# Patient Record
Sex: FEMALE | Race: WHITE | NOT HISPANIC OR LATINO | Employment: OTHER | URBAN - METROPOLITAN AREA
[De-identification: names, ages, dates, MRNs, and addresses within clinical notes are randomized per-mention and may not be internally consistent; named-entity substitution may affect disease eponyms.]

---

## 2017-02-21 RX ORDER — PRASUGREL 10 MG/1
10 TABLET, FILM COATED ORAL DAILY
Qty: 30 TABLET | Refills: 4 | Status: SHIPPED | OUTPATIENT
Start: 2017-02-21 | End: 2018-05-11 | Stop reason: HOSPADM

## 2017-05-08 ENCOUNTER — OFFICE VISIT (OUTPATIENT)
Dept: CARDIOLOGY | Facility: CLINIC | Age: 57
End: 2017-05-08

## 2017-05-08 VITALS
HEART RATE: 61 BPM | WEIGHT: 185 LBS | BODY MASS INDEX: 34.04 KG/M2 | HEIGHT: 62 IN | SYSTOLIC BLOOD PRESSURE: 157 MMHG | DIASTOLIC BLOOD PRESSURE: 82 MMHG

## 2017-05-08 DIAGNOSIS — I10 ESSENTIAL HYPERTENSION: Primary | ICD-10-CM

## 2017-05-08 DIAGNOSIS — I25.10 CORONARY ARTERY DISEASE INVOLVING NATIVE CORONARY ARTERY OF NATIVE HEART WITHOUT ANGINA PECTORIS: ICD-10-CM

## 2017-05-08 PROCEDURE — 99213 OFFICE O/P EST LOW 20 MIN: CPT | Performed by: INTERNAL MEDICINE

## 2017-05-08 RX ORDER — BUDESONIDE AND FORMOTEROL FUMARATE DIHYDRATE 160; 4.5 UG/1; UG/1
2 AEROSOL RESPIRATORY (INHALATION) 2 TIMES DAILY PRN
Refills: 0 | COMMUNITY
Start: 2017-05-06 | End: 2022-02-02

## 2017-05-08 RX ORDER — BISOPROLOL FUMARATE 5 MG/1
2.5 TABLET, FILM COATED ORAL DAILY
Qty: 45 TABLET | Refills: 3 | Status: SHIPPED | OUTPATIENT
Start: 2017-05-08 | End: 2017-07-28 | Stop reason: SDUPTHER

## 2017-05-08 RX ORDER — ALBUTEROL SULFATE 90 UG/1
1 AEROSOL, METERED RESPIRATORY (INHALATION) EVERY 4 HOURS PRN
Refills: 1 | COMMUNITY
Start: 2017-05-06 | End: 2023-02-17

## 2017-07-28 RX ORDER — BISOPROLOL FUMARATE 5 MG/1
2.5 TABLET, FILM COATED ORAL DAILY
Qty: 45 TABLET | Refills: 0 | Status: SHIPPED | OUTPATIENT
Start: 2017-07-28 | End: 2018-01-18 | Stop reason: SDUPTHER

## 2017-11-10 ENCOUNTER — TELEPHONE (OUTPATIENT)
Dept: CARDIOLOGY | Facility: CLINIC | Age: 57
End: 2017-11-10

## 2018-01-18 RX ORDER — BISOPROLOL FUMARATE 5 MG/1
2.5 TABLET, FILM COATED ORAL DAILY
Qty: 45 TABLET | Refills: 0 | Status: ON HOLD | OUTPATIENT
Start: 2018-01-18 | End: 2018-05-11

## 2018-01-18 RX ORDER — NITROGLYCERIN 0.4 MG/1
0.4 TABLET SUBLINGUAL
Qty: 25 TABLET | Refills: 3 | Status: ON HOLD | OUTPATIENT
Start: 2018-01-18 | End: 2018-05-11

## 2018-05-10 ENCOUNTER — HOSPITAL ENCOUNTER (OUTPATIENT)
Facility: HOSPITAL | Age: 58
Setting detail: OBSERVATION
Discharge: HOME | End: 2018-05-11
Attending: EMERGENCY MEDICINE | Admitting: INTERNAL MEDICINE

## 2018-05-10 ENCOUNTER — APPOINTMENT (OUTPATIENT)
Dept: GENERAL RADIOLOGY | Facility: HOSPITAL | Age: 58
End: 2018-05-10

## 2018-05-10 DIAGNOSIS — R07.9 CHEST PAIN, UNSPECIFIED TYPE: Primary | ICD-10-CM

## 2018-05-10 LAB
ALBUMIN SERPL-MCNC: 4.2 G/DL (ref 3.5–5.2)
ALBUMIN/GLOB SERPL: 1.4 G/DL
ALP SERPL-CCNC: 95 U/L (ref 39–117)
ALT SERPL W P-5'-P-CCNC: 33 U/L (ref 1–33)
ANION GAP SERPL CALCULATED.3IONS-SCNC: 12.9 MMOL/L
AST SERPL-CCNC: 23 U/L (ref 1–32)
BASOPHILS # BLD AUTO: 0.05 10*3/MM3 (ref 0–0.2)
BASOPHILS NFR BLD AUTO: 0.5 % (ref 0–1.5)
BILIRUB SERPL-MCNC: 0.3 MG/DL (ref 0.1–1.2)
BUN BLD-MCNC: 11 MG/DL (ref 6–20)
BUN/CREAT SERPL: 13.6 (ref 7–25)
CALCIUM SPEC-SCNC: 9.5 MG/DL (ref 8.6–10.5)
CHLORIDE SERPL-SCNC: 101 MMOL/L (ref 98–107)
CO2 SERPL-SCNC: 26.1 MMOL/L (ref 22–29)
CREAT BLD-MCNC: 0.81 MG/DL (ref 0.57–1)
DEPRECATED RDW RBC AUTO: 45.7 FL (ref 37–54)
EOSINOPHIL # BLD AUTO: 0.25 10*3/MM3 (ref 0–0.7)
EOSINOPHIL NFR BLD AUTO: 2.6 % (ref 0.3–6.2)
ERYTHROCYTE [DISTWIDTH] IN BLOOD BY AUTOMATED COUNT: 14.5 % (ref 11.7–13)
GFR SERPL CREATININE-BSD FRML MDRD: 72 ML/MIN/1.73
GLOBULIN UR ELPH-MCNC: 2.9 GM/DL
GLUCOSE BLD-MCNC: 243 MG/DL (ref 65–99)
GLUCOSE BLDC GLUCOMTR-MCNC: 210 MG/DL (ref 70–130)
GLUCOSE BLDC GLUCOMTR-MCNC: 273 MG/DL (ref 70–130)
HCT VFR BLD AUTO: 40.2 % (ref 35.6–45.5)
HGB BLD-MCNC: 12.6 G/DL (ref 11.9–15.5)
HOLD SPECIMEN: NORMAL
HOLD SPECIMEN: NORMAL
IMM GRANULOCYTES # BLD: 0.02 10*3/MM3 (ref 0–0.03)
IMM GRANULOCYTES NFR BLD: 0.2 % (ref 0–0.5)
LYMPHOCYTES # BLD AUTO: 2.18 10*3/MM3 (ref 0.9–4.8)
LYMPHOCYTES NFR BLD AUTO: 22.9 % (ref 19.6–45.3)
MAGNESIUM SERPL-MCNC: 2.2 MG/DL (ref 1.6–2.6)
MCH RBC QN AUTO: 27 PG (ref 26.9–32)
MCHC RBC AUTO-ENTMCNC: 31.3 G/DL (ref 32.4–36.3)
MCV RBC AUTO: 86.3 FL (ref 80.5–98.2)
MONOCYTES # BLD AUTO: 1.09 10*3/MM3 (ref 0.2–1.2)
MONOCYTES NFR BLD AUTO: 11.4 % (ref 5–12)
NEUTROPHILS # BLD AUTO: 5.97 10*3/MM3 (ref 1.9–8.1)
NEUTROPHILS NFR BLD AUTO: 62.6 % (ref 42.7–76)
PLATELET # BLD AUTO: 321 10*3/MM3 (ref 140–500)
PMV BLD AUTO: 10.4 FL (ref 6–12)
POTASSIUM BLD-SCNC: 4.3 MMOL/L (ref 3.5–5.2)
PROT SERPL-MCNC: 7.1 G/DL (ref 6–8.5)
RBC # BLD AUTO: 4.66 10*6/MM3 (ref 3.9–5.2)
SODIUM BLD-SCNC: 140 MMOL/L (ref 136–145)
TROPONIN T SERPL-MCNC: <0.01 NG/ML (ref 0–0.03)
WBC NRBC COR # BLD: 9.54 10*3/MM3 (ref 4.5–10.7)
WHOLE BLOOD HOLD SPECIMEN: NORMAL
WHOLE BLOOD HOLD SPECIMEN: NORMAL

## 2018-05-10 PROCEDURE — 80053 COMPREHEN METABOLIC PANEL: CPT | Performed by: EMERGENCY MEDICINE

## 2018-05-10 PROCEDURE — 71046 X-RAY EXAM CHEST 2 VIEWS: CPT

## 2018-05-10 PROCEDURE — 93005 ELECTROCARDIOGRAM TRACING: CPT | Performed by: EMERGENCY MEDICINE

## 2018-05-10 PROCEDURE — 94799 UNLISTED PULMONARY SVC/PX: CPT

## 2018-05-10 PROCEDURE — G0378 HOSPITAL OBSERVATION PER HR: HCPCS

## 2018-05-10 PROCEDURE — 93010 ELECTROCARDIOGRAM REPORT: CPT | Performed by: INTERNAL MEDICINE

## 2018-05-10 PROCEDURE — 99219 PR INITIAL OBSERVATION CARE/DAY 50 MINUTES: CPT | Performed by: INTERNAL MEDICINE

## 2018-05-10 PROCEDURE — 25010000002 ENOXAPARIN PER 10 MG: Performed by: INTERNAL MEDICINE

## 2018-05-10 PROCEDURE — 93005 ELECTROCARDIOGRAM TRACING: CPT | Performed by: INTERNAL MEDICINE

## 2018-05-10 PROCEDURE — 63710000001 INSULIN ASPART PER 5 UNITS: Performed by: HOSPITALIST

## 2018-05-10 PROCEDURE — 25010000002 HYDRALAZINE PER 20 MG: Performed by: INTERNAL MEDICINE

## 2018-05-10 PROCEDURE — 94640 AIRWAY INHALATION TREATMENT: CPT

## 2018-05-10 PROCEDURE — 93005 ELECTROCARDIOGRAM TRACING: CPT

## 2018-05-10 PROCEDURE — 96375 TX/PRO/DX INJ NEW DRUG ADDON: CPT

## 2018-05-10 PROCEDURE — 84484 ASSAY OF TROPONIN QUANT: CPT | Performed by: EMERGENCY MEDICINE

## 2018-05-10 PROCEDURE — 85025 COMPLETE CBC W/AUTO DIFF WBC: CPT

## 2018-05-10 PROCEDURE — 82962 GLUCOSE BLOOD TEST: CPT

## 2018-05-10 PROCEDURE — 25010000002 MORPHINE PER 10 MG: Performed by: NURSE PRACTITIONER

## 2018-05-10 PROCEDURE — 96376 TX/PRO/DX INJ SAME DRUG ADON: CPT

## 2018-05-10 PROCEDURE — 99284 EMERGENCY DEPT VISIT MOD MDM: CPT

## 2018-05-10 PROCEDURE — 96372 THER/PROPH/DIAG INJ SC/IM: CPT

## 2018-05-10 PROCEDURE — 25010000002 PROMETHAZINE PER 50 MG: Performed by: HOSPITALIST

## 2018-05-10 PROCEDURE — 25010000002 ONDANSETRON PER 1 MG: Performed by: NURSE PRACTITIONER

## 2018-05-10 PROCEDURE — 83735 ASSAY OF MAGNESIUM: CPT | Performed by: NURSE PRACTITIONER

## 2018-05-10 RX ORDER — ONDANSETRON 2 MG/ML
4 INJECTION INTRAMUSCULAR; INTRAVENOUS ONCE
Status: COMPLETED | OUTPATIENT
Start: 2018-05-10 | End: 2018-05-10

## 2018-05-10 RX ORDER — PREGABALIN 75 MG/1
75 CAPSULE ORAL NIGHTLY
Status: DISCONTINUED | OUTPATIENT
Start: 2018-05-10 | End: 2018-05-11 | Stop reason: HOSPADM

## 2018-05-10 RX ORDER — CHLORAL HYDRATE 500 MG
1000 CAPSULE ORAL
Status: ON HOLD | COMMUNITY
End: 2018-11-17

## 2018-05-10 RX ORDER — ALBUTEROL SULFATE 2.5 MG/3ML
2.5 SOLUTION RESPIRATORY (INHALATION)
Status: DISCONTINUED | OUTPATIENT
Start: 2018-05-10 | End: 2018-05-11 | Stop reason: HOSPADM

## 2018-05-10 RX ORDER — PIOGLITAZONEHYDROCHLORIDE 30 MG/1
30 TABLET ORAL DAILY
Status: DISCONTINUED | OUTPATIENT
Start: 2018-05-10 | End: 2018-05-11

## 2018-05-10 RX ORDER — DEXTROSE MONOHYDRATE 25 G/50ML
25 INJECTION, SOLUTION INTRAVENOUS
Status: DISCONTINUED | OUTPATIENT
Start: 2018-05-10 | End: 2018-05-11 | Stop reason: HOSPADM

## 2018-05-10 RX ORDER — ATORVASTATIN CALCIUM 10 MG/1
10 TABLET, FILM COATED ORAL DAILY
Status: DISCONTINUED | OUTPATIENT
Start: 2018-05-10 | End: 2018-05-11

## 2018-05-10 RX ORDER — NICOTINE POLACRILEX 4 MG
15 LOZENGE BUCCAL
Status: DISCONTINUED | OUTPATIENT
Start: 2018-05-10 | End: 2018-05-11 | Stop reason: HOSPADM

## 2018-05-10 RX ORDER — ASPIRIN 81 MG/1
81 TABLET ORAL DAILY
Status: DISCONTINUED | OUTPATIENT
Start: 2018-05-11 | End: 2018-05-11 | Stop reason: HOSPADM

## 2018-05-10 RX ORDER — FLUOXETINE HYDROCHLORIDE 20 MG/1
20 CAPSULE ORAL DAILY
Status: DISCONTINUED | OUTPATIENT
Start: 2018-05-10 | End: 2018-05-11 | Stop reason: HOSPADM

## 2018-05-10 RX ORDER — HYDRALAZINE HYDROCHLORIDE 20 MG/ML
10 INJECTION INTRAMUSCULAR; INTRAVENOUS EVERY 4 HOURS PRN
Status: DISCONTINUED | OUTPATIENT
Start: 2018-05-10 | End: 2018-05-11 | Stop reason: HOSPADM

## 2018-05-10 RX ORDER — SODIUM CHLORIDE 0.9 % (FLUSH) 0.9 %
10 SYRINGE (ML) INJECTION AS NEEDED
Status: DISCONTINUED | OUTPATIENT
Start: 2018-05-10 | End: 2018-05-11 | Stop reason: HOSPADM

## 2018-05-10 RX ORDER — SODIUM CHLORIDE 0.9 % (FLUSH) 0.9 %
1-10 SYRINGE (ML) INJECTION AS NEEDED
Status: DISCONTINUED | OUTPATIENT
Start: 2018-05-10 | End: 2018-05-11 | Stop reason: HOSPADM

## 2018-05-10 RX ORDER — PROMETHAZINE HYDROCHLORIDE 25 MG/ML
12.5 INJECTION, SOLUTION INTRAMUSCULAR; INTRAVENOUS EVERY 4 HOURS PRN
Status: DISCONTINUED | OUTPATIENT
Start: 2018-05-10 | End: 2018-05-11 | Stop reason: HOSPADM

## 2018-05-10 RX ORDER — BISOPROLOL FUMARATE 5 MG/1
2.5 TABLET, FILM COATED ORAL DAILY
Status: DISCONTINUED | OUTPATIENT
Start: 2018-05-10 | End: 2018-05-11 | Stop reason: HOSPADM

## 2018-05-10 RX ADMIN — PROMETHAZINE HYDROCHLORIDE 12.5 MG: 25 INJECTION INTRAMUSCULAR; INTRAVENOUS at 22:06

## 2018-05-10 RX ADMIN — FLUOXETINE HYDROCHLORIDE 20 MG: 20 CAPSULE ORAL at 18:44

## 2018-05-10 RX ADMIN — BISOPROLOL FUMARATE 2.5 MG: 5 TABLET ORAL at 18:44

## 2018-05-10 RX ADMIN — INSULIN ASPART 4 UNITS: 100 INJECTION, SOLUTION INTRAVENOUS; SUBCUTANEOUS at 21:38

## 2018-05-10 RX ADMIN — ALBUTEROL SULFATE 2.5 MG: 2.5 SOLUTION RESPIRATORY (INHALATION) at 20:45

## 2018-05-10 RX ADMIN — ONDANSETRON 4 MG: 2 INJECTION INTRAMUSCULAR; INTRAVENOUS at 16:42

## 2018-05-10 RX ADMIN — ONDANSETRON 4 MG: 2 INJECTION INTRAMUSCULAR; INTRAVENOUS at 15:01

## 2018-05-10 RX ADMIN — ATORVASTATIN CALCIUM 10 MG: 10 TABLET, FILM COATED ORAL at 18:44

## 2018-05-10 RX ADMIN — METFORMIN HYDROCHLORIDE 1000 MG: 1000 TABLET ORAL at 18:44

## 2018-05-10 RX ADMIN — MORPHINE SULFATE 4 MG: 4 INJECTION, SOLUTION INTRAMUSCULAR; INTRAVENOUS at 15:02

## 2018-05-10 RX ADMIN — ALBUTEROL SULFATE 2.5 MG: 2.5 SOLUTION RESPIRATORY (INHALATION) at 23:19

## 2018-05-10 RX ADMIN — PREGABALIN 75 MG: 75 CAPSULE ORAL at 21:49

## 2018-05-10 RX ADMIN — Medication 10 MG: at 22:05

## 2018-05-10 RX ADMIN — PIOGLITAZONE 30 MG: 30 TABLET ORAL at 18:44

## 2018-05-10 RX ADMIN — ENOXAPARIN SODIUM 40 MG: 40 INJECTION SUBCUTANEOUS at 18:44

## 2018-05-10 NOTE — ED PROVIDER NOTES
"  EMERGENCY DEPARTMENT ENCOUNTER    CHIEF COMPLAINT  Chief Complaint: Chest pain   History given by:pt  History limited by:N/A  Room Number: 33/33  PMD: Ezio Patterson MD      HPI:  Pt is a 57 y.o. female who presents with intermittent episodes of mid-sternal chest pain [described as pressure] that has been ongoing for approximately 1 week. Pt states she last experienced an episode of CP earlier this morning and reported radiation of pain into the LUE [today's episode was the first time pt experienced radiation of pain into the LUE]. Patient also complains of nausea, near syncope, dizziness, and mild SOA.  Patient denies diaphoresis, vomiting, or any other pertinent symptoms. Pt denies any recent stress tests being performed. Pt did however see her PCP yesterday, but failed to mention the ongoing CP. Pt takes ASA daily and reports that EMS administered 3 nitroglycerin pills en route to the ER. Pt states she did experience relief after medication was administered. Pt denies being on any other anticoagulating medications at this time. On evaluation, pt is still experiencing CP and rates the current pain as 3/10 [the pain is not currently radiating into the LUE and is staying localized at mid-sternal region].  Past Medical History of previous abnormal stress test, CAD, diabetes, angina, asthma, obesity, previous cardiac cath, and Coronary stent placement.     Duration: 1 week  Timing:intermittent episodes   Location:mid-sternal  Radiation:experienced one episode of CP with radiation into the LUE  Quality:\"pain described as a pressure\"  Intensity/Severity:current pain is 3/10  Progression:unchanged   Associated Symptoms:nausea, dizziness, near syncope, mild SOA  Aggravating Factors:None reported   Alleviating Factors:None reported   Previous Episodes:Pt has an extensive cardiac hx.  Treatment before arrival:Pt takes ASA daily and reports EMS administering 3 nitroglycerin pills en route to the ER. Pt states she " did experience relief after medication was administered.     PAST MEDICAL HISTORY  Active Ambulatory Problems     Diagnosis Date Noted   • Chest pain 01/28/2016   • Shortness of breath 01/28/2016   • Syncope and collapse 01/28/2016   • Weakness 01/28/2016   • Coronary artery disease 01/28/2016   • Soft tissue lesion of shoulder region 04/22/2015   • Disorder of rotator cuff 06/10/2015   • Shoulder pain 04/01/2015   • Rotator cuff tear arthropathy 06/10/2015   • Unstable angina pectoris 10/01/2015   • Injury of kidney 07/01/2016   • Type 2 diabetes mellitus 07/01/2016   • Hypertension 07/01/2016   • Hypotension 07/01/2016   • Hypovolemia 07/04/2016     Resolved Ambulatory Problems     Diagnosis Date Noted   • No Resolved Ambulatory Problems     Past Medical History:   Diagnosis Date   • Abnormal stress test    • CAD (coronary artery disease)    • Crescendo angina    • Diabetes mellitus    • H/O angioplasty    • Hiatal hernia    • Hyperlipidemia    • Hypertension    • Obesity    • Recurrent urinary tract infection        PAST SURGICAL HISTORY  Past Surgical History:   Procedure Laterality Date   • CARDIAC CATHETERIZATION      OUTCOME: SUCCESSFUL   • CORONARY STENT PLACEMENT     • HYSTERECTOMY     • TONSILLECTOMY AND ADENOIDECTOMY         FAMILY HISTORY  History reviewed. No pertinent family history.    SOCIAL HISTORY  Social History     Social History   • Marital status:      Spouse name: N/A   • Number of children: N/A   • Years of education: N/A     Occupational History   • Not on file.     Social History Main Topics   • Smoking status: Never Smoker   • Smokeless tobacco: Not on file   • Alcohol use Yes      Comment: SOCIAL   • Drug use: Unknown   • Sexual activity: Not on file     Other Topics Concern   • Not on file     Social History Narrative   • No narrative on file         ALLERGIES  Penicillins    REVIEW OF SYSTEMS  Review of Systems   Constitutional: Negative for chills, diaphoresis and fever.    HENT: Negative for sore throat.    Respiratory: Positive for shortness of breath (mild ).    Cardiovascular: Positive for chest pain.   Gastrointestinal: Positive for nausea. Negative for vomiting.   Genitourinary: Negative for dysuria.   Musculoskeletal: Negative for back pain.   Skin: Negative for rash.   Neurological: Positive for dizziness and syncope (near syncope ).   Psychiatric/Behavioral: The patient is not nervous/anxious.        PHYSICAL EXAM  ED Triage Vitals   Temp Heart Rate Resp BP SpO2   05/10/18 1340 05/10/18 1325 05/10/18 1338 05/10/18 1325 05/10/18 1331   97.9 °F (36.6 °C) 79 18 179/80 97 %       Physical Exam   Constitutional: She is oriented to person, place, and time and well-developed, well-nourished, and in no distress.   HENT:   Head: Normocephalic and atraumatic.   Mouth/Throat: Mucous membranes are normal.   Eyes: No scleral icterus.   Neck: Normal range of motion.   Cardiovascular: Normal rate, regular rhythm and normal heart sounds.    Pulmonary/Chest: Effort normal and breath sounds normal. She exhibits no tenderness.   Lungs clear to auscultation bilaterally    Abdominal: Soft. Bowel sounds are normal. There is no tenderness.   Musculoskeletal: Normal range of motion.   Neurological: She is alert and oriented to person, place, and time.   Skin: Skin is warm and dry.   Psychiatric: Mood and affect normal.   Nursing note and vitals reviewed.      LAB RESULTS  Recent Results (from the past 24 hour(s))   Comprehensive Metabolic Panel    Collection Time: 05/10/18  2:09 PM   Result Value Ref Range    Glucose 243 (H) 65 - 99 mg/dL    BUN 11 6 - 20 mg/dL    Creatinine 0.81 0.57 - 1.00 mg/dL    Sodium 140 136 - 145 mmol/L    Potassium 4.3 3.5 - 5.2 mmol/L    Chloride 101 98 - 107 mmol/L    CO2 26.1 22.0 - 29.0 mmol/L    Calcium 9.5 8.6 - 10.5 mg/dL    Total Protein 7.1 6.0 - 8.5 g/dL    Albumin 4.20 3.50 - 5.20 g/dL    ALT (SGPT) 33 1 - 33 U/L    AST (SGOT) 23 1 - 32 U/L    Alkaline  Phosphatase 95 39 - 117 U/L    Total Bilirubin 0.3 0.1 - 1.2 mg/dL    eGFR Non African Amer 72 >60 mL/min/1.73    Globulin 2.9 gm/dL    A/G Ratio 1.4 g/dL    BUN/Creatinine Ratio 13.6 7.0 - 25.0    Anion Gap 12.9 mmol/L   Troponin    Collection Time: 05/10/18  2:09 PM   Result Value Ref Range    Troponin T <0.010 0.000 - 0.030 ng/mL   Light Blue Top    Collection Time: 05/10/18  2:09 PM   Result Value Ref Range    Extra Tube hold for add-on    Green Top (Gel)    Collection Time: 05/10/18  2:09 PM   Result Value Ref Range    Extra Tube Hold for add-ons.    Lavender Top    Collection Time: 05/10/18  2:09 PM   Result Value Ref Range    Extra Tube hold for add-on    Gold Top - SST    Collection Time: 05/10/18  2:09 PM   Result Value Ref Range    Extra Tube Hold for add-ons.    CBC Auto Differential    Collection Time: 05/10/18  2:09 PM   Result Value Ref Range    WBC 9.54 4.50 - 10.70 10*3/mm3    RBC 4.66 3.90 - 5.20 10*6/mm3    Hemoglobin 12.6 11.9 - 15.5 g/dL    Hematocrit 40.2 35.6 - 45.5 %    MCV 86.3 80.5 - 98.2 fL    MCH 27.0 26.9 - 32.0 pg    MCHC 31.3 (L) 32.4 - 36.3 g/dL    RDW 14.5 (H) 11.7 - 13.0 %    RDW-SD 45.7 37.0 - 54.0 fl    MPV 10.4 6.0 - 12.0 fL    Platelets 321 140 - 500 10*3/mm3    Neutrophil % 62.6 42.7 - 76.0 %    Lymphocyte % 22.9 19.6 - 45.3 %    Monocyte % 11.4 5.0 - 12.0 %    Eosinophil % 2.6 0.3 - 6.2 %    Basophil % 0.5 0.0 - 1.5 %    Immature Grans % 0.2 0.0 - 0.5 %    Neutrophils, Absolute 5.97 1.90 - 8.10 10*3/mm3    Lymphocytes, Absolute 2.18 0.90 - 4.80 10*3/mm3    Monocytes, Absolute 1.09 0.20 - 1.20 10*3/mm3    Eosinophils, Absolute 0.25 0.00 - 0.70 10*3/mm3    Basophils, Absolute 0.05 0.00 - 0.20 10*3/mm3    Immature Grans, Absolute 0.02 0.00 - 0.03 10*3/mm3   Magnesium    Collection Time: 05/10/18  2:09 PM   Result Value Ref Range    Magnesium 2.2 1.6 - 2.6 mg/dL       I ordered the above labs and reviewed the results    RADIOLOGY  XR Chest 2 View   Final Result   No focal pulmonary  "consolidation. Follow-up as clinical   indications persist.       This report was finalized on 5/10/2018 2:38 PM by Dr. Sony Roth M.D.              I ordered the above noted radiological studies and reviewed the images on the PACS system.      EKG    ekg was interpreted by Dr. Brooks      PROGRESS AND CONSULTS    Reviewed pt's history and workup with Dr. Brooks.  At bedside evaluation, they agree with the plan of care.    1539  Placed consult to Cardiology.     1553  Discussed pt's case with Dr. Harper [cardiology] who agrees to admit.     1557  Rechecked pt who is resting comfortably and in no acute distress. Discussed lab/ radiology results with pt. Discussed consult with cardiology and plan to admit pt to Dr. Harper. Pt understands and agrees to admission. All questions addressed at this time.    ADMISSION    Discussed treatment plan and reason for admission with pt/family and admitting physician.  Pt/family voiced understanding of the plan for admission for further testing/treatment as needed.      DIAGNOSIS  Final diagnoses:   Chest pain, unspecified type     COURSE & MEDICAL DECISION MAKING  Pertinent Labs and Imaging studies that were ordered and reviewed are noted above.  Results were reviewed/discussed with the patient and they were also made aware of online assess.   Pt also made aware that some labs, such as cultures, will not be resulted during ER visit and follow up with PMD is necessary.     MEDICATIONS GIVEN IN ER  Medications   sodium chloride 0.9 % flush 10 mL (not administered)   morphine injection 4 mg (4 mg Intravenous Given 5/10/18 1502)   ondansetron (ZOFRAN) injection 4 mg (4 mg Intravenous Given 5/10/18 1501)       /80   Pulse 79   Temp 97.9 °F (36.6 °C) (Tympanic)   Resp 18   Ht 149.9 cm (59\")   Wt 82.6 kg (182 lb)   SpO2 97%   BMI 36.76 kg/m²       I personally reviewed the past medical history, past surgical history, social history, family history, " current medications and allergies as they appear in this chart.  The scribe's note accurately reflects the work and decisions made by me.     Documentation assistance provided by williams Guo for SEN Hadley on 5/10/2018 at 4:08 PM. Information recorded by the scribe was done at my direction and has been verified and validated by me.           Morales Guo  05/10/18 1608       BRETT Le  05/10/18 9418

## 2018-05-10 NOTE — H&P
Patient Care Team:  Ezio Patterson MD as PCP - General  Ezio Patterson MD as PCP - Family Medicine    Chief complaint CHEST PAIN    Subjective     Patient is a 57 y.o. female presents with RETROSTERNAL CHEST PAIN. Onset of symptoms was abrupt starting 1 day ago.  Symptoms are associated with SOB.  Symptoms are aggravated by NONE.   Symptoms improve with NONE. Severity MODERATE Context AT REST  Quality PRESSURE    Review of Systems   All systems were reviewed and negative except for:  Constitution:  positive for fatigue  Cardiovascular: positive for  chest pressure / pain, on exertion    History  Past Medical History:   Diagnosis Date   • Abnormal stress test    • CAD (coronary artery disease)    • Crescendo angina    • Diabetes mellitus    • H/O angioplasty    • Hiatal hernia    • Hyperlipidemia    • Hypertension    • Obesity    • Recurrent urinary tract infection      Past Surgical History:   Procedure Laterality Date   • CARDIAC CATHETERIZATION      OUTCOME: SUCCESSFUL   • CORONARY STENT PLACEMENT     • HYSTERECTOMY     • TONSILLECTOMY AND ADENOIDECTOMY       History reviewed. No pertinent family history.  Social History   Substance Use Topics   • Smoking status: Never Smoker   • Smokeless tobacco: Not on file   • Alcohol use Yes      Comment: SOCIAL     No prescriptions prior to admission.     Allergies:  Penicillins    Objective     Vital Signs  Temp:  [97.8 °F (36.6 °C)-97.9 °F (36.6 °C)] 97.8 °F (36.6 °C)  Heart Rate:  [61-79] 77  Resp:  [18-20] 20  BP: (167-179)/(70-80) 167/70    Physical Exam:      General Appearance:    Alert, cooperative, in no acute distress   Head:    Normocephalic, without obvious abnormality, atraumatic   Eyes:            Lids and lashes normal, conjunctivae and sclerae normal, no   icterus, no pallor, corneas clear, PERRLA   Ears:    Ears appear intact with no abnormalities noted   Throat:   No oral lesions, no thrush, oral mucosa moist   Neck:   No adenopathy,  supple, trachea midline, no thyromegaly, no     carotid bruit, no JVD   Back:     No kyphosis present, no scoliosis present, no skin lesions,       erythema or scars, no tenderness to percussion or                   palpation,   range of motion normal   Lungs:     Clear to auscultation,respirations regular, even and                   unlabored    Heart:    Regular rhythm and normal rate, normal S1 and S2, no            murmur, no gallop, no rub, no click   Breast Exam:    Deferred   Abdomen:     Normal bowel sounds, no masses, no organomegaly, soft        non-tender, non-distended, no guarding, no rebound                 tenderness   Genitalia:    Deferred   Extremities:   Moves all extremities well, no edema, no cyanosis, no              redness   Pulses:   Pulses palpable and equal bilaterally   Skin:   No bleeding, bruising or rash   Lymph nodes:   No palpable adenopathy   Neurologic:   Cranial nerves 2 - 12 grossly intact, sensation intact, DTR        present and equal bilaterally       Results Review:    I reviewed the patient's new clinical results.    Assessment/Plan     Active Problems:    Chest pain      Patient Active Problem List   Diagnosis   • Chest pain   • Shortness of breath   • Syncope and collapse   • Weakness   • Coronary artery disease   • Soft tissue lesion of shoulder region   • Disorder of rotator cuff   • Shoulder pain   • Rotator cuff tear arthropathy   • Unstable angina pectoris   • Injury of kidney   • Type 2 diabetes mellitus   • Hypertension   • Hypotension   • Hypovolemia         I discussed the patients findings and my recommendations with patient.     Roe Harper MD  05/10/18  4:57 PM    Time:

## 2018-05-10 NOTE — PLAN OF CARE
Problem: Patient Care Overview  Goal: Plan of Care Review  Outcome: Ongoing (interventions implemented as appropriate)   05/10/18 9463   Coping/Psychosocial   Plan of Care Reviewed With patient   Plan of Care Review   Progress no change   OTHER   Outcome Summary BP elevated. no chest pain at this time. admission complete. stress test and echo ordered. Will continue to monitor.        Problem: Pain, Acute (Adult)  Goal: Identify Related Risk Factors and Signs and Symptoms  Outcome: Ongoing (interventions implemented as appropriate)    Goal: Acceptable Pain Control/Comfort Level  Outcome: Ongoing (interventions implemented as appropriate)

## 2018-05-10 NOTE — ED PROVIDER NOTES
Pt presents to the ED c/o constant CP which radiates to her LUE for the past 2 hours.  Pt reports intermittent episodes of mid-sternal CP over the past week.      On exam, pt's heart is RRR, lungs are CTAB, and abd has mild epigastric tenderness with no guarding or rebound.     EKG           EKG time: 1329  Rhythm/Rate: NSR, 79  P waves and AL: normal  QRS, axis: normal   ST and T waves: normal     Interpreted Contemporaneously by me, independently viewed  No prior for comparison.    Plan to consult Dr. Fatima (cardiology).    MD ATTESTATION NOTE    The FERNANDO and I have discussed this patient's history, physical exam, and treatment plan.  I have reviewed the documentation and personally had a face to face interaction with the patient. I affirm the documentation and agree with the treatment and plan.  The attached note describes my personal findings.    Documentation assistance provided by williams Laureano for Dr. Brooks Information recorded by the scribe was done at my direction and has been verified and validated by me.          Denisse Laureano  05/10/18 1526       Nixon Brooks MD  05/10/18 7985

## 2018-05-11 ENCOUNTER — APPOINTMENT (OUTPATIENT)
Dept: NUCLEAR MEDICINE | Facility: HOSPITAL | Age: 58
End: 2018-05-11

## 2018-05-11 ENCOUNTER — APPOINTMENT (OUTPATIENT)
Dept: CARDIOLOGY | Facility: HOSPITAL | Age: 58
End: 2018-05-11
Attending: INTERNAL MEDICINE

## 2018-05-11 VITALS
OXYGEN SATURATION: 96 % | HEART RATE: 58 BPM | DIASTOLIC BLOOD PRESSURE: 54 MMHG | SYSTOLIC BLOOD PRESSURE: 128 MMHG | TEMPERATURE: 98.4 F | RESPIRATION RATE: 20 BRPM | BODY MASS INDEX: 36 KG/M2 | HEIGHT: 59 IN | WEIGHT: 178.6 LBS

## 2018-05-11 LAB
ANION GAP SERPL CALCULATED.3IONS-SCNC: 12.7 MMOL/L
BH CV ECHO MEAS - ACS: 1.7 CM
BH CV ECHO MEAS - AO MAX PG (FULL): 3 MMHG
BH CV ECHO MEAS - AO MAX PG: 7 MMHG
BH CV ECHO MEAS - AO MEAN PG (FULL): 1 MMHG
BH CV ECHO MEAS - AO MEAN PG: 3 MMHG
BH CV ECHO MEAS - AO ROOT AREA (BSA CORRECTED): 1.8
BH CV ECHO MEAS - AO ROOT AREA: 8 CM^2
BH CV ECHO MEAS - AO ROOT DIAM: 3.2 CM
BH CV ECHO MEAS - AO V2 MAX: 132 CM/SEC
BH CV ECHO MEAS - AO V2 MEAN: 82.2 CM/SEC
BH CV ECHO MEAS - AO V2 VTI: 27.5 CM
BH CV ECHO MEAS - AVA(I,A): 2.7 CM^2
BH CV ECHO MEAS - AVA(I,D): 2.7 CM^2
BH CV ECHO MEAS - AVA(V,A): 2.4 CM^2
BH CV ECHO MEAS - AVA(V,D): 2.4 CM^2
BH CV ECHO MEAS - BSA(HAYCOCK): 1.9 M^2
BH CV ECHO MEAS - BSA: 1.8 M^2
BH CV ECHO MEAS - BZI_BMI: 36 KILOGRAMS/M^2
BH CV ECHO MEAS - BZI_METRIC_HEIGHT: 149.9 CM
BH CV ECHO MEAS - BZI_METRIC_WEIGHT: 80.7 KG
BH CV ECHO MEAS - CONTRAST EF (2CH): 63.8 ML/M^2
BH CV ECHO MEAS - CONTRAST EF 4CH: 59.4 ML/M^2
BH CV ECHO MEAS - EDV(CUBED): 85.2 ML
BH CV ECHO MEAS - EDV(MOD-SP2): 47 ML
BH CV ECHO MEAS - EDV(MOD-SP4): 69 ML
BH CV ECHO MEAS - EDV(TEICH): 87.7 ML
BH CV ECHO MEAS - EF(CUBED): 79.4 %
BH CV ECHO MEAS - EF(MOD-BP): 60 %
BH CV ECHO MEAS - EF(MOD-SP2): 63.8 %
BH CV ECHO MEAS - EF(MOD-SP4): 59.4 %
BH CV ECHO MEAS - EF(TEICH): 71.9 %
BH CV ECHO MEAS - ESV(CUBED): 17.6 ML
BH CV ECHO MEAS - ESV(MOD-SP2): 17 ML
BH CV ECHO MEAS - ESV(MOD-SP4): 28 ML
BH CV ECHO MEAS - ESV(TEICH): 24.6 ML
BH CV ECHO MEAS - FS: 40.9 %
BH CV ECHO MEAS - IVS/LVPW: 1.1
BH CV ECHO MEAS - IVSD: 1.2 CM
BH CV ECHO MEAS - LAT PEAK E' VEL: 7.7 CM/SEC
BH CV ECHO MEAS - LV DIASTOLIC VOL/BSA (35-75): 39.3 ML/M^2
BH CV ECHO MEAS - LV MASS(C)D: 180 GRAMS
BH CV ECHO MEAS - LV MASS(C)DI: 102.5 GRAMS/M^2
BH CV ECHO MEAS - LV MAX PG: 4 MMHG
BH CV ECHO MEAS - LV MEAN PG: 2 MMHG
BH CV ECHO MEAS - LV SYSTOLIC VOL/BSA (12-30): 16 ML/M^2
BH CV ECHO MEAS - LV V1 MAX: 100 CM/SEC
BH CV ECHO MEAS - LV V1 MEAN: 68.7 CM/SEC
BH CV ECHO MEAS - LV V1 VTI: 23.7 CM
BH CV ECHO MEAS - LVIDD: 4.4 CM
BH CV ECHO MEAS - LVIDS: 2.6 CM
BH CV ECHO MEAS - LVLD AP2: 6 CM
BH CV ECHO MEAS - LVLD AP4: 6.9 CM
BH CV ECHO MEAS - LVLS AP2: 4.7 CM
BH CV ECHO MEAS - LVLS AP4: 5.8 CM
BH CV ECHO MEAS - LVOT AREA (M): 3.1 CM^2
BH CV ECHO MEAS - LVOT AREA: 3.1 CM^2
BH CV ECHO MEAS - LVOT DIAM: 2 CM
BH CV ECHO MEAS - LVPWD: 1.1 CM
BH CV ECHO MEAS - MED PEAK E' VEL: 6.7 CM/SEC
BH CV ECHO MEAS - MV A DUR: 0.19 SEC
BH CV ECHO MEAS - MV A MAX VEL: 85.9 CM/SEC
BH CV ECHO MEAS - MV DEC SLOPE: 492 CM/SEC^2
BH CV ECHO MEAS - MV DEC TIME: 0.17 SEC
BH CV ECHO MEAS - MV E MAX VEL: 70.3 CM/SEC
BH CV ECHO MEAS - MV E/A: 0.82
BH CV ECHO MEAS - MV MAX PG: 4.2 MMHG
BH CV ECHO MEAS - MV MEAN PG: 2 MMHG
BH CV ECHO MEAS - MV P1/2T MAX VEL: 105 CM/SEC
BH CV ECHO MEAS - MV P1/2T: 62.5 MSEC
BH CV ECHO MEAS - MV V2 MAX: 102 CM/SEC
BH CV ECHO MEAS - MV V2 MEAN: 56.2 CM/SEC
BH CV ECHO MEAS - MV V2 VTI: 32.4 CM
BH CV ECHO MEAS - MVA P1/2T LCG: 2.1 CM^2
BH CV ECHO MEAS - MVA(P1/2T): 3.5 CM^2
BH CV ECHO MEAS - MVA(VTI): 2.3 CM^2
BH CV ECHO MEAS - PA ACC TIME: 0.11 SEC
BH CV ECHO MEAS - PA MAX PG (FULL): 1.9 MMHG
BH CV ECHO MEAS - PA MAX PG: 4.5 MMHG
BH CV ECHO MEAS - PA PR(ACCEL): 30 MMHG
BH CV ECHO MEAS - PA V2 MAX: 106 CM/SEC
BH CV ECHO MEAS - PULM A REVS DUR: 0.15 SEC
BH CV ECHO MEAS - PULM A REVS VEL: 22.8 CM/SEC
BH CV ECHO MEAS - PULM DIAS VEL: 32 CM/SEC
BH CV ECHO MEAS - PULM S/D: 0.7
BH CV ECHO MEAS - PULM SYS VEL: 22.3 CM/SEC
BH CV ECHO MEAS - PVA(V,A): 2.6 CM^2
BH CV ECHO MEAS - PVA(V,D): 2.6 CM^2
BH CV ECHO MEAS - QP/QS: 1
BH CV ECHO MEAS - RAP SYSTOLE: 3 MMHG
BH CV ECHO MEAS - RV MAX PG: 2.5 MMHG
BH CV ECHO MEAS - RV MEAN PG: 2 MMHG
BH CV ECHO MEAS - RV V1 MAX: 79.8 CM/SEC
BH CV ECHO MEAS - RV V1 MEAN: 57.9 CM/SEC
BH CV ECHO MEAS - RV V1 VTI: 22.1 CM
BH CV ECHO MEAS - RVOT AREA: 3.5 CM^2
BH CV ECHO MEAS - RVOT DIAM: 2.1 CM
BH CV ECHO MEAS - RVSP: 15.3 MMHG
BH CV ECHO MEAS - SI(AO): 126 ML/M^2
BH CV ECHO MEAS - SI(CUBED): 38.5 ML/M^2
BH CV ECHO MEAS - SI(LVOT): 42.4 ML/M^2
BH CV ECHO MEAS - SI(MOD-SP2): 17.1 ML/M^2
BH CV ECHO MEAS - SI(MOD-SP4): 23.4 ML/M^2
BH CV ECHO MEAS - SI(TEICH): 35.9 ML/M^2
BH CV ECHO MEAS - SV(AO): 221.2 ML
BH CV ECHO MEAS - SV(CUBED): 67.6 ML
BH CV ECHO MEAS - SV(LVOT): 74.5 ML
BH CV ECHO MEAS - SV(MOD-SP2): 30 ML
BH CV ECHO MEAS - SV(MOD-SP4): 41 ML
BH CV ECHO MEAS - SV(RVOT): 76.5 ML
BH CV ECHO MEAS - SV(TEICH): 63.1 ML
BH CV ECHO MEAS - TAPSE (>1.6): 2 CM2
BH CV ECHO MEAS - TR MAX VEL: 175 CM/SEC
BH CV ECHO MEASUREMENTS AVERAGE E/E' RATIO: 9.76
BH CV STRESS BP STAGE 1: NORMAL
BH CV STRESS BP STAGE 2: NORMAL
BH CV STRESS DURATION MIN STAGE 1: 3
BH CV STRESS DURATION MIN STAGE 2: 0
BH CV STRESS DURATION SEC STAGE 1: 0
BH CV STRESS DURATION SEC STAGE 2: 49
BH CV STRESS GRADE STAGE 1: 10
BH CV STRESS GRADE STAGE 2: 12
BH CV STRESS HR STAGE 1: 91
BH CV STRESS HR STAGE 2: 90
BH CV STRESS METS STAGE 1: 5
BH CV STRESS METS STAGE 2: 7.5
BH CV STRESS PROTOCOL 1: NORMAL
BH CV STRESS PROTOCOL 2 COMMENTS STAGE 1: NORMAL
BH CV STRESS PROTOCOL 2 DOSE REGADENOSON STAGE 1: 0.4
BH CV STRESS PROTOCOL 2 DURATION MIN STAGE 1: 0
BH CV STRESS PROTOCOL 2 DURATION SEC STAGE 1: 10
BH CV STRESS PROTOCOL 2 STAGE 1: 1
BH CV STRESS PROTOCOL 2: NORMAL
BH CV STRESS RECOVERY BP: NORMAL MMHG
BH CV STRESS RECOVERY HR: 65 BPM
BH CV STRESS SPEED STAGE 1: 1.7
BH CV STRESS SPEED STAGE 2: 2.5
BH CV STRESS STAGE 1: 1
BH CV STRESS STAGE 2: 2
BH CV XLRA - RV BASE: 3 CM
BH CV XLRA - RV LENGTH: 6.7 CM
BH CV XLRA - RV MID: 2.2 CM
BH CV XLRA - TDI S': 11.6 CM/SEC
BUN BLD-MCNC: 10 MG/DL (ref 6–20)
BUN/CREAT SERPL: 12.5 (ref 7–25)
CALCIUM SPEC-SCNC: 9.6 MG/DL (ref 8.6–10.5)
CHLORIDE SERPL-SCNC: 99 MMOL/L (ref 98–107)
CHOLEST SERPL-MCNC: 175 MG/DL (ref 0–200)
CO2 SERPL-SCNC: 26.3 MMOL/L (ref 22–29)
CREAT BLD-MCNC: 0.8 MG/DL (ref 0.57–1)
GFR SERPL CREATININE-BSD FRML MDRD: 73 ML/MIN/1.73
GLUCOSE BLD-MCNC: 207 MG/DL (ref 65–99)
GLUCOSE BLDC GLUCOMTR-MCNC: 154 MG/DL (ref 70–130)
GLUCOSE BLDC GLUCOMTR-MCNC: 199 MG/DL (ref 70–130)
GLUCOSE BLDC GLUCOMTR-MCNC: 303 MG/DL (ref 70–130)
HDLC SERPL-MCNC: 32 MG/DL (ref 40–60)
LDLC SERPL CALC-MCNC: 103 MG/DL (ref 0–100)
LDLC/HDLC SERPL: 3.23 {RATIO}
LEFT ATRIUM VOLUME INDEX: 25 ML/M2
LV EF NUC BP: 60 %
MAXIMAL PREDICTED HEART RATE: 161 BPM
PERCENT MAX PREDICTED HR: 48.45 %
POTASSIUM BLD-SCNC: 4.3 MMOL/L (ref 3.5–5.2)
SODIUM BLD-SCNC: 138 MMOL/L (ref 136–145)
STRESS BASELINE BP: NORMAL MMHG
STRESS BASELINE HR: 64 BPM
STRESS PERCENT HR: 57 %
STRESS POST EXERCISE DUR MIN: 3 MIN
STRESS POST EXERCISE DUR SEC: 49 SEC
STRESS POST PEAK BP: NORMAL MMHG
STRESS POST PEAK HR: 78 BPM
STRESS TARGET HR: 137 BPM
TRIGL SERPL-MCNC: 198 MG/DL (ref 0–150)
TROPONIN T SERPL-MCNC: <0.01 NG/ML (ref 0–0.03)
TROPONIN T SERPL-MCNC: <0.01 NG/ML (ref 0–0.03)
VLDLC SERPL-MCNC: 39.6 MG/DL (ref 5–40)

## 2018-05-11 PROCEDURE — A9500 TC99M SESTAMIBI: HCPCS | Performed by: INTERNAL MEDICINE

## 2018-05-11 PROCEDURE — 93306 TTE W/DOPPLER COMPLETE: CPT

## 2018-05-11 PROCEDURE — 78452 HT MUSCLE IMAGE SPECT MULT: CPT | Performed by: INTERNAL MEDICINE

## 2018-05-11 PROCEDURE — 93018 CV STRESS TEST I&R ONLY: CPT | Performed by: INTERNAL MEDICINE

## 2018-05-11 PROCEDURE — 0 TECHNETIUM SESTAMIBI: Performed by: INTERNAL MEDICINE

## 2018-05-11 PROCEDURE — G0378 HOSPITAL OBSERVATION PER HR: HCPCS

## 2018-05-11 PROCEDURE — 25010000002 REGADENOSON 0.4 MG/5ML SOLUTION: Performed by: INTERNAL MEDICINE

## 2018-05-11 PROCEDURE — 82962 GLUCOSE BLOOD TEST: CPT

## 2018-05-11 PROCEDURE — 80061 LIPID PANEL: CPT | Performed by: NURSE PRACTITIONER

## 2018-05-11 PROCEDURE — 99217 PR OBSERVATION CARE DISCHARGE MANAGEMENT: CPT | Performed by: INTERNAL MEDICINE

## 2018-05-11 PROCEDURE — 80048 BASIC METABOLIC PNL TOTAL CA: CPT | Performed by: INTERNAL MEDICINE

## 2018-05-11 PROCEDURE — 94799 UNLISTED PULMONARY SVC/PX: CPT

## 2018-05-11 PROCEDURE — 84484 ASSAY OF TROPONIN QUANT: CPT | Performed by: NURSE PRACTITIONER

## 2018-05-11 PROCEDURE — 0399T ADULT TRANSTHORACIC ECHO COMPLETE W/ CONT IF NECESSARY PER PROTOCOL: CPT | Performed by: INTERNAL MEDICINE

## 2018-05-11 PROCEDURE — 93306 TTE W/DOPPLER COMPLETE: CPT | Performed by: INTERNAL MEDICINE

## 2018-05-11 PROCEDURE — 84484 ASSAY OF TROPONIN QUANT: CPT | Performed by: INTERNAL MEDICINE

## 2018-05-11 PROCEDURE — 93017 CV STRESS TEST TRACING ONLY: CPT

## 2018-05-11 PROCEDURE — 63710000001 INSULIN ASPART PER 5 UNITS: Performed by: HOSPITALIST

## 2018-05-11 PROCEDURE — 0399T HC MYOCARDL STRAIN IMAG QUAN ASSMT PER SESS: CPT

## 2018-05-11 PROCEDURE — 78452 HT MUSCLE IMAGE SPECT MULT: CPT

## 2018-05-11 RX ORDER — ATORVASTATIN CALCIUM 20 MG/1
40 TABLET, FILM COATED ORAL DAILY
Status: DISCONTINUED | OUTPATIENT
Start: 2018-05-12 | End: 2018-05-11 | Stop reason: HOSPADM

## 2018-05-11 RX ORDER — SODIUM CHLORIDE 0.9 % (FLUSH) 0.9 %
1-10 SYRINGE (ML) INJECTION AS NEEDED
Status: DISCONTINUED | OUTPATIENT
Start: 2018-05-11 | End: 2018-05-11 | Stop reason: HOSPADM

## 2018-05-11 RX ORDER — ACETAMINOPHEN 325 MG/1
650 TABLET ORAL EVERY 4 HOURS PRN
Status: DISCONTINUED | OUTPATIENT
Start: 2018-05-11 | End: 2018-05-11 | Stop reason: HOSPADM

## 2018-05-11 RX ORDER — BISOPROLOL FUMARATE 5 MG/1
2.5 TABLET, FILM COATED ORAL DAILY
Qty: 45 TABLET | Refills: 0 | Status: SHIPPED | OUTPATIENT
Start: 2018-05-11 | End: 2019-02-27 | Stop reason: SDUPTHER

## 2018-05-11 RX ORDER — NITROGLYCERIN 0.4 MG/1
0.4 TABLET SUBLINGUAL
Status: DISCONTINUED | OUTPATIENT
Start: 2018-05-11 | End: 2018-05-11 | Stop reason: HOSPADM

## 2018-05-11 RX ORDER — NITROGLYCERIN 0.4 MG/1
0.4 TABLET SUBLINGUAL
Qty: 25 TABLET | Refills: 0 | Status: SHIPPED | OUTPATIENT
Start: 2018-05-11 | End: 2018-09-05 | Stop reason: SDUPTHER

## 2018-05-11 RX ORDER — PIOGLITAZONEHYDROCHLORIDE 45 MG/1
45 TABLET ORAL DAILY
Qty: 30 TABLET | Refills: 0 | Status: SHIPPED | OUTPATIENT
Start: 2018-05-12 | End: 2018-05-21 | Stop reason: ALTCHOICE

## 2018-05-11 RX ORDER — PRAVASTATIN SODIUM 20 MG
20 TABLET ORAL NIGHTLY
Qty: 30 TABLET | Refills: 1 | Status: SHIPPED | OUTPATIENT
Start: 2018-05-11 | End: 2018-06-07 | Stop reason: ALTCHOICE

## 2018-05-11 RX ORDER — PANTOPRAZOLE SODIUM 40 MG/1
40 TABLET, DELAYED RELEASE ORAL
Status: DISCONTINUED | OUTPATIENT
Start: 2018-05-11 | End: 2018-05-11 | Stop reason: HOSPADM

## 2018-05-11 RX ADMIN — TECHNETIUM TC 99M SESTAMIBI 1 DOSE: 1 INJECTION INTRAVENOUS at 09:15

## 2018-05-11 RX ADMIN — ACETAMINOPHEN 650 MG: 325 TABLET, FILM COATED ORAL at 12:19

## 2018-05-11 RX ADMIN — FLUOXETINE HYDROCHLORIDE 20 MG: 20 CAPSULE ORAL at 12:13

## 2018-05-11 RX ADMIN — ALBUTEROL SULFATE 2.5 MG: 2.5 SOLUTION RESPIRATORY (INHALATION) at 06:22

## 2018-05-11 RX ADMIN — BISOPROLOL FUMARATE 2.5 MG: 5 TABLET ORAL at 12:12

## 2018-05-11 RX ADMIN — REGADENOSON 0.4 MG: 0.08 INJECTION, SOLUTION INTRAVENOUS at 09:15

## 2018-05-11 RX ADMIN — TECHNETIUM TC 99M SESTAMIBI 1 DOSE: 1 INJECTION INTRAVENOUS at 06:45

## 2018-05-11 RX ADMIN — ATORVASTATIN CALCIUM 10 MG: 10 TABLET, FILM COATED ORAL at 12:12

## 2018-05-11 RX ADMIN — INSULIN ASPART 5 UNITS: 100 INJECTION, SOLUTION INTRAVENOUS; SUBCUTANEOUS at 12:16

## 2018-05-11 RX ADMIN — ALBUTEROL SULFATE 2.5 MG: 2.5 SOLUTION RESPIRATORY (INHALATION) at 11:07

## 2018-05-11 RX ADMIN — PIOGLITAZONE 30 MG: 30 TABLET ORAL at 12:12

## 2018-05-11 RX ADMIN — ALBUTEROL SULFATE 2.5 MG: 2.5 SOLUTION RESPIRATORY (INHALATION) at 15:05

## 2018-05-11 RX ADMIN — ALBUTEROL SULFATE 2.5 MG: 2.5 SOLUTION RESPIRATORY (INHALATION) at 03:33

## 2018-05-11 RX ADMIN — METFORMIN HYDROCHLORIDE 1000 MG: 1000 TABLET ORAL at 12:12

## 2018-05-11 RX ADMIN — ASPIRIN 81 MG: 81 TABLET ORAL at 12:12

## 2018-05-11 RX ADMIN — PANTOPRAZOLE SODIUM 40 MG: 40 TABLET, DELAYED RELEASE ORAL at 12:16

## 2018-05-11 NOTE — NURSING NOTE
"Diabetes Education  Assessment/Teaching    Patient Name:  Lily Unger  YOB: 1960  MRN: [id number]  Admit Date:  5/10/2018      Assessment Date:  5/11/2018  Flowsheet Row Most Recent Value   General Information    Referral From: MD yanna Ricardo   Height 149.9 cm (59\")   Height Method Stated   Weight 81 kg (178 lb 9.6 oz)   Diabetes History   What type of diabetes do you have? Type 2   Do you test your blood sugar at home? yes   Frequency of checks bid per pt   Do you have any diabetes complications? neuropathy   Education Preferences   Barriers to Learning -- [cultural. e.g pt describes not wanting to take so many meds]   Assessment Topics   Reducing Risk - Assessment Needs education   Healthy Coping - Assessment Competent [pt has supportive son at bedside. ]       Flowsheet Row Most Recent Value   DM Education Needs   Meter Has own   Medication Oral [metformin, Actos. ]   Reducing Risks Blood pressure, Foot care, Neuropathy [advise pt to f/u with podiatry as she has neuropathy ]   Healthy Coping Appropriate   Motivation Engaged   Teaching Method Explanation, Discussion, Handouts   Patient Response Verbalized understanding, Needs reinforcement            Electronically signed by:  Modesta Cee, RN, BSN, CDE   05/11/18 4:31 PM  "

## 2018-05-11 NOTE — PLAN OF CARE
Problem: Patient Care Overview  Goal: Plan of Care Review  Outcome: Outcome(s) achieved Date Met: 05/11/18 05/11/18 1543   Coping/Psychosocial   Plan of Care Reviewed With patient   Plan of Care Review   Progress improving   OTHER   Outcome Summary VSS; echo complete; stress test negative, see d/c orders; diabetes RN to come talk to patient prior to d/c     Goal: Individualization and Mutuality  Outcome: Outcome(s) achieved Date Met: 05/11/18    Goal: Discharge Needs Assessment  Outcome: Outcome(s) achieved Date Met: 05/11/18    Goal: Interprofessional Rounds/Family Conf  Outcome: Outcome(s) achieved Date Met: 05/11/18      Problem: Pain, Acute (Adult)  Goal: Identify Related Risk Factors and Signs and Symptoms  Outcome: Outcome(s) achieved Date Met: 05/11/18    Goal: Acceptable Pain Control/Comfort Level  Outcome: Outcome(s) achieved Date Met: 05/11/18

## 2018-05-11 NOTE — DISCHARGE INSTRUCTIONS
Return to ER per EMS for any recurrent symptoms including, but not limited to: chest pain/pressure/tightness, weakness, shortness of breath, dizziness, palpitations, near syncope or syncope at which time cardiac catheterization would be recommended

## 2018-05-11 NOTE — PLAN OF CARE
Problem: Patient Care Overview  Goal: Plan of Care Review  Outcome: Ongoing (interventions implemented as appropriate)   05/11/18 0659   Coping/Psychosocial   Plan of Care Reviewed With patient   Plan of Care Review   Progress improving   OTHER   Outcome Summary VSS. Echo and stress test planned for today. Pt has had nothing by mouth all night. BP elevated at beginning of shift. PRN Hydralazine added for SBP >170 and DBP>90. BP improved. Pt c/o nausea. Phenergan added. Continue to monitor.      Goal: Individualization and Mutuality  Outcome: Ongoing (interventions implemented as appropriate)    Goal: Discharge Needs Assessment  Outcome: Ongoing (interventions implemented as appropriate)      Problem: Pain, Acute (Adult)  Goal: Identify Related Risk Factors and Signs and Symptoms  Outcome: Ongoing (interventions implemented as appropriate)    Goal: Acceptable Pain Control/Comfort Level  Outcome: Ongoing (interventions implemented as appropriate)

## 2018-05-11 NOTE — CONSULTS
Internal medicine consult    Referring physician  Dr. LOPEZ    Reason for consult  Follow medical problems    Chief complaint  Chest pain    History of present illness 57-year-old white female with history of diabetes mellitus hypertension hyperlipidemia coronary artery disease morbidly obese who was been noncompliant with medication who has not taken any medication for last 2 months admitted through emergency room to cardiology service with intermittent chest pain for last 1 week which radiated to left upper extremity associated with some shortness of breath nausea dizzy mild shortness of breath but no vomiting diaphoresis validated in ER and admitted to cardiology service to rule out acute coronary syndrome, asked to follow the patient for medical problems.  Patient blood sugar is out of control as she has not taken any medication for last 2 months.  Patient denies fever chills cough but she does have polyuria polydipsia.    PAST MEDICAL HISTORY    • Abnormal stress test     • CAD (coronary artery disease)     • Crescendo angina     • Diabetes mellitus     • H/O angioplasty     • Hiatal hernia     • Hyperlipidemia     • Hypertension     • Obesity     • Recurrent urinary tract infection        PAST SURGICAL HISTORY  Surgical History         Past Surgical History:   Procedure Laterality Date   • CARDIAC CATHETERIZATION         OUTCOME: SUCCESSFUL   • CORONARY STENT PLACEMENT       • HYSTERECTOMY       • TONSILLECTOMY AND ADENOIDECTOMY             FAMILY HISTORY  History reviewed. No pertinent family history.     SOCIAL HISTORY  Social History   Social History            Social History   • Marital status:        Spouse name: N/A   • Number of children: N/A   • Years of education: N/A          Occupational History   • Not on file.               Social History Main Topics     • Smoking status: Never Smoker    • Smokeless tobacco: Not on file    • Alcohol use Yes          Comment: SOCIAL     • Drug use: Unknown   •  "Sexual activity: Not on file            Other Topics Concern   • Not on file          Social History Narrative   • No narrative on file         ALLERGIES  Penicillins    Current medications reviewed     REVIEW OF SYSTEMS  Review of Systems   Constitutional: Negative for chills, diaphoresis and fever.   HENT: Negative for sore throat.    Respiratory: Positive for shortness of breath (mild ).    Cardiovascular: Positive for chest pain.   Gastrointestinal: Positive for nausea. Negative for vomiting.   Genitourinary: Negative for dysuria.   Musculoskeletal: Negative for back pain.   Skin: Negative for rash.   Neurological: Positive for dizziness and syncope (near syncope ).   Psychiatric/Behavioral: The patient is not nervous/anxious.       PHYSICAL EXAM  Blood pressure 128/54, pulse 60, temperature 98.4 °F (36.9 °C), temperature source Oral, resp. rate 20, height 149.9 cm (59\"), weight 81 kg (178 lb 9.6 oz), SpO2 94 %, unknown if currently breastfeeding.    Constitutional: She is oriented to person, place, and time and well-developed, well-nourished, and in no distress.   Head: Normocephalic and atraumatic.   Mouth/Throat: Mucous membranes are normal.   Eyes: No scleral icterus.   Neck: Normal range of motion.   Cardiovascular: Normal rate, regular rhythm and normal heart sounds.    Pulmonary/Chest: Effort normal and breath sounds normal. She exhibits no tenderness.   Lungs clear to auscultation bilaterally    Abdominal: Soft. Bowel sounds are normal. There is no tenderness.   Musculoskeletal: Normal range of motion.   Neurological: She is alert and oriented to person, place, and time.   Skin: Skin is warm and dry.   Psychiatric: Mood and affect normal.     LAB RESULTS  Lab Results (last 24 hours)     Procedure Component Value Units Date/Time    Troponin [[id number]]  (Normal) Collected:  05/11/18 1154    Specimen:  Blood Updated:  05/11/18 1248     Troponin T <0.010 ng/mL     Narrative:       Troponin T Reference " Ranges:  Less than 0.03 ng/mL:    Negative for AMI  0.03 to 0.09 ng/mL:      Indeterminant for AMI  Greater than 0.09 ng/mL: Positive for AMI    POC Glucose Once [[id number]]  (Abnormal) Collected:  05/11/18 1053    Specimen:  Blood Updated:  05/11/18 1057     Glucose 303 (H) mg/dL     Narrative:       Meter: [id number] : [id number] Je REZA    Lipid Panel [[id number]]  (Abnormal) Collected:  05/11/18 0520    Specimen:  Blood Updated:  05/11/18 0958     Total Cholesterol 175 mg/dL      Triglycerides 198 (H) mg/dL      HDL Cholesterol 32 (L) mg/dL      LDL Cholesterol  103 (H) mg/dL      VLDL Cholesterol 39.6 mg/dL      LDL/HDL Ratio 3.23    Narrative:       Cholesterol Reference Ranges  (U.S. Department of Health and Human Services ATP III Classifications)    Desirable          <200 mg/dL  Borderline High    200-239 mg/dL  High Risk          >240 mg/dL      Triglyceride Reference Ranges  (U.S. Department of Health and Human Services ATP III Classifications)    Normal           <150 mg/dL  Borderline High  150-199 mg/dL  High             200-499 mg/dL  Very High        >500 mg/dL    HDL Reference Ranges  (U.S. Department of Health and Human Services ATP III Classifcations)    Low     <40 mg/dl (major risk factor for CHD)  High    >60 mg/dl ('negative' risk factor for CHD)        LDL Reference Ranges  (U.S. Department of Health and Human Services ATP III Classifcations)    Optimal          <100 mg/dL  Near Optimal     100-129 mg/dL  Borderline High  130-159 mg/dL  High             160-189 mg/dL  Very High        >189 mg/dL    Basic Metabolic Panel [[id number]]  (Abnormal) Collected:  05/11/18 0520    Specimen:  Blood Updated:  05/11/18 0704     Glucose 207 (H) mg/dL      BUN 10 mg/dL      Creatinine 0.80 mg/dL      Sodium 138 mmol/L      Potassium 4.3 mmol/L      Chloride 99 mmol/L      CO2 26.3 mmol/L      Calcium 9.6 mg/dL      eGFR Non African Amer 73 mL/min/1.73      BUN/Creatinine Ratio 12.5     Anion Gap  12.7 mmol/L     Narrative:       GFR Normal >60  Chronic Kidney Disease <60  Kidney Failure <15    Troponin [[id number]]  (Normal) Collected:  05/11/18 0520    Specimen:  Blood Updated:  05/11/18 0659     Troponin T <0.010 ng/mL     Narrative:       Troponin T Reference Ranges:  Less than 0.03 ng/mL:    Negative for AMI  0.03 to 0.09 ng/mL:      Indeterminant for AMI  Greater than 0.09 ng/mL: Positive for AMI    POC Glucose Once [[id number]]  (Abnormal) Collected:  05/11/18 0621    Specimen:  Blood Updated:  05/11/18 0622     Glucose 199 (H) mg/dL     Narrative:       Meter: [id number] : [id number] Bishop Calhoun CNA    POC Glucose Once [[id number]]  (Abnormal) Collected:  05/10/18 2018    Specimen:  Blood Updated:  05/10/18 2020     Glucose 273 (H) mg/dL     Narrative:       Meter: [id number] : [id number] Bishop Calhoun CNA    POC Glucose Once [[id number]]  (Abnormal) Collected:  05/10/18 1705    Specimen:  Blood Updated:  05/10/18 1706     Glucose 210 (H) mg/dL     Narrative:       Meter: [id number] : [id number] Graham REZA    [place] Draw [[id number]] Collected:  05/10/18 1409    Specimen:  Blood Updated:  05/10/18 1515    Narrative:       The following orders were created for panel order [place] Draw.  Procedure                               Abnormality         Status                     ---------                               -----------         ------                     Light Blue Top[[id number]]                                   Final result               Green Top (Gel)[[id number]]                                  Final result               Lavender Top[[id number]]                                     Final result               Gold Top - SST[[id number]]                                   Final result                 Please view results for these tests on the individual orders.    Light Blue Top [[id number]] Collected:  05/10/18 1409    Specimen:  Blood Updated:  05/10/18 1515     Extra Tube hold for  add-on     Comment: Auto resulted       Green Top (Gel) [[id number]] Collected:  05/10/18 1409    Specimen:  Blood Updated:  05/10/18 1515     Extra Tube Hold for add-ons.     Comment: Auto resulted.       Lavender Top [[id number]] Collected:  05/10/18 1409    Specimen:  Blood Updated:  05/10/18 1515     Extra Tube hold for add-on     Comment: Auto resulted       Gold Top - SST [[id number]] Collected:  05/10/18 1409    Specimen:  Blood Updated:  05/10/18 1515     Extra Tube Hold for add-ons.     Comment: Auto resulted.       Magnesium [[id number]]  (Normal) Collected:  05/10/18 1409    Specimen:  Blood Updated:  05/10/18 1507     Magnesium 2.2 mg/dL         Imaging Results (last 24 hours)     ** No results found for the last 24 hours. **        ECG 12 Lead      RR Interval= 909 ms  NV Interval= 144 ms  QRSD Interval= 92 ms  QT Interval= 440 ms  QTc Interval= 461 ms  Heart Rate= 66 ms  P Axis= 32 deg  QRS Axis= 20 deg  T Wave Axis= 82 deg  I: 40 Axis= 20 deg  T: 40 Axis= 19 deg  ST Axis= 84 deg  SINUS RHYTHM  CONSIDER LEFT VENTRICULAR HYPERTROPHY  NO SIGNIFICANT CHANGE FROM PREVIOUS ECG                Current Facility-Administered Medications:   •  acetaminophen (TYLENOL) tablet 650 mg, 650 mg, Oral, Q4H PRN, Arely Lomax, APRN, 650 mg at 05/11/18 1219  •  albuterol (PROVENTIL) nebulizer solution 0.083% 2.5 mg/3mL, 2.5 mg, Nebulization, Q4H - RT, Roe Harper MD, 2.5 mg at 05/11/18 1107  •  aspirin EC tablet 81 mg, 81 mg, Oral, Daily, Roe Harper MD, 81 mg at 05/11/18 1212  •  [START ON 5/12/2018] atorvastatin (LIPITOR) tablet 40 mg, 40 mg, Oral, Daily, Anthony Ricardo MD  •  bisoprolol (ZEBeta) tablet 2.5 mg, 2.5 mg, Oral, Daily, Roe Harper MD, 2.5 mg at 05/11/18 1212  •  dextrose (D50W) solution 25 g, 25 g, Intravenous, Q15 Min PRN, Anthony Ricardo MD  •  dextrose (GLUTOSE) oral gel 15 g, 15 g, Oral, Q15 Min PRN, Anthony Ricardo MD  •  enoxaparin (LOVENOX) syringe 40 mg, 40 mg, Subcutaneous, Q24H,  Roe Harper MD, 40 mg at 05/10/18 1844  •  FLUoxetine (PROzac) capsule 20 mg, 20 mg, Oral, Daily, Roe Harper MD, 20 mg at 05/11/18 1213  •  glucagon (human recombinant) (GLUCAGEN DIAGNOSTIC) injection 1 mg, 1 mg, Subcutaneous, PRN, Anthony Ricardo MD  •  hydrALAZINE (APRESOLINE) injection 10 mg, 10 mg, Intravenous, Q4H PRN, Roe Harper MD, 10 mg at 05/10/18 2205  •  insulin aspart (novoLOG) injection 0-7 Units, 0-7 Units, Subcutaneous, 4x Daily With Meals & Nightly, Anthony Ricardo MD, 5 Units at 05/11/18 1216  •  metFORMIN (GLUCOPHAGE) tablet 1,000 mg, 1,000 mg, Oral, BID With Meals, Roe Harper MD, 1,000 mg at 05/11/18 1212  •  nitroglycerin (NITROSTAT) SL tablet 0.4 mg, 0.4 mg, Sublingual, Q5 Min PRN, Arely Lomax, APRN  •  pantoprazole (PROTONIX) EC tablet 40 mg, 40 mg, Oral, Q AM, Arely Lomax APRN, 40 mg at 05/11/18 1216  •  [START ON 5/12/2018] pioglitazone (ACTOS) tablet 45 mg, 45 mg, Oral, Daily, Anthony Ricardo MD  •  pregabalin (LYRICA) capsule 75 mg, 75 mg, Oral, Nightly, Roe Harper MD, 75 mg at 05/10/18 2149  •  promethazine (PHENERGAN) injection 12.5 mg, 12.5 mg, Intravenous, Q4H PRN, Anthony Ricardo MD, 12.5 mg at 05/10/18 2206  •  sodium chloride 0.9 % flush 1-10 mL, 1-10 mL, Intravenous, PRN, Roe Harper MD  •  sodium chloride 0.9 % flush 1-10 mL, 1-10 mL, Intravenous, PRN, Arely Lomax, APRN  •  sodium chloride 0.9 % flush 10 mL, 10 mL, Intravenous, PRN, Nixon Brooks MD     ASSESSMENT  Chest pain and multiple coronary risk factors rule out acute coronary syndrome  Non-insulin-dependent diabetes mellitus mellitus poorly controlled secondary to noncompliance of medication  Hypertension  Hyperlipidemia  Coronary artery disease  Noncompliance of medication  Gastroesophageal reflux disease  Depression    PLAN  Agree with current care  Resume her home dose of Glucophage and Actos and continue Accu-Chek with sliding scale insulin  Check hemoglobin  A1c TSH lipid profile  Check 2-D echo and a stress Cardiolite per cardiology  Diabetic education  Stress ulcer DVT prophylaxis  Will follow with Dr. LOPEZ further recommendation according to hospital course    NEDA HELTON MD

## 2018-05-11 NOTE — PROGRESS NOTES
"[facility]  Progress Note                                                                                    Patient Identification:  Lily Unger:   57 y.o.  female  1960  [id number]            Chief Complaint   Patient presents with   • Chest Pain       Date of Admission:5-    Admitting Physician:  Dr Harper    Reason for Admission:Chest pain, unspecified type [R07.9], Chief Complaint   Patient presents with   • Chest Pain       History of Present Illness:     This patient is a 57-year-old white female followed for CAD, crescendo angina and benign essential hypertension.  She had a stent placed to the proximal LAD creating stent nursing home of the proximal diagonal in . Cardiac cath in 2016 (see below) showed widely patent proximal LAD stent, jailed 80% diagonal, 80% mid LAD and 70% lesions in the PDA and PLV of RCA.  EF at that time was normal. The patient temporarily relocated to [place] shortly thereafter.  She will return to [place] approximately 2 months ago and since that time has been out of all medications.    She went to see her PCP yesterday morning for new prescriptions.  She states her \"blood sugar has been out of control\"and that she had been experiencing some nausea, dizziness.  She did not mention episodes of chest pain.  She presented to the emergency room yesterday afternoon reporting one-week history of recurrent episodes of substernal chest pain radiating into her left shoulder.  She describes the pain as \"like a knife\", unrelated to activity that was relieved wth sublingual nitroglycerin per EMS.  She denies any recurrence and states this discomfort is different from that that she experienced when her stent was placed.    Admission EKG (see below) sinus rhythm with poor RWP, NSSTTW normalities.  Troponin negative ×2      Cardiac Risk Factors:  Diabetes, hypertension, dyslipidemia     Past Medical History:  Past Medical History:   Diagnosis Date   • Abnormal " stress test    • CAD (coronary artery disease)    • Crescendo angina    • Diabetes mellitus    • H/O angioplasty    • Hiatal hernia    • Hyperlipidemia    • Hypertension    • Obesity    • Recurrent urinary tract infection     at least 3 stable    Past Surgical History:  Past Surgical History:   Procedure Laterality Date   • CARDIAC CATHETERIZATION      OUTCOME: SUCCESSFUL   • CORONARY STENT PLACEMENT     • HYSTERECTOMY     • TONSILLECTOMY AND ADENOIDECTOMY          Social History:   Social History   Substance Use Topics   • Smoking status: Never Smoker   • Smokeless tobacco: Not on file   • Alcohol use Yes      Comment: SOCIAL        Family History:  History reviewed. No pertinent family history.       Allergies:  Allergies   Allergen Reactions   • Penicillins        Home Meds:  No current facility-administered medications on file prior to encounter.      Current Outpatient Prescriptions on File Prior to Encounter   Medication Sig Dispense Refill   • aspirin 81 MG EC tablet Take 81 mg by mouth daily.     • SYMBICORT 160-4.5 MCG/ACT inhaler Take 2 puffs by mouth.  0   • bisoprolol (ZEBETA) 5 MG tablet Take 0.5 tablets by mouth Daily. 45 tablet 0   • FLUoxetine (PROzac) 20 MG capsule Take 20 mg by mouth daily.     • metFORMIN (GLUCOPHAGE) 1000 MG tablet Take 1,000 mg by mouth 2 (two) times a day with meals.  0   • nitroglycerin (NITROSTAT) 0.4 MG SL tablet Take 1 tablet by mouth Every 5 (Five) Minutes As Needed for Chest Pain. PLACE 1 TABLET UNDER THE TONGUE EVERY 5 MINUTES FOR UP TO 3 DOSES 25 tablet 3   • pioglitazone (ACTOS) 30 MG tablet Take 30 mg by mouth daily.     • prasugrel (EFFIENT) 10 MG tablet Take 1 tablet by mouth Daily. 30 tablet 4   • pravastatin (PRAVACHOL) 20 MG tablet Take 1 tablet by mouth nightly.     • pregabalin (LYRICA) 75 MG capsule Take 1 capsule by mouth nightly.     • VENTOLIN  (90 BASE) MCG/ACT inhaler   1         Scheduled Meds:    albuterol 2.5 mg Q4H - RT   aspirin 81 mg Daily    atorvastatin 10 mg Daily   bisoprolol 2.5 mg Daily   enoxaparin 40 mg Q24H   FLUoxetine 20 mg Daily   insulin aspart 0-7 Units 4x Daily With Meals & Nightly   metFORMIN 1,000 mg BID With Meals   pioglitazone 30 mg Daily   pregabalin 75 mg Nightly           INTAKE AND OUTPUT:    Intake/Output Summary (Last 24 hours) at 05/11/18 0834  Last data filed at 05/10/18 1800   Gross per 24 hour   Intake              240 ml   Output                0 ml   Net              240 ml           Review of Systems:  Constitutional: No fever, chills   Eyes: No eye pain, vision changes   ENT/oropharynx: No difficulty swallowing, no epistaxis, no changes in hearing   Cardiovascular: No palpitations, paroxysmal nocturnal dyspnea, orthopnea, diaphoresis, syncopal episode   Respiratory: No dyspnea on exertion, cough, wheezing or hemoptysis   Gastrointestinal: +nausea, No abdominal pain,  vomiting, diarrhea, bloody stools   Genitourinary: No hematuria, dysuria   Neurological: No headache, one-sided weakness    Musculoskeletal: No cramps, joint pain   Integument: No rash, edema       Constitutional:  Temp:  [97.4 °F (36.3 °C)-97.9 °F (36.6 °C)] 97.4 °F (36.3 °C)  Heart Rate:  [50-79] 50  Resp:  [16-20] 16  BP: ()/(52-84) 105/56    Physical Exam:  General:  Well-developed, well-nourished white female resting quietly Appears in no acute distress  Eyes: PERTL,  HEENT:  No JVD. Thyroid not visibly enlarged. No mucosal pallor or cyanosis  Respiratory: Respirations regular and unlabored at rest. BBS with good air entry in all fields. No crackles, rubs or wheezes auscultated  Cardiovascular: S1S2 Regular rate and rhythm. No murmur, rubs or gallop. No carotid bruits. PT pulses . No- lower extremity pitting edema  Gastrointestinal: Abdomen soft, non tender. Bowel sounds present. No hepatosplenomegaly.  Musculoskeletal: BRICENO x4. No abnormal movements  Extremities: No digital clubbing or cyanosis  Skin: Color pink. Skin warm and dry to touch. No  rash  Neuro: AAO x3 CN II-XII grossly intact                           Cardiographics    Admission ECG:       Telemetry:          ECHO:  pending    Cardiac cath  3-2016   IMPRESSION OF HEART CATHETERIZATION:    1. Normal left main coronary artery.  2.  left anterior descending artery and its branches shows proximal LAD  stent widely patent, diagonal branch jailed 80%, mid LAD 80%  3. Normal left circumflex artery and its branches nondominant  4.  right coronary artery dominant with PDA 70% posterior lateral branch  70%  5. Normal left ventricular systolic function.  LVEDP 10 mmHg.  Ejection  fraction 50 %.    RECOMMENDATION:  Considering moderate coronary artery disease patient will  be treated medically if patient continues to have the pain angioplasty of  the diagonal branch and mid LAD would be considered, patient had a stress  test positive in that area.      Lab Review:    Results from last 7 days  Lab Units 05/11/18  0520 05/10/18  1409   TROPONIN T ng/mL <0.010 <0.010       Results from last 7 days  Lab Units 05/10/18  1409   MAGNESIUM mg/dL 2.2       Results from last 7 days  Lab Units 05/11/18  0520   SODIUM mmol/L 138   POTASSIUM mmol/L 4.3   BUN mg/dL 10   CREATININE mg/dL 0.80   CALCIUM mg/dL 9.6     Results from last 7 days  Lab Units 05/10/18  1409   WBC 10*3/mm3 9.54   HEMOGLOBIN g/dL 12.6   HEMATOCRIT % 40.2   PLATELETS 10*3/mm3 321         CXR FINDINGS: Heart, mediastinum and pulmonary vasculature are unremarkable. No focal pulmonary consolidation, pleural effusion, or pneumothorax. No  acute osseous process.          Assessment / Plan:  - chest pain - no MI  - CAD - h/o LAD PCI  - HTN  - DM  - Dyslipidemia    Recommendations:  Review stress test and 2-D echo once studies completed.  Internal medicine will evaluate the patient for ongoing medical problems including diabetes and dyslipidemia      Lab/tests ordered for a.m.: Lipid panel    Arely Lomax, BRETT  5/11/2018  8:34 AM    EMR  "Dragon/Transcription:   \"Dictated utilizing Dragon dictation\".     "

## 2018-05-11 NOTE — H&P
"[facility]  Progress Note                                                                                    Patient Identification:  Lily Unger:   57 y.o.  female  1960  [id number]            Chief Complaint   Patient presents with   • Chest Pain       Date of Admission:5-    Admitting Physician:  Dr Harper    Reason for Admission:Chest pain, unspecified type [R07.9], Chief Complaint   Patient presents with   • Chest Pain       History of Present Illness:     This patient is a 57-year-old white female followed for CAD, crescendo angina and benign essential hypertension.  She had a stent placed to the proximal LAD creating stent residential of the proximal diagonal in . Cardiac cath in 2016 (see below) showed widely patent proximal LAD stent, jailed 80% diagonal, 80% mid LAD and 70% lesions in the PDA and PLV of RCA.  EF at that time was normal. The patient temporarily relocated to [place] shortly thereafter.  She will return to [place] approximately 2 months ago and since that time has been out of all medications.    She went to see her PCP yesterday morning for new prescriptions.  She states her \"blood sugar has been out of control\"and that she had been experiencing some nausea, dizziness.  She did not mention episodes of chest pain.  She presented to the emergency room yesterday afternoon reporting one-week history of recurrent episodes of substernal chest pain radiating into her left shoulder.  She describes the pain as \"like a knife\", unrelated to activity that was relieved wth sublingual nitroglycerin per EMS.  She denies any recurrence and states this discomfort is different from that that she experienced when her stent was placed.    Admission EKG (see below) sinus rhythm with poor RWP, NSSTTW normalities.  Troponin negative ×2      Cardiac Risk Factors:  Diabetes, hypertension, dyslipidemia     Past Medical History:  Past Medical History:   Diagnosis Date   • Abnormal " stress test    • CAD (coronary artery disease)    • Crescendo angina    • Diabetes mellitus    • H/O angioplasty    • Hiatal hernia    • Hyperlipidemia    • Hypertension    • Obesity    • Recurrent urinary tract infection     at least 3 stable    Past Surgical History:  Past Surgical History:   Procedure Laterality Date   • CARDIAC CATHETERIZATION      OUTCOME: SUCCESSFUL   • CORONARY STENT PLACEMENT     • HYSTERECTOMY     • TONSILLECTOMY AND ADENOIDECTOMY          Social History:   Social History   Substance Use Topics   • Smoking status: Never Smoker   • Smokeless tobacco: Not on file   • Alcohol use Yes      Comment: SOCIAL        Family History:  History reviewed. No pertinent family history.       Allergies:  Allergies   Allergen Reactions   • Penicillins        Home Meds:  No current facility-administered medications on file prior to encounter.      Current Outpatient Prescriptions on File Prior to Encounter   Medication Sig Dispense Refill   • aspirin 81 MG EC tablet Take 81 mg by mouth daily.     • SYMBICORT 160-4.5 MCG/ACT inhaler Take 2 puffs by mouth.  0   • bisoprolol (ZEBETA) 5 MG tablet Take 0.5 tablets by mouth Daily. 45 tablet 0   • FLUoxetine (PROzac) 20 MG capsule Take 20 mg by mouth daily.     • metFORMIN (GLUCOPHAGE) 1000 MG tablet Take 1,000 mg by mouth 2 (two) times a day with meals.  0   • nitroglycerin (NITROSTAT) 0.4 MG SL tablet Take 1 tablet by mouth Every 5 (Five) Minutes As Needed for Chest Pain. PLACE 1 TABLET UNDER THE TONGUE EVERY 5 MINUTES FOR UP TO 3 DOSES 25 tablet 3   • pioglitazone (ACTOS) 30 MG tablet Take 30 mg by mouth daily.     • prasugrel (EFFIENT) 10 MG tablet Take 1 tablet by mouth Daily. 30 tablet 4   • pravastatin (PRAVACHOL) 20 MG tablet Take 1 tablet by mouth nightly.     • pregabalin (LYRICA) 75 MG capsule Take 1 capsule by mouth nightly.     • VENTOLIN  (90 BASE) MCG/ACT inhaler   1         Scheduled Meds:    albuterol 2.5 mg Q4H - RT   aspirin 81 mg Daily      atorvastatin 10 mg Daily   bisoprolol 2.5 mg Daily   enoxaparin 40 mg Q24H   FLUoxetine 20 mg Daily   insulin aspart 0-7 Units 4x Daily With Meals & Nightly   metFORMIN 1,000 mg BID With Meals   pioglitazone 30 mg Daily   pregabalin 75 mg Nightly           INTAKE AND OUTPUT:    Intake/Output Summary (Last 24 hours) at 05/11/18 0834  Last data filed at 05/10/18 1800   Gross per 24 hour   Intake              240 ml   Output                0 ml   Net              240 ml           Review of Systems:  Constitutional: No fever, chills   Eyes: No eye pain, vision changes   ENT/oropharynx: No difficulty swallowing, no epistaxis, no changes in hearing   Cardiovascular: No palpitations, paroxysmal nocturnal dyspnea, orthopnea, diaphoresis, syncopal episode   Respiratory: No dyspnea on exertion, cough, wheezing or hemoptysis   Gastrointestinal: +nausea, No abdominal pain,  vomiting, diarrhea, bloody stools   Genitourinary: No hematuria, dysuria   Neurological: No headache, one-sided weakness    Musculoskeletal: No cramps, joint pain   Integument: No rash, edema       Constitutional:  Temp:  [97.4 °F (36.3 °C)-97.9 °F (36.6 °C)] 97.4 °F (36.3 °C)  Heart Rate:  [50-79] 50  Resp:  [16-20] 16  BP: ()/(52-84) 105/56    Physical Exam:  General:  Well-developed, well-nourished white female resting quietly Appears in no acute distress  Eyes: PERTL,  HEENT:  No JVD. Thyroid not visibly enlarged. No mucosal pallor or cyanosis  Respiratory: Respirations regular and unlabored at rest. BBS with good air entry in all fields. No crackles, rubs or wheezes auscultated  Cardiovascular: S1S2 Regular rate and rhythm. No murmur, rubs or gallop. No carotid bruits. PT pulses . No- lower extremity pitting edema  Gastrointestinal: Abdomen soft, non tender. Bowel sounds present. No hepatosplenomegaly.  Musculoskeletal: BRICENO x4. No abnormal movements  Extremities: No digital clubbing or cyanosis  Skin: Color pink. Skin warm and dry to touch. No  rash  Neuro: AAO x3 CN II-XII grossly intact                           Cardiographics    Admission ECG:       Telemetry:          ECHO:  pending    Cardiac cath  3-2016   IMPRESSION OF HEART CATHETERIZATION:    1. Normal left main coronary artery.  2.  left anterior descending artery and its branches shows proximal LAD  stent widely patent, diagonal branch jailed 80%, mid LAD 80%  3. Normal left circumflex artery and its branches nondominant  4.  right coronary artery dominant with PDA 70% posterior lateral branch  70%  5. Normal left ventricular systolic function.  LVEDP 10 mmHg.  Ejection  fraction 50 %.    RECOMMENDATION:  Considering moderate coronary artery disease patient will  be treated medically if patient continues to have the pain angioplasty of  the diagonal branch and mid LAD would be considered, patient had a stress  test positive in that area.      Lab Review:    Results from last 7 days  Lab Units 05/11/18  0520 05/10/18  1409   TROPONIN T ng/mL <0.010 <0.010       Results from last 7 days  Lab Units 05/10/18  1409   MAGNESIUM mg/dL 2.2       Results from last 7 days  Lab Units 05/11/18  0520   SODIUM mmol/L 138   POTASSIUM mmol/L 4.3   BUN mg/dL 10   CREATININE mg/dL 0.80   CALCIUM mg/dL 9.6     Results from last 7 days  Lab Units 05/10/18  1409   WBC 10*3/mm3 9.54   HEMOGLOBIN g/dL 12.6   HEMATOCRIT % 40.2   PLATELETS 10*3/mm3 321         CXR FINDINGS: Heart, mediastinum and pulmonary vasculature are unremarkable. No focal pulmonary consolidation, pleural effusion, or pneumothorax. No  acute osseous process.          Assessment / Plan:  - chest pain - no MI  - CAD - h/o LAD PCI  - HTN  - DM  - Dyslipidemia    Recommendations:  Review stress test and 2-D echo once studies completed.  Internal medicine will evaluate the patient for ongoing medical problems including diabetes and dyslipidemia      Lab/tests ordered for a.m.: Lipid panel    Arely Lomax, BRETT  5/11/2018  8:34 AM    EMR  "Dragon/Transcription:   \"Dictated utilizing Dragon dictation\".     "

## 2018-05-11 NOTE — DISCHARGE SUMMARY
"[facility]  Physician Discharge Summary    Patient Identification:  Name: Lily Unger  Age: 57 y.o.  Sex: female  :  1960  MRN: [id number]    Admit date: 5/10/2018    Discharge date and time:  2018    Admitting Physician: Roe Harper MD     Discharge Physician: Dr Harper    Discharge Diagnoses:   - chest pain - no MI, s/p (-) stress test  - CAD - h/o LAD PCI  - HTN  - DM  - Dyslipidemia    Discharged Condition: stable    Hospital Course:   This patient was admitted for further evaluation after presenting to the reporting uncontrolled blood sugars, some intermittent nausea and dizziness as well as a one-week history of recurrent episodes of substernal chest pain radiating into her left shoulder.  She describes the pain as \"like a knife\", unrelated to activity that was relieved wth sublingual nitroglycerin per EMS.  She denied any recurrence and states this discomfort is different from that that she experienced when her stent was placed.      Of note, the patient had been out of all of her medications for approximately 2 months since relocating to [place] from [place].    She was placed on aspirin, topical nitrate, DVT and PPI prophylaxis and previously prescribed beta blocker and statin serial cardiac enzymes and EKGs were negative for acute cardiac ischemic event.  A 2-D echo was done.  Stress test showed normal myocardial perfusion with no evidence of ischemia.    She was seen in consultation by internal medicine and met with diabetic educator.    At time of discharge, patient's awake, alert resting quietly without complaint.  Cardiac test results to date including stress test with it's sensitivity and specificity of approximately 85% was reviewed with the patient.  She was advised that no further ischemic workup would be planned at this time.  Rationale for use of sublingual nitroglycerin was reviewed and prescription renewed.  She was advised to return to ER " medical evaluation per EMS for any recurrent symptoms including, but not limited to: chest pain/pressure/tightness, weakness, shortness of breath, dizziness, palpitations, near syncope or syncope at which time cardiac catheterization would be recommended.  She has been advised is scheduled to see us in office on May 21 and advised to follow-up with her PCP in one week.  All questions were answered.  She voiced understanding and agreement with treatment plan    Consults:   IP CONSULT TO CARDIOLOGY  IP CONSULT TO INTERNAL MEDICINE  IP CONSULT TO DIABETES EDUCATOR    Discharge Exam:  General:  Well-developed, well-nourished white female resting quietly Appears in no acute distress  Eyes: PERTL,  HEENT:  No JVD. Thyroid not visibly enlarged. No mucosal pallor or cyanosis  Respiratory: Respirations regular and unlabored at rest. BBS with good air entry in all fields. No crackles, rubs or wheezes auscultated  Cardiovascular: S1S2 Regular rate and rhythm. No murmur, rubs or gallop. No carotid bruits. PT pulses . No- lower extremity pitting edema  Gastrointestinal: Abdomen soft, non tender. Bowel sounds present. No hepatosplenomegaly.  Musculoskeletal: BRICENO x4. No abnormal movements  Extremities: No digital clubbing or cyanosis  Skin: Color pink. Skin warm and dry to touch. No rash  Neuro: AAO x3 CN II-XII grossly intact          LABS:    Results from last 7 days  Lab Units 05/11/18  1154 05/11/18  0520 05/10/18  1409   TROPONIN T ng/mL <0.010 <0.010 <0.010       Results from last 7 days  Lab Units 05/10/18  1409   MAGNESIUM mg/dL 2.2       Results from last 7 days  Lab Units 05/11/18  0520   SODIUM mmol/L 138   POTASSIUM mmol/L 4.3   BUN mg/dL 10   CREATININE mg/dL 0.80   CALCIUM mg/dL 9.6     Results from last 7 days  Lab Units 05/10/18  1409   WBC 10*3/mm3 9.54   HEMOGLOBIN g/dL 12.6   HEMATOCRIT % 40.2   PLATELETS 10*3/mm3 321       Results from last 7 days  Lab Units 05/11/18  0520   CHOLESTEROL mg/dL 175   TRIGLYCERIDES mg/dL 198*   HDL  "CHOL mg/dL 32*   LDL CHOL mg/dL 103*     Disposition:home      Discharge Medications:    Lily Unger KAMRAN   Home Medication Instructions GAMA:[id number]    Printed on:05/11/18 4493   Medication Information                      aspirin 81 MG EC tablet  Take 81 mg by mouth daily.             bisoprolol (ZEBETA) 5 MG tablet  Take 0.5 tablets by mouth Daily.             FLUoxetine (PROzac) 20 MG capsule  Take 20 mg by mouth daily.             metFORMIN (GLUCOPHAGE) 1000 MG tablet  Take 1,000 mg by mouth 2 (two) times a day with meals.             nitroglycerin (NITROSTAT) 0.4 MG SL tablet  Take 1 tablet by mouth Every 5 (Five) Minutes As Needed for Chest Pain. PLACE 1 TABLET UNDER THE TONGUE EVERY 5 MINUTES FOR UP TO 3 DOSES             Omega-3 Fatty Acids (FISH OIL) 1000 MG capsule capsule  Take 1,000 mg by mouth Daily With Breakfast.             pioglitazone (ACTOS) 30 MG tablet  Take 30 mg by mouth daily.             pravastatin (PRAVACHOL) 20 MG tablet  Take 1 tablet by mouth Every Night.             pregabalin (LYRICA) 75 MG capsule  Take 1 capsule by mouth nightly.             SYMBICORT 160-4.5 MCG/ACT inhaler  Take 2 puffs by mouth.             VENTOLIN  (90 BASE) MCG/ACT inhaler                     Discharge Home Instructions:  1. Return to ER per EMS for any recurrent symptoms including, but not limited to: chest pain/pressure/tightness, weakness, shortness of breath, dizziness, palpitations, near syncope or syncope at which time cardiac catheterization would be recommended    2. Follow up with PCP in one week for ongoing medical care including management of dyslipidemia and diabetes.  3. Follow up with Dr Harper as scheduled at the [facility] office on May 21 at 11:30 AM    Signed:  BRETT Delgadillo  5/11/2018  3:25 PM      EMR Dragon/Transcription:   \"Dictated utilizing Dragon dictation\".     "

## 2018-05-21 ENCOUNTER — OFFICE VISIT (OUTPATIENT)
Dept: CARDIOLOGY | Facility: CLINIC | Age: 58
End: 2018-05-21

## 2018-05-21 VITALS
HEIGHT: 60 IN | WEIGHT: 185 LBS | DIASTOLIC BLOOD PRESSURE: 78 MMHG | SYSTOLIC BLOOD PRESSURE: 159 MMHG | BODY MASS INDEX: 36.32 KG/M2 | HEART RATE: 50 BPM

## 2018-05-21 DIAGNOSIS — R06.02 SHORTNESS OF BREATH: ICD-10-CM

## 2018-05-21 DIAGNOSIS — I10 ESSENTIAL HYPERTENSION: ICD-10-CM

## 2018-05-21 DIAGNOSIS — R53.1 WEAKNESS: ICD-10-CM

## 2018-05-21 DIAGNOSIS — I25.10 CORONARY ARTERY DISEASE INVOLVING NATIVE CORONARY ARTERY OF NATIVE HEART WITHOUT ANGINA PECTORIS: Primary | ICD-10-CM

## 2018-05-21 DIAGNOSIS — R55 SYNCOPE AND COLLAPSE: ICD-10-CM

## 2018-05-21 DIAGNOSIS — R09.89 BRUIT: ICD-10-CM

## 2018-05-21 PROCEDURE — 99213 OFFICE O/P EST LOW 20 MIN: CPT | Performed by: INTERNAL MEDICINE

## 2018-05-21 RX ORDER — GABAPENTIN 600 MG/1
800 TABLET ORAL 3 TIMES DAILY
Refills: 0 | COMMUNITY
Start: 2018-05-15 | End: 2020-07-21

## 2018-05-21 RX ORDER — PANTOPRAZOLE SODIUM 40 MG/1
40 TABLET, DELAYED RELEASE ORAL DAILY
Refills: 0 | COMMUNITY
Start: 2018-05-14 | End: 2021-03-01

## 2018-05-21 RX ORDER — LOSARTAN POTASSIUM 25 MG/1
25 TABLET ORAL DAILY
Refills: 0 | COMMUNITY
Start: 2018-05-14 | End: 2018-11-19 | Stop reason: HOSPADM

## 2018-05-21 NOTE — PROGRESS NOTES
" Subjective:       Lily Unger is a 57 y.o. female who here for follow up    CC  Follow-up for the coronary artery disease  HPI  57-year-old female here for the follow-up for coronary artery disease, benign essential arterial hypertension, shortness of breath as well as history of the syncope denies any chest pains or tightness in chest     Problem List Items Addressed This Visit        Cardiovascular and Mediastinum    Syncope and collapse    Coronary artery disease - Primary    Relevant Orders    Duplex Carotid Ultrasound CAR    Hypertension    Relevant Medications    losartan (COZAAR) 25 MG tablet       Respiratory    Shortness of breath       Other    Weakness      Other Visit Diagnoses     Bruit            .    The following portions of the patient's history were reviewed and updated as appropriate: allergies, current medications, past family history, past medical history, past social history, past surgical history and problem list.    Past Medical History:   Diagnosis Date   • Abnormal stress test    • CAD (coronary artery disease)    • Crescendo angina    • Diabetes mellitus    • H/O angioplasty    • Hiatal hernia    • Hyperlipidemia    • Hypertension    • Obesity    • Recurrent urinary tract infection     reports that she has never smoked. She does not have any smokeless tobacco history on file. She reports that she drinks alcohol.  Family History   Problem Relation Age of Onset   • Heart attack Father    • Heart attack Brother        Review of Systems  Constitutional: No wt loss, fever, fatigue  Gastrointestinal: No nausea, abdominal pain  Behavioral/Psych: No insomnia or anxiety   Cardiovascular No chest pains or tightness in chest  Objective:       Physical Exam           Physical Exam  /78 (BP Location: Right arm, Patient Position: Sitting)   Pulse 50   Ht 151.1 cm (59.5\")   Wt 83.9 kg (185 lb)   BMI 36.74 kg/m²     General appearance: NAD, conversant   Eyes: anicteric sclerae, moist " conjunctivae; no lid-lag; PERRLA   HENT: Atraumatic; oropharynx clear with moist mucous membranes and no mucosal ulcerations;  normal hard and soft palate   Neck: Trachea midline; FROM, supple, no thyromegaly or lymphadenopathy   Lungs: CTA, with normal respiratory effort and no intercostal retractions   CV: S1-S2 regular, no murmurs, no rub, no gallop   Abdomen: Soft, non-tender; no masses or HSM   Extremities: No peripheral edema or extremity lymphadenopathy  Skin: Normal temperature, turgor and texture; no rash, ulcers or subcutaneous nodules   Psych: Appropriate affect, alert and oriented to person, place and time           Cardiographics  @Procedures    Echocardiogram:        Current Outpatient Prescriptions:   •  aspirin 81 MG EC tablet, Take 81 mg by mouth daily., Disp: , Rfl:   •  bisoprolol (ZEBETA) 5 MG tablet, Take 0.5 tablets by mouth Daily., Disp: 45 tablet, Rfl: 0  •  Cholecalciferol (VITAMIN D PO), Take  by mouth., Disp: , Rfl:   •  gabapentin (NEURONTIN) 600 MG tablet, Take 600 mg by mouth 3 (Three) Times a Day., Disp: , Rfl: 0  •  losartan (COZAAR) 25 MG tablet, Take 25 mg by mouth Daily., Disp: , Rfl: 0  •  metFORMIN (GLUCOPHAGE) 1000 MG tablet, Take 1 tablet by mouth 2 (Two) Times a Day With Meals for 30 days., Disp: 60 tablet, Rfl: 0  •  metFORMIN (GLUCOPHAGE) 500 MG tablet, Take 500 mg by mouth 2 (Two) Times a Day., Disp: , Rfl: 0  •  nitroglycerin (NITROSTAT) 0.4 MG SL tablet, Take 1 tablet by mouth Every 5 (Five) Minutes As Needed for Chest Pain. PLACE 1 TABLET UNDER THE TONGUE EVERY 5 MINUTES FOR UP TO 3 DOSES, Disp: 25 tablet, Rfl: 0  •  Omega-3 Fatty Acids (FISH OIL) 1000 MG capsule capsule, Take 1,000 mg by mouth Daily With Breakfast., Disp: , Rfl:   •  pantoprazole (PROTONIX) 40 MG EC tablet, Take 40 mg by mouth Daily., Disp: , Rfl: 0  •  pravastatin (PRAVACHOL) 20 MG tablet, Take 1 tablet by mouth Every Night., Disp: 30 tablet, Rfl: 1  •  SYMBICORT 160-4.5 MCG/ACT inhaler, Take 2 puffs  by mouth., Disp: , Rfl: 0  •  VENTOLIN  (90 BASE) MCG/ACT inhaler, , Disp: , Rfl: 1   Assessment:        Patient Active Problem List   Diagnosis   • Chest pain   • Shortness of breath   • Syncope and collapse   • Weakness   • Coronary artery disease   • Soft tissue lesion of shoulder region   • Disorder of rotator cuff   • Shoulder pain   • Rotator cuff tear arthropathy   • Unstable angina pectoris   • Injury of kidney   • Type 2 diabetes mellitus   • Hypertension   • Hypotension   • Hypovolemia     Total Cholesterol 0 - 200 mg/dL 175    Triglycerides 0 - 150 mg/dL 198     HDL Cholesterol 40 - 60 mg/dL 32     LDL Cholesterol  0 - 100 mg/dL 103     VLDL Cholesterol 5 - 40 mg/dL 39.6    LDL/HDL Ratio  3.23      Glucose 65 - 99 mg/dL 207     BUN 6 - 20 mg/dL 10    Creatinine 0.57 - 1.00 mg/dL 0.80    Sodium 136 - 145 mmol/L 138    Potassium 3.5 - 5.2 mmol/L 4.3    Chloride 98 - 107 mmol/L 99    CO2 22.0 - 29.0 mmol/L 26.3    Calcium 8.6 - 10.5 mg/dL 9.6    eGFR Non African Amer >60 mL/min/1.73 73    BUN/Creatinine Ratio 7.0 - 25.0 12.5    Anion Gap mmol/L 12.7      Interpretation Summary     · No ECG evidence of myocardial ischemia.Negative clinical evidence of myocardial ischemia.  · Left ventricular ejection fraction is normal (Calculated EF = 60%).  · Myocardial perfusion imaging indicates a normal myocardial perfusion study with no evidence of ischemia.  · Impressions are consistent with a low risk study.        Interpretation Summary     · NORMAL GLOBAL STRAIN -19.3  · Left atrial cavity size is mildly dilated.  · Mild mitral valve regurgitation is present  · Mild tricuspid valve regurgitation is present.  · Calculated EF = 60%.  · There is no evidence of pericardial effusion               Plan:            ICD-10-CM ICD-9-CM   1. Coronary artery disease involving native coronary artery of native heart without angina pectoris I25.10 414.01   2. Syncope and collapse R55 780.2   3. Essential hypertension I10  401.9   4. Shortness of breath R06.02 786.05   5. Weakness R53.1 780.79   6. Bruit R09.89 785.9     1. Coronary artery disease involving native coronary artery of native heart without angina pectoris  No angina pectoralis  - Duplex Carotid Ultrasound CAR    2. Syncope and collapse  No more syncope    3. Essential hypertension  Blood pressure under control    4. Shortness of breath  Multifactorial    5. Weakness  Multifactorial    6. Bruit  Asymptomatic       Specificity and sensitivity of the stress test/ cardiac workup has been explained. Pt has been explained if  Symptoms continue please go to ER, and further w/p will be required.    Also explained this does not rule out coronary artery disease or the future events, continue to emphasize on risk reductions for coronary artery disease    SEE US 2 WKS WITH US OF CAROTIDS  COUNSELING:    Lily Horner was given to patient for the following topics: diagnostic results, risk factor reductions, impressions, risks and benefits of treatment options and importance of treatment compliance .       SMOKING COUNSELING:    Counseling given: Not Answered      EMR Dragon/Transcription disclaimer:   Much of this encounter note is an electronic transcription/translation of spoken language to printed text. The electronic translation of spoken language may permit erroneous, or at times, nonsensical words or phrases to be inadvertently transcribed; Although I have reviewed the note for such errors, some may still exist.

## 2018-05-31 ENCOUNTER — HOSPITAL ENCOUNTER (OUTPATIENT)
Dept: CARDIOLOGY | Facility: HOSPITAL | Age: 58
Discharge: HOME | End: 2018-05-31
Attending: INTERNAL MEDICINE | Admitting: INTERNAL MEDICINE

## 2018-05-31 LAB
BH CV XLRA MEAS LEFT CCA RATIO VEL: 74.2 CM/SEC
BH CV XLRA MEAS LEFT DIST CCA EDV: 19.2 CM/SEC
BH CV XLRA MEAS LEFT DIST CCA PSV: 74.2 CM/SEC
BH CV XLRA MEAS LEFT DIST ICA EDV: -34 CM/SEC
BH CV XLRA MEAS LEFT DIST ICA PSV: -98.5 CM/SEC
BH CV XLRA MEAS LEFT ICA RATIO VEL: -130 CM/SEC
BH CV XLRA MEAS LEFT ICA/CCA RATIO: -1.8
BH CV XLRA MEAS LEFT MID ICA EDV: -37.5 CM/SEC
BH CV XLRA MEAS LEFT MID ICA PSV: -130 CM/SEC
BH CV XLRA MEAS LEFT PROX CCA EDV: 19.3 CM/SEC
BH CV XLRA MEAS LEFT PROX CCA PSV: 95.6 CM/SEC
BH CV XLRA MEAS LEFT PROX ECA EDV: -18.1 CM/SEC
BH CV XLRA MEAS LEFT PROX ECA PSV: -148 CM/SEC
BH CV XLRA MEAS LEFT PROX ICA EDV: -22.8 CM/SEC
BH CV XLRA MEAS LEFT PROX ICA PSV: -78.2 CM/SEC
BH CV XLRA MEAS LEFT PROX SCLA EDV: 11.8 CM/SEC
BH CV XLRA MEAS LEFT PROX SCLA PSV: 137 CM/SEC
BH CV XLRA MEAS LEFT VERTEBRAL A EDV: 20.4 CM/SEC
BH CV XLRA MEAS LEFT VERTEBRAL A PSV: 66.4 CM/SEC
BH CV XLRA MEAS RIGHT CCA RATIO VEL: 79.4 CM/SEC
BH CV XLRA MEAS RIGHT DIST CCA EDV: 17.7 CM/SEC
BH CV XLRA MEAS RIGHT DIST CCA PSV: 79.4 CM/SEC
BH CV XLRA MEAS RIGHT DIST ICA EDV: -15.3 CM/SEC
BH CV XLRA MEAS RIGHT DIST ICA PSV: -57 CM/SEC
BH CV XLRA MEAS RIGHT ICA RATIO VEL: -165 CM/SEC
BH CV XLRA MEAS RIGHT ICA/CCA RATIO: -2.1
BH CV XLRA MEAS RIGHT MID ICA EDV: -36.1 CM/SEC
BH CV XLRA MEAS RIGHT MID ICA PSV: -116 CM/SEC
BH CV XLRA MEAS RIGHT PROX CCA EDV: 14.7 CM/SEC
BH CV XLRA MEAS RIGHT PROX CCA PSV: 83.8 CM/SEC
BH CV XLRA MEAS RIGHT PROX ECA EDV: -14.7 CM/SEC
BH CV XLRA MEAS RIGHT PROX ECA PSV: -125 CM/SEC
BH CV XLRA MEAS RIGHT PROX ICA EDV: -53.4 CM/SEC
BH CV XLRA MEAS RIGHT PROX ICA PSV: -165 CM/SEC
BH CV XLRA MEAS RIGHT PROX SCLA PSV: 133 CM/SEC
BH CV XLRA MEAS RIGHT VERTEBRAL A EDV: 10.7 CM/SEC
BH CV XLRA MEAS RIGHT VERTEBRAL A PSV: 43.1 CM/SEC
LEFT ARM BP: NORMAL MMHG
RIGHT ARM BP: NORMAL MMHG

## 2018-05-31 PROCEDURE — 93880 EXTRACRANIAL BILAT STUDY: CPT

## 2018-06-07 ENCOUNTER — OFFICE VISIT (OUTPATIENT)
Dept: CARDIOLOGY | Facility: CLINIC | Age: 58
End: 2018-06-07

## 2018-06-07 VITALS
DIASTOLIC BLOOD PRESSURE: 74 MMHG | SYSTOLIC BLOOD PRESSURE: 123 MMHG | WEIGHT: 181 LBS | BODY MASS INDEX: 35.53 KG/M2 | HEIGHT: 60 IN | HEART RATE: 56 BPM

## 2018-06-07 DIAGNOSIS — I25.10 CORONARY ARTERY DISEASE INVOLVING NATIVE CORONARY ARTERY OF NATIVE HEART WITHOUT ANGINA PECTORIS: ICD-10-CM

## 2018-06-07 DIAGNOSIS — I10 ESSENTIAL HYPERTENSION: ICD-10-CM

## 2018-06-07 DIAGNOSIS — I77.9 BILATERAL CAROTID ARTERY DISEASE (HCC): ICD-10-CM

## 2018-06-07 DIAGNOSIS — I65.29 STENOSIS OF CAROTID ARTERY, UNSPECIFIED LATERALITY: Primary | ICD-10-CM

## 2018-06-07 PROCEDURE — 99213 OFFICE O/P EST LOW 20 MIN: CPT | Performed by: INTERNAL MEDICINE

## 2018-06-07 RX ORDER — ROSUVASTATIN CALCIUM 10 MG/1
TABLET, COATED ORAL
COMMUNITY
Start: 2018-06-06 | End: 2019-03-12 | Stop reason: ALTCHOICE

## 2018-06-07 NOTE — PROGRESS NOTES
" Subjective:       Lily Unger is a 57 y.o. female who here for follow up    CC  Follow up after carotid artery us  HPI  57-year-old female with a carotid artery disease essential hypertension underwent ultrasound of the carotids here for the follow-up with no chest pains or tightness in chest     Problem List Items Addressed This Visit        Cardiovascular and Mediastinum    Coronary artery disease    Hypertension    Bilateral carotid artery disease      Other Visit Diagnoses     Stenosis of carotid artery, unspecified laterality    -  Primary    Relevant Orders    Duplex Carotid Ultrasound CAR        .    The following portions of the patient's history were reviewed and updated as appropriate: allergies, current medications, past family history, past medical history, past social history, past surgical history and problem list.    Past Medical History:   Diagnosis Date   • Abnormal stress test    • CAD (coronary artery disease)    • Crescendo angina    • Diabetes mellitus    • H/O angioplasty    • Hiatal hernia    • Hyperlipidemia    • Hypertension    • Obesity    • Recurrent urinary tract infection     reports that she has never smoked. She does not have any smokeless tobacco history on file. She reports that she drinks alcohol.  Family History   Problem Relation Age of Onset   • Heart attack Father    • Heart attack Brother        Review of Systems  Constitutional: No wt loss, fever, fatigue  Gastrointestinal: No nausea, abdominal pain  Behavioral/Psych: No insomnia or anxiety   Cardiovascular No chest pains or tightness in chest  Objective:       Physical Exam           Physical Exam  /74 (BP Location: Left arm, Patient Position: Sitting)   Pulse 56   Ht 151.1 cm (59.5\")   Wt 82.1 kg (181 lb)   BMI 35.95 kg/m²     General appearance: NAD, conversant   Eyes: anicteric sclerae, moist conjunctivae; no lid-lag; PERRLA   HENT: Atraumatic; oropharynx clear with moist mucous membranes and no mucosal " ulcerations;  normal hard and soft palate   Neck: Trachea midline; FROM, supple, no thyromegaly or lymphadenopathy   Lungs: CTA, with normal respiratory effort and no intercostal retractions   CV: S1-S2 regular, no murmurs, no rub, no gallop   Abdomen: Soft, non-tender; no masses or HSM   Extremities: No peripheral edema or extremity lymphadenopathy  Skin: Normal temperature, turgor and texture; no rash, ulcers or subcutaneous nodules   Psych: Appropriate affect, alert and oriented to person, place and time           Cardiographics  @Procedures    Echocardiogram:        Current Outpatient Prescriptions:   •  aspirin 81 MG EC tablet, Take 81 mg by mouth daily., Disp: , Rfl:   •  bisoprolol (ZEBETA) 5 MG tablet, Take 0.5 tablets by mouth Daily., Disp: 45 tablet, Rfl: 0  •  Cholecalciferol (VITAMIN D PO), Take  by mouth., Disp: , Rfl:   •  gabapentin (NEURONTIN) 600 MG tablet, Take 600 mg by mouth 3 (Three) Times a Day., Disp: , Rfl: 0  •  losartan (COZAAR) 25 MG tablet, Take 25 mg by mouth Daily., Disp: , Rfl: 0  •  metFORMIN (GLUCOPHAGE) 1000 MG tablet, Take 1 tablet by mouth 2 (Two) Times a Day With Meals for 30 days., Disp: 60 tablet, Rfl: 0  •  metFORMIN (GLUCOPHAGE) 500 MG tablet, Take 500 mg by mouth 2 (Two) Times a Day., Disp: , Rfl: 0  •  nitroglycerin (NITROSTAT) 0.4 MG SL tablet, Take 1 tablet by mouth Every 5 (Five) Minutes As Needed for Chest Pain. PLACE 1 TABLET UNDER THE TONGUE EVERY 5 MINUTES FOR UP TO 3 DOSES, Disp: 25 tablet, Rfl: 0  •  Omega-3 Fatty Acids (FISH OIL) 1000 MG capsule capsule, Take 1,000 mg by mouth Daily With Breakfast., Disp: , Rfl:   •  pantoprazole (PROTONIX) 40 MG EC tablet, Take 40 mg by mouth Daily., Disp: , Rfl: 0  •  rosuvastatin (CRESTOR) 10 MG tablet, , Disp: , Rfl:   •  SYMBICORT 160-4.5 MCG/ACT inhaler, Take 2 puffs by mouth., Disp: , Rfl: 0  •  VENTOLIN  (90 BASE) MCG/ACT inhaler, , Disp: , Rfl: 1   Assessment:        Patient Active Problem List   Diagnosis   •  Chest pain   • Shortness of breath   • Syncope and collapse   • Weakness   • Coronary artery disease   • Soft tissue lesion of shoulder region   • Disorder of rotator cuff   • Shoulder pain   • Rotator cuff tear arthropathy   • Unstable angina pectoris   • Injury of kidney   • Type 2 diabetes mellitus   • Hypertension   • Hypotension   • Hypovolemia     Interpretation Summary     · Proximal right internal carotid artery moderate stenosis.  · Proximal left internal carotid artery moderate stenosis.  · Mid right internal carotid artery is normal.  · Distal right internal carotid artery is normal.  · All other right side carotid system vessels are normal.  · Mid left internal carotid artery is normal.  · Distal left internal carotid artery is normal.  · All other left side carotid system vessels are normal               Plan:            ICD-10-CM ICD-9-CM   1. Stenosis of carotid artery, unspecified laterality I65.29 433.10   2. Coronary artery disease involving native coronary artery of native heart without angina pectoris I25.10 414.01   3. Essential hypertension I10 401.9   4. Bilateral carotid artery disease I77.9 447.9     1. Stenosis of carotid artery, unspecified laterality  Moderate carotid artery stenosis  - Duplex Carotid Ultrasound CAR; Future    2. Coronary artery disease involving native coronary artery of native heart without angina pectoris  No angina pectoris    3. Essential hypertension  Blood pressure well-controlled    4. Bilateral carotid artery disease  Moderate stenosis will be treated conservatively       See in 1 yr with us carotids  COUNSELING:    Lily Horner was given to patient for the following topics: diagnostic results, risk factor reductions, impressions, risks and benefits of treatment options and importance of treatment compliance .       SMOKING COUNSELING:    Counseling given: Not Answered      EMR Dragon/Transcription disclaimer:   Much of this encounter note is an  electronic transcription/translation of spoken language to printed text. The electronic translation of spoken language may permit erroneous, or at times, nonsensical words or phrases to be inadvertently transcribed; Although I have reviewed the note for such errors, some may still exist.

## 2018-06-19 PROBLEM — I77.9 BILATERAL CAROTID ARTERY DISEASE (HCC): Status: ACTIVE | Noted: 2018-06-19

## 2018-09-07 RX ORDER — NITROGLYCERIN 0.4 MG/1
TABLET SUBLINGUAL
Qty: 25 TABLET | Refills: 0 | Status: SHIPPED | OUTPATIENT
Start: 2018-09-07 | End: 2020-07-24 | Stop reason: SDUPTHER

## 2018-11-17 ENCOUNTER — HOSPITAL ENCOUNTER (OUTPATIENT)
Facility: HOSPITAL | Age: 58
Setting detail: OBSERVATION
Discharge: HOME | End: 2018-11-19
Attending: INTERNAL MEDICINE | Admitting: INTERNAL MEDICINE

## 2018-11-17 PROBLEM — R42 DIZZINESS: Status: ACTIVE | Noted: 2018-11-17

## 2018-11-17 PROBLEM — B34.9 VIRAL ILLNESS: Status: ACTIVE | Noted: 2018-11-17

## 2018-11-17 PROBLEM — Z91.199 MEDICAL NON-COMPLIANCE: Status: ACTIVE | Noted: 2018-11-17

## 2018-11-17 LAB
B PERT DNA SPEC QL NAA+PROBE: NOT DETECTED
C PNEUM DNA NPH QL NAA+NON-PROBE: NOT DETECTED
FLUAV H1 2009 PAND RNA NPH QL NAA+PROBE: NOT DETECTED
FLUAV H1 HA GENE NPH QL NAA+PROBE: NOT DETECTED
FLUAV H3 RNA NPH QL NAA+PROBE: NOT DETECTED
FLUAV SUBTYP SPEC NAA+PROBE: NOT DETECTED
FLUBV RNA ISLT QL NAA+PROBE: NOT DETECTED
GLUCOSE BLDC GLUCOMTR-MCNC: 115 MG/DL (ref 70–130)
GLUCOSE BLDC GLUCOMTR-MCNC: 127 MG/DL (ref 70–130)
HADV DNA SPEC NAA+PROBE: NOT DETECTED
HBA1C MFR BLD: 7.6 % (ref 4.8–5.6)
HCOV 229E RNA SPEC QL NAA+PROBE: NOT DETECTED
HCOV HKU1 RNA SPEC QL NAA+PROBE: NOT DETECTED
HCOV NL63 RNA SPEC QL NAA+PROBE: NOT DETECTED
HCOV OC43 RNA SPEC QL NAA+PROBE: NOT DETECTED
HMPV RNA NPH QL NAA+NON-PROBE: NOT DETECTED
HPIV1 RNA SPEC QL NAA+PROBE: NOT DETECTED
HPIV2 RNA SPEC QL NAA+PROBE: NOT DETECTED
HPIV3 RNA NPH QL NAA+PROBE: NOT DETECTED
HPIV4 P GENE NPH QL NAA+PROBE: NOT DETECTED
M PNEUMO IGG SER IA-ACNC: NOT DETECTED
RHINOVIRUS RNA SPEC NAA+PROBE: NOT DETECTED
RSV RNA NPH QL NAA+NON-PROBE: NOT DETECTED
TROPONIN T SERPL-MCNC: <0.01 NG/ML (ref 0–0.03)

## 2018-11-17 PROCEDURE — G0378 HOSPITAL OBSERVATION PER HR: HCPCS

## 2018-11-17 PROCEDURE — 87486 CHLMYD PNEUM DNA AMP PROBE: CPT | Performed by: INTERNAL MEDICINE

## 2018-11-17 PROCEDURE — 87798 DETECT AGENT NOS DNA AMP: CPT | Performed by: INTERNAL MEDICINE

## 2018-11-17 PROCEDURE — 93005 ELECTROCARDIOGRAM TRACING: CPT | Performed by: INTERNAL MEDICINE

## 2018-11-17 PROCEDURE — 87581 M.PNEUMON DNA AMP PROBE: CPT | Performed by: INTERNAL MEDICINE

## 2018-11-17 PROCEDURE — 84484 ASSAY OF TROPONIN QUANT: CPT | Performed by: INTERNAL MEDICINE

## 2018-11-17 PROCEDURE — 83036 HEMOGLOBIN GLYCOSYLATED A1C: CPT | Performed by: INTERNAL MEDICINE

## 2018-11-17 PROCEDURE — 87633 RESP VIRUS 12-25 TARGETS: CPT | Performed by: INTERNAL MEDICINE

## 2018-11-17 PROCEDURE — 94640 AIRWAY INHALATION TREATMENT: CPT

## 2018-11-17 PROCEDURE — 93010 ELECTROCARDIOGRAM REPORT: CPT | Performed by: INTERNAL MEDICINE

## 2018-11-17 PROCEDURE — 82962 GLUCOSE BLOOD TEST: CPT

## 2018-11-17 PROCEDURE — G0379 DIRECT REFER HOSPITAL OBSERV: HCPCS

## 2018-11-17 RX ORDER — BISOPROLOL FUMARATE 5 MG/1
2.5 TABLET, FILM COATED ORAL DAILY
Status: DISCONTINUED | OUTPATIENT
Start: 2018-11-17 | End: 2018-11-19 | Stop reason: HOSPADM

## 2018-11-17 RX ORDER — ONDANSETRON 4 MG/1
4 TABLET, FILM COATED ORAL EVERY 6 HOURS PRN
Status: DISCONTINUED | OUTPATIENT
Start: 2018-11-17 | End: 2018-11-19 | Stop reason: HOSPADM

## 2018-11-17 RX ORDER — HYDROCODONE BITARTRATE AND ACETAMINOPHEN 5; 325 MG/1; MG/1
1 TABLET ORAL EVERY 4 HOURS PRN
Status: DISCONTINUED | OUTPATIENT
Start: 2018-11-17 | End: 2018-11-19 | Stop reason: HOSPADM

## 2018-11-17 RX ORDER — GABAPENTIN 300 MG/1
600 CAPSULE ORAL EVERY 8 HOURS SCHEDULED
Status: DISCONTINUED | OUTPATIENT
Start: 2018-11-17 | End: 2018-11-19 | Stop reason: HOSPADM

## 2018-11-17 RX ORDER — ROSUVASTATIN CALCIUM 10 MG/1
10 TABLET, COATED ORAL NIGHTLY
Status: DISCONTINUED | OUTPATIENT
Start: 2018-11-17 | End: 2018-11-19 | Stop reason: HOSPADM

## 2018-11-17 RX ORDER — NITROGLYCERIN 0.4 MG/1
0.4 TABLET SUBLINGUAL
Status: DISCONTINUED | OUTPATIENT
Start: 2018-11-17 | End: 2018-11-19 | Stop reason: HOSPADM

## 2018-11-17 RX ORDER — ONDANSETRON 2 MG/ML
4 INJECTION INTRAMUSCULAR; INTRAVENOUS EVERY 6 HOURS PRN
Status: DISCONTINUED | OUTPATIENT
Start: 2018-11-17 | End: 2018-11-19 | Stop reason: HOSPADM

## 2018-11-17 RX ORDER — BUDESONIDE AND FORMOTEROL FUMARATE DIHYDRATE 160; 4.5 UG/1; UG/1
2 AEROSOL RESPIRATORY (INHALATION)
Status: DISCONTINUED | OUTPATIENT
Start: 2018-11-17 | End: 2018-11-19 | Stop reason: HOSPADM

## 2018-11-17 RX ORDER — ONDANSETRON 4 MG/1
4 TABLET, ORALLY DISINTEGRATING ORAL EVERY 6 HOURS PRN
Status: DISCONTINUED | OUTPATIENT
Start: 2018-11-17 | End: 2018-11-19 | Stop reason: HOSPADM

## 2018-11-17 RX ORDER — DEXTROSE MONOHYDRATE 25 G/50ML
25 INJECTION, SOLUTION INTRAVENOUS
Status: DISCONTINUED | OUTPATIENT
Start: 2018-11-17 | End: 2018-11-19 | Stop reason: HOSPADM

## 2018-11-17 RX ORDER — SODIUM CHLORIDE 0.9 % (FLUSH) 0.9 %
3-10 SYRINGE (ML) INJECTION AS NEEDED
Status: DISCONTINUED | OUTPATIENT
Start: 2018-11-17 | End: 2018-11-19 | Stop reason: HOSPADM

## 2018-11-17 RX ORDER — NICOTINE POLACRILEX 4 MG
15 LOZENGE BUCCAL
Status: DISCONTINUED | OUTPATIENT
Start: 2018-11-17 | End: 2018-11-19 | Stop reason: HOSPADM

## 2018-11-17 RX ORDER — LOSARTAN POTASSIUM 25 MG/1
25 TABLET ORAL DAILY
Status: DISCONTINUED | OUTPATIENT
Start: 2018-11-18 | End: 2018-11-18

## 2018-11-17 RX ORDER — PANTOPRAZOLE SODIUM 40 MG/1
40 TABLET, DELAYED RELEASE ORAL
Status: DISCONTINUED | OUTPATIENT
Start: 2018-11-18 | End: 2018-11-19 | Stop reason: HOSPADM

## 2018-11-17 RX ORDER — SODIUM CHLORIDE 0.9 % (FLUSH) 0.9 %
3 SYRINGE (ML) INJECTION EVERY 12 HOURS SCHEDULED
Status: DISCONTINUED | OUTPATIENT
Start: 2018-11-17 | End: 2018-11-19 | Stop reason: HOSPADM

## 2018-11-17 RX ORDER — ALBUTEROL SULFATE 2.5 MG/3ML
2.5 SOLUTION RESPIRATORY (INHALATION) EVERY 6 HOURS PRN
Status: DISCONTINUED | OUTPATIENT
Start: 2018-11-17 | End: 2018-11-19 | Stop reason: HOSPADM

## 2018-11-17 RX ORDER — NATEGLINIDE 60 MG/1
60 TABLET ORAL
Status: ON HOLD | COMMUNITY
End: 2018-11-17

## 2018-11-17 RX ORDER — ACETAMINOPHEN 325 MG/1
650 TABLET ORAL EVERY 4 HOURS PRN
Status: DISCONTINUED | OUTPATIENT
Start: 2018-11-17 | End: 2018-11-19 | Stop reason: HOSPADM

## 2018-11-17 RX ADMIN — HYDROCODONE BITARTRATE AND ACETAMINOPHEN 1 TABLET: 5; 325 TABLET ORAL at 17:49

## 2018-11-17 RX ADMIN — HYDROCODONE BITARTRATE AND ACETAMINOPHEN 1 TABLET: 5; 325 TABLET ORAL at 23:32

## 2018-11-17 RX ADMIN — Medication 3 ML: at 20:00

## 2018-11-17 RX ADMIN — BUDESONIDE AND FORMOTEROL FUMARATE DIHYDRATE 2 PUFF: 160; 4.5 AEROSOL RESPIRATORY (INHALATION) at 19:55

## 2018-11-17 RX ADMIN — ROSUVASTATIN CALCIUM 10 MG: 10 TABLET, FILM COATED ORAL at 20:00

## 2018-11-17 RX ADMIN — GABAPENTIN 600 MG: 300 CAPSULE ORAL at 21:02

## 2018-11-17 NOTE — H&P
Patient Name:  Lily Unger  YOB: 1960  MRN:  [id number]  Admit Date:  11/17/2018  Patient Care Team:  Ezio Patterson MD as PCP - General  Ezio Patterson MD as PCP - Family Medicine      No chief complaint on file.  Chief complaint is headache and sore throat    Subjective   Ms. Unger is a 58 y.o. female  with a history of diabetes, hypertension, coronary artery disease who presented to [facility] emergency room today for multiple complaints.  She was directly admitted to our service for further care.  Her symptoms first started on Tuesday with a right sided stiff neck and progressed to a sore throat.  She later developed headache and progressed up to a point where she took 10 Tylenol last night and a 5 hour period.  She has had some nausea, vomiting and diarrhea as well.  She's also had some congestion, rhinorrhea, chest soreness and multiple sick contacts.  This concludes her sister-in-law for whom she was primary caregiver, family member with hand-foot and mouth.  And she works as a  at [employer].  She came to [facility] earlier today.  Influenza swab and strep throat swab were both negative.  She complained of dizziness.  Orthostatics were negative.  Her CBC was within normal limits except for a white blood cell count of 11.8.  Urinalysis was clear of UTI.  Her CT head was negative as well as a chest x-ray.  The patient's temperature was 100.7 in the emergency room.  She was given 1 L of fluids and sent to us to be directly admitted.  She also reports having blood in her bowel movement    Her sore throat has difficult to swallow.  She also has some soreness of her tongue as well.  History of Present Illness    Past Medical History:   Diagnosis Date   • Abnormal stress test    • CAD (coronary artery disease)    • Crescendo angina (CMS/HCC)    • Diabetes mellitus (CMS/HCC)    • H/O angioplasty    • Hiatal hernia    • Hyperlipidemia    • Hypertension    •  Obesity    • Recurrent urinary tract infection      Past Surgical History:   Procedure Laterality Date   • CARDIAC CATHETERIZATION      OUTCOME: SUCCESSFUL   • CORONARY STENT PLACEMENT     • HYSTERECTOMY     • TONSILLECTOMY AND ADENOIDECTOMY       Family History   Problem Relation Age of Onset   • Heart attack Father    • Heart attack Brother      Social History     Tobacco Use   • Smoking status: Never Smoker   Substance Use Topics   • Alcohol use: Yes     Comment: SOCIAL   • Drug use: Not on file     Medications Prior to Admission   Medication Sig Dispense Refill Last Dose   • aspirin 81 MG EC tablet Take 81 mg by mouth daily.   11/17/2018 at Unknown time   • bisoprolol (ZEBETA) 5 MG tablet Take 0.5 tablets by mouth Daily. 45 tablet 0 11/17/2018 at Unknown time   • Ertugliflozin L-PyroglutamicAc (STEGLATRO) 15 MG tablet Take 15 mg by mouth Every Morning.   11/17/2018 at Unknown time   • gabapentin (NEURONTIN) 600 MG tablet Take 600 mg by mouth 3 (Three) Times a Day.  0 11/17/2018 at Unknown time   • losartan (COZAAR) 25 MG tablet Take 25 mg by mouth Daily.  0 11/17/2018 at Unknown time   • metFORMIN (GLUCOPHAGE) 500 MG tablet Take 1,000 mg by mouth 2 (Two) Times a Day.  0 11/17/2018 at Unknown time   • pantoprazole (PROTONIX) 40 MG EC tablet Take 40 mg by mouth Daily.  0 11/17/2018 at Unknown time   • rosuvastatin (CRESTOR) 10 MG tablet    11/16/2018 at Unknown time   • SITagliptin (JANUVIA) 50 MG tablet Take 100 mg by mouth Daily.   11/17/2018 at Unknown time   • SYMBICORT 160-4.5 MCG/ACT inhaler Take 2 puffs by mouth.  0 11/16/2018 at Unknown time   • VENTOLIN  (90 BASE) MCG/ACT inhaler   1 11/16/2018 at Unknown time   • Cholecalciferol (VITAMIN D PO) Take  by mouth.   More than a month at Unknown time   • nitroglycerin (NITROSTAT) 0.4 MG SL tablet place 1 tablet under the tongue if needed every 5 minutes for chest pain for 3 doses IF NO RELIEF AFTER FIRST DOSE CALL PRESCRIBER . 25 tablet 0 More  than a month at Unknown time   • Omega-3 Fatty Acids (FISH OIL) 1000 MG capsule capsule Take 1,000 mg by mouth Daily With Breakfast.   Taking     Allergies:    Allergies   Allergen Reactions   • Penicillins Hives       Review of Systems   Constitutional: Positive for activity change, chills, fatigue and fever. Negative for appetite change and unexpected weight change.   HENT: Positive for sore throat and trouble swallowing. Negative for nosebleeds.    Eyes: Negative for pain and visual disturbance.   Respiratory: Positive for chest tightness. Negative for cough and shortness of breath.    Cardiovascular: Positive for chest pain. Negative for palpitations and leg swelling.   Gastrointestinal: Positive for blood in stool, diarrhea, nausea and vomiting. Negative for abdominal pain and constipation.   Endocrine:        Positive for diabetes.  Negative for thyroid disease   Genitourinary: Negative for dysuria and hematuria.   Musculoskeletal: Negative.  Negative for back pain, neck pain and neck stiffness.   Skin: Negative for rash and wound.   Neurological: Positive for dizziness and headaches. Negative for syncope, weakness and light-headedness.   Hematological: Negative for adenopathy. Does not bruise/bleed easily.   Psychiatric/Behavioral: Negative for agitation, behavioral problems and confusion.        Objective    Vital Signs  Heart Rate:  [85] 85  Resp:  [18] 18  BP: (101)/(66) 101/66  SpO2:  [92 %] 92 %  on   ;   Device (Oxygen Therapy): room air  Body mass index is 35 kg/m².    Physical Exam   Constitutional: She is oriented to person, place, and time. She appears well-developed and well-nourished. No distress.   No acute distress but appears uncomfortable   HENT:   Head: Atraumatic.   Mouth/Throat: No oropharyngeal exudate.   No mouth sores or signs of candidiasis   Neck: Normal range of motion. Neck supple. No JVD present. No neck rigidity. No thyromegaly present.   Full range of motion of the neck.    Cardiovascular: Normal rate and regular rhythm. Exam reveals no friction rub.   No murmur heard.  Pulmonary/Chest: Effort normal and breath sounds normal. No stridor. No respiratory distress.   Abdominal: Soft. Bowel sounds are normal. She exhibits no distension. There is no tenderness. There is no guarding.   Musculoskeletal: She exhibits no edema or tenderness.   Lymphadenopathy:     She has no cervical adenopathy.   Neurological: She is alert and oriented to person, place, and time.   Skin: Skin is warm and dry. She is not diaphoretic. No erythema.   No lesions on her hands or feet   Psychiatric: She has a normal mood and affect. Her behavior is normal.   Vitals reviewed.      Results Review:  I reviewed the patient's new clinical results.  I reviewed the patient's new imaging results and agree with the interpretation.  I reviewed the patient's other test results and agree with the interpretation  I personally viewed and interpreted the patient's EKG/Telemetry data  Discussed with ED provider at [facility]      Lab Results (last 24 hours)     ** No results found for the last 24 hours. **          Imaging Results (last 24 hours)     ** No results found for the last 24 hours. **          ECG 12 Lead    (Results Pending)     Assessment/Plan   Active Hospital Problems    Diagnosis Date Noted   • **Chest pain [R07.9] 01/28/2016   • Medical non-compliance [Z91.19] 11/17/2018   • Dizziness [R42] 11/17/2018   • Type 2 diabetes mellitus (CMS/[facility]) [E11.9] 07/01/2016   • Hypertension [I10] 07/01/2016      Resolved Hospital Problems   No resolved problems to display.       · I suspect she has a viral illness with the numerous sick contacts.  We'll check a respiratory viral panel.  Influenza antigen and strep antigen were both negative at the outside hospital.  Her CT head was negative.  · Given her dysphagia we may need to obtain barium swallow but will hold off until we get further information  · She complained of stiff  neck and headache but her stiff neck is now gone.  Very low suspicion for meningitis.  · Supportive care for her headache has CT head was negative  · Type 2 diabetes: Once we get her home medications will restart them.  Place her on sliding scale insulin X  · Reported hematochezia: Check hemoglobin and occult blood.  She may require a GI consultation given the bleeding and dysphagia but hold off for now  · I suspect chest pain is related to a viral illness but given her history of coronary artery disease I will check EKG and troponin now.  Also consult her cardiologist  · SCDs for DVT prophylaxis  · Patient's son is her healthcare surrogate and she wishes to be full code      I discussed the patients findings and my recommendations with patient and nursing staff.      Zachariah Hammond MD  [facility]  11/17/18  4:06 PM

## 2018-11-17 NOTE — PLAN OF CARE
Problem: Patient Care Overview  Goal: Plan of Care Review  Outcome: Ongoing (interventions implemented as appropriate)   11/17/18 1704   Coping/Psychosocial   Plan of Care Reviewed With patient   Plan of Care Review   Progress no change   OTHER   Outcome Summary Pt c/o sore throat/drainage. headache with 6/10. Meds ordered, plan for Cardiology consult for intermittent CP, Trop negative. VSS, will CTM      Goal: Individualization and Mutuality  Outcome: Ongoing (interventions implemented as appropriate)    Goal: Discharge Needs Assessment  Outcome: Ongoing (interventions implemented as appropriate)    Goal: Interprofessional Rounds/Family Conf  Outcome: Ongoing (interventions implemented as appropriate)      Problem: Infection, Risk/Actual (Adult)  Goal: Identify Related Risk Factors and Signs and Symptoms  Outcome: Outcome(s) achieved Date Met: 11/17/18    Goal: Infection Prevention/Resolution  Outcome: Ongoing (interventions implemented as appropriate)

## 2018-11-18 ENCOUNTER — APPOINTMENT (OUTPATIENT)
Dept: GENERAL RADIOLOGY | Facility: HOSPITAL | Age: 58
End: 2018-11-18

## 2018-11-18 LAB
ALBUMIN SERPL-MCNC: 3.3 G/DL (ref 3.5–5.2)
ALP SERPL-CCNC: 66 U/L (ref 39–117)
ALT SERPL W P-5'-P-CCNC: 25 U/L (ref 1–33)
ANION GAP SERPL CALCULATED.3IONS-SCNC: 11.2 MMOL/L
AST SERPL-CCNC: 23 U/L (ref 1–32)
BASOPHILS # BLD AUTO: 0.03 10*3/MM3 (ref 0–0.2)
BASOPHILS NFR BLD AUTO: 0.3 % (ref 0–1.5)
BILIRUB CONJ SERPL-MCNC: <0.2 MG/DL (ref 0–0.3)
BILIRUB INDIRECT SERPL-MCNC: ABNORMAL MG/DL
BILIRUB SERPL-MCNC: 0.2 MG/DL (ref 0.1–1.2)
BUN BLD-MCNC: 12 MG/DL (ref 8–23)
BUN/CREAT SERPL: 10.3 (ref 7–25)
CALCIUM SPEC-SCNC: 9.4 MG/DL (ref 8.6–10.5)
CHLORIDE SERPL-SCNC: 104 MMOL/L (ref 98–107)
CO2 SERPL-SCNC: 26.8 MMOL/L (ref 22–29)
CREAT BLD-MCNC: 1.17 MG/DL (ref 0.57–1)
DEPRECATED RDW RBC AUTO: 52.4 FL (ref 37–54)
EOSINOPHIL # BLD AUTO: 0.26 10*3/MM3 (ref 0–0.7)
EOSINOPHIL NFR BLD AUTO: 2.2 % (ref 0.3–6.2)
ERYTHROCYTE [DISTWIDTH] IN BLOOD BY AUTOMATED COUNT: 15.8 % (ref 11.7–13)
GFR SERPL CREATININE-BSD FRML MDRD: 47 ML/MIN/1.73
GLUCOSE BLD-MCNC: 126 MG/DL (ref 65–99)
GLUCOSE BLDC GLUCOMTR-MCNC: 115 MG/DL (ref 70–130)
GLUCOSE BLDC GLUCOMTR-MCNC: 138 MG/DL (ref 70–130)
GLUCOSE BLDC GLUCOMTR-MCNC: 145 MG/DL (ref 70–130)
GLUCOSE BLDC GLUCOMTR-MCNC: 151 MG/DL (ref 70–130)
HCT VFR BLD AUTO: 38.8 % (ref 35.6–45.5)
HGB BLD-MCNC: 11.4 G/DL (ref 11.9–15.5)
IMM GRANULOCYTES # BLD: 0.03 10*3/MM3 (ref 0–0.03)
IMM GRANULOCYTES NFR BLD: 0.3 % (ref 0–0.5)
LIPASE SERPL-CCNC: 24 U/L (ref 13–60)
LYMPHOCYTES # BLD AUTO: 1.78 10*3/MM3 (ref 0.9–4.8)
LYMPHOCYTES NFR BLD AUTO: 15 % (ref 19.6–45.3)
MCH RBC QN AUTO: 26.6 PG (ref 26.9–32)
MCHC RBC AUTO-ENTMCNC: 29.4 G/DL (ref 32.4–36.3)
MCV RBC AUTO: 90.7 FL (ref 80.5–98.2)
MONOCYTES # BLD AUTO: 1.52 10*3/MM3 (ref 0.2–1.2)
MONOCYTES NFR BLD AUTO: 12.8 % (ref 5–12)
NEUTROPHILS # BLD AUTO: 8.28 10*3/MM3 (ref 1.9–8.1)
NEUTROPHILS NFR BLD AUTO: 69.4 % (ref 42.7–76)
PLATELET # BLD AUTO: 229 10*3/MM3 (ref 140–500)
PMV BLD AUTO: 9.3 FL (ref 6–12)
POTASSIUM BLD-SCNC: 4.5 MMOL/L (ref 3.5–5.2)
PROT SERPL-MCNC: 7 G/DL (ref 6–8.5)
RBC # BLD AUTO: 4.28 10*6/MM3 (ref 3.9–5.2)
SODIUM BLD-SCNC: 142 MMOL/L (ref 136–145)
TROPONIN T SERPL-MCNC: <0.01 NG/ML (ref 0–0.03)
WBC NRBC COR # BLD: 11.9 10*3/MM3 (ref 4.5–10.7)

## 2018-11-18 PROCEDURE — 94799 UNLISTED PULMONARY SVC/PX: CPT

## 2018-11-18 PROCEDURE — 80076 HEPATIC FUNCTION PANEL: CPT | Performed by: INTERNAL MEDICINE

## 2018-11-18 PROCEDURE — G0378 HOSPITAL OBSERVATION PER HR: HCPCS

## 2018-11-18 PROCEDURE — 83690 ASSAY OF LIPASE: CPT | Performed by: INTERNAL MEDICINE

## 2018-11-18 PROCEDURE — 93010 ELECTROCARDIOGRAM REPORT: CPT | Performed by: INTERNAL MEDICINE

## 2018-11-18 PROCEDURE — 96376 TX/PRO/DX INJ SAME DRUG ADON: CPT

## 2018-11-18 PROCEDURE — 93005 ELECTROCARDIOGRAM TRACING: CPT | Performed by: INTERNAL MEDICINE

## 2018-11-18 PROCEDURE — 80048 BASIC METABOLIC PNL TOTAL CA: CPT | Performed by: INTERNAL MEDICINE

## 2018-11-18 PROCEDURE — 96374 THER/PROPH/DIAG INJ IV PUSH: CPT

## 2018-11-18 PROCEDURE — 25010000002 ONDANSETRON PER 1 MG: Performed by: INTERNAL MEDICINE

## 2018-11-18 PROCEDURE — 82962 GLUCOSE BLOOD TEST: CPT

## 2018-11-18 PROCEDURE — 99244 OFF/OP CNSLTJ NEW/EST MOD 40: CPT | Performed by: INTERNAL MEDICINE

## 2018-11-18 PROCEDURE — 85025 COMPLETE CBC W/AUTO DIFF WBC: CPT | Performed by: INTERNAL MEDICINE

## 2018-11-18 PROCEDURE — 63710000001 INSULIN LISPRO (HUMAN) PER 5 UNITS: Performed by: INTERNAL MEDICINE

## 2018-11-18 PROCEDURE — 71046 X-RAY EXAM CHEST 2 VIEWS: CPT

## 2018-11-18 PROCEDURE — 84484 ASSAY OF TROPONIN QUANT: CPT | Performed by: INTERNAL MEDICINE

## 2018-11-18 RX ORDER — MECLIZINE HYDROCHLORIDE 25 MG/1
25 TABLET ORAL 3 TIMES DAILY PRN
Status: DISCONTINUED | OUTPATIENT
Start: 2018-11-18 | End: 2018-11-19 | Stop reason: HOSPADM

## 2018-11-18 RX ADMIN — GABAPENTIN 600 MG: 300 CAPSULE ORAL at 05:29

## 2018-11-18 RX ADMIN — GABAPENTIN 600 MG: 300 CAPSULE ORAL at 20:25

## 2018-11-18 RX ADMIN — HYDROCODONE BITARTRATE AND ACETAMINOPHEN 1 TABLET: 5; 325 TABLET ORAL at 14:01

## 2018-11-18 RX ADMIN — ACETAMINOPHEN 650 MG: 325 TABLET, FILM COATED ORAL at 05:30

## 2018-11-18 RX ADMIN — ONDANSETRON 4 MG: 2 INJECTION INTRAMUSCULAR; INTRAVENOUS at 14:01

## 2018-11-18 RX ADMIN — PANTOPRAZOLE SODIUM 40 MG: 40 TABLET, DELAYED RELEASE ORAL at 05:29

## 2018-11-18 RX ADMIN — GABAPENTIN 600 MG: 300 CAPSULE ORAL at 17:19

## 2018-11-18 RX ADMIN — ACETAMINOPHEN 650 MG: 325 TABLET, FILM COATED ORAL at 20:28

## 2018-11-18 RX ADMIN — ONDANSETRON 4 MG: 2 INJECTION INTRAMUSCULAR; INTRAVENOUS at 20:28

## 2018-11-18 RX ADMIN — INSULIN LISPRO 2 UNITS: 100 INJECTION, SOLUTION INTRAVENOUS; SUBCUTANEOUS at 20:41

## 2018-11-18 RX ADMIN — Medication 3 ML: at 08:57

## 2018-11-18 RX ADMIN — BISOPROLOL FUMARATE 2.5 MG: 5 TABLET ORAL at 08:57

## 2018-11-18 RX ADMIN — BUDESONIDE AND FORMOTEROL FUMARATE DIHYDRATE 2 PUFF: 160; 4.5 AEROSOL RESPIRATORY (INHALATION) at 19:52

## 2018-11-18 RX ADMIN — LOSARTAN POTASSIUM 25 MG: 25 TABLET, FILM COATED ORAL at 08:57

## 2018-11-18 RX ADMIN — Medication 3 ML: at 20:25

## 2018-11-18 RX ADMIN — BUDESONIDE AND FORMOTEROL FUMARATE DIHYDRATE 2 PUFF: 160; 4.5 AEROSOL RESPIRATORY (INHALATION) at 07:59

## 2018-11-18 RX ADMIN — ACETAMINOPHEN 650 MG: 325 TABLET, FILM COATED ORAL at 00:19

## 2018-11-18 RX ADMIN — ROSUVASTATIN CALCIUM 10 MG: 10 TABLET, FILM COATED ORAL at 20:25

## 2018-11-18 NOTE — PLAN OF CARE
Problem: Patient Care Overview  Goal: Plan of Care Review  Outcome: Ongoing (interventions implemented as appropriate)   11/18/18 1802   Coping/Psychosocial   Plan of Care Reviewed With patient   Plan of Care Review   Progress improving   OTHER   Outcome Summary VSS. Cardiology DCed cozaar d/t bradycardia. Pt still c/o HA. Likely home tomorrow.     Goal: Individualization and Mutuality  Outcome: Ongoing (interventions implemented as appropriate)    Goal: Discharge Needs Assessment  Outcome: Ongoing (interventions implemented as appropriate)    Goal: Interprofessional Rounds/Family Conf  Outcome: Ongoing (interventions implemented as appropriate)      Problem: Infection, Risk/Actual (Adult)  Goal: Infection Prevention/Resolution  Outcome: Ongoing (interventions implemented as appropriate)      Problem: Pain, Acute (Adult)  Goal: Acceptable Pain Control/Comfort Level  Outcome: Ongoing (interventions implemented as appropriate)      Problem: Fall Risk (Adult)  Goal: Absence of Fall  Outcome: Ongoing (interventions implemented as appropriate)

## 2018-11-18 NOTE — CONSULTS
Patient Name: Lily Unger  :1960  58 y.o.    Date of Admission: 2018  Date of Consultation:  18  Encounter Provider: Magdalena Randall MD  Place of Service: [facility] CARDIOLOGY  Referring Provider: Zachariah Hammond MD  Patient Care Team:  Ezio Patterson MD as PCP - General  Ezio Patterson MD as PCP - Family Medicine      Chief complaint: sore throat, congestion, lightheadedness, chest pain    History of Present Illness:      This is a 58-year-old female who follows with Dr. Harper and has a history of coronary artery disease, diabetes mellitus, carotid artery stenosis, hypertension, hyperlipidemia, obesity.    In , she was admitted with chest pain and had a cardiac catheterization showing moderate CAD for which she was treated medically.  She then returned 2015 to [facility] with chest pain and had a crit catheterization showing 70-90% proximal LAD stenosis and was treated with drug-eluting stent to the proximal LAD.  She still had dyspnea and poor exercise tolerance.  She had repeat stress study there was no ischemia.  In 2016, she had a stress nuclear perfusion study showing a moderate sized defect in the anterolateral wall with ischemia done for chest pain and dyspnea.  She had cardiac catheterization which showed patent LAD stent, 80% jailed diagonal stenosis, 80% mid LAD stenosis.  She was treated medically.  She had an echocardiogram showing normal left ventricular systolic function, mild LVH, diastolic dysfunction done 2016.  She had 24-hour Holter which showed PVCs and PACs.  May 2018, she had syncope, dyspnea and weakness.  She had a stress nuclear perfusion study showing no evidence of ischemia.  Her echocardiogram then showed ejection fraction 60% with mild mitral and tricuspid insufficiency and mild left atrial dilation.  She had a carotid artery study which showed moderate bilateral internal  carotid artery stenosis.    In the past she had nausea with plavix and was switched to effient.     She presented to [facility] 1/17 with sore throat, dysphagia, congestion and dizziness.  She was negative for flu and strep.  Her chest x-ray showed no acute disease.  Urine was clear.  She had chest pain was transferred to [facility] for further evaluation.  Her troponin on admission was 0.010.    She had nausea, cough and diarrhea which are new.  She feels weak.  Her chest pain is reproducible by palpation at the costochrondal joints.  She's had no syncope.  Her cough is productive.     Cardiac Testing:  Carotid doppler studies 5/31/18  Interpretation Summary     · Proximal right internal carotid artery moderate stenosis.  · Proximal left internal carotid artery moderate stenosis.  · Mid right internal carotid artery is normal.  · Distal right internal carotid artery is normal.  · All other right side carotid system vessels are normal.  · Mid left internal carotid artery is normal.  · Distal left internal carotid artery is normal.  · All other left side carotid system vessels are normal.     Echocardiogram 5/11/18  Interpretation Summary     · NORMAL GLOBAL STRAIN -19.3  · Left atrial cavity size is mildly dilated.  · Mild mitral valve regurgitation is present  · Mild tricuspid valve regurgitation is present.  · Calculated EF = 60%.  · There is no evidence of pericardial effusion.     Myocardial Perfusion Stress Test 5/11/18  Interpretation Summary     · No ECG evidence of myocardial ischemia.Negative clinical evidence of myocardial ischemia.  · Left ventricular ejection fraction is normal (Calculated EF = 60%).  · Myocardial perfusion imaging indicates a normal myocardial perfusion study with no evidence of ischemia.  · Impressions are consistent with a low risk study.     24 hr Holter Monitor 7/2/16      Echocardiogram July 2016      Cardiac Catheterization March 2016      Myocardial Perfusion Stress Test  February 2016      Cardiac Catheterization October 2015            Past Medical History:   Diagnosis Date   • Abnormal stress test    • CAD (coronary artery disease)    • Crescendo angina (CMS/Formerly [facility])    • Diabetes mellitus (CMS/[facility])    • H/O angioplasty    • Hiatal hernia    • Hyperlipidemia    • Hypertension    • Obesity    • Recurrent urinary tract infection        Past Surgical History:   Procedure Laterality Date   • CARDIAC CATHETERIZATION      OUTCOME: SUCCESSFUL   • CORONARY STENT PLACEMENT     • HYSTERECTOMY     • TONSILLECTOMY AND ADENOIDECTOMY           Prior to Admission medications    Medication Sig Start Date End Date Taking? Authorizing Provider   aspirin 81 MG EC tablet Take 81 mg by mouth daily.   Yes Christina Fair MD   bisoprolol (ZEBETA) 5 MG tablet Take 0.5 tablets by mouth Daily. 5/11/18  Yes Arely Lomax APRN   Ertugliflozin L-PyroglutamicAc (STEGLATRO) 15 MG tablet Take 15 mg by mouth Every Morning.   Yes Christina Fair MD   gabapentin (NEURONTIN) 600 MG tablet Take 600 mg by mouth 3 (Three) Times a Day. 5/15/18  Yes Christina Fair MD   losartan (COZAAR) 25 MG tablet Take 25 mg by mouth Daily. 5/14/18  Yes Christina Fair MD   metFORMIN (GLUCOPHAGE) 500 MG tablet Take 1,000 mg by mouth 2 (Two) Times a Day. 5/14/18  Yes Christina Fair MD   pantoprazole (PROTONIX) 40 MG EC tablet Take 40 mg by mouth Daily. 5/14/18  Yes Christina Fair MD   rosuvastatin (CRESTOR) 10 MG tablet  6/6/18  Yes Christina Fair MD   SITagliptin (JANUVIA) 50 MG tablet Take 100 mg by mouth Daily.   Yes ProviderChristina MD   SYMBICORT 160-4.5 MCG/ACT inhaler Take 2 puffs by mouth. 5/6/17  Yes Christina Fair MD   VENTOLIN  (90 BASE) MCG/ACT inhaler  5/6/17  Yes ProviderChristina MD   nitroglycerin (NITROSTAT) 0.4 MG SL tablet place 1 tablet under the tongue if needed every 5 minutes for chest pain for 3 doses IF NO RELIEF AFTER FIRST DOSE CALL PRESCRIBER  . 9/7/18   Roe Harper MD       Allergies   Allergen Reactions   • Penicillins Hives       Social History     Socioeconomic History   • Marital status:      Spouse name: Not on file   • Number of children: Not on file   • Years of education: Not on file   • Highest education level: Not on file   Tobacco Use   • Smoking status: Never Smoker   Substance and Sexual Activity   • Alcohol use: Yes     Comment: SOCIAL       Family History   Problem Relation Age of Onset   • Heart attack Father    • Heart attack Brother        REVIEW OF SYSTEMS:   All systems reviewed.  Pertinent positives identified in HPI.  All other systems are negative.      Objective:     Vitals:    11/17/18 1955 11/17/18 2324 11/18/18 0500 11/18/18 0657   BP:  134/69  104/62   BP Location:  Right arm  Right arm   Patient Position:  Lying  Lying   Pulse: 71 79  60   Resp: 18 20  20   Temp:  99.3 °F (37.4 °C)  98.2 °F (36.8 °C)   TempSrc:  Oral  Oral   SpO2: 92% 95%  94%   Weight:   81.3 kg (179 lb 3.2 oz)    Height:         Body mass index is 35 kg/m².    General Appearance:    Alert, cooperative, in no acute distress   Head:    Normocephalic, without obvious abnormality, atraumatic   Eyes:            Lids and lashes normal, conjunctivae and sclerae normal, no   icterus, no pallor, corneas clear   Ears:    Ears appear intact with no abnormalities noted   Throat:   No oral lesions, no thrush, oral mucosa moist   Neck:   No adenopathy, supple, trachea midline, no thyromegaly, no   carotid bruit, no JVD   Back:     No kyphosis present, no scoliosis present, no skin lesions, erythema or scars, no tenderness, range of motion normal   Lungs:     Clear to auscultation,respirations regular, even and unlabored    Heart:    Regular rhythm and normal rate, normal S1 and S2, no murmur, no gallop, no rub, no click   Chest Wall:    No abnormalities observed   Abdomen:     Normal bowel sounds, no masses, no organomegaly, soft        -tender  epigastric, non-distended, no guarding, no rebound  tenderness   Extremities:   Moves all extremities well, no edema, no cyanosis, no redness   Pulses:   Pulses palpable and equal bilaterally. Normal radial, carotid, dorsalis pedis and posterior tibial pulses bilaterally.    Skin:  Psychiatric:   No bleeding, bruising or rash    Alert and oriented x 3, normal mood and affect   Lab Review:           Invalid input(s): LABALBU, PROT  Results from last 7 days   Lab Units  11/17/18   1551   TROPONIN T ng/mL  <0.010                             Telemetry:        EKG:        I personally viewed and interpreted the patient's EKG/Telemetry data.        Assessment and Plan:       1. Chest pain with h/o CAD. No ACS. Chest pain is reproducible with palpation.   2. Hypertension - BP now low -stop losartan.   3. Hyperlipidemia  4. Moderate bilateral carotid disease.   5. DM  6. Cough, dizziness, nausea, diarrhea and weakness - viral panel is normal. Check cxr, lipase and liver panel.      Will follow. abd tender - may need eval for this.  No stress needed.         Magdalena Randall MD  11/18/18  7:53 AM

## 2018-11-18 NOTE — PROGRESS NOTES
Name: Lily Unger ADMIT: 2018   : 1960  PCP: Ezio Patterson MD    MRN: [id number] LOS: 0 days   AGE/SEX: 58 y.o. female  ROOM: S519/1   Subjective     Continues with intermittent headache which is improved with Tylenol and Norco.  Sore throat has completely resolved.  Intermittent nausea but no vomiting or diarrhea, mild abdominal pain continues.  Has remained afebrile in the hospital.  Denies chest pain, palpitations, shortness of breath or cough.  She required oxygen overnight due to hypoxia, does not use her CPAP machine  Objective   Vital Signs  Temp:  [98.1 °F (36.7 °C)-99.3 °F (37.4 °C)] 98.2 °F (36.8 °C)  Heart Rate:  [53-85] 53  Resp:  [16-20] 16  BP: (101-134)/(59-69) 104/62  SpO2:  [90 %-97 %] 97 %  on  Flow (L/min):  [2] 2;   Device (Oxygen Therapy): nasal cannula  Body mass index is 35 kg/m².    Physical Exam   Constitutional: She is oriented to person, place, and time. No distress.   HENT:   Head: Normocephalic and atraumatic.   Mouth/Throat: No oropharyngeal exudate.   No oral ulcers or candidiasis   Neck: Normal range of motion. Neck supple.   Cardiovascular: Normal rate and regular rhythm. Exam reveals no friction rub.   No murmur heard.  Pulmonary/Chest: Effort normal and breath sounds normal. No stridor. No respiratory distress. She has no wheezes.   Abdominal: Soft. Bowel sounds are normal. She exhibits no distension. There is no tenderness.   Musculoskeletal: She exhibits no edema or tenderness.   Neurological: She is alert and oriented to person, place, and time.   Skin: She is not diaphoretic. No erythema. No pallor.   Psychiatric: She has a normal mood and affect. Her behavior is normal.       Results Review:       I reviewed the patient's new clinical results.  Results from last 7 days   Lab Units  18   0723   WBC 10*3/mm3  11.90*   HEMOGLOBIN g/dL  11.4*   PLATELETS 10*3/mm3  229     Results from last 7 days   Lab Units  18   0723   SODIUM mmol/L   142   POTASSIUM mmol/L  4.5   CHLORIDE mmol/L  104   CO2 mmol/L  26.8   BUN mg/dL  12   CREATININE mg/dL  1.17*   GLUCOSE mg/dL  126*   Estimated Creatinine Clearance: 48.3 mL/min (A) (by C-G formula based on SCr of 1.17 mg/dL (H)).  Results from last 7 days   Lab Units  11/18/18   0723   ALBUMIN g/dL  3.30*   BILIRUBIN mg/dL  0.2   ALK PHOS U/L  66   AST (SGOT) U/L  23   ALT (SGPT) U/L  25     Results from last 7 days   Lab Units  11/18/18   0723   CALCIUM mg/dL  9.4   ALBUMIN g/dL  3.30*           bisoprolol 2.5 mg Oral Daily   budesonide-formoterol 2 puff Inhalation BID - RT   gabapentin 600 mg Oral Q8H   insulin lispro 0-7 Units Subcutaneous 4x Daily With Meals & Nightly   pantoprazole 40 mg Oral Q AM   rosuvastatin 10 mg Oral Nightly   sodium chloride 3 mL Intravenous Q12H      Diet Regular; Consistent Carbohydrate      Assessment/Plan      Active Hospital Problems    Diagnosis Date Noted   • **Chest pain [R07.9] 01/28/2016   • Medical non-compliance [Z91.19] 11/17/2018   • Dizziness [R42] 11/17/2018   • Viral illness [B34.9] 11/17/2018   • Type 2 diabetes mellitus (CMS/[facility]) [E11.9] 07/01/2016   • Hypertension [I10] 07/01/2016      Resolved Hospital Problems   No resolved problems to display.       · I still suspect she has a viral illness with the numerous sick contacts even with a negative RVP.  She is already improving.  Her CT head was negative at outside hospital.  · Odynophagia and sore throat are gone, Rapid strep negative at OSH  · She complained of stiff neck and headache but her stiff neck is now gone.  Very low suspicion for meningitis.  · Supportive care for her headache as CT head was negative  · Type 2 diabetes: holding steglatro. Continue on sliding scale insulin    · Reported hematochezia: Check hemoglobin and occult blood but no BM yet.    · I suspect chest pain is related to a viral illness but given her history of coronary artery disease I have consulted her cardiologist  · MILAGROS: non compliant  with CPAP, could be contributing to her headache  · SCDs for DVT prophylaxis  · Patient's son is her healthcare surrogate and she wishes to be full code    · DISPO: Home tomorrow        Zachariah Hammond MD  [facility]  11/18/18  12:51 PM

## 2018-11-18 NOTE — PLAN OF CARE
Problem: Patient Care Overview  Goal: Plan of Care Review  Outcome: Ongoing (interventions implemented as appropriate)   11/18/18 0423   Coping/Psychosocial   Plan of Care Reviewed With patient   Plan of Care Review   Progress no change   OTHER   Outcome Summary VSS. Patient still c/o headache. PRN Lortab and Tylenol given for headache with some effect. Cardiology to see today. Still awaiting patient to have BM to send sample. Patient states she has a history of internal and external hemorrhoids. Will continue to monitor.        Problem: Infection, Risk/Actual (Adult)  Goal: Infection Prevention/Resolution  Outcome: Ongoing (interventions implemented as appropriate)      Problem: Pain, Acute (Adult)  Goal: Identify Related Risk Factors and Signs and Symptoms  Outcome: Outcome(s) achieved Date Met: 11/18/18    Goal: Acceptable Pain Control/Comfort Level  Outcome: Ongoing (interventions implemented as appropriate)      Problem: Fall Risk (Adult)  Goal: Identify Related Risk Factors and Signs and Symptoms  Outcome: Outcome(s) achieved Date Met: 11/18/18    Goal: Absence of Fall  Outcome: Ongoing (interventions implemented as appropriate)

## 2018-11-19 ENCOUNTER — TRANSCRIBE ORDERS (OUTPATIENT)
Dept: CARDIOLOGY | Facility: CLINIC | Age: 58
End: 2018-11-19

## 2018-11-19 VITALS
BODY MASS INDEX: 35.58 KG/M2 | DIASTOLIC BLOOD PRESSURE: 71 MMHG | SYSTOLIC BLOOD PRESSURE: 140 MMHG | RESPIRATION RATE: 16 BRPM | WEIGHT: 181.22 LBS | HEIGHT: 60 IN | TEMPERATURE: 98.1 F | OXYGEN SATURATION: 93 % | HEART RATE: 59 BPM

## 2018-11-19 DIAGNOSIS — R55 VASOVAGAL SYNCOPE: ICD-10-CM

## 2018-11-19 DIAGNOSIS — R07.2 PRECORDIAL PAIN: Primary | ICD-10-CM

## 2018-11-19 LAB
GLUCOSE BLDC GLUCOMTR-MCNC: 121 MG/DL (ref 70–130)
GLUCOSE BLDC GLUCOMTR-MCNC: 143 MG/DL (ref 70–130)

## 2018-11-19 PROCEDURE — 82962 GLUCOSE BLOOD TEST: CPT

## 2018-11-19 PROCEDURE — 94799 UNLISTED PULMONARY SVC/PX: CPT

## 2018-11-19 PROCEDURE — G0378 HOSPITAL OBSERVATION PER HR: HCPCS

## 2018-11-19 PROCEDURE — 94664 DEMO&/EVAL PT USE INHALER: CPT

## 2018-11-19 PROCEDURE — 99212 OFFICE O/P EST SF 10 MIN: CPT | Performed by: NURSE PRACTITIONER

## 2018-11-19 RX ORDER — MECLIZINE HYDROCHLORIDE 25 MG/1
25 TABLET ORAL 3 TIMES DAILY PRN
Qty: 30 TABLET | Refills: 0 | Status: SHIPPED | OUTPATIENT
Start: 2018-11-19 | End: 2020-07-21

## 2018-11-19 RX ORDER — HYDROCODONE BITARTRATE AND ACETAMINOPHEN 5; 325 MG/1; MG/1
1 TABLET ORAL EVERY 4 HOURS PRN
Qty: 12 TABLET | Refills: 0 | Status: SHIPPED | OUTPATIENT
Start: 2018-11-19 | End: 2018-11-27

## 2018-11-19 RX ORDER — ASPIRIN 81 MG/1
81 TABLET, CHEWABLE ORAL DAILY
Status: DISCONTINUED | OUTPATIENT
Start: 2018-11-19 | End: 2018-11-19 | Stop reason: HOSPADM

## 2018-11-19 RX ADMIN — BISOPROLOL FUMARATE 2.5 MG: 5 TABLET ORAL at 09:27

## 2018-11-19 RX ADMIN — BUDESONIDE AND FORMOTEROL FUMARATE DIHYDRATE 2 PUFF: 160; 4.5 AEROSOL RESPIRATORY (INHALATION) at 09:06

## 2018-11-19 RX ADMIN — GABAPENTIN 600 MG: 300 CAPSULE ORAL at 06:15

## 2018-11-19 RX ADMIN — ASPIRIN 81 MG: 81 TABLET, CHEWABLE ORAL at 13:34

## 2018-11-19 RX ADMIN — Medication 3 ML: at 09:00

## 2018-11-19 RX ADMIN — PANTOPRAZOLE SODIUM 40 MG: 40 TABLET, DELAYED RELEASE ORAL at 06:15

## 2018-11-19 NOTE — PROGRESS NOTES
[facility]  Cardiology Progress Note    Patient Identification:  Name: Lily Unger  Age: 58 y.o.  Sex: female  :  1960  MRN: [id number]                 Follow Up / Chief Complaint:     Interval History:  Lily Unger is a 58-year-old female with a history of  coronary artery disease, diabetes mellitus, carotid artery stenosis, hypertension, hyperlipidemia, and obesity.     In , she was admitted with chest pain and had a cardiac catheterization showing moderate CAD for which she was treated medically.  She then returned 2015 to [facility] with chest pain and had a crit catheterization showing 70-90% proximal LAD stenosis and was treated with drug-eluting stent to the proximal LAD.  She still had dyspnea and poor exercise tolerance.  She had repeat stress study there was no ischemia.  In 2016, she had a stress nuclear perfusion study showing a moderate sized defect in the anterolateral wall with ischemia done for chest pain and dyspnea.  She had cardiac catheterization which showed patent LAD stent, 80% jailed diagonal stenosis, 80% mid LAD stenosis.  She was treated medically.  She had an echocardiogram showing normal left ventricular systolic function, mild LVH, diastolic dysfunction done 2016.  She had 24-hour Holter which showed PVCs and PACs.  May 2018, she had syncope, dyspnea and weakness.  She had a stress nuclear perfusion study showing no evidence of ischemia.  Her echocardiogram then showed ejection fraction 60% with mild mitral and tricuspid insufficiency and mild left atrial dilation.  She had a carotid artery study which showed moderate bilateral internal carotid artery stenosis.     In the past she had nausea with plavix and was switched to effient.      She presented to [facility]  with sore throat, dysphagia, congestion and dizziness.  She was negative for flu and strep.  Her chest x-ray showed no acute disease.  Urine was clear.  She  had chest pain was transferred to [facility] for further evaluation.  She had two neg.             Subjective: She states she feels jittery and has nausea.  She denies chest pain/pressure, vomiting.        Objective:  Temp:  [97.5 °F (36.4 °C)-98.1 °F (36.7 °C)] 98.1 °F (36.7 °C)  Heart Rate:  [47-58] 58  Resp:  [16] 16  BP: (113-142)/(59-66) 121/59  SpO2:  [90 %-97 %] 95 %    Past Medical History:  Past Medical History:   Diagnosis Date   • Abnormal stress test    • CAD (coronary artery disease)    • Crescendo angina (CMS/[facility])    • Diabetes mellitus (CMS/HCC)    • H/O angioplasty    • Hiatal hernia    • Hyperlipidemia    • Hypertension    • Obesity    • Recurrent urinary tract infection      Past Surgical History:  Past Surgical History:   Procedure Laterality Date   • CARDIAC CATHETERIZATION      OUTCOME: SUCCESSFUL   • CORONARY STENT PLACEMENT     • HYSTERECTOMY     • TONSILLECTOMY AND ADENOIDECTOMY          Social History:   Social History     Tobacco Use   • Smoking status: Never Smoker   Substance Use Topics   • Alcohol use: Yes     Comment: SOCIAL      Family History:  Family History   Problem Relation Age of Onset   • Heart attack Father    • Heart attack Brother           Allergies:  Allergies   Allergen Reactions   • Penicillins Hives     Scheduled Meds:    bisoprolol 2.5 mg Daily   budesonide-formoterol 2 puff BID - RT   gabapentin 600 mg Q8H   insulin lispro 0-7 Units 4x Daily With Meals & Nightly   pantoprazole 40 mg Q AM   rosuvastatin 10 mg Nightly   sodium chloride 3 mL Q12H           INTAKE AND OUTPUT:    Intake/Output Summary (Last 24 hours) at 11/19/2018 1118  Last data filed at 11/19/2018 0930  Gross per 24 hour   Intake 360 ml   Output --   Net 360 ml       Review of Systems:   GI: nausea, without vomiting  Cardiac: Denies chest pain and pressure  Pulmonary: Denies cough and shortness of air.    Constitutional:  Temp:  [97.5 °F (36.4 °C)-98.1 °F (36.7 °C)] 98.1 °F (36.7 °C)  Heart Rate:  [47-58]  58  Resp:  [16] 16  BP: (113-142)/(59-66) 121/59    Physical Exam:  General:  Appears in no acute distress  Eyes: EOM normal, and no conjunctival drainage  HEENT:  No JVD. Thyroid not visibly enlarged. No mucosal pallor or cyanosis  Respiratory: Respirations regular and unlabored at rest. BBS with good air entry in all fields. No crackles, rubs or wheezes auscultated. On room air.  Cardiovascular: S1S2 Regular rate and rhythm. No murmur, rub or gallop. No carotid bruits. DP/PT pulses     . No pretibial pitting edema  Gastrointestinal: Abdomen soft, flat, non tender. Bowel sounds present. No hepatosplenomegaly. No ascites  Musculoskeletal: BRICENO x4. No abnormal movements  Extremities: No digital clubbing or cyanosis  Skin:  Skin warm and dry to touch. No rashes    Neuro: AAO x3 CN II-XII grossly intact  Psych: Mood and affect normal, pleasant and cooperative          Cardiographics  Telemetry:       Cardiac Testing:  Carotid doppler studies 5/31/18  Interpretation Summary      · Proximal right internal carotid artery moderate stenosis.  · Proximal left internal carotid artery moderate stenosis.  · Mid right internal carotid artery is normal.  · Distal right internal carotid artery is normal.  · All other right side carotid system vessels are normal.  · Mid left internal carotid artery is normal.  · Distal left internal carotid artery is normal.  · All other left side carotid system vessels are normal.      Echocardiogram 5/11/18  Interpretation Summary      · NORMAL GLOBAL STRAIN -19.3  · Left atrial cavity size is mildly dilated.  · Mild mitral valve regurgitation is present  · Mild tricuspid valve regurgitation is present.  · Calculated EF = 60%.  · There is no evidence of pericardial effusion.      Myocardial Perfusion Stress Test 5/11/18  Interpretation Summary      · No ECG evidence of myocardial ischemia.Negative clinical evidence of myocardial ischemia.  · Left ventricular ejection fraction is normal (Calculated  EF = 60%).  · Myocardial perfusion imaging indicates a normal myocardial perfusion study with no evidence of ischemia.  · Impressions are consistent with a low risk study.      24 hr Holter Monitor 7/2/16       Echocardiogram July 2016        Cardiac Catheterization March 2016       Myocardial Perfusion Stress Test February 2016       Cardiac Catheterization October 2015                         Lab Review   Results from last 7 days   Lab Units  11/18/18   0723  11/17/18   1551   TROPONIN T ng/mL  <0.010  <0.010         Results from last 7 days   Lab Units  11/18/18   0723   SODIUM mmol/L  142   POTASSIUM mmol/L  4.5   BUN mg/dL  12   CREATININE mg/dL  1.17*   CALCIUM mg/dL  9.4        Results from last 7 days   Lab Units  11/18/18   0723   WBC 10*3/mm3  11.90*   HEMOGLOBIN g/dL  11.4*   HEMATOCRIT %  38.8   PLATELETS 10*3/mm3  229             Assessment:  1. Chest pain with h/o CAD.    2. Hypertension    3. Hyperlipidemia  4. Moderate bilateral carotid disease.   5. DM  6. Cough, dizziness, nausea, diarrhea and weakness - viral panel is normal. Check cxr, lipase and liver panel.            Plan  1. Chest pain with h/o CAD:  No current pain.  Added ASA 81 mg. Will have outpatient stress testing on Dec.3.     2. Hypertension:  Controlled, /59, HR 58     3. Hyperlipidemia: On Crestor    4. Moderate bilateral carotid disease: 05/31/18 Carotid doppler study showed Proximal right internal carotid artery moderate stenosis, left internal carotid artery moderate stenosis.        Ok to discharge per CV standpoint will have her follow up.      -Added ASA 81 mg    -Will have a follow up with Dr. Harper on Dec. 3, 2018 at 7:30 am for an outpatient stress test at [address] [place] [address] [address], [place], [address].   No caffeine or decaf for 24 hours prior to testing and no eating 2 hours before testing.       - Follow up appointment on December 11 at 10:30 am at [facility] [place], [address].    Reviewed Risk  "of benefits with patient regarding stress test.            11/19/2018  BRETT Beth      EMR Dragon/Transcription:   \"Dictated utilizing Dragon dictation\".     "

## 2018-11-19 NOTE — PLAN OF CARE
Problem: Patient Care Overview  Goal: Plan of Care Review  Outcome: Ongoing (interventions implemented as appropriate)   11/19/18 0454   Coping/Psychosocial   Plan of Care Reviewed With patient   Plan of Care Review   Progress no change   OTHER   Outcome Summary HR danny most of the night (40-50). Patient still c/o headache and nausea that was treated with PRN medications. Plan is for d/c today. Will continue to monitor.        Problem: Infection, Risk/Actual (Adult)  Goal: Infection Prevention/Resolution  Outcome: Ongoing (interventions implemented as appropriate)      Problem: Pain, Acute (Adult)  Goal: Acceptable Pain Control/Comfort Level  Outcome: Ongoing (interventions implemented as appropriate)      Problem: Fall Risk (Adult)  Goal: Absence of Fall  Outcome: Ongoing (interventions implemented as appropriate)

## 2018-11-19 NOTE — DISCHARGE INSTRUCTIONS
-Will have a follow up with Dr. Harper on Dec. 3, 2018 at 7:30 am for an outpatient stress test at [address], [place], [address].   No caffeine or decaf for 24 hours prior to testing and no eating 2 hours before testing.        - Follow up appointment on December 11 at 10:30 am at [address] [place], [address].

## 2018-11-19 NOTE — DISCHARGE SUMMARY
Name: Lily Unger  Age: 58 y.o.  Sex: female  :  1960  MRN: [id number]         Primary Care Physician: Ezio Patterson MD      Date of Admission:  2018  Date of Discharge:  2018      Chief Complaint:  No chief complaint on file.  Chief complaint is chest pain    Presenting Problem/History of Present Illness:  Viral illness [B34.9]     Discharge Diagnosis:  Active Hospital Problems    Diagnosis Date Noted   • **Viral illness [B34.9] 2018   • Dizziness [R42] 2018   • Type 2 diabetes mellitus (CMS/HCC) [E11.9] 2016   • Hypertension [I10] 2016   • Chest pain [R07.9] 2016      Resolved Hospital Problems   No resolved problems to display.       Secondary Diagnoses:  Past Medical History:   Diagnosis Date   • Abnormal stress test    • CAD (coronary artery disease)    • Crescendo angina (CMS/HCC)    • Diabetes mellitus (CMS/HCC)    • H/O angioplasty    • Hiatal hernia    • Hyperlipidemia    • Hypertension    • Obesity    • Recurrent urinary tract infection          Consults:    Consulting Physician(s)     Provider Relationship Specialty    Roe Harper MD Consulting Physician Cardiology          Procedures Performed:    TWO-VIEW CHEST     HISTORY: Productive cough.     FINDINGS: The lungs are well-expanded and clear and the heart and hilar  structures are normal. There is no evidence of pneumonia or change from  05/10/2018.    Hospital Course:    The patient is a 58-year-old female who presented to our hospital as a direct admission for multiple complaints.  Please see admission history and physical for complete details.  All of her symptoms seemed to point to a viral upper respiratory infection.  A respiratory viral panel was negative however I still feel she had a viral illness which she is now recovering from.  Her symptoms included headache, nausea, vomiting, chest soreness, cough, chills and a fever at the outside hospital.  Workup there  included a chest x-ray which was negative.  She also had a negative CT of her head due to the headache.  She wanted to come here because this is where her cardiologist goes.  I did consult her cardiologist who recommended outpatient stress test soon.  She initially had a stiff neck as well which resolved prior to even being admitted to us.  Suspicion of meningitis was very low.  She also had diet aphasia and sore throat but a rapid strep was negative at the outside hospital.  This has also resolved at the time of discharge and there are no oral lesions suggestive of hand-foot-and-mouth.  She is around a lot of sick people working as a  at [employer] and suspect she had contracted a viral illness from this..  The patient has improved with supportive care.  I discussed discharge plan with her today and she is in agreement.  I want her to follow-up with her primary care doctor in 2 days to ensure that she is improving still.      Physical Exam:  Temp:  [97.6 °F (36.4 °C)-98.1 °F (36.7 °C)] 98.1 °F (36.7 °C)  Heart Rate:  [47-59] 59  Resp:  [16] 16  BP: (113-142)/(59-71) 140/71  Body mass index is 35.39 kg/m².  Physical Exam  Constitutional: She is oriented to person, place, and time. No distress.   HENT:   Head: Normocephalic and atraumatic.   Mouth/Throat: No oropharyngeal exudate.   No oral ulcers or candidiasis   Neck: Normal range of motion. Neck supple.   Cardiovascular: Normal rate and regular rhythm. Exam reveals no friction rub.   No murmur heard.  Pulmonary/Chest: Effort normal and breath sounds normal. No stridor. No respiratory distress. She has no wheezes.   Abdominal: Soft. Bowel sounds are normal. She exhibits no distension. There is no tenderness.   Musculoskeletal: She exhibits no edema or tenderness.   Neurological: She is alert and oriented to person, place, and time.   Skin: She is not diaphoretic. No erythema. No pallor.   Psychiatric: She has a normal mood and affect. Her behavior is normal.          Condition on Discharge:  Stable.    Discharge Disposition:   Home with family    Allergies:   Allergies   Allergen Reactions   • Penicillins Hives       Discharge Medications:      Discharge Medications      New Medications      Instructions Start Date   HYDROcodone-acetaminophen 5-325 MG per tablet  Commonly known as:  NORCO   1 tablet, Oral, Every 4 Hours PRN      meclizine 25 MG tablet  Commonly known as:  ANTIVERT   25 mg, Oral, 3 Times Daily PRN         Continue These Medications      Instructions Start Date   aspirin 81 MG EC tablet   81 mg, Oral, Daily      bisoprolol 5 MG tablet  Commonly known as:  ZEBETA   2.5 mg, Oral, Daily      gabapentin 600 MG tablet  Commonly known as:  NEURONTIN   600 mg, Oral, 3 Times Daily      metFORMIN 500 MG tablet  Commonly known as:  GLUCOPHAGE   1,000 mg, Oral, 2 Times Daily      nitroglycerin 0.4 MG SL tablet  Commonly known as:  NITROSTAT   place 1 tablet under the tongue if needed every 5 minutes for chest pain for 3 doses IF NO RELIEF AFTER FIRST DOSE CALL PRESCRIBER .      pantoprazole 40 MG EC tablet  Commonly known as:  PROTONIX   40 mg, Oral, Daily      rosuvastatin 10 MG tablet  Commonly known as:  CRESTOR   No dose, route, or frequency recorded.      SITagliptin 50 MG tablet  Commonly known as:  JANUVIA   100 mg, Oral, Daily      STEGLATRO 15 MG tablet  Generic drug:  Ertugliflozin L-PyroglutamicAc   15 mg, Oral, Every Morning      SYMBICORT 160-4.5 MCG/ACT inhaler  Generic drug:  budesonide-formoterol   2 puffs, Oral      VENTOLIN  (90 Base) MCG/ACT inhaler  Generic drug:  albuterol   No dose, route, or frequency recorded.         Stop These Medications    losartan 25 MG tablet  Commonly known as:  COZAAR            Discharge Diet:   Diet Instructions     Advance Diet As Tolerated            Activity at Discharge:   Activity Instructions     Activity as Tolerated            Follow-up Appointments:  Future Appointments   Date Time Provider  Department Center   12/3/2018  7:30 AM [facility]  [facility] KPL A[facility]   12/3/2018  8:30 AM [facility]  [facility]ISH KPL ASH[facility]   12/3/2018  9:30 AM [facility]  ASHISH KPL ASHISH   [facility]2018 11[facility]   12/11/2018 10:30 AM Roe Harepr MD [facility] None   6/6/2019  9:30 AM Roe Harper MD [facility] None     Follow-up Information     Roe Harper MD Follow up on 12/3/2018.    Specialty:  Cardiology  Why:  Follow up with Dr. Harper for a stress test.  No caffeine, decaf, or 24 hours prior to testing and Do not eat or drink 2 hours before testing.  Appoint. on Dec. 11, at 1030 am     Contact information:  [address] [id number] [phone]             Ezio Patterson MD .    Specialties:  Emergency Medicine, General Practice  Contact information:  [address] [id number] [phone]                     Test Results Pending at Discharge:  None     Code status:   Code Status and Medical Interventions:   Ordered at: 11/17/18 6010     Code Status:    CPR     Medical Interventions (Level of Support Prior to Arrest):    Full         Zachariah Hammond MD  [facility]  11/19/18  1:51 PM      Time: greater than 30 minutes.

## 2018-11-19 NOTE — PROGRESS NOTES
To Whom It May Concern,    Lily Unger was admitted to my service from November 17, 2018 through November 19, 2018.  She is excused from work until she has followed up and cleared to return to work by her primary care physician.  Please call with any questions.  Thank you.

## 2018-12-03 ENCOUNTER — HOSPITAL ENCOUNTER (OUTPATIENT)
Dept: CARDIOLOGY | Facility: HOSPITAL | Age: 58
Discharge: HOME | End: 2018-12-03
Attending: INTERNAL MEDICINE

## 2018-12-03 VITALS
OXYGEN SATURATION: 97 % | RESPIRATION RATE: 16 BRPM | HEART RATE: 59 BPM | HEIGHT: 60 IN | WEIGHT: 181 LBS | DIASTOLIC BLOOD PRESSURE: 73 MMHG | BODY MASS INDEX: 35.53 KG/M2 | SYSTOLIC BLOOD PRESSURE: 135 MMHG

## 2018-12-03 PROCEDURE — 78452 HT MUSCLE IMAGE SPECT MULT: CPT | Performed by: INTERNAL MEDICINE

## 2018-12-03 PROCEDURE — A9500 TC99M SESTAMIBI: HCPCS | Performed by: INTERNAL MEDICINE

## 2018-12-03 PROCEDURE — 93016 CV STRESS TEST SUPVJ ONLY: CPT | Performed by: INTERNAL MEDICINE

## 2018-12-03 PROCEDURE — 93018 CV STRESS TEST I&R ONLY: CPT | Performed by: INTERNAL MEDICINE

## 2018-12-03 PROCEDURE — 25010000002 REGADENOSON 0.4 MG/5ML SOLUTION: Performed by: INTERNAL MEDICINE

## 2018-12-03 PROCEDURE — 78452 HT MUSCLE IMAGE SPECT MULT: CPT

## 2018-12-03 PROCEDURE — 0 TECHNETIUM SESTAMIBI: Performed by: INTERNAL MEDICINE

## 2018-12-03 PROCEDURE — 93017 CV STRESS TEST TRACING ONLY: CPT

## 2018-12-03 RX ORDER — FLUTICASONE PROPIONATE 50 MCG
2 SPRAY, SUSPENSION (ML) NASAL DAILY
Refills: 5 | COMMUNITY
Start: 2018-11-08 | End: 2020-07-21

## 2018-12-03 RX ORDER — BUTALBITAL, ACETAMINOPHEN AND CAFFEINE 50; 325; 40 MG/1; MG/1; MG/1
1 TABLET ORAL EVERY 4 HOURS PRN
Status: ON HOLD | COMMUNITY
End: 2020-08-14

## 2018-12-03 RX ORDER — PIOGLITAZONEHYDROCHLORIDE 30 MG/1
30 TABLET ORAL DAILY
Refills: 5 | COMMUNITY
Start: 2018-11-11 | End: 2019-05-28 | Stop reason: SINTOL

## 2018-12-03 RX ORDER — BLOOD SUGAR DIAGNOSTIC
STRIP MISCELLANEOUS
Refills: 0 | COMMUNITY
Start: 2018-09-24

## 2018-12-03 RX ORDER — DULOXETIN HYDROCHLORIDE 30 MG/1
30 CAPSULE, DELAYED RELEASE ORAL DAILY
Refills: 5 | COMMUNITY
Start: 2018-11-16 | End: 2019-03-12

## 2018-12-03 RX ADMIN — REGADENOSON 0.4 MG: 0.08 INJECTION, SOLUTION INTRAVENOUS at 10:23

## 2018-12-03 RX ADMIN — TECHNETIUM TC 99M SESTAMIBI 1 DOSE: 1 INJECTION INTRAVENOUS at 07:52

## 2018-12-03 RX ADMIN — TECHNETIUM TC 99M SESTAMIBI 1 DOSE: 1 INJECTION INTRAVENOUS at 10:23

## 2018-12-04 LAB
BH CV STRESS BP STAGE 1: NORMAL
BH CV STRESS COMMENTS STAGE 1: NORMAL
BH CV STRESS DOSE REGADENOSON STAGE 1: 0.4
BH CV STRESS DURATION MIN STAGE 1: 2
BH CV STRESS DURATION SEC STAGE 1: 35
BH CV STRESS GRADE STAGE 1: 0
BH CV STRESS HR STAGE 1: 89
BH CV STRESS METS STAGE 1: 1.4
BH CV STRESS PROTOCOL 1: NORMAL
BH CV STRESS RECOVERY BP: NORMAL MMHG
BH CV STRESS RECOVERY HR: 61 BPM
BH CV STRESS RECOVERY O2: 97 %
BH CV STRESS SPEED STAGE 1: 1
BH CV STRESS STAGE 1: 1
LV EF NUC BP: 64 %
MAXIMAL PREDICTED HEART RATE: 160 BPM
PERCENT MAX PREDICTED HR: 55.63 %
STRESS BASELINE BP: NORMAL MMHG
STRESS BASELINE HR: 52 BPM
STRESS O2 SAT REST: 97 %
STRESS PERCENT HR: 65 %
STRESS POST ESTIMATED WORKLOAD: 1.4 METS
STRESS POST EXERCISE DUR MIN: 2 MIN
STRESS POST EXERCISE DUR SEC: 35 SEC
STRESS POST PEAK BP: NORMAL MMHG
STRESS POST PEAK HR: 89 BPM
STRESS TARGET HR: 136 BPM

## 2018-12-11 ENCOUNTER — OFFICE VISIT (OUTPATIENT)
Dept: CARDIOLOGY | Facility: CLINIC | Age: 58
End: 2018-12-11

## 2018-12-11 VITALS
HEIGHT: 60 IN | DIASTOLIC BLOOD PRESSURE: 75 MMHG | SYSTOLIC BLOOD PRESSURE: 136 MMHG | HEART RATE: 62 BPM | BODY MASS INDEX: 34.95 KG/M2 | WEIGHT: 178 LBS

## 2018-12-11 DIAGNOSIS — I25.10 CORONARY ARTERY DISEASE INVOLVING NATIVE CORONARY ARTERY OF NATIVE HEART WITHOUT ANGINA PECTORIS: ICD-10-CM

## 2018-12-11 DIAGNOSIS — R06.02 SHORTNESS OF BREATH: ICD-10-CM

## 2018-12-11 DIAGNOSIS — I10 ESSENTIAL HYPERTENSION: Primary | ICD-10-CM

## 2018-12-11 PROCEDURE — 99213 OFFICE O/P EST LOW 20 MIN: CPT | Performed by: INTERNAL MEDICINE

## 2018-12-11 NOTE — PROGRESS NOTES
Subjective:        Lily Unger is a 58 y.o. female who here for follow up    CC  sob  HPI  58 years old female with known history of coronary artery disease, benign essential arterial hypertension and shortness of breath underwent a stress test which was normal    Patient denies any chest pains or tightness in chest     Problem List Items Addressed This Visit        Cardiovascular and Mediastinum    Coronary artery disease    Hypertension - Primary       Respiratory    Shortness of breath        .Interpretation Summary        · Findings consistent with a normal ECG stress test.  · Left ventricular ejection fraction is normal (Calculated EF = 64%).  · Myocardial perfusion imaging indicates a normal myocardial perfusion study with no evidence of ischemia.  · Impressions are consistent with a low risk study.         The following portions of the patient's history were reviewed and updated as appropriate: allergies, current medications, past family history, past medical history, past social history, past surgical history and problem list.    Past Medical History:   Diagnosis Date   • Abnormal stress test    • CAD (coronary artery disease)    • Crescendo angina (CMS/HCC)    • Diabetes mellitus (CMS/HCC)    • H/O angioplasty    • Hiatal hernia    • Hyperlipidemia    • Hypertension    • Obesity    • Recurrent urinary tract infection      reports that  has never smoked. she has never used smokeless tobacco. She reports that she drinks alcohol. She reports that she does not use drugs.   Family History   Problem Relation Age of Onset   • Heart attack Father    • Heart disease Father    • Heart attack Brother    • Heart disease Brother    • Heart attack Maternal Grandfather    • Heart disease Maternal Grandfather        Review of Systems  Constitutional: No wt loss, fever, fatigue  Gastrointestinal: No nausea, abdominal pain  Behavioral/Psych: No insomnia or anxiety   Cardiovascular shortness of breath  Objective:     "   Physical Exam           Physical Exam  /75   Pulse 62   Ht 152.4 cm (60\")   Wt 80.7 kg (178 lb)   BMI 34.76 kg/m²     General appearance: NAD, conversant   Eyes: anicteric sclerae, moist conjunctivae; no lid-lag; PERRLA   HENT: Atraumatic; oropharynx clear with moist mucous membranes and no mucosal ulcerations;  normal hard and soft palate   Neck: Trachea midline; FROM, supple, no thyromegaly or lymphadenopathy   Lungs: CTA, with normal respiratory effort and no intercostal retractions   CV: S1-S2 regular, no murmurs, no rub, no gallop   Abdomen: Soft, non-tender; no masses or HSM   Extremities: No peripheral edema or extremity lymphadenopathy  Skin: Normal temperature, turgor and texture; no rash, ulcers or subcutaneous nodules   Psych: Appropriate affect, alert and oriented to person, place and time           Procedures      Echocardiogram:        Current Outpatient Medications:   •  aspirin 81 MG EC tablet, Take 81 mg by mouth daily., Disp: , Rfl:   •  bisoprolol (ZEBETA) 5 MG tablet, Take 0.5 tablets by mouth Daily., Disp: 45 tablet, Rfl: 0  •  butalbital-acetaminophen-caffeine (FIORICET, ESGIC) -40 MG per tablet, Take 1 tablet by mouth Every 4 (Four) Hours As Needed for Headache., Disp: , Rfl:   •  DULoxetine (CYMBALTA) 30 MG capsule, Take 30 mg by mouth Daily., Disp: , Rfl: 5  •  Ertugliflozin L-PyroglutamicAc (STEGLATRO) 15 MG tablet, Take 15 mg by mouth Every Morning., Disp: , Rfl:   •  fluticasone (FLONASE) 50 MCG/ACT nasal spray, INSTILL 2 (TWO) PUFFS IN EACH NOSTRIL DAILY, Disp: , Rfl: 5  •  gabapentin (NEURONTIN) 600 MG tablet, Take 600 mg by mouth 3 (Three) Times a Day., Disp: , Rfl: 0  •  meclizine (ANTIVERT) 25 MG tablet, Take 1 tablet by mouth 3 (Three) Times a Day As Needed for dizziness., Disp: 30 tablet, Rfl: 0  •  metFORMIN (GLUCOPHAGE) 500 MG tablet, Take 1,000 mg by mouth 2 (Two) Times a Day., Disp: , Rfl: 0  •  nitroglycerin (NITROSTAT) 0.4 MG SL tablet, place 1 tablet under " the tongue if needed every 5 minutes for chest pain for 3 doses IF NO RELIEF AFTER FIRST DOSE CALL PRESCRIBER ., Disp: 25 tablet, Rfl: 0  •  ONETOUCH VERIO test strip, , Disp: , Rfl: 0  •  pantoprazole (PROTONIX) 40 MG EC tablet, Take 40 mg by mouth Daily., Disp: , Rfl: 0  •  pioglitazone (ACTOS) 30 MG tablet, Take 30 mg by mouth Daily., Disp: , Rfl: 5  •  rosuvastatin (CRESTOR) 10 MG tablet, , Disp: , Rfl:   •  SITagliptin (JANUVIA) 50 MG tablet, Take 100 mg by mouth Daily., Disp: , Rfl:   •  SYMBICORT 160-4.5 MCG/ACT inhaler, Take 2 puffs by mouth., Disp: , Rfl: 0  •  VENTOLIN  (90 BASE) MCG/ACT inhaler, , Disp: , Rfl: 1   Assessment:        Patient Active Problem List   Diagnosis   • Chest pain   • Shortness of breath   • Syncope and collapse   • Weakness   • Coronary artery disease   • Soft tissue lesion of shoulder region   • Disorder of rotator cuff   • Shoulder pain   • Rotator cuff tear arthropathy   • Unstable angina pectoris (CMS/HCC)   • Injury of kidney   • Type 2 diabetes mellitus (CMS/HCC)   • Hypertension   • Hypotension   • Hypovolemia   • Bilateral carotid artery disease (CMS/HCC)   • Medical non-compliance   • Dizziness   • Viral illness               Plan:            ICD-10-CM ICD-9-CM   1. Essential hypertension I10 401.9   2. Coronary artery disease involving native coronary artery of native heart without angina pectoris I25.10 414.01   3. Shortness of breath R06.02 786.05     1. Essential hypertension  Blood pressure controlled    2. Coronary artery disease involving native coronary artery of native heart without angina pectoris  No angina pectoralis    3. Shortness of breath  Multifactorial       Specificity and sensitivity of the stress test/ cardiac workup has been explained. Pt has been explained if  Symptoms continue please go to ER, and further w/p will be required.    Also explained this does not rule out coronary artery disease or the future events, continue to emphasize on  risk reductions for coronary artery disease    Pt also advised to contact PCP for other causes of symptoms      rtc 3 months    If still sob will need cath  COUNSELING:    Lily Horner was given to patient for the following topics: diagnostic results, risk factor reductions, impressions, risks and benefits of treatment options and importance of treatment compliance .       SMOKING COUNSELING:    Counseling given: Not Answered      EMR Dragon/Transcription disclaimer:   Much of this encounter note is an electronic transcription/translation of spoken language to printed text. The electronic translation of spoken language may permit erroneous, or at times, nonsensical words or phrases to be inadvertently transcribed; Although I have reviewed the note for such errors, some may still exist.

## 2019-02-27 RX ORDER — BISOPROLOL FUMARATE 5 MG/1
2.5 TABLET, FILM COATED ORAL DAILY
Qty: 45 TABLET | Refills: 2 | Status: SHIPPED | OUTPATIENT
Start: 2019-02-27 | End: 2019-12-09 | Stop reason: SDUPTHER

## 2019-03-12 ENCOUNTER — HOSPITAL ENCOUNTER (OUTPATIENT)
Dept: CARDIOLOGY | Facility: HOSPITAL | Age: 59
Discharge: HOME | End: 2019-03-12
Admitting: INTERNAL MEDICINE

## 2019-03-12 ENCOUNTER — TELEPHONE (OUTPATIENT)
Dept: CARDIOLOGY | Facility: CLINIC | Age: 59
End: 2019-03-12

## 2019-03-12 ENCOUNTER — OFFICE VISIT (OUTPATIENT)
Dept: CARDIOLOGY | Facility: CLINIC | Age: 59
End: 2019-03-12

## 2019-03-12 VITALS
BODY MASS INDEX: 35.73 KG/M2 | WEIGHT: 182 LBS | SYSTOLIC BLOOD PRESSURE: 139 MMHG | HEIGHT: 60 IN | HEART RATE: 57 BPM | DIASTOLIC BLOOD PRESSURE: 83 MMHG

## 2019-03-12 DIAGNOSIS — I25.118 CORONARY ARTERY DISEASE OF NATIVE ARTERY OF NATIVE HEART WITH STABLE ANGINA PECTORIS (HCC): ICD-10-CM

## 2019-03-12 DIAGNOSIS — R06.02 SOB (SHORTNESS OF BREATH): Primary | ICD-10-CM

## 2019-03-12 LAB
ANION GAP SERPL CALCULATED.3IONS-SCNC: 16.4 MMOL/L
APTT PPP: 30.5 SECONDS (ref 22.7–35.4)
BASOPHILS # BLD AUTO: 0.07 10*3/MM3 (ref 0–0.2)
BASOPHILS NFR BLD AUTO: 0.8 % (ref 0–1.5)
BUN BLD-MCNC: 21 MG/DL (ref 8–23)
BUN/CREAT SERPL: 20 (ref 7–25)
CALCIUM SPEC-SCNC: 10.4 MG/DL (ref 8.6–10.5)
CHLORIDE SERPL-SCNC: 100 MMOL/L (ref 98–107)
CO2 SERPL-SCNC: 25.6 MMOL/L (ref 22–29)
CREAT BLD-MCNC: 1.05 MG/DL (ref 0.57–1)
DEPRECATED RDW RBC AUTO: 49.4 FL (ref 37–54)
EOSINOPHIL # BLD AUTO: 0.25 10*3/MM3 (ref 0–0.4)
EOSINOPHIL NFR BLD AUTO: 3 % (ref 0.3–6.2)
ERYTHROCYTE [DISTWIDTH] IN BLOOD BY AUTOMATED COUNT: 15 % (ref 12.3–15.4)
GFR SERPL CREATININE-BSD FRML MDRD: 53 ML/MIN/1.73
GLUCOSE BLD-MCNC: 178 MG/DL (ref 65–99)
HCT VFR BLD AUTO: 45.3 % (ref 34–46.6)
HGB BLD-MCNC: 13.5 G/DL (ref 12–15.9)
IMM GRANULOCYTES # BLD AUTO: 0.04 10*3/MM3 (ref 0–0.05)
IMM GRANULOCYTES NFR BLD AUTO: 0.5 % (ref 0–0.5)
INR PPP: 1.01 (ref 0.9–1.1)
LYMPHOCYTES # BLD AUTO: 2.65 10*3/MM3 (ref 0.7–3.1)
LYMPHOCYTES NFR BLD AUTO: 31.8 % (ref 19.6–45.3)
MCH RBC QN AUTO: 26.7 PG (ref 26.6–33)
MCHC RBC AUTO-ENTMCNC: 29.8 G/DL (ref 31.5–35.7)
MCV RBC AUTO: 89.7 FL (ref 79–97)
MONOCYTES # BLD AUTO: 0.86 10*3/MM3 (ref 0.1–0.9)
MONOCYTES NFR BLD AUTO: 10.3 % (ref 5–12)
NEUTROPHILS # BLD AUTO: 4.46 10*3/MM3 (ref 1.4–7)
NEUTROPHILS NFR BLD AUTO: 53.6 % (ref 42.7–76)
NRBC BLD AUTO-RTO: 0 /100 WBC (ref 0–0)
PLATELET # BLD AUTO: 385 10*3/MM3 (ref 140–450)
PMV BLD AUTO: 10.4 FL (ref 6–12)
POTASSIUM BLD-SCNC: 4.6 MMOL/L (ref 3.5–5.2)
PROTHROMBIN TIME: 13.1 SECONDS (ref 11.7–14.2)
RBC # BLD AUTO: 5.05 10*6/MM3 (ref 3.77–5.28)
SODIUM BLD-SCNC: 142 MMOL/L (ref 136–145)
WBC NRBC COR # BLD: 8.33 10*3/MM3 (ref 3.4–10.8)

## 2019-03-12 PROCEDURE — 36415 COLL VENOUS BLD VENIPUNCTURE: CPT | Performed by: INTERNAL MEDICINE

## 2019-03-12 PROCEDURE — 80048 BASIC METABOLIC PNL TOTAL CA: CPT | Performed by: INTERNAL MEDICINE

## 2019-03-12 PROCEDURE — 85610 PROTHROMBIN TIME: CPT | Performed by: INTERNAL MEDICINE

## 2019-03-12 PROCEDURE — 85730 THROMBOPLASTIN TIME PARTIAL: CPT | Performed by: INTERNAL MEDICINE

## 2019-03-12 PROCEDURE — 99213 OFFICE O/P EST LOW 20 MIN: CPT | Performed by: INTERNAL MEDICINE

## 2019-03-12 PROCEDURE — 85025 COMPLETE CBC W/AUTO DIFF WBC: CPT | Performed by: INTERNAL MEDICINE

## 2019-03-12 RX ORDER — CHLORAL HYDRATE 500 MG
1000 CAPSULE ORAL
COMMUNITY
End: 2023-02-17

## 2019-03-12 RX ORDER — MULTIPLE VITAMINS W/ MINERALS TAB 9MG-400MCG
1 TAB ORAL DAILY
COMMUNITY

## 2019-03-12 NOTE — PROGRESS NOTES
Subjective:        Lily Unger is a 58 y.o. female who here for follow up    CC  Still siig shortness of breath  HPI  58-year-old white female known history of coronary artery disease continues to complains of shortness of breath on minimum exertion gradually worsening with no chest pains or tightness in the chest     Problem List Items Addressed This Visit        Cardiovascular and Mediastinum    Coronary artery disease    Relevant Orders    CBC & Differential    Basic Metabolic Panel    aPTT    Protime-INR    CBC Auto Differential       Respiratory    SOB (shortness of breath) - Primary    Relevant Orders    Case Request Cath Lab: Left Heart Cath, also right (Completed)    CBC & Differential    Basic Metabolic Panel    aPTT    Protime-INR    CBC Auto Differential        .    The following portions of the patient's history were reviewed and updated as appropriate: allergies, current medications, past family history, past medical history, past social history, past surgical history and problem list.    Past Medical History:   Diagnosis Date   • Abnormal stress test    • CAD (coronary artery disease)    • Crescendo angina (CMS/HCC)    • Diabetes mellitus (CMS/HCC)    • H/O angioplasty    • Hiatal hernia    • Hyperlipidemia    • Hypertension    • Obesity    • Recurrent urinary tract infection      reports that she has quit smoking. she has never used smokeless tobacco. She reports that she drinks alcohol. She reports that she does not use drugs.   Family History   Problem Relation Age of Onset   • Heart attack Father    • Heart disease Father    • Heart attack Brother    • Heart disease Brother    • Heart attack Maternal Grandfather    • Heart disease Maternal Grandfather        Review of Systems  Constitutional: No wt loss, fever, fatigue  Gastrointestinal: No nausea, abdominal pain  Behavioral/Psych: No insomnia or anxiety   Cardiovascular shortness of breath  Objective:       Physical Exam    /83    "Pulse 57   Ht 151.1 cm (59.5\")   Wt 82.6 kg (182 lb)   BMI 36.14 kg/m²   General appearance: No acute changes   Neck: Trachea midline; NECK, supple, no thyromegaly or lymphadenopathy   Lungs: Normal size and shape, normal breath sounds, equal distribution of air, no rales and rhonchi   CV: S1-S2 regular, no murmurs, no rub, no gallop   Abdomen: Soft, non-tender; no masses , no abnormal abdominal sounds   Extremities: No deformity , normal color , no peripheral edema   Skin: Normal temperature, turgor and texture; no rash, ulcers          Procedures      Echocardiogram:        Current Outpatient Medications:   •  aspirin 81 MG EC tablet, Take 81 mg by mouth daily., Disp: , Rfl:   •  bisoprolol (ZEBETA) 5 MG tablet, Take 0.5 tablets by mouth Daily., Disp: 45 tablet, Rfl: 2  •  butalbital-acetaminophen-caffeine (FIORICET, ESGIC) -40 MG per tablet, Take 1 tablet by mouth Every 4 (Four) Hours As Needed for Headache., Disp: , Rfl:   •  Ertugliflozin L-PyroglutamicAc (STEGLATRO) 15 MG tablet, Take 15 mg by mouth Every Morning., Disp: , Rfl:   •  fluticasone (FLONASE) 50 MCG/ACT nasal spray, INSTILL 2 (TWO) PUFFS IN EACH NOSTRIL DAILY, Disp: , Rfl: 5  •  gabapentin (NEURONTIN) 600 MG tablet, Take 600 mg by mouth 3 (Three) Times a Day., Disp: , Rfl: 0  •  metFORMIN (GLUCOPHAGE) 500 MG tablet, Take 1,000 mg by mouth 2 (Two) Times a Day., Disp: , Rfl: 0  •  Multiple Vitamins-Minerals (MULTIVITAMIN WITH MINERALS) tablet tablet, Take 1 tablet by mouth Daily., Disp: , Rfl:   •  Omega-3 Fatty Acids (FISH OIL) 1000 MG capsule capsule, Take  by mouth Daily With Breakfast., Disp: , Rfl:   •  pantoprazole (PROTONIX) 40 MG EC tablet, Take 40 mg by mouth Daily., Disp: , Rfl: 0  •  pioglitazone (ACTOS) 30 MG tablet, Take 30 mg by mouth Daily., Disp: , Rfl: 5  •  SITagliptin (JANUVIA) 50 MG tablet, Take 100 mg by mouth Daily., Disp: , Rfl:   •  SYMBICORT 160-4.5 MCG/ACT inhaler, Take 2 puffs by mouth., Disp: , Rfl: 0  •  VENTOLIN "  (90 BASE) MCG/ACT inhaler, , Disp: , Rfl: 1  •  meclizine (ANTIVERT) 25 MG tablet, Take 1 tablet by mouth 3 (Three) Times a Day As Needed for dizziness., Disp: 30 tablet, Rfl: 0  •  nitroglycerin (NITROSTAT) 0.4 MG SL tablet, place 1 tablet under the tongue if needed every 5 minutes for chest pain for 3 doses IF NO RELIEF AFTER FIRST DOSE CALL PRESCRIBER ., Disp: 25 tablet, Rfl: 0  •  ONETOUCH VERIO test strip, , Disp: , Rfl: 0   Assessment:        Patient Active Problem List   Diagnosis   • Chest pain   • Shortness of breath   • Syncope and collapse   • Weakness   • Coronary artery disease   • Soft tissue lesion of shoulder region   • Disorder of rotator cuff   • Shoulder pain   • Rotator cuff tear arthropathy   • Unstable angina pectoris (CMS/HCC)   • Injury of kidney   • Type 2 diabetes mellitus (CMS/HCC)   • Hypertension   • Hypotension   • Hypovolemia   • Bilateral carotid artery disease (CMS/HCC)   • Medical non-compliance   • Dizziness   • Viral illness               Plan:            ICD-10-CM ICD-9-CM   1. SOB (shortness of breath) R06.02 786.05   2. Coronary artery disease of native artery of native heart with stable angina pectoris (CMS/HCC) I25.118 414.01     413.9     1. SOB (shortness of breath)  We will need right and left heart catheter  - Case Request Cath Lab: Left Heart Cath, also right  - CBC & Differential  - Basic Metabolic Panel  - aPTT  - Protime-INR  - CBC Auto Differential    2. Coronary artery disease of native artery of native heart with stable angina pectoris (CMS/HCC)  Will need diagnostic heart catheter  - CBC & Differential  - Basic Metabolic Panel  - aPTT  - Protime-INR  - CBC Auto Differential       Needs rt and lt     Procedure, risks and options of cardiac cath explained to pt INCLUDING BUT NOT LIMITED TO MI, STROKE, DEATH, INFECTION HAEMORRHAGE, . Pt understands well and agrees with no further questions.  COUNSELING:    Lily Horner was given to  patient for the following topics: diagnostic results, risk factor reductions, impressions, risks and benefits of treatment options and importance of treatment compliance .       SMOKING COUNSELING:    Counseling given: Not Answered      Dictated using Dragon dictation

## 2019-03-12 NOTE — TELEPHONE ENCOUNTER
DAUGHTER IS PATIENT TRANSPORTATION TO HAVE HEART CATH DONE -SHE IS ONLY OFF ON [facility] AND WANTS TO KNOW CAN WE SCHEDULE IT TO NEXT WED 20TH CAL DAUGHTER RIVERA [phone]

## 2019-03-20 ENCOUNTER — HOSPITAL ENCOUNTER (OUTPATIENT)
Facility: HOSPITAL | Age: 59
Discharge: HOME | End: 2019-03-21
Attending: INTERNAL MEDICINE | Admitting: INTERNAL MEDICINE

## 2019-03-20 DIAGNOSIS — R06.02 SOB (SHORTNESS OF BREATH): ICD-10-CM

## 2019-03-20 LAB
ACT BLD: 213 SECONDS (ref 82–152)
GLUCOSE BLDC GLUCOMTR-MCNC: 101 MG/DL (ref 70–130)
HCT VFR BLDA CALC: 36 % (ref 38–51)
HCT VFR BLDA CALC: 36 % (ref 38–51)
HGB BLDA-MCNC: 12.2 G/DL (ref 12–17)
HGB BLDA-MCNC: 12.2 G/DL (ref 12–17)
SAO2 % BLDA: 64 % (ref 95–98)
SAO2 % BLDA: 95 % (ref 95–98)

## 2019-03-20 PROCEDURE — C9600 PERC DRUG-EL COR STENT SING: HCPCS | Performed by: INTERNAL MEDICINE

## 2019-03-20 PROCEDURE — 93460 R&L HRT ART/VENTRICLE ANGIO: CPT | Performed by: INTERNAL MEDICINE

## 2019-03-20 PROCEDURE — 25010000002 MIDAZOLAM PER 1 MG: Performed by: INTERNAL MEDICINE

## 2019-03-20 PROCEDURE — 92928 PRQ TCAT PLMT NTRAC ST 1 LES: CPT | Performed by: INTERNAL MEDICINE

## 2019-03-20 PROCEDURE — G0378 HOSPITAL OBSERVATION PER HR: HCPCS

## 2019-03-20 PROCEDURE — 85014 HEMATOCRIT: CPT

## 2019-03-20 PROCEDURE — 25010000002 HEPARIN (PORCINE) PER 1000 UNITS: Performed by: INTERNAL MEDICINE

## 2019-03-20 PROCEDURE — 82962 GLUCOSE BLOOD TEST: CPT

## 2019-03-20 PROCEDURE — 0 IOPAMIDOL PER 1 ML: Performed by: INTERNAL MEDICINE

## 2019-03-20 PROCEDURE — C1874 STENT, COATED/COV W/DEL SYS: HCPCS | Performed by: INTERNAL MEDICINE

## 2019-03-20 PROCEDURE — C1769 GUIDE WIRE: HCPCS | Performed by: INTERNAL MEDICINE

## 2019-03-20 PROCEDURE — C1894 INTRO/SHEATH, NON-LASER: HCPCS | Performed by: INTERNAL MEDICINE

## 2019-03-20 PROCEDURE — 25010000002 FENTANYL CITRATE (PF) 100 MCG/2ML SOLUTION: Performed by: INTERNAL MEDICINE

## 2019-03-20 PROCEDURE — 85018 HEMOGLOBIN: CPT

## 2019-03-20 PROCEDURE — 85347 COAGULATION TIME ACTIVATED: CPT

## 2019-03-20 PROCEDURE — C1725 CATH, TRANSLUMIN NON-LASER: HCPCS | Performed by: INTERNAL MEDICINE

## 2019-03-20 PROCEDURE — 99152 MOD SED SAME PHYS/QHP 5/>YRS: CPT | Performed by: INTERNAL MEDICINE

## 2019-03-20 PROCEDURE — 99153 MOD SED SAME PHYS/QHP EA: CPT | Performed by: INTERNAL MEDICINE

## 2019-03-20 PROCEDURE — C1887 CATHETER, GUIDING: HCPCS | Performed by: INTERNAL MEDICINE

## 2019-03-20 DEVICE — XIENCE SIERRA™ EVEROLIMUS ELUTING CORONARY STENT SYSTEM 3.50 MM X 08 MM / RAPID-EXCHANGE
Type: IMPLANTABLE DEVICE | Status: FUNCTIONAL
Brand: XIENCE SIERRA™

## 2019-03-20 RX ORDER — LIDOCAINE HYDROCHLORIDE 20 MG/ML
INJECTION, SOLUTION INFILTRATION; PERINEURAL AS NEEDED
Status: DISCONTINUED | OUTPATIENT
Start: 2019-03-20 | End: 2019-03-20 | Stop reason: HOSPADM

## 2019-03-20 RX ORDER — SODIUM CHLORIDE 0.9 % (FLUSH) 0.9 %
3-10 SYRINGE (ML) INJECTION AS NEEDED
Status: DISCONTINUED | OUTPATIENT
Start: 2019-03-20 | End: 2019-03-20 | Stop reason: HOSPADM

## 2019-03-20 RX ORDER — HEPARIN SODIUM 1000 [USP'U]/ML
INJECTION, SOLUTION INTRAVENOUS; SUBCUTANEOUS AS NEEDED
Status: DISCONTINUED | OUTPATIENT
Start: 2019-03-20 | End: 2019-03-20 | Stop reason: HOSPADM

## 2019-03-20 RX ORDER — MULTIPLE VITAMINS W/ MINERALS TAB 9MG-400MCG
1 TAB ORAL DAILY
Status: DISCONTINUED | OUTPATIENT
Start: 2019-03-20 | End: 2019-03-21 | Stop reason: HOSPADM

## 2019-03-20 RX ORDER — SODIUM CHLORIDE 9 MG/ML
100 INJECTION, SOLUTION INTRAVENOUS CONTINUOUS
Status: ACTIVE | OUTPATIENT
Start: 2019-03-20 | End: 2019-03-20

## 2019-03-20 RX ORDER — ATROPINE SULFATE 1 MG/ML
INJECTION, SOLUTION INTRAMUSCULAR; INTRAVENOUS; SUBCUTANEOUS AS NEEDED
Status: DISCONTINUED | OUTPATIENT
Start: 2019-03-20 | End: 2019-03-20 | Stop reason: HOSPADM

## 2019-03-20 RX ORDER — PANTOPRAZOLE SODIUM 40 MG/1
40 TABLET, DELAYED RELEASE ORAL DAILY
Status: DISCONTINUED | OUTPATIENT
Start: 2019-03-20 | End: 2019-03-21 | Stop reason: HOSPADM

## 2019-03-20 RX ORDER — PIOGLITAZONEHYDROCHLORIDE 30 MG/1
30 TABLET ORAL DAILY
Status: DISCONTINUED | OUTPATIENT
Start: 2019-03-20 | End: 2019-03-21 | Stop reason: HOSPADM

## 2019-03-20 RX ORDER — SODIUM CHLORIDE 9 MG/ML
75 INJECTION, SOLUTION INTRAVENOUS CONTINUOUS
Status: DISCONTINUED | OUTPATIENT
Start: 2019-03-20 | End: 2019-03-21 | Stop reason: HOSPADM

## 2019-03-20 RX ORDER — MIDAZOLAM HYDROCHLORIDE 1 MG/ML
INJECTION INTRAMUSCULAR; INTRAVENOUS AS NEEDED
Status: DISCONTINUED | OUTPATIENT
Start: 2019-03-20 | End: 2019-03-20 | Stop reason: HOSPADM

## 2019-03-20 RX ORDER — SODIUM CHLORIDE 9 MG/ML
250 INJECTION, SOLUTION INTRAVENOUS ONCE AS NEEDED
Status: DISCONTINUED | OUTPATIENT
Start: 2019-03-20 | End: 2019-03-21 | Stop reason: HOSPADM

## 2019-03-20 RX ORDER — ASPIRIN 81 MG/1
TABLET, CHEWABLE ORAL AS NEEDED
Status: DISCONTINUED | OUTPATIENT
Start: 2019-03-20 | End: 2019-03-20 | Stop reason: HOSPADM

## 2019-03-20 RX ORDER — ALBUTEROL SULFATE 2.5 MG/3ML
2.5 SOLUTION RESPIRATORY (INHALATION) EVERY 6 HOURS PRN
Status: DISCONTINUED | OUTPATIENT
Start: 2019-03-20 | End: 2019-03-21 | Stop reason: HOSPADM

## 2019-03-20 RX ORDER — ASPIRIN 81 MG/1
81 TABLET ORAL DAILY
Status: DISCONTINUED | OUTPATIENT
Start: 2019-03-21 | End: 2019-03-21 | Stop reason: HOSPADM

## 2019-03-20 RX ORDER — LIDOCAINE HYDROCHLORIDE 10 MG/ML
0.1 INJECTION, SOLUTION EPIDURAL; INFILTRATION; INTRACAUDAL; PERINEURAL ONCE AS NEEDED
Status: DISCONTINUED | OUTPATIENT
Start: 2019-03-20 | End: 2019-03-20 | Stop reason: HOSPADM

## 2019-03-20 RX ORDER — GABAPENTIN 100 MG/1
100 CAPSULE ORAL EVERY 12 HOURS SCHEDULED
Status: DISCONTINUED | OUTPATIENT
Start: 2019-03-20 | End: 2019-03-21 | Stop reason: HOSPADM

## 2019-03-20 RX ORDER — BISOPROLOL FUMARATE 5 MG/1
2.5 TABLET, FILM COATED ORAL DAILY
Status: DISCONTINUED | OUTPATIENT
Start: 2019-03-21 | End: 2019-03-21 | Stop reason: HOSPADM

## 2019-03-20 RX ORDER — FENTANYL CITRATE 50 UG/ML
INJECTION, SOLUTION INTRAMUSCULAR; INTRAVENOUS AS NEEDED
Status: DISCONTINUED | OUTPATIENT
Start: 2019-03-20 | End: 2019-03-20 | Stop reason: HOSPADM

## 2019-03-20 RX ORDER — BUDESONIDE AND FORMOTEROL FUMARATE DIHYDRATE 160; 4.5 UG/1; UG/1
2 AEROSOL RESPIRATORY (INHALATION)
Status: DISCONTINUED | OUTPATIENT
Start: 2019-03-20 | End: 2019-03-21 | Stop reason: HOSPADM

## 2019-03-20 RX ORDER — BUTALBITAL, ACETAMINOPHEN AND CAFFEINE 50; 325; 40 MG/1; MG/1; MG/1
1 TABLET ORAL EVERY 4 HOURS PRN
Status: DISCONTINUED | OUTPATIENT
Start: 2019-03-20 | End: 2019-03-21 | Stop reason: HOSPADM

## 2019-03-20 RX ORDER — SODIUM CHLORIDE 0.9 % (FLUSH) 0.9 %
3 SYRINGE (ML) INJECTION EVERY 12 HOURS SCHEDULED
Status: DISCONTINUED | OUTPATIENT
Start: 2019-03-20 | End: 2019-03-20 | Stop reason: HOSPADM

## 2019-03-20 RX ADMIN — MULTIPLE VITAMINS W/ MINERALS TAB 1 TABLET: TAB at 18:23

## 2019-03-20 RX ADMIN — PIOGLITAZONE 30 MG: 30 TABLET ORAL at 18:23

## 2019-03-20 RX ADMIN — GABAPENTIN 100 MG: 100 CAPSULE ORAL at 21:24

## 2019-03-20 RX ADMIN — SODIUM CHLORIDE 75 ML/HR: 9 INJECTION, SOLUTION INTRAVENOUS at 09:02

## 2019-03-20 RX ADMIN — Medication 3 ML: at 17:54

## 2019-03-21 VITALS
SYSTOLIC BLOOD PRESSURE: 123 MMHG | OXYGEN SATURATION: 99 % | BODY MASS INDEX: 36.17 KG/M2 | WEIGHT: 179.44 LBS | HEART RATE: 52 BPM | TEMPERATURE: 98.1 F | RESPIRATION RATE: 18 BRPM | DIASTOLIC BLOOD PRESSURE: 63 MMHG | HEIGHT: 59 IN

## 2019-03-21 LAB
ANION GAP SERPL CALCULATED.3IONS-SCNC: 11.8 MMOL/L
BUN BLD-MCNC: 22 MG/DL (ref 8–23)
BUN/CREAT SERPL: 21.8 (ref 7–25)
CALCIUM SPEC-SCNC: 9.5 MG/DL (ref 8.6–10.5)
CHLORIDE SERPL-SCNC: 103 MMOL/L (ref 98–107)
CO2 SERPL-SCNC: 25.2 MMOL/L (ref 22–29)
CREAT BLD-MCNC: 1.01 MG/DL (ref 0.57–1)
GFR SERPL CREATININE-BSD FRML MDRD: 56 ML/MIN/1.73
GLUCOSE BLD-MCNC: 125 MG/DL (ref 65–99)
POTASSIUM BLD-SCNC: 3.8 MMOL/L (ref 3.5–5.2)
SODIUM BLD-SCNC: 140 MMOL/L (ref 136–145)

## 2019-03-21 PROCEDURE — G0378 HOSPITAL OBSERVATION PER HR: HCPCS

## 2019-03-21 PROCEDURE — 94640 AIRWAY INHALATION TREATMENT: CPT

## 2019-03-21 PROCEDURE — 80048 BASIC METABOLIC PNL TOTAL CA: CPT | Performed by: INTERNAL MEDICINE

## 2019-03-21 PROCEDURE — 99217 PR OBSERVATION CARE DISCHARGE MANAGEMENT: CPT | Performed by: NURSE PRACTITIONER

## 2019-03-21 RX ORDER — ROSUVASTATIN CALCIUM 10 MG/1
10 TABLET, COATED ORAL DAILY
Qty: 30 TABLET | Refills: 11 | Status: SHIPPED | OUTPATIENT
Start: 2019-03-21 | End: 2020-05-13

## 2019-03-21 RX ORDER — CLOPIDOGREL BISULFATE 75 MG/1
75 TABLET ORAL ONCE
Qty: 1 TABLET | Refills: 0
Start: 2019-03-21 | End: 2019-03-21

## 2019-03-21 RX ORDER — CLOPIDOGREL BISULFATE 75 MG/1
75 TABLET ORAL ONCE
Status: DISCONTINUED | OUTPATIENT
Start: 2019-03-21 | End: 2019-03-21 | Stop reason: HOSPADM

## 2019-03-21 RX ADMIN — PANTOPRAZOLE SODIUM 40 MG: 40 TABLET, DELAYED RELEASE ORAL at 09:35

## 2019-03-21 RX ADMIN — BUDESONIDE AND FORMOTEROL FUMARATE DIHYDRATE 2 PUFF: 160; 4.5 AEROSOL RESPIRATORY (INHALATION) at 07:35

## 2019-03-21 RX ADMIN — BISOPROLOL FUMARATE 2.5 MG: 5 TABLET ORAL at 09:35

## 2019-03-21 RX ADMIN — ASPIRIN 81 MG: 81 TABLET, DELAYED RELEASE ORAL at 09:35

## 2019-03-21 RX ADMIN — GABAPENTIN 100 MG: 100 CAPSULE ORAL at 09:35

## 2019-03-21 RX ADMIN — PIOGLITAZONE 30 MG: 30 TABLET ORAL at 09:35

## 2019-03-21 RX ADMIN — MULTIPLE VITAMINS W/ MINERALS TAB 1 TABLET: TAB at 09:35

## 2019-03-21 NOTE — DISCHARGE SUMMARY
[facility]  Physician Discharge Summary    Patient Identification:  Name: Lily Unger  Age: 58 y.o.  Sex: female  :  1960  MRN: [id number]    Admit date: 3/20/2019    Discharge date and time:   3/21/19 at 1036    Admitting Physician: Roe Harper MD     Discharge Physician: Dr. Roe Harper    Discharge Diagnoses:   Patient Active Problem List   Diagnosis   • Chest pain   • Shortness of breath   • Syncope and collapse   • Weakness   • Coronary artery disease   • Soft tissue lesion of shoulder region   • Disorder of rotator cuff   • Shoulder pain   • Rotator cuff tear arthropathy   • Unstable angina pectoris (CMS/HCC)   • Injury of kidney   • Type 2 diabetes mellitus (CMS/HCC)   • Hypertension   • Hypotension   • Hypovolemia   • Bilateral carotid artery disease (CMS/HCC)   • Medical non-compliance   • Dizziness   • Viral illness   • SOB (shortness of breath)       Discharged Condition: good    Hospital Course:   Lily Unger is a 58-year-old female, who is a patient of Dr. Harper.  She has a history of hypertension, hypotension, CAD shortness of breath, and hyperlipidemia. She had an office visit on 3/12/2019 complaining of shortness of breath with minimal exertion gradually worsening with no chest pain or tightness in the chest. Schedule a cardiac catheterization for her on 3/20.  She received a stent to the ostial RCA 90% reduced to 0% with 3.5/8 Xience stent.  LAD showed minimal irregularity with midportion 30-40% stenosis.  Circumflex artery was nondominant with known athersclerotic stenosis.  Mild pulmonary hypertension.  Right groin site slight bruising, no hematoma, soft, and no oozing.  She will be on Plavix, asa, BB, and a statin.  She was educated on the importance of taking her medication as prescribed.  She was not able to afford Brilinta, switched to Plavix.      She will follow up with Dr. Harper on  at 11 AM at the [place]  [address] office. She was asked to follow-up with her primary care within 1 week of discharge.  CV stable at discharge.     CARDIAC REHAB EVALUATION AND ENROLLMENT    Discharge Exam:  General: No acute distress, resting in bed.   Skin: Warm and dry, no diaphoresis noted.  Right groin site bruised, soft, no hematoma noted and no oozing   EYES:  EOM normal, and no conjunctival drainage.   HEENT: external ear and nose normal; oral mucosa moist   Neck: Supple; no carotid bruits; no JVD   Heart: S1S2 regular rate and rhythm; no murmurs; no gallop or rub appreciated   Pulmonary:  Respirations regular, unlabored at rest, bilateral breath sounds have good air entry throughout all lung fields; no crackles, rubs or wheezes auscultated.     GI: Soft, non-tender, non-distended, positive bowel sounds  No hepatosplenomegaly   Extremities: Bilateral lower extremities have no pre-tibial pitting edema; DP/PT pulses are palpable   Neurological:  Alert and oriented x3; no neuro deficits          LABS:          Results from last 7 days   Lab Units 03/21/19  0509   SODIUM mmol/L 140   POTASSIUM mmol/L 3.8   BUN mg/dL 22   CREATININE mg/dL 1.01*   CALCIUM mg/dL 9.5        Results from last 7 days   Lab Units 03/20/19  1011 03/20/19  1007   HEMOGLOBIN, POC g/dL 12.2 12.2   HEMATOCRIT POC % 36* 36*             Disposition:  Home    Discharge Medications:      Discharge Medications      New Medications      Instructions Start Date   rosuvastatin 10 MG tablet  Commonly known as:  CRESTOR   10 mg, Oral, Daily      ticagrelor 90 MG tablet tablet  Commonly known as:  BRILINTA   90 mg, Oral, 2 Times Daily         Continue These Medications      Instructions Start Date   aspirin 81 MG EC tablet   81 mg, Oral, Daily      bisoprolol 5 MG tablet  Commonly known as:  ZEBETA   2.5 mg, Oral, Daily      butalbital-acetaminophen-caffeine -40 MG per tablet  Commonly known as:  FIORICET, ESGIC   1 tablet, Oral, Every 4 Hours PRN      fish oil 1000  MG capsule capsule   1,000 mg, Oral, Daily With Breakfast      fluticasone 50 MCG/ACT nasal spray  Commonly known as:  FLONASE   INSTILL 2 (TWO) PUFFS IN EACH NOSTRIL DAILY      gabapentin 600 MG tablet  Commonly known as:  NEURONTIN   600 mg, Oral, 3 Times Daily      meclizine 25 MG tablet  Commonly known as:  ANTIVERT   25 mg, Oral, 3 Times Daily PRN      metFORMIN 500 MG tablet  Commonly known as:  GLUCOPHAGE   1,000 mg, Oral, 2 Times Daily      multivitamin with minerals tablet tablet   1 tablet, Oral, Daily      nitroglycerin 0.4 MG SL tablet  Commonly known as:  NITROSTAT   place 1 tablet under the tongue if needed every 5 minutes for chest pain for 3 doses IF NO RELIEF AFTER FIRST DOSE CALL PRESCRIBER .      ONETOUCH VERIO test strip  Generic drug:  glucose blood   No dose, route, or frequency recorded.      pantoprazole 40 MG EC tablet  Commonly known as:  PROTONIX   40 mg, Oral, Daily      pioglitazone 30 MG tablet  Commonly known as:  ACTOS   30 mg, Oral, Daily      SITagliptin 50 MG tablet  Commonly known as:  JANUVIA   100 mg, Oral, Daily      STEGLATRO 15 MG tablet  Generic drug:  Ertugliflozin L-PyroglutamicAc   15 mg, Oral, Every Morning      SYMBICORT 160-4.5 MCG/ACT inhaler  Generic drug:  budesonide-formoterol   2 puffs, Oral, 2 Times Daily - RT      VENTOLIN  (90 Base) MCG/ACT inhaler  Generic drug:  albuterol sulfate HFA   1 puff, Inhalation, Every 4 Hours PRN            The following medical decision was discussed in detail with Dr. Harper    Discharge Home Instructions:   1. Follow up with Dr. Cordero on April 11, 2019 at 11:00 am at [address] [place], [address].  2. Follow up with your primary care physician. Please call for an appointment.   3. Return to ER per EMS for any recurrent symptoms including, but not limited to: chest pain/pressure/tightness, weakness, Shortness of breath, dizziness, palpitations, near syncope or syncope.      4.She was  "educated on the importance of taking her medication as prescribed.      5. Routine post cardiac catheterization/PCI discharge home care instructions:    1. No submerging procedure site below water for 7-10 days.  2. No lifting objects greater than 1 lbs for 3 days.  3. If groin site used, avoid climbing several flights of stairs or sitting for longer than 2 hours at a time for the next 24 hours.   4. Monitor puncture site for bleeding and/or knots;. If bleeding should occur at the groin site: lie flat, apply pressure and return to the ER. If bleeding should occur at the wrist site, apply pressure and return to the ER.  5.  You may apply a DRY Band-Aid over the puncture site if needed. Do not apply any lotions, salves or ointments to site.  6. No driving for 3 days.  7. Return to ER for recurrent symptoms.  8. No smoking.  9. Take all medications as prescribed.            Signed:  BRETT Beth  3/21/2019  10:32 AM      EMR Dragon/Transcription:   \"Dictated utilizing Dragon dictation\".     "

## 2019-03-21 NOTE — CONSULTS
" Provided phase II information packet, which includes; general information about cardiac rehab, [facility] handout and [facility] letter article entitled “Cardiac Rehab is often the Best Medicine for Recovery\", stresses the importance of cardiac rehab after a heart event.   I provided the contact information for cardiac rehab here at [facility].   "

## 2019-03-21 NOTE — PLAN OF CARE
Problem: Patient Care Overview  Goal: Plan of Care Review  Outcome: Ongoing (interventions implemented as appropriate)   03/21/19 1326   Coping/Psychosocial   Plan of Care Reviewed With patient   Plan of Care Review   Progress improving   OTHER   Outcome Summary VSS. Pt s/p PCI via rt femoral site. Rt radial and rt brachial attempt sites also c/d/i. Pt with no c/o pain this shift; however, she is still SOA with exertion. Will continue to monitor. Should d/c home today.

## 2019-03-23 ENCOUNTER — TELEPHONE (OUTPATIENT)
Dept: CARDIOLOGY | Facility: CLINIC | Age: 59
End: 2019-03-23

## 2019-03-23 RX ORDER — PRASUGREL 10 MG/1
10 TABLET, FILM COATED ORAL DAILY
Qty: 30 TABLET | Refills: 1 | OUTPATIENT
Start: 2019-03-23 | End: 2019-03-25 | Stop reason: RX

## 2019-03-25 ENCOUNTER — TELEPHONE (OUTPATIENT)
Dept: CARDIOLOGY | Facility: CLINIC | Age: 59
End: 2019-03-25

## 2019-03-25 NOTE — TELEPHONE ENCOUNTER
PATIENT HAD STENTS ON FRI AND WAS GIVEN PLAVIX PATIENT CALLED ANSWERING SERVICE BECAUSE SHE IS ALLERGIC TO PLAVIX -DR GRANADOS CALLED HER IN EFFIENT. INSURANCES DENIED IT.SHE  WANTS AN ALTERNATIVE    CORNELIO  [phone]    [facility] [place]  [phone]

## 2019-04-04 LAB — ACT BLD: 169 SECONDS (ref 82–152)

## 2019-04-05 ENCOUNTER — TELEPHONE (OUTPATIENT)
Dept: CARDIOLOGY | Facility: CLINIC | Age: 59
End: 2019-04-05

## 2019-04-05 ENCOUNTER — APPOINTMENT (OUTPATIENT)
Dept: GENERAL RADIOLOGY | Facility: HOSPITAL | Age: 59
End: 2019-04-05

## 2019-04-05 ENCOUNTER — HOSPITAL ENCOUNTER (EMERGENCY)
Facility: HOSPITAL | Age: 59
Discharge: HOME | End: 2019-04-05
Attending: EMERGENCY MEDICINE | Admitting: NURSE PRACTITIONER

## 2019-04-05 VITALS
WEIGHT: 182 LBS | HEIGHT: 59 IN | RESPIRATION RATE: 18 BRPM | HEART RATE: 55 BPM | BODY MASS INDEX: 36.69 KG/M2 | OXYGEN SATURATION: 99 % | DIASTOLIC BLOOD PRESSURE: 66 MMHG | TEMPERATURE: 97.7 F | SYSTOLIC BLOOD PRESSURE: 120 MMHG

## 2019-04-05 DIAGNOSIS — R07.9 CHEST PAIN, UNSPECIFIED TYPE: Primary | ICD-10-CM

## 2019-04-05 DIAGNOSIS — B37.9 YEAST INFECTION: ICD-10-CM

## 2019-04-05 LAB
ALBUMIN SERPL-MCNC: 3.4 G/DL (ref 3.5–5.2)
ALBUMIN/GLOB SERPL: 0.8 G/DL
ALP SERPL-CCNC: 72 U/L (ref 39–117)
ALT SERPL W P-5'-P-CCNC: 23 U/L (ref 1–33)
ANION GAP SERPL CALCULATED.3IONS-SCNC: 15.6 MMOL/L
AST SERPL-CCNC: 21 U/L (ref 1–32)
BACTERIA UR QL AUTO: ABNORMAL /HPF
BASOPHILS # BLD AUTO: 0.06 10*3/MM3 (ref 0–0.2)
BASOPHILS NFR BLD AUTO: 0.6 % (ref 0–1.5)
BILIRUB SERPL-MCNC: 0.2 MG/DL (ref 0.2–1.2)
BILIRUB UR QL STRIP: NEGATIVE
BUN BLD-MCNC: 13 MG/DL (ref 8–23)
BUN/CREAT SERPL: 11.8 (ref 7–25)
CALCIUM SPEC-SCNC: 9.6 MG/DL (ref 8.6–10.5)
CHLORIDE SERPL-SCNC: 99 MMOL/L (ref 98–107)
CLARITY UR: CLEAR
CO2 SERPL-SCNC: 22.4 MMOL/L (ref 22–29)
COLOR UR: YELLOW
CREAT BLD-MCNC: 1.1 MG/DL (ref 0.57–1)
DEPRECATED RDW RBC AUTO: 48.2 FL (ref 37–54)
EOSINOPHIL # BLD AUTO: 0.21 10*3/MM3 (ref 0–0.4)
EOSINOPHIL NFR BLD AUTO: 2 % (ref 0.3–6.2)
ERYTHROCYTE [DISTWIDTH] IN BLOOD BY AUTOMATED COUNT: 15 % (ref 12.3–15.4)
GFR SERPL CREATININE-BSD FRML MDRD: 51 ML/MIN/1.73
GLOBULIN UR ELPH-MCNC: 4.3 GM/DL
GLUCOSE BLD-MCNC: 139 MG/DL (ref 65–99)
GLUCOSE UR STRIP-MCNC: ABNORMAL MG/DL
HCT VFR BLD AUTO: 40.8 % (ref 34–46.6)
HGB BLD-MCNC: 12.3 G/DL (ref 12–15.9)
HGB UR QL STRIP.AUTO: NEGATIVE
HYALINE CASTS UR QL AUTO: ABNORMAL /LPF
IMM GRANULOCYTES # BLD AUTO: 0.06 10*3/MM3 (ref 0–0.05)
IMM GRANULOCYTES NFR BLD AUTO: 0.6 % (ref 0–0.5)
KETONES UR QL STRIP: NEGATIVE
LEUKOCYTE ESTERASE UR QL STRIP.AUTO: ABNORMAL
LYMPHOCYTES # BLD AUTO: 3.06 10*3/MM3 (ref 0.7–3.1)
LYMPHOCYTES NFR BLD AUTO: 29.7 % (ref 19.6–45.3)
MCH RBC QN AUTO: 26.9 PG (ref 26.6–33)
MCHC RBC AUTO-ENTMCNC: 30.1 G/DL (ref 31.5–35.7)
MCV RBC AUTO: 89.3 FL (ref 79–97)
MONOCYTES # BLD AUTO: 0.87 10*3/MM3 (ref 0.1–0.9)
MONOCYTES NFR BLD AUTO: 8.5 % (ref 5–12)
NEUTROPHILS # BLD AUTO: 6.03 10*3/MM3 (ref 1.4–7)
NEUTROPHILS NFR BLD AUTO: 58.6 % (ref 42.7–76)
NITRITE UR QL STRIP: NEGATIVE
NRBC BLD AUTO-RTO: 0 /100 WBC (ref 0–0)
NT-PROBNP SERPL-MCNC: 170.6 PG/ML (ref 5–900)
PH UR STRIP.AUTO: 6.5 [PH] (ref 5–8)
PLATELET # BLD AUTO: 400 10*3/MM3 (ref 140–450)
PMV BLD AUTO: 9.4 FL (ref 6–12)
POTASSIUM BLD-SCNC: 3.9 MMOL/L (ref 3.5–5.2)
PROT SERPL-MCNC: 7.7 G/DL (ref 6–8.5)
PROT UR QL STRIP: NEGATIVE
RBC # BLD AUTO: 4.57 10*6/MM3 (ref 3.77–5.28)
RBC # UR: ABNORMAL /HPF
REF LAB TEST METHOD: ABNORMAL
SODIUM BLD-SCNC: 137 MMOL/L (ref 136–145)
SP GR UR STRIP: 1.02 (ref 1–1.03)
SQUAMOUS #/AREA URNS HPF: ABNORMAL /HPF
TROPONIN T SERPL-MCNC: <0.01 NG/ML (ref 0–0.03)
UROBILINOGEN UR QL STRIP: ABNORMAL
WBC NRBC COR # BLD: 10.29 10*3/MM3 (ref 3.4–10.8)
WBC UR QL AUTO: ABNORMAL /HPF

## 2019-04-05 PROCEDURE — 93010 ELECTROCARDIOGRAM REPORT: CPT | Performed by: INTERNAL MEDICINE

## 2019-04-05 PROCEDURE — 71045 X-RAY EXAM CHEST 1 VIEW: CPT

## 2019-04-05 PROCEDURE — 83880 ASSAY OF NATRIURETIC PEPTIDE: CPT | Performed by: NURSE PRACTITIONER

## 2019-04-05 PROCEDURE — 93005 ELECTROCARDIOGRAM TRACING: CPT | Performed by: EMERGENCY MEDICINE

## 2019-04-05 PROCEDURE — 99283 EMERGENCY DEPT VISIT LOW MDM: CPT

## 2019-04-05 PROCEDURE — 81001 URINALYSIS AUTO W/SCOPE: CPT | Performed by: NURSE PRACTITIONER

## 2019-04-05 PROCEDURE — 84484 ASSAY OF TROPONIN QUANT: CPT | Performed by: NURSE PRACTITIONER

## 2019-04-05 PROCEDURE — 93005 ELECTROCARDIOGRAM TRACING: CPT

## 2019-04-05 PROCEDURE — 80053 COMPREHEN METABOLIC PANEL: CPT | Performed by: NURSE PRACTITIONER

## 2019-04-05 PROCEDURE — 85025 COMPLETE CBC W/AUTO DIFF WBC: CPT | Performed by: NURSE PRACTITIONER

## 2019-04-05 RX ORDER — NYSTATIN 100000 [USP'U]/G
POWDER TOPICAL 2 TIMES DAILY
Qty: 30 G | Refills: 0 | Status: SHIPPED | OUTPATIENT
Start: 2019-04-05 | End: 2020-07-21

## 2019-04-05 NOTE — TELEPHONE ENCOUNTER
Pt called stating she has been extremely short of breath today. She just had stents placed on 3/20.  I advised her to go to the ER to be checked.  She verbalized understanding.

## 2019-04-05 NOTE — ED NOTES
Patient to er from home with c/o chest pressure and not feeling. Patient reported she had stents placed two weeks ago.       Mahesh Potts.  04/05/19 3472

## 2019-04-05 NOTE — ED PROVIDER NOTES
"EMERGENCY DEPARTMENT ENCOUNTER    CHIEF COMPLAINT  Chief Complaint: cp  History given by:patient  History limited by:none  Time Seen: 1715  Room Number: 28/28  PMD: Ezio Patterson MD Dr.Chandramani (cardiology).     HPI:  Pt is a 58 y.o. female who presents with intermittent central cp that began 2 weeks after stent placement with . Pt also complains of SOA. Pt states sx are aggravated by laying flat. Pt also complains of nausea. Pt denies back pain, vomiting, diaphoresis, and weakness. Family at bedside complains of rash to pt's navel and abd. Family states they contacted cardiology who referred them to the ED for further evaluation. Pt has not been wearing her CPAP because \"they people below me smoke and it smells like it.\"         Duration: 2 weeks  Timing:constant  Location:central  Radiation:none  Quality:pain  Intensity/Severity:moderate  Progression:unchanged  Associated Symptoms:SOA, nausea  Aggravating Factors:laying flat  Alleviating Factors:none  Previous Episodes:Pt has cardiac hx with stent placement.   Treatment before arrival:none    PAST MEDICAL HISTORY  Active Ambulatory Problems     Diagnosis Date Noted   • Chest pain 01/28/2016   • Shortness of breath 01/28/2016   • Syncope and collapse 01/28/2016   • Weakness 01/28/2016   • Coronary artery disease 01/28/2016   • Soft tissue lesion of shoulder region 04/22/2015   • Disorder of rotator cuff 06/10/2015   • Shoulder pain 04/01/2015   • Rotator cuff tear arthropathy 06/10/2015   • Unstable angina pectoris (CMS/[facility]) 10/01/2015   • Injury of kidney 07/01/2016   • Type 2 diabetes mellitus (CMS/[facility]) 07/01/2016   • Hypertension 07/01/2016   • Hypotension 07/01/2016   • Hypovolemia 07/04/2016   • Bilateral carotid artery disease (CMS/[facility]) 06/19/2018   • Medical non-compliance 11/17/2018   • Dizziness 11/17/2018   • Viral illness 11/17/2018   • SOB (shortness of breath) 03/12/2019     Resolved Ambulatory Problems     Diagnosis Date " Noted   • No Resolved Ambulatory Problems     Past Medical History:   Diagnosis Date   • Abnormal stress test    • Asthma    • CAD (coronary artery disease)    • Crescendo angina (CMS/[facility])    • Diabetes mellitus (CMS/[facility])    • H/O angioplasty    • Hiatal hernia    • Hyperlipidemia    • Hypertension    • Obesity    • MILAGROS on CPAP    • Recurrent urinary tract infection        PAST SURGICAL HISTORY  Past Surgical History:   Procedure Laterality Date   • CARDIAC CATHETERIZATION      OUTCOME: SUCCESSFUL   • CARDIAC CATHETERIZATION N/A 3/20/2019    Procedure: RIGHT AND LEFT HEART CATH;  Surgeon: Roe Harper MD;  Location:  [facility] CATH INVASIVE LOCATION;  Service: Cardiology   • CARDIAC CATHETERIZATION N/A 3/20/2019    Procedure: Coronary angiography;  Surgeon: Roe Harper MD;  Location:  [facility] CATH INVASIVE LOCATION;  Service: Cardiology   • CARDIAC CATHETERIZATION N/A 3/20/2019    Procedure: Left ventriculography;  Surgeon: Roe Harper MD;  Location:  [facility] CATH INVASIVE LOCATION;  Service: Cardiology   • CARDIAC CATHETERIZATION N/A 3/20/2019    Procedure: Stent BANDAR coronary;  Surgeon: Roe Harper MD;  [facility];  Service: Cardiology   • CORONARY STENT PLACEMENT     • HYSTERECTOMY     • TONSILLECTOMY AND ADENOIDECTOMY     • UMBILICAL HERNIA REPAIR         FAMILY HISTORY  Family History   Problem Relation Age of Onset   • Heart attack Father    • Heart disease Father    • Heart attack Brother    • Heart disease Brother    • Heart attack Maternal Grandfather    • Heart disease Maternal Grandfather        SOCIAL HISTORY  Social History     Socioeconomic History   • Marital status:      Spouse name: Not on file   • Number of children: Not on file   • Years of education: Not on file   • Highest education level: Not on file   Tobacco Use   • Smoking status: Former Smoker   • Smokeless tobacco: Never Used   Substance and Sexual Activity   • Alcohol  use: Yes     Comment: SOCIAL   • Drug use: No         ALLERGIES  Penicillins and Plavix [clopidogrel bisulfate]    REVIEW OF SYSTEMS  Review of Systems   Constitutional: Negative for diaphoresis and fever.   Respiratory: Positive for shortness of breath.    Cardiovascular: Positive for chest pain.   Gastrointestinal: Positive for nausea. Negative for vomiting.   Musculoskeletal: Negative for back pain.   Neurological: Negative for weakness.       PHYSICAL EXAM  ED Triage Vitals [04/05/19 1707]   Temp Heart Rate Resp BP SpO2   97.7 °F (36.5 °C) 57 -- -- 99 %      Temp src Heart Rate Source Patient Position BP Location FiO2 (%)   Tympanic -- -- -- --       Physical Exam   Constitutional: She is well-developed, well-nourished, and in no distress. No distress.   HENT:   Head: Normocephalic and atraumatic.   Mouth/Throat: Oropharynx is clear and moist and mucous membranes are normal.   Eyes: Pupils are equal, round, and reactive to light.   Neck: Normal range of motion.   Cardiovascular: Normal rate, regular rhythm and normal heart sounds.   Pulmonary/Chest: Effort normal and breath sounds normal. She has no wheezes.   Abdominal: Soft. Bowel sounds are normal. There is no tenderness.   Musculoskeletal: Normal range of motion. She exhibits no edema.   Neurological: She is alert.   Skin: Skin is warm and dry. No rash noted.   Yeast infection to groin   Psychiatric: Mood, memory, affect and judgment normal.   Nursing note and vitals reviewed.      LAB RESULTS  Recent Results (from the past 24 hour(s))   Comprehensive Metabolic Panel    Collection Time: 04/05/19  5:24 PM   Result Value Ref Range    Glucose 139 (H) 65 - 99 mg/dL    BUN 13 8 - 23 mg/dL    Creatinine 1.10 (H) 0.57 - 1.00 mg/dL    Sodium 137 136 - 145 mmol/L    Potassium 3.9 3.5 - 5.2 mmol/L    Chloride 99 98 - 107 mmol/L    CO2 22.4 22.0 - 29.0 mmol/L    Calcium 9.6 8.6 - 10.5 mg/dL    Total Protein 7.7 6.0 - 8.5 g/dL    Albumin 3.40 (L) 3.50 - 5.20 g/dL    ALT  (SGPT) 23 1 - 33 U/L    AST (SGOT) 21 1 - 32 U/L    Alkaline Phosphatase 72 39 - 117 U/L    Total Bilirubin 0.2 0.2 - 1.2 mg/dL    eGFR Non African Amer 51 (L) >60 mL/min/1.73    Globulin 4.3 gm/dL    A/G Ratio 0.8 g/dL    BUN/Creatinine Ratio 11.8 7.0 - 25.0    Anion Gap 15.6 mmol/L   Troponin    Collection Time: 04/05/19  5:24 PM   Result Value Ref Range    Troponin T <0.010 0.000 - 0.030 ng/mL   BNP    Collection Time: 04/05/19  5:24 PM   Result Value Ref Range    proBNP 170.6 5.0 - 900.0 pg/mL   CBC Auto Differential    Collection Time: 04/05/19  5:24 PM   Result Value Ref Range    WBC 10.29 3.40 - 10.80 10*3/mm3    RBC 4.57 3.77 - 5.28 10*6/mm3    Hemoglobin 12.3 12.0 - 15.9 g/dL    Hematocrit 40.8 34.0 - 46.6 %    MCV 89.3 79.0 - 97.0 fL    MCH 26.9 26.6 - 33.0 pg    MCHC 30.1 (L) 31.5 - 35.7 g/dL    RDW 15.0 12.3 - 15.4 %    RDW-SD 48.2 37.0 - 54.0 fl    MPV 9.4 6.0 - 12.0 fL    Platelets 400 140 - 450 10*3/mm3    Neutrophil % 58.6 42.7 - 76.0 %    Lymphocyte % 29.7 19.6 - 45.3 %    Monocyte % 8.5 5.0 - 12.0 %    Eosinophil % 2.0 0.3 - 6.2 %    Basophil % 0.6 0.0 - 1.5 %    Immature Grans % 0.6 (H) 0.0 - 0.5 %    Neutrophils, Absolute 6.03 1.40 - 7.00 10*3/mm3    Lymphocytes, Absolute 3.06 0.70 - 3.10 10*3/mm3    Monocytes, Absolute 0.87 0.10 - 0.90 10*3/mm3    Eosinophils, Absolute 0.21 0.00 - 0.40 10*3/mm3    Basophils, Absolute 0.06 0.00 - 0.20 10*3/mm3    Immature Grans, Absolute 0.06 (H) 0.00 - 0.05 10*3/mm3    nRBC 0.0 0.0 - 0.0 /100 WBC   Urinalysis With Microscopic If Indicated (No Culture) - Urine, Clean Catch    Collection Time: 04/05/19  6:17 PM   Result Value Ref Range    Color, UA Yellow Yellow, Straw    Appearance, UA Clear Clear    pH, UA 6.5 5.0 - 8.0    Specific Gravity, UA 1.022 1.005 - 1.030    Glucose, UA >=1000 mg/dL (3+) (A) Negative    Ketones, UA Negative Negative    Bilirubin, UA Negative Negative    Blood, UA Negative Negative    Protein, UA Negative Negative    Leuk Esterase, UA  "Small (1+) (A) Negative    Nitrite, UA Negative Negative    Urobilinogen, UA 0.2 E.U./dL 0.2 - 1.0 E.U./dL   Urinalysis, Microscopic Only - Urine, Clean Catch    Collection Time: 04/05/19  6:17 PM   Result Value Ref Range    RBC, UA 0-2 None Seen, 0-2 /HPF    WBC, UA 3-5 (A) None Seen, 0-2 /HPF    Bacteria, UA None Seen None Seen /HPF    Squamous Epithelial Cells, UA 3-6 (A) None Seen, 0-2 /HPF    Hyaline Casts, UA None Seen None Seen /LPF    Methodology Automated Microscopy        I ordered the above labs and reviewed the results    RADIOLOGY  XR Chest 1 View   Final Result   No active disease       I ordered the above noted radiological studies and reviewed the images on the PACS system.        EKG    ekg was interpreted by Dr. Gillespie      MEDICAL RECORD REVIEW  Pt is followed by Dr. Harper (cardiology) and had a cardiac cath on 3/20/19. Pt has a RCA stent placed as well.       PROGRESS AND CONSULTS    1857-Reviewed pt's history and workup with Dr. Gillespie. After a bedside evaluation; Dr Gillespie agrees with the plan of care. He states that he will inform pt of negative work up and plan for discharge with PCP, cardiology, and pulmonology f/u.     COURSE & MEDICAL DECISION MAKING  Pertinent Labs and Imaging studies that were ordered and reviewed are noted above.  Results were reviewed/discussed with the patient and they were also made aware of online assess.   Pt also made aware that some labs, such as cultures, will not be resulted during ER visit and follow up with PMD is necessary.     MEDICATIONS GIVEN IN ER  Medications - No data to display    /66 (Patient Position: Lying)   Pulse 55   Temp 97.7 °F (36.5 °C) (Tympanic)   Resp 18   Ht 149.9 cm (59\")   Wt 82.6 kg (182 lb)   SpO2 99%   BMI 36.76 kg/m²     Discussed all results and noted any abnormalities with patient.  Discussed absoute need to recheck abnormalities with her PCP and cardiology.     Reviewed implications of results, diagnosis, meds, " responsibility to follow up, warning signs and symptoms of possible worsening, potential complications and reasons to return to ER with patient    Discussed plan for discharge, as there is no emergent indication for admission.  Pt is agreeable and understands need for follow up and repeat testing.  Pt is aware that discharge does not mean that nothing is wrong but it indicates no emergency is present and they must continue care with her PCP and cardiology.  Pt is discharged with instructions to follow up with primary care doctor to have their blood pressure rechecked.       DIAGNOSIS  Final diagnoses:   Chest pain, unspecified type   Yeast infection       FOLLOW UP   Roe Harper MD  [facility] [place] [address]  [phone]    Schedule an appointment as soon as possible for a visit         RX     Medication List      New Prescriptions    nystatin [id number] UNIT/GM powder  Commonly known as:  MYCOSTATIN  Apply  topically to the appropriate area as directed 2 (Two) Times a Day.            I personally reviewed the past medical history, past surgical history, social history, family history, current medications and allergies as they appear in this chart.  The scribe's note accurately reflects the work and decisions made by me.           Documentation assistance provided by williams Parekh for BRETT Toro.  Information recorded by the scribe was done at my direction and has been verified and validated by me.                   Ernestine Parekh  04/05/19 1928       Trish Pimentel APRN  04/05/19 2027

## 2019-04-05 NOTE — ED PROVIDER NOTES
MD ATTESTATION NOTE    The FERNANDO and I have discussed this patient's history, physical exam, and treatment plan.  I have reviewed the documentation and personally had a face to face interaction with the patient. I affirm the documentation and agree with the treatment and plan.  The attached note describes my personal findings.      Pt reports that she underwent cardiac catheterization with stent placement on 03/20/19 secondary to persistent dyspnea with exertion. Pt presents to the ED c/o dyspnea onset at about 12:30 PM today. Pt reports that she has also had mild sternal discomfort that worsens with inspiration. Pt denies diaphoresis, palpitations, abdominal pain, nausea, vomiting, BLE edema, bilateral/unilateral calf pain, and dizziness/lightheadedness. Per family, pt is supposed to be using a CPAP, but has been noncompliant in using it.     Dr. Harper - cardiologist       PHYSICAL EXAM    GENERAL: Pt is alert and oriented x3. Pt is not distressed.  HENT: Moist mucous membranes.   EYES: PERRL. EOMs intact.   CV: Regular rhythm, regular rate. 2+ radial pulses bilaterally  RESPIRATORY/CHEST WALL: Normal effort. No respiratory distress. Lungs are CTAB. Chest wall is nontender.   ABDOMEN: Soft and nontender.   MUSCULOSKELETAL: No BLE edema.  NEURO: Alert. Pt moves all extremities.   SKIN: Warm, dry.    Vital signs and nursing notes reviewed.        Pt's troponin is negative. CXR is negative acute. Pt's O2 sat is 99% on room air. Pt was strongly advised to f/u with her pulmonologist and cardiologist in the office. Strict RTER warnings given. Pt and family agree with plan for discharge.       This is chronic dyspea. Stent was placed to see if it would palliate her symptoms. Her symptoms have not improved despite stent. Her sx today are no worse than chronic dyspnea. I see no need for further testing, such as CTA.     EKG          EKG time: 17:14  Rhythm/Rate: Sinus bradycardia rate 53  P waves and TN: Normal P  waves  QRS, axis: PRWP   ST and T waves: Normal ST     Interpreted Contemporaneously by me, independently viewed  changed compared to prior 11/18/18 (PRWP is new)          Documentation assistance provided by Karlos Zaman. Information recorded by the scribe was done at my direction and has been verified and validated by me.     Entered by Karlos Zaman, acting as scribe for Dr. Jose Miguel MD.        Karlos Zaman  04/05/19 8963       Ezio Gillespie II, MD  04/06/19 4595

## 2019-04-11 ENCOUNTER — OFFICE VISIT (OUTPATIENT)
Dept: CARDIOLOGY | Facility: CLINIC | Age: 59
End: 2019-04-11

## 2019-04-11 VITALS
HEART RATE: 52 BPM | SYSTOLIC BLOOD PRESSURE: 152 MMHG | DIASTOLIC BLOOD PRESSURE: 78 MMHG | BODY MASS INDEX: 35.93 KG/M2 | WEIGHT: 183 LBS | HEIGHT: 60 IN

## 2019-04-11 DIAGNOSIS — Z95.820 S/P ANGIOPLASTY WITH STENT: Primary | ICD-10-CM

## 2019-04-11 DIAGNOSIS — R94.39 ABNORMAL STRESS TEST: ICD-10-CM

## 2019-04-11 DIAGNOSIS — I10 ESSENTIAL HYPERTENSION: ICD-10-CM

## 2019-04-11 DIAGNOSIS — R06.02 SOB (SHORTNESS OF BREATH): ICD-10-CM

## 2019-04-11 DIAGNOSIS — I25.10 CORONARY ARTERY DISEASE INVOLVING NATIVE CORONARY ARTERY OF NATIVE HEART WITHOUT ANGINA PECTORIS: ICD-10-CM

## 2019-04-11 DIAGNOSIS — I25.118 CORONARY ARTERY DISEASE OF NATIVE ARTERY OF NATIVE HEART WITH STABLE ANGINA PECTORIS (HCC): ICD-10-CM

## 2019-04-11 PROCEDURE — 99213 OFFICE O/P EST LOW 20 MIN: CPT | Performed by: INTERNAL MEDICINE

## 2019-04-11 NOTE — PROGRESS NOTES
Subjective:        Lily Unger is a 58 y.o. female who here for follow up    CC  POST CATH  HPI  58-year-old female who recently underwent the diagnostic heart catheter underwent angioplasty and the stent has markedly improved shortness of breath better no chest pains     Problem List Items Addressed This Visit        Cardiovascular and Mediastinum    Coronary artery disease    Hypertension       Respiratory    SOB (shortness of breath)      Other Visit Diagnoses     S/P angioplasty with stent    -  Primary    Relevant Orders    Ambulatory Referral to Cardiac Rehab    Treadmill Stress Test        .HEMODYNAMIC / ANGIOGRAPHIC DATA:    1. Pulmonary capillary wedge pressure was 12.   2. Pulmonary artery systolic pressure was 35/15.  3. Right atrial pressure was 8.  4. Cardiac index was 2.1.  5. Left ventricular end diastolic pressure was 10 mmHg.  6. Left ventriculography revealed the EF to be 50%.  7. The left main is normal.  8. The LAD is .Left anterior descending shows minimum irregularity with a midportion 30-40% stenosis  9. Circumflex artery was a nondominant with known atherosclerotic stenosis  10. The right coronary artery is .Right coronary artery was a dominant with ostial 90% was reduced to 0% with 3.5/8 Xience stent  11. Mild pulmonary hypertension  Successful angioplasty and stent to the ostial RCA 90% reduced to 0% with 3.5/8 Xience stent, type of the lesion  b2  RECOMMENDATIONS:  Post-procedure care will focus on prevention of any ischemic events and congestive complications.      The following portions of the patient's history were reviewed and updated as appropriate: allergies, current medications, past family history, past medical history, past social history, past surgical history and problem list.    Past Medical History:   Diagnosis Date   • Abnormal stress test    • Asthma    • CAD (coronary artery disease)    • Crescendo angina (CMS/[facility])    • Diabetes mellitus (CMS/[facility])    • H/O  "angioplasty    • Hiatal hernia    • Hyperlipidemia    • Hypertension    • Obesity    • MILAGROS on CPAP    • Recurrent urinary tract infection      reports that she has quit smoking. She has never used smokeless tobacco. She reports that she drinks alcohol. She reports that she does not use drugs.   Family History   Problem Relation Age of Onset   • Heart attack Father    • Heart disease Father    • Heart attack Brother    • Heart disease Brother    • Heart attack Maternal Grandfather    • Heart disease Maternal Grandfather        Review of Systems  Constitutional: No wt loss, fever, fatigue  Gastrointestinal: No nausea, abdominal pain  Behavioral/Psych: No insomnia or anxiety   Cardiovascular no chest pain  Objective:       Physical Exam    /78   Pulse 52   Ht 151.1 cm (59.5\")   Wt 83 kg (183 lb)   BMI 36.34 kg/m²   General appearance: No acute changes   Neck: Trachea midline; NECK, supple, no thyromegaly or lymphadenopathy   Lungs: Normal size and shape, normal breath sounds, equal distribution of air, no rales and rhonchi   CV: S1-S2 regular, no murmurs, no rub, no gallop   Abdomen: Soft, non-tender; no masses , no abnormal abdominal sounds   Extremities: No deformity , normal color , no peripheral edema   Skin: Normal temperature, turgor and texture; no rash, ulcers          Procedures      Echocardiogram:        Current Outpatient Medications:   •  aspirin 81 MG EC tablet, Take 81 mg by mouth daily., Disp: , Rfl:   •  bisoprolol (ZEBETA) 5 MG tablet, Take 0.5 tablets by mouth Daily., Disp: 45 tablet, Rfl: 2  •  butalbital-acetaminophen-caffeine (FIORICET, ESGIC) -40 MG per tablet, Take 1 tablet by mouth Every 4 (Four) Hours As Needed for Headache., Disp: , Rfl:   •  Ertugliflozin L-PyroglutamicAc (STEGLATRO) 15 MG tablet, Take 15 mg by mouth Every Morning., Disp: , Rfl:   •  fluticasone (FLONASE) 50 MCG/ACT nasal spray, INSTILL 2 (TWO) PUFFS IN EACH NOSTRIL DAILY, Disp: , Rfl: 5  •  gabapentin " (NEURONTIN) 600 MG tablet, Take 600 mg by mouth 3 (Three) Times a Day., Disp: , Rfl: 0  •  meclizine (ANTIVERT) 25 MG tablet, Take 1 tablet by mouth 3 (Three) Times a Day As Needed for dizziness., Disp: 30 tablet, Rfl: 0  •  metFORMIN (GLUCOPHAGE) 500 MG tablet, Take 1,000 mg by mouth 2 (Two) Times a Day., Disp: , Rfl: 0  •  Multiple Vitamins-Minerals (MULTIVITAMIN WITH MINERALS) tablet tablet, Take 1 tablet by mouth Daily., Disp: , Rfl:   •  nystatin (MYCOSTATIN) 139997 UNIT/GM powder, Apply  topically to the appropriate area as directed 2 (Two) Times a Day., Disp: 30 g, Rfl: 0  •  Omega-3 Fatty Acids (FISH OIL) 1000 MG capsule capsule, Take 1,000 mg by mouth Daily With Breakfast., Disp: , Rfl:   •  ONETOUCH VERIO test strip, , Disp: , Rfl: 0  •  pantoprazole (PROTONIX) 40 MG EC tablet, Take 40 mg by mouth Daily., Disp: , Rfl: 0  •  pioglitazone (ACTOS) 30 MG tablet, Take 30 mg by mouth Daily., Disp: , Rfl: 5  •  rosuvastatin (CRESTOR) 10 MG tablet, Take 1 tablet by mouth Daily., Disp: 30 tablet, Rfl: 11  •  SITagliptin (JANUVIA) 50 MG tablet, Take 100 mg by mouth Daily., Disp: , Rfl:   •  SYMBICORT 160-4.5 MCG/ACT inhaler, Take 2 puffs by mouth 2 (Two) Times a Day., Disp: , Rfl: 0  •  ticagrelor (BRILINTA) 90 MG tablet tablet, Take 1 tablet by mouth 2 (Two) Times a Day., Disp: 60 tablet, Rfl: 3  •  VENTOLIN  (90 BASE) MCG/ACT inhaler, Inhale 1 puff Every 4 (Four) Hours As Needed., Disp: , Rfl: 1  •  nitroglycerin (NITROSTAT) 0.4 MG SL tablet, place 1 tablet under the tongue if needed every 5 minutes for chest pain for 3 doses IF NO RELIEF AFTER FIRST DOSE CALL PRESCRIBER ., Disp: 25 tablet, Rfl: 0   Assessment:        Patient Active Problem List   Diagnosis   • Chest pain   • Shortness of breath   • Syncope and collapse   • Weakness   • Coronary artery disease   • Soft tissue lesion of shoulder region   • Disorder of rotator cuff   • Shoulder pain   • Rotator cuff tear arthropathy   • Unstable angina  pectoris (CMS/[facility])   • Injury of kidney   • Type 2 diabetes mellitus (CMS/HCC)   • Hypertension   • Hypotension   • Hypovolemia   • Bilateral carotid artery disease (CMS/HCC)   • Medical non-compliance   • Dizziness   • Viral illness   • SOB (shortness of breath)               Plan:            ICD-10-CM ICD-9-CM   1. S/P angioplasty with stent Z95.9 V45.89   2. Essential hypertension I10 401.9   3. Coronary artery disease involving native coronary artery of native heart without angina pectoris I25.10 414.01   4. SOB (shortness of breath) R06.02 786.05     1. S/P angioplasty with stent  With recent angioplasty and stent we will proceed with a regular treadmill test and start cardiac rehab  - Ambulatory Referral to Cardiac Rehab  - Treadmill Stress Test    2. Essential hypertension  Blood pressure controlled    3. Coronary artery disease involving native coronary artery of native heart without angina pectoris  No angina pectoris    4. SOB (shortness of breath)  Multifactorial       ETT, CARDIAC REHAB    SEE IN 6 MONTHS  COUNSELING:    Lily Horner was given to patient for the following topics: diagnostic results, risk factor reductions, impressions, risks and benefits of treatment options and importance of treatment compliance .       SMOKING COUNSELING:    Counseling given: Not Answered      Dictated using Dragon dictation

## 2019-04-16 ENCOUNTER — TELEPHONE (OUTPATIENT)
Dept: CARDIAC REHAB | Facility: HOSPITAL | Age: 59
End: 2019-04-16

## 2019-04-16 NOTE — TELEPHONE ENCOUNTER
Attempted to contact pt, but her voice mailbox has not been set up so unable to contact her.  Will try again another time.

## 2019-04-17 ENCOUNTER — TELEPHONE (OUTPATIENT)
Dept: CARDIAC REHAB | Facility: HOSPITAL | Age: 59
End: 2019-04-17

## 2019-04-17 NOTE — TELEPHONE ENCOUNTER
Called and spoke to pt about attending program.  Explained purpose and benefits.  Pt would like to attend, but wants to wait until after she sees Dr. Harper on May 6. She will call us back to schedule.

## 2019-05-03 ENCOUNTER — TELEPHONE (OUTPATIENT)
Dept: CARDIOLOGY | Facility: CLINIC | Age: 59
End: 2019-05-03

## 2019-05-03 NOTE — TELEPHONE ENCOUNTER
----- Message from Laura Rivera sent at 5/2/2019  1:49 PM EDT -----  Regarding: Sample  Contact: [phone]  Samples of Brilinta 90mg BID  Wants to  Monday      Samples ready to be picked up

## 2019-05-06 ENCOUNTER — HOSPITAL ENCOUNTER (OUTPATIENT)
Dept: CARDIOLOGY | Facility: HOSPITAL | Age: 59
Discharge: HOME | End: 2019-05-06
Admitting: INTERNAL MEDICINE

## 2019-05-06 VITALS
HEIGHT: 59 IN | BODY MASS INDEX: 36.89 KG/M2 | HEART RATE: 52 BPM | SYSTOLIC BLOOD PRESSURE: 148 MMHG | DIASTOLIC BLOOD PRESSURE: 80 MMHG | WEIGHT: 183 LBS

## 2019-05-06 PROCEDURE — 93016 CV STRESS TEST SUPVJ ONLY: CPT | Performed by: INTERNAL MEDICINE

## 2019-05-06 PROCEDURE — 93018 CV STRESS TEST I&R ONLY: CPT | Performed by: INTERNAL MEDICINE

## 2019-05-06 PROCEDURE — 93017 CV STRESS TEST TRACING ONLY: CPT

## 2019-05-06 RX ORDER — PRASUGREL 10 MG/1
10 TABLET, FILM COATED ORAL DAILY
Qty: 30 TABLET | Refills: 11 | Status: SHIPPED | OUTPATIENT
Start: 2019-05-06 | End: 2019-05-13 | Stop reason: SDUPTHER

## 2019-05-08 LAB
BH CV STRESS BP STAGE 1: NORMAL
BH CV STRESS DURATION MIN STAGE 1: 2
BH CV STRESS DURATION SEC STAGE 1: 16
BH CV STRESS GRADE STAGE 1: 10
BH CV STRESS HR STAGE 1: 90
BH CV STRESS METS STAGE 1: 4.4
BH CV STRESS PROTOCOL 1: NORMAL
BH CV STRESS RECOVERY BP: NORMAL MMHG
BH CV STRESS RECOVERY HR: 52 BPM
BH CV STRESS SPEED STAGE 1: 1.7
BH CV STRESS STAGE 1: 1
MAXIMAL PREDICTED HEART RATE: 160 BPM
PERCENT MAX PREDICTED HR: 56.88 %
STRESS BASELINE BP: NORMAL MMHG
STRESS BASELINE HR: 50 BPM
STRESS PERCENT HR: 67 %
STRESS POST ESTIMATED WORKLOAD: 4.4 METS
STRESS POST EXERCISE DUR MIN: 2 MIN
STRESS POST EXERCISE DUR SEC: 16 SEC
STRESS POST PEAK BP: NORMAL MMHG
STRESS POST PEAK HR: 91 BPM
STRESS TARGET HR: 136 BPM

## 2019-05-10 ENCOUNTER — TELEPHONE (OUTPATIENT)
Dept: CARDIOLOGY | Facility: CLINIC | Age: 59
End: 2019-05-10

## 2019-05-10 NOTE — TELEPHONE ENCOUNTER
SENT IN [place] FOR EFFIENT 10 MG QD  THROUGH COVER MY MEDS     [id number]        Effient 10 mg was APPROVED   ID# [id number]

## 2019-05-13 RX ORDER — PRASUGREL 10 MG/1
10 TABLET, FILM COATED ORAL DAILY
Qty: 30 TABLET | Refills: 11 | Status: SHIPPED | OUTPATIENT
Start: 2019-05-13 | End: 2020-03-17

## 2019-05-28 ENCOUNTER — HOSPITAL ENCOUNTER (OUTPATIENT)
Dept: CARDIOLOGY | Facility: HOSPITAL | Age: 59
Discharge: HOME | End: 2019-05-28

## 2019-05-28 VITALS
WEIGHT: 180 LBS | SYSTOLIC BLOOD PRESSURE: 134 MMHG | HEIGHT: 60 IN | BODY MASS INDEX: 35.34 KG/M2 | OXYGEN SATURATION: 97 % | RESPIRATION RATE: 18 BRPM | DIASTOLIC BLOOD PRESSURE: 72 MMHG | HEART RATE: 70 BPM

## 2019-05-28 DIAGNOSIS — I25.118 CORONARY ARTERY DISEASE OF NATIVE ARTERY OF NATIVE HEART WITH STABLE ANGINA PECTORIS (HCC): ICD-10-CM

## 2019-05-28 DIAGNOSIS — I25.10 CORONARY ARTERY DISEASE INVOLVING NATIVE CORONARY ARTERY OF NATIVE HEART WITHOUT ANGINA PECTORIS: ICD-10-CM

## 2019-05-28 DIAGNOSIS — R94.39 ABNORMAL STRESS TEST: ICD-10-CM

## 2019-05-28 DIAGNOSIS — Z95.820 S/P ANGIOPLASTY WITH STENT: ICD-10-CM

## 2019-05-28 DIAGNOSIS — R06.02 SOB (SHORTNESS OF BREATH): ICD-10-CM

## 2019-05-28 PROCEDURE — 78452 HT MUSCLE IMAGE SPECT MULT: CPT

## 2019-05-28 PROCEDURE — 93017 CV STRESS TEST TRACING ONLY: CPT

## 2019-05-28 PROCEDURE — A9500 TC99M SESTAMIBI: HCPCS | Performed by: INTERNAL MEDICINE

## 2019-05-28 PROCEDURE — 93016 CV STRESS TEST SUPVJ ONLY: CPT | Performed by: NURSE PRACTITIONER

## 2019-05-28 PROCEDURE — 25010000002 REGADENOSON 0.4 MG/5ML SOLUTION: Performed by: INTERNAL MEDICINE

## 2019-05-28 PROCEDURE — 78452 HT MUSCLE IMAGE SPECT MULT: CPT | Performed by: INTERNAL MEDICINE

## 2019-05-28 PROCEDURE — 0 TECHNETIUM SESTAMIBI: Performed by: INTERNAL MEDICINE

## 2019-05-28 PROCEDURE — 93018 CV STRESS TEST I&R ONLY: CPT | Performed by: INTERNAL MEDICINE

## 2019-05-28 RX ORDER — ALBUTEROL SULFATE 2.5 MG/3ML
2.5 SOLUTION RESPIRATORY (INHALATION) EVERY 6 HOURS PRN
Refills: 11 | COMMUNITY
Start: 2019-05-22

## 2019-05-28 RX ORDER — DULOXETIN HYDROCHLORIDE 30 MG/1
30 CAPSULE, DELAYED RELEASE ORAL DAILY
Refills: 5 | Status: ON HOLD | COMMUNITY
Start: 2019-05-20 | End: 2022-01-12

## 2019-05-28 RX ORDER — DEXTROMETHORPHAN HYDROBROMIDE AND PROMETHAZINE HYDROCHLORIDE 15; 6.25 MG/5ML; MG/5ML
SYRUP ORAL
Refills: 1 | COMMUNITY
Start: 2019-04-24 | End: 2020-08-03

## 2019-05-28 RX ADMIN — TECHNETIUM TC 99M SESTAMIBI 1 DOSE: 1 INJECTION INTRAVENOUS at 07:33

## 2019-05-28 RX ADMIN — REGADENOSON 0.4 MG: 0.08 INJECTION, SOLUTION INTRAVENOUS at 09:31

## 2019-05-28 RX ADMIN — TECHNETIUM TC 99M SESTAMIBI 1 DOSE: 1 INJECTION INTRAVENOUS at 09:30

## 2019-05-30 LAB
BH CV STRESS BP STAGE 1: NORMAL
BH CV STRESS COMMENTS STAGE 1: NORMAL
BH CV STRESS DOSE REGADENOSON STAGE 1: 0.4
BH CV STRESS DURATION MIN STAGE 1: 2
BH CV STRESS DURATION SEC STAGE 1: 32
BH CV STRESS GRADE STAGE 1: 0
BH CV STRESS HR STAGE 1: 100
BH CV STRESS METS STAGE 1: 1.4
BH CV STRESS PROTOCOL 1: NORMAL
BH CV STRESS RECOVERY BP: NORMAL MMHG
BH CV STRESS RECOVERY HR: 72 BPM
BH CV STRESS RECOVERY O2: 97 %
BH CV STRESS SPEED STAGE 1: 1
BH CV STRESS STAGE 1: 1
LV EF NUC BP: 70 %
MAXIMAL PREDICTED HEART RATE: 160 BPM
PERCENT MAX PREDICTED HR: 62.5 %
STRESS BASELINE BP: NORMAL MMHG
STRESS BASELINE HR: 70 BPM
STRESS O2 SAT REST: 97 %
STRESS PERCENT HR: 74 %
STRESS POST ESTIMATED WORKLOAD: 1.4 METS
STRESS POST EXERCISE DUR MIN: 2 MIN
STRESS POST EXERCISE DUR SEC: 32 SEC
STRESS POST PEAK BP: NORMAL MMHG
STRESS POST PEAK HR: 100 BPM
STRESS TARGET HR: 136 BPM

## 2019-06-03 ENCOUNTER — OFFICE VISIT (OUTPATIENT)
Dept: CARDIOLOGY | Facility: CLINIC | Age: 59
End: 2019-06-03

## 2019-06-03 VITALS
HEIGHT: 60 IN | HEART RATE: 58 BPM | DIASTOLIC BLOOD PRESSURE: 82 MMHG | SYSTOLIC BLOOD PRESSURE: 128 MMHG | WEIGHT: 177 LBS | BODY MASS INDEX: 34.75 KG/M2

## 2019-06-03 DIAGNOSIS — I25.10 CORONARY ARTERY DISEASE INVOLVING NATIVE CORONARY ARTERY OF NATIVE HEART WITHOUT ANGINA PECTORIS: ICD-10-CM

## 2019-06-03 DIAGNOSIS — I10 ESSENTIAL HYPERTENSION: Primary | ICD-10-CM

## 2019-06-03 PROCEDURE — 99212 OFFICE O/P EST SF 10 MIN: CPT | Performed by: INTERNAL MEDICINE

## 2019-06-03 NOTE — PROGRESS NOTES
Subjective:        Lily Unger is a 58 y.o. female who here for follow up    CC  Follow-up after the stress test  HPI  58-year-old female with known history of the coronary artery disease as well as benign essential arterial hypertension here for the follow-up after the stress test which was normal     Problem List Items Addressed This Visit        Cardiovascular and Mediastinum    Coronary artery disease    Hypertension - Primary        .Interpretation Summary        · Findings consistent with an equivocal ECG stress test.  · Left ventricular ejection fraction is normal (Calculated EF = 70%).  · Myocardial perfusion imaging indicates a normal myocardial perfusion study with no evidence of ischemia.  · Impressions are consistent with a low risk study.         The following portions of the patient's history were reviewed and updated as appropriate: allergies, current medications, past family history, past medical history, past social history, past surgical history and problem list.    Past Medical History:   Diagnosis Date   • Abnormal stress test    • Asthma    • CAD (coronary artery disease)    • Crescendo angina (CMS/HCC)    • Diabetes mellitus (CMS/HCC)    • H/O angioplasty    • Hiatal hernia    • Hyperlipidemia    • Hypertension    • Obesity    • MILAGROS on CPAP    • Recurrent urinary tract infection      reports that she has quit smoking. She has never used smokeless tobacco. She reports that she drinks alcohol. She reports that she does not use drugs.   Family History   Problem Relation Age of Onset   • Heart attack Father    • Heart disease Father    • Heart attack Brother    • Heart disease Brother    • Heart attack Maternal Grandfather    • Heart disease Maternal Grandfather    • Heart disease Mother        Review of Systems  Constitutional: No wt loss, fever, fatigue  Gastrointestinal: No nausea, abdominal pain  Behavioral/Psych: No insomnia or anxiety   Cardiovascular no chest pains or tightness no  "chest  Objective:       Physical Exam    /82   Pulse 58   Ht 151.1 cm (59.5\")   Wt 80.3 kg (177 lb)   BMI 35.15 kg/m²   General appearance: No acute changes   Neck: Trachea midline; NECK, supple, no thyromegaly or lymphadenopathy   Lungs: Normal size and shape, normal breath sounds, equal distribution of air, no rales and rhonchi   CV: S1-S2 regular, no murmurs, no rub, no gallop   Abdomen: Soft, non-tender; no masses , no abnormal abdominal sounds   Extremities: No deformity , normal color , no peripheral edema   Skin: Normal temperature, turgor and texture; no rash, ulcers          Procedures      Echocardiogram:        Current Outpatient Medications:   •  albuterol (PROVENTIL) (2.5 MG/3ML) 0.083% nebulizer solution, 1 (ONE) NEBULE FOUR TIMES DAILY, Disp: , Rfl: 11  •  aspirin 81 MG EC tablet, Take 81 mg by mouth daily., Disp: , Rfl:   •  bisoprolol (ZEBETA) 5 MG tablet, Take 0.5 tablets by mouth Daily., Disp: 45 tablet, Rfl: 2  •  butalbital-acetaminophen-caffeine (FIORICET, ESGIC) -40 MG per tablet, Take 1 tablet by mouth Every 4 (Four) Hours As Needed for Headache., Disp: , Rfl:   •  clarithromycin XL (BIAXIN XL) 500 MG 24 hr tablet, Take 1,000 mg by mouth Daily., Disp: , Rfl: 0  •  DULoxetine (CYMBALTA) 30 MG capsule, Take 30 mg by mouth Daily., Disp: , Rfl: 5  •  Ertugliflozin L-PyroglutamicAc (STEGLATRO) 15 MG tablet, Take 15 mg by mouth Every Morning., Disp: , Rfl:   •  fluticasone (FLONASE) 50 MCG/ACT nasal spray, INSTILL 2 (TWO) PUFFS IN EACH NOSTRIL DAILY, Disp: , Rfl: 5  •  gabapentin (NEURONTIN) 600 MG tablet, Take 600 mg by mouth 3 (Three) Times a Day., Disp: , Rfl: 0  •  meclizine (ANTIVERT) 25 MG tablet, Take 1 tablet by mouth 3 (Three) Times a Day As Needed for dizziness., Disp: 30 tablet, Rfl: 0  •  metFORMIN (GLUCOPHAGE) 500 MG tablet, Take 1,000 mg by mouth 2 (Two) Times a Day., Disp: , Rfl: 0  •  Multiple Vitamins-Minerals (MULTIVITAMIN WITH MINERALS) tablet tablet, Take 1 " tablet by mouth Daily., Disp: , Rfl:   •  nitroglycerin (NITROSTAT) 0.4 MG SL tablet, place 1 tablet under the tongue if needed every 5 minutes for chest pain for 3 doses IF NO RELIEF AFTER FIRST DOSE CALL PRESCRIBER ., Disp: 25 tablet, Rfl: 0  •  nystatin (MYCOSTATIN) 312374 UNIT/GM powder, Apply  topically to the appropriate area as directed 2 (Two) Times a Day., Disp: 30 g, Rfl: 0  •  Omega-3 Fatty Acids (FISH OIL) 1000 MG capsule capsule, Take 1,000 mg by mouth Daily With Breakfast., Disp: , Rfl:   •  ONETOUCH VERIO test strip, , Disp: , Rfl: 0  •  pantoprazole (PROTONIX) 40 MG EC tablet, Take 40 mg by mouth Daily., Disp: , Rfl: 0  •  prasugrel (EFFIENT) 10 MG tablet, Take 1 tablet by mouth Daily., Disp: 30 tablet, Rfl: 11  •  promethazine-dextromethorphan (PROMETHAZINE-DM) 6.25-15 MG/5ML syrup, TAKE 5-10 MILLILITER EVERY SIX HOURS AS NEEDED, Disp: , Rfl: 1  •  rosuvastatin (CRESTOR) 10 MG tablet, Take 1 tablet by mouth Daily., Disp: 30 tablet, Rfl: 11  •  silver sulfadiazine (SILVADENE, SSD) 1 % cream, APPLY TO AFFECTED AREA TWICE A DAY UNTIL THE BURN IS HEALED, Disp: , Rfl: 0  •  SITagliptin (JANUVIA) 50 MG tablet, Take 100 mg by mouth Daily., Disp: , Rfl:   •  SYMBICORT 160-4.5 MCG/ACT inhaler, Take 2 puffs by mouth 2 (Two) Times a Day., Disp: , Rfl: 0  •  VENTOLIN  (90 BASE) MCG/ACT inhaler, Inhale 1 puff Every 4 (Four) Hours As Needed., Disp: , Rfl: 1   Assessment:        Patient Active Problem List   Diagnosis   • Chest pain   • Shortness of breath   • Syncope and collapse   • Weakness   • Coronary artery disease   • Soft tissue lesion of shoulder region   • Disorder of rotator cuff   • Shoulder pain   • Rotator cuff tear arthropathy   • Unstable angina pectoris (CMS/HCC)   • Injury of kidney   • Type 2 diabetes mellitus (CMS/HCC)   • Hypertension   • Hypotension   • Hypovolemia   • Bilateral carotid artery disease (CMS/HCC)   • Medical non-compliance   • Dizziness   • Viral illness   • SOB  (shortness of breath)               Plan:            ICD-10-CM ICD-9-CM   1. Essential hypertension I10 401.9   2. Coronary artery disease involving native coronary artery of native heart without angina pectoris I25.10 414.01     1. Essential hypertension  Blood pressure controlled    2. Coronary artery disease involving native coronary artery of native heart without angina pectoris  No chest pain       6 month  COUNSELING:    Lily Horner was given to patient for the following topics: diagnostic results, risk factor reductions, impressions, risks and benefits of treatment options and importance of treatment compliance .       SMOKING COUNSELING:    Counseling given: Not Answered      Dictated using Dragon dictation

## 2019-10-18 ENCOUNTER — OFFICE VISIT (OUTPATIENT)
Dept: CARDIOLOGY | Facility: CLINIC | Age: 59
End: 2019-10-18

## 2019-10-18 VITALS
WEIGHT: 186 LBS | BODY MASS INDEX: 36.52 KG/M2 | DIASTOLIC BLOOD PRESSURE: 78 MMHG | HEIGHT: 60 IN | SYSTOLIC BLOOD PRESSURE: 128 MMHG | HEART RATE: 60 BPM

## 2019-10-18 DIAGNOSIS — I10 ESSENTIAL HYPERTENSION: ICD-10-CM

## 2019-10-18 DIAGNOSIS — R06.02 SOB (SHORTNESS OF BREATH): Primary | ICD-10-CM

## 2019-10-18 DIAGNOSIS — R07.89 OTHER CHEST PAIN: ICD-10-CM

## 2019-10-18 DIAGNOSIS — I25.119 CORONARY ARTERY DISEASE INVOLVING NATIVE HEART WITH ANGINA PECTORIS, UNSPECIFIED VESSEL OR LESION TYPE (HCC): ICD-10-CM

## 2019-10-18 DIAGNOSIS — R07.89 CHEST PAIN, ATYPICAL: ICD-10-CM

## 2019-10-18 PROCEDURE — 93000 ELECTROCARDIOGRAM COMPLETE: CPT | Performed by: NURSE PRACTITIONER

## 2019-10-18 PROCEDURE — 99214 OFFICE O/P EST MOD 30 MIN: CPT | Performed by: NURSE PRACTITIONER

## 2019-10-18 NOTE — PROGRESS NOTES
.    Patient Name: Lily Unger  :1960  Age: 59 y.o.  Primary Cardiologist: Roe Harper MD  Encounter Provider:  BRETT Telles      Chief Complaint:   Chief Complaint   Patient presents with   • Chest Pain   • Shortness of Breath         HPI this 59-year-old female, new to this provider, comes today with complaints of chest pain shortness of air.  History to include abnormal stress test, asthma, CAD, DM 2, hiatal hernia, hyperlipidemia, hypertension, obesity, MILAGROS with CPAP therapy noted.  Last echo done in May 2018 revealed normal global strain, mild dilation of LAC, mild MR/TR, EF 60% with no evidence of pericardial effusion.  Stress testing done in May 2019 revealed no evidence of myocardial ischemia.  Cardiac catheterization in 2019 revealed LAD with midportion 30 to 40% stenosis, circumflex nondominant with known atherosclerotic stenosis, RCA dominant with ostial 90% stenosis reduced to 0 status post BANDAR placement and mild pulmonary hypertension.  She is currently on maximum medical therapy to include aspirin, beta-blockade, Effient, statin.  No recent lipid panel available for review at this time.  ECG in office today reveals sinus rhythm, rate 61, no ST elevation or depression noted.  She she has had a one-time episode of chest pressure while lying in bed which was relieved by warm water bath and massage.  This pain was exacerbated by position change.  Was an isolated incident.    Patient Active Problem List   Diagnosis   • Chest pain, atypical   • Shortness of breath   • Syncope and collapse   • Weakness   • Coronary artery disease   • Soft tissue lesion of shoulder region   • Disorder of rotator cuff   • Shoulder pain   • Rotator cuff tear arthropathy   • Unstable angina pectoris (CMS/HCC)   • Injury of kidney   • Type 2 diabetes mellitus (CMS/HCC)   • Hypertension   • Hypotension   • Hypovolemia   • Bilateral carotid artery disease (CMS/HCC)   • Medical non-compliance    • Dizziness   • Viral illness   • SOB (shortness of breath)           The following portions of the patient's history were reviewed and updated as appropriate: allergies, current medications, past family history, past medical history, past social history, past surgical history and problem list.    Current Outpatient Medications on File Prior to Visit   Medication Sig Dispense Refill   • albuterol (PROVENTIL) (2.5 MG/3ML) 0.083% nebulizer solution 1 (ONE) NEBULE FOUR TIMES DAILY  11   • aspirin 81 MG EC tablet Take 81 mg by mouth daily.     • bisoprolol (ZEBETA) 5 MG tablet Take 0.5 tablets by mouth Daily. 45 tablet 2   • butalbital-acetaminophen-caffeine (FIORICET, ESGIC) -40 MG per tablet Take 1 tablet by mouth Every 4 (Four) Hours As Needed for Headache.     • DULoxetine (CYMBALTA) 30 MG capsule Take 30 mg by mouth Daily.  5   • Ertugliflozin L-PyroglutamicAc (STEGLATRO) 15 MG tablet Take 15 mg by mouth Every Morning.     • fluticasone (FLONASE) 50 MCG/ACT nasal spray INSTILL 2 (TWO) PUFFS IN EACH NOSTRIL DAILY  5   • gabapentin (NEURONTIN) 600 MG tablet Take 800 mg by mouth 3 (Three) Times a Day.  0   • meclizine (ANTIVERT) 25 MG tablet Take 1 tablet by mouth 3 (Three) Times a Day As Needed for dizziness. 30 tablet 0   • metFORMIN (GLUCOPHAGE) 500 MG tablet Take 1,000 mg by mouth 2 (Two) Times a Day.  0   • Multiple Vitamins-Minerals (MULTIVITAMIN WITH MINERALS) tablet tablet Take 1 tablet by mouth Daily.     • Omega-3 Fatty Acids (FISH OIL) 1000 MG capsule capsule Take 1,000 mg by mouth Daily With Breakfast.     • pantoprazole (PROTONIX) 40 MG EC tablet Take 40 mg by mouth Daily.  0   • prasugrel (EFFIENT) 10 MG tablet Take 1 tablet by mouth Daily. 30 tablet 11   • promethazine-dextromethorphan (PROMETHAZINE-DM) 6.25-15 MG/5ML syrup TAKE 5-10 MILLILITER EVERY SIX HOURS AS NEEDED  1   • rosuvastatin (CRESTOR) 10 MG tablet Take 1 tablet by mouth Daily. 30 tablet 11   • SITagliptin (JANUVIA) 50 MG tablet  Take 100 mg by mouth Daily.     • SYMBICORT 160-4.5 MCG/ACT inhaler Take 2 puffs by mouth 2 (Two) Times a Day.  0   • VENTOLIN  (90 BASE) MCG/ACT inhaler Inhale 1 puff Every 4 (Four) Hours As Needed.  1   • clarithromycin XL (BIAXIN XL) 500 MG 24 hr tablet Take 1,000 mg by mouth Daily.  0   • nitroglycerin (NITROSTAT) 0.4 MG SL tablet place 1 tablet under the tongue if needed every 5 minutes for chest pain for 3 doses IF NO RELIEF AFTER FIRST DOSE CALL PRESCRIBER . 25 tablet 0   • nystatin (MYCOSTATIN) 617038 UNIT/GM powder Apply  topically to the appropriate area as directed 2 (Two) Times a Day. 30 g 0   • ONETOUCH VERIO test strip   0   • silver sulfadiazine (SILVADENE, SSD) 1 % cream APPLY TO AFFECTED AREA TWICE A DAY UNTIL THE BURN IS HEALED  0     No current facility-administered medications on file prior to visit.        Past Medical History:   Diagnosis Date   • Abnormal stress test    • Asthma    • CAD (coronary artery disease)    • Crescendo angina (CMS/[facility])    • Diabetes mellitus (CMS/[facility])    • H/O angioplasty    • Hiatal hernia    • Hyperlipidemia    • Hypertension    • Obesity    • MILAGROS on CPAP    • Recurrent urinary tract infection        Past Surgical History:   Procedure Laterality Date   • CARDIAC CATHETERIZATION      OUTCOME: SUCCESSFUL   • CARDIAC CATHETERIZATION N/A 3/20/2019    Procedure: RIGHT AND LEFT HEART CATH;  Surgeon: Roe Harper MD;  Location: [facility] INVASIVE LOCATION;  Service: Cardiology   • CARDIAC CATHETERIZATION N/A 3/20/2019    Procedure: Coronary angiography;  Surgeon: Roe Harper MD;  Location: [facility] INVASIVE LOCATION;  Service: Cardiology   • CARDIAC CATHETERIZATION N/A 3/20/2019    Procedure: Left ventriculography;  Surgeon: Roe Harper MD;  Location: [facility] INVASIVE LOCATION;  Service: Cardiology   • CARDIAC CATHETERIZATION N/A 3/20/2019    Procedure: Stent BANDAR coronary;  Surgeon: Roe Harper MD;  Location:  "[facility] INVASIVE LOCATION;  Service: Cardiology   • CORONARY STENT PLACEMENT     • HYSTERECTOMY     • TONSILLECTOMY AND ADENOIDECTOMY     • UMBILICAL HERNIA REPAIR         Family History   Problem Relation Age of Onset   • Heart attack Father    • Heart disease Father    • Heart attack Brother    • Heart disease Brother    • Heart attack Maternal Grandfather    • Heart disease Maternal Grandfather    • Heart disease Mother        Social History     Socioeconomic History   • Marital status:      Spouse name: Not on file   • Number of children: Not on file   • Years of education: Not on file   • Highest education level: Not on file   Tobacco Use   • Smoking status: Former Smoker   • Smokeless tobacco: Never Used   Substance and Sexual Activity   • Alcohol use: Yes     Comment: SOCIAL   • Drug use: No   • Sexual activity: Defer     Birth control/protection: Surgical       Review of Systems   Constitution: Negative for diaphoresis, weakness and malaise/fatigue.   HENT: Negative for nosebleeds and stridor.    Cardiovascular: Positive for chest pain. Negative for claudication, dyspnea on exertion, irregular heartbeat, leg swelling, near-syncope, orthopnea, palpitations, paroxysmal nocturnal dyspnea and syncope.        One episode of pressure, relieved with position change and warm water bath with massage   Respiratory: Negative for cough, hemoptysis, shortness of breath and wheezing.    Gastrointestinal: Negative for abdominal pain, constipation, diarrhea, hematemesis, hematochezia, melena, nausea and vomiting.   Neurological: Negative for dizziness, focal weakness and light-headedness.       OBJECTIVE:   Vital Signs  Vitals:    10/18/19 0958   BP: 128/78   Pulse: 60     Estimated body mass index is 36.94 kg/m² as calculated from the following:    Height as of this encounter: 151.1 cm (59.5\").    Weight as of this encounter: 84.4 kg (186 lb).    Physical Exam   Constitutional: She is oriented to person, place, " and time. She appears well-developed and well-nourished. No distress.   HENT:   Head: Normocephalic and atraumatic.   Eyes: Conjunctivae and EOM are normal. Pupils are equal, round, and reactive to light.   Neck: Normal range of motion. Neck supple. No JVD present. No tracheal deviation present. No thyromegaly present.   Cardiovascular: Normal rate, regular rhythm, normal heart sounds and intact distal pulses. Exam reveals no gallop and no friction rub.   No murmur heard.  Pulmonary/Chest: Effort normal and breath sounds normal. No respiratory distress. She has no wheezes. She has no rales. She exhibits no tenderness.   Abdominal: Soft. Bowel sounds are normal. She exhibits no distension. There is no tenderness.   Musculoskeletal: Normal range of motion. She exhibits no edema, tenderness or deformity.   Neurological: She is alert and oriented to person, place, and time.   Skin: Skin is warm and dry. No rash noted. She is not diaphoretic. No erythema. No pallor.   Psychiatric: She has a normal mood and affect. Her behavior is normal.         ECG 12 Lead  Date/Time: 10/18/2019 10:22 AM  Performed by: Lena Unger APRN  Authorized by: Lena Unger APRN   Comparison: compared with previous ECG from 4/5/2019  Similar to previous ECG  Rhythm: sinus rhythm  Rate: normal  Conduction: conduction normal  ST Segments: ST segments normal  T Waves: T waves normal  QRS axis: normal    Clinical impression: normal ECG            Cardiac Cath:  3/20/19  HEMODYNAMIC / ANGIOGRAPHIC DATA:    1. Pulmonary capillary wedge pressure was 12.   2. Pulmonary artery systolic pressure was 35/15.  3. Right atrial pressure was 8.  4. Cardiac index was 2.1.  5. Left ventricular end diastolic pressure was 10 mmHg.  6. Left ventriculography revealed the EF to be 50%.  7. The left main is normal.  8. The LAD is .Left anterior descending shows minimum irregularity with a midportion 30-40% stenosis  9. Circumflex artery was a nondominant  with known atherosclerotic stenosis  10. The right coronary artery is .Right coronary artery was a dominant with ostial 90% was reduced to 0% with 3.5/8 Xience stent  11. Mild pulmonary hypertension  Successful angioplasty and stent to the ostial RCA 90% reduced to 0% with 3.5/8 Xience stent, type of the lesion  b2  RECOMMENDATIONS:  Post-procedure care will focus on prevention of any ischemic events and congestive complications.       Importance of taking dual antiplatelets for one year  has been explained, risk of stent thrombosis leading to the acute MI, which carries high morbidity and mortality has been explained     Discontinuation or interruptions of these medications should be under the strict guidance of appropriate health professional    Stress Testin19  Interpretation Summary        · Findings consistent with an equivocal ECG stress test.  · Left ventricular ejection fraction is normal (Calculated EF = 70%).  · Myocardial perfusion imaging indicates a normal myocardial perfusion study with no evidence of ischemia.  · Impressions are consistent with a low risk study.     Asymptomatic for chest pain. Specificity of study reduced secondary to motion artifact due to poor exercise tolerance., however ECG is not suggestive of ischemia  Ectopy: rare PAC and rare PVC in recovery.   HR and BP response are Appropriate for Beta-blocker therapy  Pharmacologic study due to inability to tolerate increasing speed and grade of treadmill due to mobility  Issues and Beta-blocker therapy.  Participated in Low Level exercise and tolerance is poor.          Cardiac Echo:  18  Interpretation Summary     · NORMAL GLOBAL STRAIN -19.3  · Left atrial cavity size is mildly dilated.  · Mild mitral valve regurgitation is present  · Mild tricuspid valve regurgitation is present.  · Calculated EF = 60%.  · There is no evidence of pericardial effusion.           ASSESSMENT:      Diagnosis Plan   1. SOB (shortness of  breath)  Adult Transthoracic Echo Complete W/ Cont if Necessary Per Protocol    Lipid Panel    Comprehensive Metabolic Panel   2. Coronary artery disease involving native heart with angina pectoris, unspecified vessel or lesion type (CMS/HCC)  ECG 12 Lead    Adult Transthoracic Echo Complete W/ Cont if Necessary Per Protocol    Lipid Panel    Comprehensive Metabolic Panel   3. Chest pain, atypical  Adult Transthoracic Echo Complete W/ Cont if Necessary Per Protocol    Lipid Panel    Comprehensive Metabolic Panel   4. Essential hypertension     5. Other chest pain           PLAN OF CARE:     1.  Chest pain/CAD-currently on maximum medical therapy.  Last cardiac catheterization in March 2019 as above.  On DAPT.  Patient states she has been compliant with all medications.  Chest pain was an isolated incident described as pressure, relieved with position change in warm water bath with massage, exacerbated by position change, nitroglycerin was not needed, this occurred 2 to 3 weeks ago.  Will obtain updated echo, and advised patient that should her symptoms recur or not be relieved by 3 doses of nitro 5 minutes apart that she should call us immediately and go straight to the emergency department.  Repeat cardiac catheterization should symptoms continue to occur, she is not interested in pursuing this at this time.  Pain is atypical in nature and was described as reproducible at the time.    2.  Hypertension- blood pressure in office today is currently stable and controlled.  Continue beta-blocker.    Importance of controlling hypertension and blood pressure  checkup on the regular basis has been explained  Hypertension as a silent killer has been discussed  Risk reduction of the weight and regular exercises to control the hypertension has been explained    3.  Hyperlipidemia-on statin therapy.  No recent lipid panel available for review.  Will obtain fasting lipid panel and LFTs.    Risk of the hyperlipidemia, importance  of the treatment has been explained  Pros and cons of the statins has been explained  Regular blood workup as well as side effects including the liver failure, myelopathy death has been explained     Echo, lipid, CMP, follow-up in 1 month or sooner if needed with MD. patient advised to call the office should she need to use her nitro, or if the pain recurs, nitro is ineffective after third dose she is to go immediately to the emergency department.  Cardiac cath to be considered if symptoms recur.      Sincerely,   BRETT Telles  [facility]  10/18/19  10:43 AM

## 2019-10-29 ENCOUNTER — HOSPITAL ENCOUNTER (OUTPATIENT)
Dept: CARDIOLOGY | Facility: HOSPITAL | Age: 59
Discharge: HOME | End: 2019-10-29

## 2019-10-29 ENCOUNTER — HOSPITAL ENCOUNTER (OUTPATIENT)
Dept: CARDIOLOGY | Facility: HOSPITAL | Age: 59
Discharge: HOME | End: 2019-10-29
Admitting: NURSE PRACTITIONER

## 2019-10-29 VITALS
BODY MASS INDEX: 37.5 KG/M2 | SYSTOLIC BLOOD PRESSURE: 104 MMHG | HEIGHT: 59 IN | WEIGHT: 186 LBS | DIASTOLIC BLOOD PRESSURE: 74 MMHG | HEART RATE: 64 BPM

## 2019-10-29 LAB
ALBUMIN SERPL-MCNC: 3.9 G/DL (ref 3.5–5.2)
ALBUMIN/GLOB SERPL: 1 G/DL
ALP SERPL-CCNC: 73 U/L (ref 39–117)
ALT SERPL W P-5'-P-CCNC: 18 U/L (ref 1–33)
ANION GAP SERPL CALCULATED.3IONS-SCNC: 6.7 MMOL/L (ref 5–15)
AST SERPL-CCNC: 17 U/L (ref 1–32)
BILIRUB SERPL-MCNC: 0.2 MG/DL (ref 0.2–1.2)
BUN BLD-MCNC: 10 MG/DL (ref 8–23)
BUN/CREAT SERPL: 11.1 (ref 7–25)
CALCIUM SPEC-SCNC: 9.8 MG/DL (ref 8.6–10.5)
CHLORIDE SERPL-SCNC: 98 MMOL/L (ref 98–107)
CHOLEST SERPL-MCNC: 103 MG/DL (ref 0–200)
CO2 SERPL-SCNC: 31.3 MMOL/L (ref 22–29)
CREAT BLD-MCNC: 0.9 MG/DL (ref 0.57–1)
GFR SERPL CREATININE-BSD FRML MDRD: 64 ML/MIN/1.73
GLOBULIN UR ELPH-MCNC: 3.8 GM/DL
GLUCOSE BLD-MCNC: 107 MG/DL (ref 65–99)
HDLC SERPL-MCNC: 40 MG/DL (ref 40–60)
LDLC SERPL CALC-MCNC: 40 MG/DL (ref 0–100)
LDLC/HDLC SERPL: 0.99 {RATIO}
POTASSIUM BLD-SCNC: 4.9 MMOL/L (ref 3.5–5.2)
PROT SERPL-MCNC: 7.7 G/DL (ref 6–8.5)
SODIUM BLD-SCNC: 136 MMOL/L (ref 136–145)
TRIGL SERPL-MCNC: 117 MG/DL (ref 0–150)
VLDLC SERPL-MCNC: 23.4 MG/DL (ref 5–40)

## 2019-10-29 PROCEDURE — 80061 LIPID PANEL: CPT | Performed by: NURSE PRACTITIONER

## 2019-10-29 PROCEDURE — 80053 COMPREHEN METABOLIC PANEL: CPT | Performed by: NURSE PRACTITIONER

## 2019-10-29 PROCEDURE — 93306 TTE W/DOPPLER COMPLETE: CPT

## 2019-10-29 PROCEDURE — 93306 TTE W/DOPPLER COMPLETE: CPT | Performed by: INTERNAL MEDICINE

## 2019-10-29 PROCEDURE — 36415 COLL VENOUS BLD VENIPUNCTURE: CPT | Performed by: NURSE PRACTITIONER

## 2019-10-31 ENCOUNTER — OFFICE VISIT (OUTPATIENT)
Dept: CARDIOLOGY | Facility: CLINIC | Age: 59
End: 2019-10-31

## 2019-10-31 VITALS
DIASTOLIC BLOOD PRESSURE: 93 MMHG | WEIGHT: 183 LBS | SYSTOLIC BLOOD PRESSURE: 142 MMHG | HEIGHT: 59 IN | BODY MASS INDEX: 36.89 KG/M2 | HEART RATE: 64 BPM

## 2019-10-31 DIAGNOSIS — R07.2 PRECORDIAL PAIN: ICD-10-CM

## 2019-10-31 DIAGNOSIS — R06.02 SOB (SHORTNESS OF BREATH): ICD-10-CM

## 2019-10-31 DIAGNOSIS — I25.10 CORONARY ARTERY DISEASE INVOLVING NATIVE CORONARY ARTERY OF NATIVE HEART WITHOUT ANGINA PECTORIS: Primary | ICD-10-CM

## 2019-10-31 DIAGNOSIS — I10 ESSENTIAL HYPERTENSION: ICD-10-CM

## 2019-10-31 LAB
BH CV ECHO MEAS - ACS: 1.9 CM
BH CV ECHO MEAS - AO MAX PG (FULL): 2.7 MMHG
BH CV ECHO MEAS - AO MAX PG: 8 MMHG
BH CV ECHO MEAS - AO MEAN PG (FULL): 2 MMHG
BH CV ECHO MEAS - AO MEAN PG: 4 MMHG
BH CV ECHO MEAS - AO ROOT AREA (BSA CORRECTED): 1.8
BH CV ECHO MEAS - AO ROOT AREA: 8.6 CM^2
BH CV ECHO MEAS - AO ROOT DIAM: 3.3 CM
BH CV ECHO MEAS - AO V2 MAX: 141 CM/SEC
BH CV ECHO MEAS - AO V2 MEAN: 96.9 CM/SEC
BH CV ECHO MEAS - AO V2 VTI: 33 CM
BH CV ECHO MEAS - AVA(I,A): 2.4 CM^2
BH CV ECHO MEAS - AVA(I,D): 2.4 CM^2
BH CV ECHO MEAS - AVA(V,A): 2.6 CM^2
BH CV ECHO MEAS - AVA(V,D): 2.6 CM^2
BH CV ECHO MEAS - BSA(HAYCOCK): 1.9 M^2
BH CV ECHO MEAS - BSA: 1.8 M^2
BH CV ECHO MEAS - BZI_BMI: 37.6 KILOGRAMS/M^2
BH CV ECHO MEAS - BZI_METRIC_HEIGHT: 149.9 CM
BH CV ECHO MEAS - BZI_METRIC_WEIGHT: 84.4 KG
BH CV ECHO MEAS - EDV(CUBED): 54.9 ML
BH CV ECHO MEAS - EDV(MOD-SP2): 44 ML
BH CV ECHO MEAS - EDV(MOD-SP4): 52 ML
BH CV ECHO MEAS - EDV(TEICH): 62 ML
BH CV ECHO MEAS - EF(CUBED): 71.5 %
BH CV ECHO MEAS - EF(MOD-BP): 66 %
BH CV ECHO MEAS - EF(MOD-SP2): 61.4 %
BH CV ECHO MEAS - EF(MOD-SP4): 69.2 %
BH CV ECHO MEAS - EF(TEICH): 64 %
BH CV ECHO MEAS - ESV(CUBED): 15.6 ML
BH CV ECHO MEAS - ESV(MOD-SP2): 17 ML
BH CV ECHO MEAS - ESV(MOD-SP4): 16 ML
BH CV ECHO MEAS - ESV(TEICH): 22.3 ML
BH CV ECHO MEAS - FS: 34.2 %
BH CV ECHO MEAS - IVS/LVPW: 1.2
BH CV ECHO MEAS - IVSD: 1.4 CM
BH CV ECHO MEAS - LA DIMENSION: 3.7 CM
BH CV ECHO MEAS - LA/AO: 1.1
BH CV ECHO MEAS - LAT PEAK E' VEL: 9.8 CM/SEC
BH CV ECHO MEAS - LV DIASTOLIC VOL/BSA (35-75): 29.1 ML/M^2
BH CV ECHO MEAS - LV MASS(C)D: 173.1 GRAMS
BH CV ECHO MEAS - LV MASS(C)DI: 96.8 GRAMS/M^2
BH CV ECHO MEAS - LV MAX PG: 5.3 MMHG
BH CV ECHO MEAS - LV MEAN PG: 2 MMHG
BH CV ECHO MEAS - LV SYSTOLIC VOL/BSA (12-30): 8.9 ML/M^2
BH CV ECHO MEAS - LV V1 MAX: 115 CM/SEC
BH CV ECHO MEAS - LV V1 MEAN: 67.7 CM/SEC
BH CV ECHO MEAS - LV V1 VTI: 25.6 CM
BH CV ECHO MEAS - LVIDD: 3.8 CM
BH CV ECHO MEAS - LVIDS: 2.5 CM
BH CV ECHO MEAS - LVLD AP2: 6 CM
BH CV ECHO MEAS - LVLD AP4: 6.5 CM
BH CV ECHO MEAS - LVLS AP2: 5.4 CM
BH CV ECHO MEAS - LVLS AP4: 5.1 CM
BH CV ECHO MEAS - LVOT AREA (M): 3.1 CM^2
BH CV ECHO MEAS - LVOT AREA: 3.1 CM^2
BH CV ECHO MEAS - LVOT DIAM: 2 CM
BH CV ECHO MEAS - LVPWD: 1.2 CM
BH CV ECHO MEAS - MED PEAK E' VEL: 9 CM/SEC
BH CV ECHO MEAS - MV A DUR: 0.18 SEC
BH CV ECHO MEAS - MV A MAX VEL: 73.7 CM/SEC
BH CV ECHO MEAS - MV DEC SLOPE: 315 CM/SEC^2
BH CV ECHO MEAS - MV DEC TIME: 0.22 SEC
BH CV ECHO MEAS - MV E MAX VEL: 73.7 CM/SEC
BH CV ECHO MEAS - MV E/A: 1
BH CV ECHO MEAS - MV MAX PG: 3 MMHG
BH CV ECHO MEAS - MV MEAN PG: 1 MMHG
BH CV ECHO MEAS - MV P1/2T MAX VEL: 88.4 CM/SEC
BH CV ECHO MEAS - MV P1/2T: 82.2 MSEC
BH CV ECHO MEAS - MV V2 MAX: 87 CM/SEC
BH CV ECHO MEAS - MV V2 MEAN: 50.9 CM/SEC
BH CV ECHO MEAS - MV V2 VTI: 29.8 CM
BH CV ECHO MEAS - MVA P1/2T LCG: 2.5 CM^2
BH CV ECHO MEAS - MVA(P1/2T): 2.7 CM^2
BH CV ECHO MEAS - MVA(VTI): 2.7 CM^2
BH CV ECHO MEAS - PA ACC TIME: 0.1 SEC
BH CV ECHO MEAS - PA MAX PG (FULL): 1.4 MMHG
BH CV ECHO MEAS - PA MAX PG: 3.9 MMHG
BH CV ECHO MEAS - PA PR(ACCEL): 32.7 MMHG
BH CV ECHO MEAS - PA V2 MAX: 99.1 CM/SEC
BH CV ECHO MEAS - PI END-D VEL: 115 CM/SEC
BH CV ECHO MEAS - PULM A REVS DUR: 0.14 SEC
BH CV ECHO MEAS - PULM A REVS VEL: 29.2 CM/SEC
BH CV ECHO MEAS - PULM DIAS VEL: 43 CM/SEC
BH CV ECHO MEAS - PULM S/D: 1.3
BH CV ECHO MEAS - PULM SYS VEL: 55.4 CM/SEC
BH CV ECHO MEAS - PVA(V,A): 2.8 CM^2
BH CV ECHO MEAS - PVA(V,D): 2.8 CM^2
BH CV ECHO MEAS - QP/QS: 0.87
BH CV ECHO MEAS - RAP SYSTOLE: 3 MMHG
BH CV ECHO MEAS - RV MAX PG: 2.5 MMHG
BH CV ECHO MEAS - RV MEAN PG: 1 MMHG
BH CV ECHO MEAS - RV V1 MAX: 79.8 CM/SEC
BH CV ECHO MEAS - RV V1 MEAN: 52.7 CM/SEC
BH CV ECHO MEAS - RV V1 VTI: 20.3 CM
BH CV ECHO MEAS - RVOT AREA: 3.5 CM^2
BH CV ECHO MEAS - RVOT DIAM: 2.1 CM
BH CV ECHO MEAS - RVSP: 22.9 MMHG
BH CV ECHO MEAS - SI(AO): 157.9 ML/M^2
BH CV ECHO MEAS - SI(CUBED): 22 ML/M^2
BH CV ECHO MEAS - SI(LVOT): 45 ML/M^2
BH CV ECHO MEAS - SI(MOD-SP2): 15.1 ML/M^2
BH CV ECHO MEAS - SI(MOD-SP4): 20.1 ML/M^2
BH CV ECHO MEAS - SI(TEICH): 22.2 ML/M^2
BH CV ECHO MEAS - SV(AO): 282.2 ML
BH CV ECHO MEAS - SV(CUBED): 39.2 ML
BH CV ECHO MEAS - SV(LVOT): 80.4 ML
BH CV ECHO MEAS - SV(MOD-SP2): 27 ML
BH CV ECHO MEAS - SV(MOD-SP4): 36 ML
BH CV ECHO MEAS - SV(RVOT): 70.3 ML
BH CV ECHO MEAS - SV(TEICH): 39.6 ML
BH CV ECHO MEAS - TAPSE (>1.6): 2 CM
BH CV ECHO MEAS - TR MAX VEL: 223 CM/SEC
BH CV ECHO MEASUREMENTS AVERAGE E/E' RATIO: 7.84
BH CV XLRA - RV BASE: 3.4 CM
BH CV XLRA - RV LENGTH: 6.8 CM
BH CV XLRA - RV MID: 2.8 CM
BH CV XLRA - TDI S': 2 CM/SEC
LEFT ATRIUM VOLUME INDEX: 29 ML/M2
MAXIMAL PREDICTED HEART RATE: 159 BPM
STRESS TARGET HR: 135 BPM

## 2019-10-31 PROCEDURE — 99214 OFFICE O/P EST MOD 30 MIN: CPT | Performed by: INTERNAL MEDICINE

## 2019-12-09 ENCOUNTER — APPOINTMENT (OUTPATIENT)
Dept: CARDIOLOGY | Facility: HOSPITAL | Age: 59
End: 2019-12-09

## 2019-12-09 RX ORDER — BISOPROLOL FUMARATE 5 MG/1
TABLET, FILM COATED ORAL
Qty: 45 TABLET | Refills: 2 | Status: SHIPPED | OUTPATIENT
Start: 2019-12-09 | End: 2020-09-03 | Stop reason: SDUPTHER

## 2019-12-27 ENCOUNTER — HOSPITAL ENCOUNTER (OUTPATIENT)
Facility: HOSPITAL | Age: 59
Setting detail: OBSERVATION
Discharge: HOME | End: 2019-12-28
Attending: HOSPITALIST | Admitting: INTERNAL MEDICINE

## 2019-12-27 PROBLEM — J45.901 ASTHMA EXACERBATION: Status: ACTIVE | Noted: 2019-12-27

## 2019-12-27 PROBLEM — J45.909 ASTHMA: Status: ACTIVE | Noted: 2019-12-27

## 2019-12-27 PROBLEM — N18.30 CKD (CHRONIC KIDNEY DISEASE) STAGE 3, GFR 30-59 ML/MIN: Status: ACTIVE | Noted: 2019-12-27

## 2019-12-27 LAB
ANION GAP SERPL CALCULATED.3IONS-SCNC: 20.2 MMOL/L (ref 5–15)
B PARAPERT DNA SPEC QL NAA+PROBE: NOT DETECTED
B PERT DNA SPEC QL NAA+PROBE: NOT DETECTED
BUN BLD-MCNC: 13 MG/DL (ref 8–23)
BUN/CREAT SERPL: 13.8 (ref 7–25)
C PNEUM DNA NPH QL NAA+NON-PROBE: NOT DETECTED
CALCIUM SPEC-SCNC: 8.8 MG/DL (ref 8.6–10.5)
CHLORIDE SERPL-SCNC: 96 MMOL/L (ref 98–107)
CO2 SERPL-SCNC: 17.8 MMOL/L (ref 22–29)
CREAT BLD-MCNC: 0.94 MG/DL (ref 0.57–1)
DEPRECATED RDW RBC AUTO: 46.1 FL (ref 37–54)
ERYTHROCYTE [DISTWIDTH] IN BLOOD BY AUTOMATED COUNT: 15.7 % (ref 12.3–15.4)
FLUAV H1 2009 PAND RNA NPH QL NAA+PROBE: NOT DETECTED
FLUAV H1 HA GENE NPH QL NAA+PROBE: NOT DETECTED
FLUAV H3 RNA NPH QL NAA+PROBE: NOT DETECTED
FLUAV SUBTYP SPEC NAA+PROBE: NOT DETECTED
FLUBV RNA ISLT QL NAA+PROBE: NOT DETECTED
GFR SERPL CREATININE-BSD FRML MDRD: 61 ML/MIN/1.73
GLUCOSE BLD-MCNC: 314 MG/DL (ref 65–99)
GLUCOSE BLDC GLUCOMTR-MCNC: 213 MG/DL (ref 70–130)
GLUCOSE BLDC GLUCOMTR-MCNC: 244 MG/DL (ref 70–130)
GLUCOSE BLDC GLUCOMTR-MCNC: 283 MG/DL (ref 70–130)
GLUCOSE BLDC GLUCOMTR-MCNC: 298 MG/DL (ref 70–130)
HADV DNA SPEC NAA+PROBE: NOT DETECTED
HBA1C MFR BLD: 8.2 % (ref 4.8–5.6)
HCOV 229E RNA SPEC QL NAA+PROBE: NOT DETECTED
HCOV HKU1 RNA SPEC QL NAA+PROBE: NOT DETECTED
HCOV NL63 RNA SPEC QL NAA+PROBE: NOT DETECTED
HCOV OC43 RNA SPEC QL NAA+PROBE: NOT DETECTED
HCT VFR BLD AUTO: 38.5 % (ref 34–46.6)
HGB BLD-MCNC: 12.2 G/DL (ref 12–15.9)
HMPV RNA NPH QL NAA+NON-PROBE: NOT DETECTED
HPIV1 RNA SPEC QL NAA+PROBE: NOT DETECTED
HPIV2 RNA SPEC QL NAA+PROBE: NOT DETECTED
HPIV3 RNA NPH QL NAA+PROBE: NOT DETECTED
HPIV4 P GENE NPH QL NAA+PROBE: NOT DETECTED
M PNEUMO IGG SER IA-ACNC: NOT DETECTED
MCH RBC QN AUTO: 26 PG (ref 26.6–33)
MCHC RBC AUTO-ENTMCNC: 31.7 G/DL (ref 31.5–35.7)
MCV RBC AUTO: 81.9 FL (ref 79–97)
PLATELET # BLD AUTO: 310 10*3/MM3 (ref 140–450)
PMV BLD AUTO: 9.7 FL (ref 6–12)
POTASSIUM BLD-SCNC: 4 MMOL/L (ref 3.5–5.2)
PROCALCITONIN SERPL-MCNC: 0.09 NG/ML (ref 0.1–0.25)
RBC # BLD AUTO: 4.7 10*6/MM3 (ref 3.77–5.28)
RHINOVIRUS RNA SPEC NAA+PROBE: NOT DETECTED
RSV RNA NPH QL NAA+NON-PROBE: NOT DETECTED
SODIUM BLD-SCNC: 134 MMOL/L (ref 136–145)
WBC NRBC COR # BLD: 8.7 10*3/MM3 (ref 3.4–10.8)

## 2019-12-27 PROCEDURE — 82962 GLUCOSE BLOOD TEST: CPT

## 2019-12-27 PROCEDURE — 94799 UNLISTED PULMONARY SVC/PX: CPT

## 2019-12-27 PROCEDURE — G0378 HOSPITAL OBSERVATION PER HR: HCPCS

## 2019-12-27 PROCEDURE — 63710000001 INSULIN LISPRO (HUMAN) PER 5 UNITS: Performed by: NURSE PRACTITIONER

## 2019-12-27 PROCEDURE — 80048 BASIC METABOLIC PNL TOTAL CA: CPT | Performed by: NURSE PRACTITIONER

## 2019-12-27 PROCEDURE — 0100U HC BIOFIRE FILMARRAY RESP PANEL 2: CPT | Performed by: HOSPITALIST

## 2019-12-27 PROCEDURE — 63710000001 INSULIN LISPRO (HUMAN) PER 5 UNITS: Performed by: HOSPITALIST

## 2019-12-27 PROCEDURE — 25010000002 METHYLPREDNISOLONE PER 125 MG: Performed by: HOSPITALIST

## 2019-12-27 PROCEDURE — 96376 TX/PRO/DX INJ SAME DRUG ADON: CPT

## 2019-12-27 PROCEDURE — 94640 AIRWAY INHALATION TREATMENT: CPT

## 2019-12-27 PROCEDURE — 25010000002 ONDANSETRON PER 1 MG: Performed by: NURSE PRACTITIONER

## 2019-12-27 PROCEDURE — 84145 PROCALCITONIN (PCT): CPT | Performed by: HOSPITALIST

## 2019-12-27 PROCEDURE — 96374 THER/PROPH/DIAG INJ IV PUSH: CPT

## 2019-12-27 PROCEDURE — 96375 TX/PRO/DX INJ NEW DRUG ADDON: CPT

## 2019-12-27 PROCEDURE — 85027 COMPLETE CBC AUTOMATED: CPT | Performed by: NURSE PRACTITIONER

## 2019-12-27 PROCEDURE — G0379 DIRECT REFER HOSPITAL OBSERV: HCPCS

## 2019-12-27 PROCEDURE — 83036 HEMOGLOBIN GLYCOSYLATED A1C: CPT | Performed by: NURSE PRACTITIONER

## 2019-12-27 RX ORDER — METHYLPREDNISOLONE SODIUM SUCCINATE 125 MG/2ML
60 INJECTION, POWDER, LYOPHILIZED, FOR SOLUTION INTRAMUSCULAR; INTRAVENOUS EVERY 8 HOURS
Status: DISCONTINUED | OUTPATIENT
Start: 2019-12-28 | End: 2019-12-27

## 2019-12-27 RX ORDER — BISOPROLOL FUMARATE 5 MG/1
2.5 TABLET, FILM COATED ORAL DAILY
Status: DISCONTINUED | OUTPATIENT
Start: 2019-12-27 | End: 2019-12-28 | Stop reason: HOSPADM

## 2019-12-27 RX ORDER — SODIUM CHLORIDE 0.9 % (FLUSH) 0.9 %
10 SYRINGE (ML) INJECTION EVERY 12 HOURS SCHEDULED
Status: DISCONTINUED | OUTPATIENT
Start: 2019-12-27 | End: 2019-12-28 | Stop reason: HOSPADM

## 2019-12-27 RX ORDER — SODIUM CHLORIDE 0.9 % (FLUSH) 0.9 %
10 SYRINGE (ML) INJECTION AS NEEDED
Status: DISCONTINUED | OUTPATIENT
Start: 2019-12-27 | End: 2019-12-28 | Stop reason: HOSPADM

## 2019-12-27 RX ORDER — METHYLPREDNISOLONE SODIUM SUCCINATE 125 MG/2ML
60 INJECTION, POWDER, LYOPHILIZED, FOR SOLUTION INTRAMUSCULAR; INTRAVENOUS EVERY 8 HOURS
Status: DISCONTINUED | OUTPATIENT
Start: 2019-12-27 | End: 2019-12-28 | Stop reason: HOSPADM

## 2019-12-27 RX ORDER — DEXTROSE MONOHYDRATE 25 G/50ML
25 INJECTION, SOLUTION INTRAVENOUS
Status: DISCONTINUED | OUTPATIENT
Start: 2019-12-27 | End: 2019-12-28 | Stop reason: HOSPADM

## 2019-12-27 RX ORDER — ONDANSETRON 2 MG/ML
4 INJECTION INTRAMUSCULAR; INTRAVENOUS EVERY 6 HOURS PRN
Status: DISCONTINUED | OUTPATIENT
Start: 2019-12-27 | End: 2019-12-28 | Stop reason: HOSPADM

## 2019-12-27 RX ORDER — ASPIRIN 81 MG/1
81 TABLET ORAL DAILY
Status: DISCONTINUED | OUTPATIENT
Start: 2019-12-27 | End: 2019-12-28 | Stop reason: HOSPADM

## 2019-12-27 RX ORDER — ACETAMINOPHEN 325 MG/1
650 TABLET ORAL EVERY 4 HOURS PRN
Status: DISCONTINUED | OUTPATIENT
Start: 2019-12-27 | End: 2019-12-28 | Stop reason: HOSPADM

## 2019-12-27 RX ORDER — GABAPENTIN 400 MG/1
800 CAPSULE ORAL EVERY 8 HOURS SCHEDULED
Status: DISCONTINUED | OUTPATIENT
Start: 2019-12-27 | End: 2019-12-28 | Stop reason: HOSPADM

## 2019-12-27 RX ORDER — DULOXETIN HYDROCHLORIDE 30 MG/1
30 CAPSULE, DELAYED RELEASE ORAL DAILY
Status: DISCONTINUED | OUTPATIENT
Start: 2019-12-27 | End: 2019-12-28 | Stop reason: HOSPADM

## 2019-12-27 RX ORDER — BUDESONIDE 1 MG/2ML
1 INHALANT ORAL
Status: DISCONTINUED | OUTPATIENT
Start: 2019-12-27 | End: 2019-12-28 | Stop reason: HOSPADM

## 2019-12-27 RX ORDER — PRASUGREL 10 MG/1
10 TABLET, FILM COATED ORAL DAILY
Status: DISCONTINUED | OUTPATIENT
Start: 2019-12-27 | End: 2019-12-28 | Stop reason: HOSPADM

## 2019-12-27 RX ORDER — IPRATROPIUM BROMIDE AND ALBUTEROL SULFATE 2.5; .5 MG/3ML; MG/3ML
3 SOLUTION RESPIRATORY (INHALATION)
Status: DISCONTINUED | OUTPATIENT
Start: 2019-12-27 | End: 2019-12-28 | Stop reason: HOSPADM

## 2019-12-27 RX ORDER — ROSUVASTATIN CALCIUM 10 MG/1
10 TABLET, COATED ORAL DAILY
Status: DISCONTINUED | OUTPATIENT
Start: 2019-12-27 | End: 2019-12-28 | Stop reason: HOSPADM

## 2019-12-27 RX ORDER — NICOTINE POLACRILEX 4 MG
15 LOZENGE BUCCAL
Status: DISCONTINUED | OUTPATIENT
Start: 2019-12-27 | End: 2019-12-28 | Stop reason: HOSPADM

## 2019-12-27 RX ORDER — ACETAMINOPHEN 160 MG/5ML
650 SOLUTION ORAL EVERY 4 HOURS PRN
Status: DISCONTINUED | OUTPATIENT
Start: 2019-12-27 | End: 2019-12-28 | Stop reason: HOSPADM

## 2019-12-27 RX ORDER — IPRATROPIUM BROMIDE AND ALBUTEROL SULFATE 2.5; .5 MG/3ML; MG/3ML
3 SOLUTION RESPIRATORY (INHALATION) EVERY 4 HOURS PRN
Status: DISCONTINUED | OUTPATIENT
Start: 2019-12-27 | End: 2019-12-28 | Stop reason: HOSPADM

## 2019-12-27 RX ORDER — NITROGLYCERIN 0.4 MG/1
0.4 TABLET SUBLINGUAL
Status: DISCONTINUED | OUTPATIENT
Start: 2019-12-27 | End: 2019-12-28 | Stop reason: HOSPADM

## 2019-12-27 RX ORDER — ACETAMINOPHEN 650 MG/1
650 SUPPOSITORY RECTAL EVERY 4 HOURS PRN
Status: DISCONTINUED | OUTPATIENT
Start: 2019-12-27 | End: 2019-12-28 | Stop reason: HOSPADM

## 2019-12-27 RX ORDER — FLUTICASONE PROPIONATE 50 MCG
2 SPRAY, SUSPENSION (ML) NASAL DAILY
Status: DISCONTINUED | OUTPATIENT
Start: 2019-12-27 | End: 2019-12-28 | Stop reason: HOSPADM

## 2019-12-27 RX ORDER — PANTOPRAZOLE SODIUM 40 MG/1
40 TABLET, DELAYED RELEASE ORAL DAILY
Status: DISCONTINUED | OUTPATIENT
Start: 2019-12-27 | End: 2019-12-28 | Stop reason: HOSPADM

## 2019-12-27 RX ADMIN — FLUTICASONE PROPIONATE 2 SPRAY: 50 SPRAY, METERED NASAL at 12:26

## 2019-12-27 RX ADMIN — INSULIN LISPRO 5 UNITS: 100 INJECTION, SOLUTION INTRAVENOUS; SUBCUTANEOUS at 18:15

## 2019-12-27 RX ADMIN — IPRATROPIUM BROMIDE AND ALBUTEROL SULFATE 3 ML: 2.5; .5 SOLUTION RESPIRATORY (INHALATION) at 05:17

## 2019-12-27 RX ADMIN — BUDESONIDE 1 MG: 1 SUSPENSION RESPIRATORY (INHALATION) at 11:22

## 2019-12-27 RX ADMIN — GABAPENTIN 800 MG: 400 CAPSULE ORAL at 21:00

## 2019-12-27 RX ADMIN — SODIUM CHLORIDE, PRESERVATIVE FREE 10 ML: 5 INJECTION INTRAVENOUS at 21:01

## 2019-12-27 RX ADMIN — IPRATROPIUM BROMIDE AND ALBUTEROL SULFATE 3 ML: 2.5; .5 SOLUTION RESPIRATORY (INHALATION) at 16:30

## 2019-12-27 RX ADMIN — ASPIRIN 81 MG: 81 TABLET, COATED ORAL at 12:27

## 2019-12-27 RX ADMIN — SODIUM CHLORIDE, PRESERVATIVE FREE 10 ML: 5 INJECTION INTRAVENOUS at 08:39

## 2019-12-27 RX ADMIN — BUDESONIDE 1 MG: 1 SUSPENSION RESPIRATORY (INHALATION) at 21:42

## 2019-12-27 RX ADMIN — METHYLPREDNISOLONE SODIUM SUCCINATE 60 MG: 125 INJECTION, POWDER, FOR SOLUTION INTRAMUSCULAR; INTRAVENOUS at 08:37

## 2019-12-27 RX ADMIN — PRASUGREL 10 MG: 10 TABLET, FILM COATED ORAL at 12:26

## 2019-12-27 RX ADMIN — PANTOPRAZOLE SODIUM 40 MG: 40 TABLET, DELAYED RELEASE ORAL at 12:27

## 2019-12-27 RX ADMIN — LINAGLIPTIN 5 MG: 5 TABLET, FILM COATED ORAL at 12:26

## 2019-12-27 RX ADMIN — IPRATROPIUM BROMIDE AND ALBUTEROL SULFATE 3 ML: 2.5; .5 SOLUTION RESPIRATORY (INHALATION) at 11:22

## 2019-12-27 RX ADMIN — DULOXETINE HYDROCHLORIDE 30 MG: 30 CAPSULE, DELAYED RELEASE ORAL at 12:26

## 2019-12-27 RX ADMIN — ONDANSETRON 4 MG: 2 INJECTION INTRAMUSCULAR; INTRAVENOUS at 18:15

## 2019-12-27 RX ADMIN — BISOPROLOL FUMARATE 2.5 MG: 5 TABLET ORAL at 12:27

## 2019-12-27 RX ADMIN — ROSUVASTATIN CALCIUM 10 MG: 10 TABLET, FILM COATED ORAL at 12:26

## 2019-12-27 RX ADMIN — ACETAMINOPHEN 650 MG: 325 TABLET, FILM COATED ORAL at 06:23

## 2019-12-27 RX ADMIN — INSULIN LISPRO 4 UNITS: 100 INJECTION, SOLUTION INTRAVENOUS; SUBCUTANEOUS at 08:11

## 2019-12-27 RX ADMIN — IPRATROPIUM BROMIDE AND ALBUTEROL SULFATE 3 ML: 2.5; .5 SOLUTION RESPIRATORY (INHALATION) at 21:42

## 2019-12-27 RX ADMIN — INSULIN LISPRO 5 UNITS: 100 INJECTION, SOLUTION INTRAVENOUS; SUBCUTANEOUS at 12:25

## 2019-12-27 RX ADMIN — ACETAMINOPHEN 650 MG: 325 TABLET, FILM COATED ORAL at 21:05

## 2019-12-27 RX ADMIN — METHYLPREDNISOLONE SODIUM SUCCINATE 60 MG: 125 INJECTION, POWDER, FOR SOLUTION INTRAMUSCULAR; INTRAVENOUS at 15:01

## 2019-12-27 RX ADMIN — INSULIN LISPRO 8 UNITS: 100 INJECTION, SOLUTION INTRAVENOUS; SUBCUTANEOUS at 21:00

## 2019-12-27 RX ADMIN — METHYLPREDNISOLONE SODIUM SUCCINATE 60 MG: 125 INJECTION, POWDER, FOR SOLUTION INTRAMUSCULAR; INTRAVENOUS at 22:48

## 2019-12-28 VITALS
OXYGEN SATURATION: 96 % | TEMPERATURE: 97.6 F | SYSTOLIC BLOOD PRESSURE: 118 MMHG | HEIGHT: 57 IN | DIASTOLIC BLOOD PRESSURE: 58 MMHG | RESPIRATION RATE: 20 BRPM | WEIGHT: 179.2 LBS | BODY MASS INDEX: 38.66 KG/M2 | HEART RATE: 80 BPM

## 2019-12-28 LAB
GLUCOSE BLDC GLUCOMTR-MCNC: 191 MG/DL (ref 70–130)
GLUCOSE BLDC GLUCOMTR-MCNC: 253 MG/DL (ref 70–130)
GLUCOSE BLDC GLUCOMTR-MCNC: 298 MG/DL (ref 70–130)

## 2019-12-28 PROCEDURE — G0378 HOSPITAL OBSERVATION PER HR: HCPCS

## 2019-12-28 PROCEDURE — 82962 GLUCOSE BLOOD TEST: CPT

## 2019-12-28 PROCEDURE — 63710000001 INSULIN LISPRO (HUMAN) PER 5 UNITS: Performed by: HOSPITALIST

## 2019-12-28 PROCEDURE — 94799 UNLISTED PULMONARY SVC/PX: CPT

## 2019-12-28 PROCEDURE — 25010000002 METHYLPREDNISOLONE PER 125 MG: Performed by: HOSPITALIST

## 2019-12-28 PROCEDURE — 96376 TX/PRO/DX INJ SAME DRUG ADON: CPT

## 2019-12-28 RX ORDER — PREDNISONE 20 MG/1
40 TABLET ORAL DAILY
Qty: 8 TABLET | Refills: 0 | Status: SHIPPED | OUTPATIENT
Start: 2019-12-28 | End: 2020-01-01

## 2019-12-28 RX ADMIN — FLUTICASONE PROPIONATE 2 SPRAY: 50 SPRAY, METERED NASAL at 08:20

## 2019-12-28 RX ADMIN — IPRATROPIUM BROMIDE AND ALBUTEROL SULFATE 3 ML: 2.5; .5 SOLUTION RESPIRATORY (INHALATION) at 16:00

## 2019-12-28 RX ADMIN — ACETAMINOPHEN 650 MG: 325 TABLET, FILM COATED ORAL at 08:31

## 2019-12-28 RX ADMIN — LINAGLIPTIN 5 MG: 5 TABLET, FILM COATED ORAL at 08:20

## 2019-12-28 RX ADMIN — METHYLPREDNISOLONE SODIUM SUCCINATE 60 MG: 125 INJECTION, POWDER, FOR SOLUTION INTRAMUSCULAR; INTRAVENOUS at 08:31

## 2019-12-28 RX ADMIN — ASPIRIN 81 MG: 81 TABLET, COATED ORAL at 08:20

## 2019-12-28 RX ADMIN — INSULIN LISPRO 8 UNITS: 100 INJECTION, SOLUTION INTRAVENOUS; SUBCUTANEOUS at 17:37

## 2019-12-28 RX ADMIN — IPRATROPIUM BROMIDE AND ALBUTEROL SULFATE 3 ML: 2.5; .5 SOLUTION RESPIRATORY (INHALATION) at 07:58

## 2019-12-28 RX ADMIN — BUDESONIDE 1 MG: 1 SUSPENSION RESPIRATORY (INHALATION) at 07:58

## 2019-12-28 RX ADMIN — ROSUVASTATIN CALCIUM 10 MG: 10 TABLET, FILM COATED ORAL at 08:20

## 2019-12-28 RX ADMIN — INSULIN LISPRO 3 UNITS: 100 INJECTION, SOLUTION INTRAVENOUS; SUBCUTANEOUS at 08:20

## 2019-12-28 RX ADMIN — DULOXETINE HYDROCHLORIDE 30 MG: 30 CAPSULE, DELAYED RELEASE ORAL at 08:20

## 2019-12-28 RX ADMIN — PANTOPRAZOLE SODIUM 40 MG: 40 TABLET, DELAYED RELEASE ORAL at 08:19

## 2019-12-28 RX ADMIN — BISOPROLOL FUMARATE 2.5 MG: 5 TABLET ORAL at 08:20

## 2019-12-28 RX ADMIN — PRASUGREL 10 MG: 10 TABLET, FILM COATED ORAL at 08:19

## 2019-12-28 RX ADMIN — METHYLPREDNISOLONE SODIUM SUCCINATE 60 MG: 125 INJECTION, POWDER, FOR SOLUTION INTRAMUSCULAR; INTRAVENOUS at 17:37

## 2019-12-28 RX ADMIN — SODIUM CHLORIDE, PRESERVATIVE FREE 10 ML: 5 INJECTION INTRAVENOUS at 08:21

## 2019-12-28 RX ADMIN — INSULIN LISPRO 8 UNITS: 100 INJECTION, SOLUTION INTRAVENOUS; SUBCUTANEOUS at 13:09

## 2019-12-28 RX ADMIN — IPRATROPIUM BROMIDE AND ALBUTEROL SULFATE 3 ML: 2.5; .5 SOLUTION RESPIRATORY (INHALATION) at 11:30

## 2019-12-28 RX ADMIN — GABAPENTIN 800 MG: 400 CAPSULE ORAL at 06:22

## 2020-03-17 RX ORDER — PRASUGREL 10 MG/1
TABLET, FILM COATED ORAL
Qty: 90 TABLET | Refills: 0 | Status: SHIPPED | OUTPATIENT
Start: 2020-03-17 | End: 2020-03-23

## 2020-03-23 RX ORDER — PRASUGREL 10 MG/1
TABLET, FILM COATED ORAL
Qty: 90 TABLET | Refills: 0 | Status: SHIPPED | OUTPATIENT
Start: 2020-03-23 | End: 2020-07-24 | Stop reason: SDUPTHER

## 2020-05-13 RX ORDER — ROSUVASTATIN CALCIUM 10 MG/1
TABLET, COATED ORAL
Qty: 90 TABLET | Refills: 1 | Status: SHIPPED | OUTPATIENT
Start: 2020-05-13 | End: 2020-11-04

## 2020-07-21 ENCOUNTER — OFFICE VISIT (OUTPATIENT)
Dept: CARDIOLOGY | Facility: CLINIC | Age: 60
End: 2020-07-21

## 2020-07-21 VITALS
SYSTOLIC BLOOD PRESSURE: 109 MMHG | HEIGHT: 57 IN | WEIGHT: 180 LBS | DIASTOLIC BLOOD PRESSURE: 73 MMHG | BODY MASS INDEX: 38.83 KG/M2 | HEART RATE: 70 BPM

## 2020-07-21 DIAGNOSIS — G47.33 OBSTRUCTIVE SLEEP APNEA SYNDROME: ICD-10-CM

## 2020-07-21 DIAGNOSIS — R09.89 OTHER SPECIFIED SYMPTOMS AND SIGNS INVOLVING THE CIRCULATORY AND RESPIRATORY SYSTEMS: ICD-10-CM

## 2020-07-21 DIAGNOSIS — I10 ESSENTIAL HYPERTENSION: ICD-10-CM

## 2020-07-21 DIAGNOSIS — I25.119 CORONARY ARTERY DISEASE INVOLVING NATIVE HEART WITH ANGINA PECTORIS, UNSPECIFIED VESSEL OR LESION TYPE (HCC): ICD-10-CM

## 2020-07-21 DIAGNOSIS — I77.9 BILATERAL CAROTID ARTERY DISEASE, UNSPECIFIED TYPE (HCC): ICD-10-CM

## 2020-07-21 DIAGNOSIS — R06.02 SHORTNESS OF BREATH: ICD-10-CM

## 2020-07-21 DIAGNOSIS — R07.2 PRECORDIAL PAIN: Primary | ICD-10-CM

## 2020-07-21 PROCEDURE — 93000 ELECTROCARDIOGRAM COMPLETE: CPT | Performed by: NURSE PRACTITIONER

## 2020-07-21 PROCEDURE — 99214 OFFICE O/P EST MOD 30 MIN: CPT | Performed by: NURSE PRACTITIONER

## 2020-07-21 RX ORDER — PREGABALIN 75 MG/1
75 CAPSULE ORAL 2 TIMES DAILY
COMMUNITY
End: 2021-08-24 | Stop reason: DRUGHIGH

## 2020-07-21 NOTE — PROGRESS NOTES
Subjective:        Lily Unger is a 59 y.o. female who here for follow up    Chief Complaint   Patient presents with   • Follow-up     6 month     Hypertension    HPI     Lily Unger is a 59-year-old female, who is this provider.  He has a history of CAD (history of angioplasty), asthma, diabetes mellitus, hiatal hernia, hyperlipidemia, hypertension, obesity, and MILAGROS with CPAP compliance.  Previous panel on 10/2019 revealed , HDL 40, LDL 40, and triglycerides 117.  Good control noted.  He is echo on 10/2019 revealed EF 66%, LAC size is mildly dilated, mild MV and TV regurgitation, and no evidence of pericardial effusion.  Stress test 5/2019 indicated a normal myocardial perfusion study with no evidence of ischemia.  Cath on 3/2019 revealed LAD shows minimum irregularity with the midportion 30 to 40% stenosis, circumflex artery was nondominant with known atherosclerotic stenosis, RCA was a dominant with ostial 90% reduced to 0%.  Mild pulmonary hypertension noted.  Recommendations were to prevent any ischemic events and congestive complications.  And dual antiplatelets for at least 1 year.    +chest pain that comes and goes, shortness of breath. Denies palpitations.        The following portions of the patient's history were reviewed and updated as appropriate: allergies, current medications, past family history, past medical history, past social history, past surgical history and problem list.    Past Medical History:   Diagnosis Date   • Abnormal stress test    • Asthma    • CAD (coronary artery disease)    • Crescendo angina (CMS/[facility])    • Diabetes mellitus (CMS/[facility])    • H/O angioplasty    • Hiatal hernia    • Hyperlipidemia    • Hypertension    • Obesity    • MILAGROS on CPAP    • Recurrent urinary tract infection          reports that she has quit smoking. She has never used smokeless tobacco. She reports that she drinks alcohol. She reports that she does not use drugs.     Family History    Problem Relation Age of Onset   • Heart attack Father    • Heart disease Father    • Heart attack Brother    • Heart disease Brother    • Heart attack Maternal Grandfather    • Heart disease Maternal Grandfather    • Heart disease Mother        ROS     Review of Systems  Constitutional: No wt loss, fever, fatigue  Gastrointestinal: No nausea, abdominal pain  Behavioral/Psych: No insomnia or anxiety  Cardiovascular; + chest pain, and shortness of breath      Objective:           Physical Exam   Constitutional: She is oriented to person, place, and time. She appears well-developed and well-nourished.   HENT:   Head: Normocephalic.   Right Ear: External ear normal.   Left Ear: External ear normal.   Eyes: EOM are normal.   Neck: Normal range of motion. No JVD present.   Cardiovascular: Normal rate, regular rhythm, normal heart sounds and intact distal pulses. Exam reveals no gallop and no friction rub.   No murmur heard.  Bruit noted   Pulmonary/Chest: Effort normal and breath sounds normal. No stridor. No respiratory distress. She has no rales.   Abdominal: Soft. Bowel sounds are normal. She exhibits no distension. There is no tenderness. There is no guarding.   Musculoskeletal: Normal range of motion. She exhibits no edema or tenderness.   Neurological: She is alert and oriented to person, place, and time. She has normal reflexes.   Skin: Skin is warm.   Psychiatric: She has a normal mood and affect. Judgment normal.   Nursing note and vitals reviewed.        ECG 12 Lead  Date/Time: 7/21/2020 11:20 AM  Performed by: Rose Castañeda APRN  Authorized by: Rose Castañeda APRN   Comparison: compared with previous ECG from 10/18/2019  Similar to previous ECG  Rhythm: sinus rhythm  Rate: normal            Interpretation Summary        · Findings consistent with an equivocal ECG stress test.  · Left ventricular ejection fraction is normal (Calculated EF = 70%).  · Myocardial perfusion imaging indicates a normal  myocardial perfusion study with no evidence of ischemia.  · Impressions are consistent with a low risk study.     Interpretation Summary     · Left atrial cavity size is mildly dilated.  · Mild mitral valve regurgitation is present  · Mild tricuspid valve regurgitation is present.  · Calculated EF = 66%.  · There is no evidence of pericardial effusion        HEMODYNAMIC / ANGIOGRAPHIC DATA:    1. Pulmonary capillary wedge pressure was 12.   2. Pulmonary artery systolic pressure was 35/15.  3. Right atrial pressure was 8.  4. Cardiac index was 2.1.  5. Left ventricular end diastolic pressure was 10 mmHg.  6. Left ventriculography revealed the EF to be 50%.  7. The left main is normal.  8. The LAD is .Left anterior descending shows minimum irregularity with a midportion 30-40% stenosis  9. Circumflex artery was a nondominant with known atherosclerotic stenosis  10. The right coronary artery is .Right coronary artery was a dominant with ostial 90% was reduced to 0% with 3.5/8 Xience stent  11. Mild pulmonary hypertension  Successful angioplasty and stent to the ostial RCA 90% reduced to 0% with 3.5/8 Xience stent, type of the lesion  b2  RECOMMENDATIONS:  Post-procedure care will focus on prevention of any ischemic events and congestive complications.       Importance of taking dual antiplatelets for one year  has been explained, risk of stent thrombosis leading to the acute MI, which carries high morbidity and mortality has been explained     Discontinuation or interruptions of these medications should be under the strict guidance of appropriate health professional      Current Outpatient Medications:   •  albuterol (PROVENTIL) (2.5 MG/3ML) 0.083% nebulizer solution, Take 2.5 mg by nebulization Every 6 (Six) Hours As Needed., Disp: , Rfl: 11  •  aspirin 81 MG EC tablet, Take 81 mg by mouth daily., Disp: , Rfl:   •  bisoprolol (ZEBeta) 5 MG tablet, TAKE ONE-HALF TABLET BY MOUTH DAILY (Patient taking differently: Take  2.5 mg by mouth Daily.), Disp: 45 tablet, Rfl: 2  •  butalbital-acetaminophen-caffeine (FIORICET, ESGIC) -40 MG per tablet, Take 1 tablet by mouth Every 4 (Four) Hours As Needed for Headache., Disp: , Rfl:   •  DULoxetine (CYMBALTA) 30 MG capsule, Take 30 mg by mouth Daily., Disp: , Rfl: 5  •  Ertugliflozin L-PyroglutamicAc (STEGLATRO) 15 MG tablet, Take 15 mg by mouth Every Morning., Disp: , Rfl:   •  Insulin Degludec (TRESIBA SC), Inject  under the skin into the appropriate area as directed., Disp: , Rfl:   •  metFORMIN (GLUCOPHAGE) 500 MG tablet, Take 1,000 mg by mouth 2 (Two) Times a Day., Disp: , Rfl: 0  •  Multiple Vitamins-Minerals (MULTIVITAMIN WITH MINERALS) tablet tablet, Take 1 tablet by mouth Daily., Disp: , Rfl:   •  Omega-3 Fatty Acids (FISH OIL) 1000 MG capsule capsule, Take 1,000 mg by mouth Daily With Breakfast., Disp: , Rfl:   •  ONETOUCH VERIO test strip, , Disp: , Rfl: 0  •  pantoprazole (PROTONIX) 40 MG EC tablet, Take 40 mg by mouth Daily., Disp: , Rfl: 0  •  prasugrel (EFFIENT) 10 MG tablet, TAKE 1 TABLET BY MOUTH EVERY DAY, Disp: 90 tablet, Rfl: 0  •  pregabalin (Lyrica) 75 MG capsule, Take 75 mg by mouth 2 (Two) Times a Day., Disp: , Rfl:   •  promethazine-dextromethorphan (PROMETHAZINE-DM) 6.25-15 MG/5ML syrup, TAKE 5-10 MILLILITER EVERY SIX HOURS AS NEEDED, Disp: , Rfl: 1  •  rosuvastatin (CRESTOR) 10 MG tablet, TAKE 1 TABLET BY MOUTH EVERY DAY, Disp: 90 tablet, Rfl: 1  •  silver sulfadiazine (SILVADENE, SSD) 1 % cream, APPLY TO AFFECTED AREA TWICE A DAY UNTIL THE BURN IS HEALED, Disp: , Rfl: 0  •  SITagliptin (JANUVIA) 50 MG tablet, Take 100 mg by mouth Daily., Disp: , Rfl:   •  SYMBICORT 160-4.5 MCG/ACT inhaler, Take 2 puffs by mouth 2 (Two) Times a Day., Disp: , Rfl: 0  •  VENTOLIN  (90 BASE) MCG/ACT inhaler, Inhale 1 puff Every 4 (Four) Hours As Needed., Disp: , Rfl: 1  •  nitroglycerin (NITROSTAT) 0.4 MG SL tablet, place 1 tablet under the tongue if needed every 5 minutes  for chest pain for 3 doses IF NO RELIEF AFTER FIRST DOSE CALL PRESCRIBER ., Disp: 25 tablet, Rfl: 0     Assessment:        Patient Active Problem List   Diagnosis   • Chest pain, atypical   • Shortness of breath   • Syncope and collapse   • Weakness   • Coronary artery disease   • Soft tissue lesion of shoulder region   • Disorder of rotator cuff   • Shoulder pain   • Rotator cuff tear arthropathy   • Unstable angina pectoris (CMS/HCC)   • Injury of kidney   • Type 2 diabetes mellitus (CMS/HCC)   • Hypertension   • Hypotension   • Hypovolemia   • Bilateral carotid artery disease (CMS/HCC)   • Medical non-compliance   • Dizziness   • Viral illness   • SOB (shortness of breath)   • Asthma exacerbation   • CKD (chronic kidney disease) stage 3, GFR 30-59 ml/min (CMS/HCC)   • Asthma               Plan:   1. Precordial pain: Will do a stress test.      It was explained to the patient that stress testing carries 85% specificity/sensitivity, and does not rule out future cardiac event.  Risks of the procedure were explained to the patient including shortness of breath, induction of myocardial infarction, and dizziness.  Patient is agreeable to proceeding with stress testing.     2. shortness of breath: will do an echo    3. Sleep apnea: she states she will start using her cpap machine    4. Essential hypertension: Her blood pressure is stable. Continue  BB    Educated patient on exercising for at least 30 minutes a day for 2 to 3 days a week. Importance of controlling hypertension and blood pressure checkup on the regular basis has been explained. Hypertension as a silent killer has been discussed. Risk reduction of the weight and regular exercises to control the hypertension has been explained.    5. CAD: Previous ischemic work up as above. She has been having  precordial pain, will do a stress test. Continue statin, BB, asa, and Effient .    Risk reduction for the coronary artery disease, controlling the blood  pressure, blood sugar management, cholesterol management, exercise, stress management, and proper compliance with medications and follow-up has been discussed    6. Bruit: Previous doppler study as above. Will do carotid doppler             No diagnosis found.    There are no diagnoses linked to this encounter.    COUNSELING: tess Horner was given to patient for the following topics: diagnostic results, risk factor reductions, impressions, risks and benefits of treatment options and importance of treatment compliance .       SMOKING COUNSELING: denies    Refill nitro, do a stress test, carotid doppler, and echo.    Sincerely,   BRETT Beth  [facility]  07/21/20  11:15     EMR Dragon/Transcription disclaimer:   Much of this encounter note is an electronic transcription/translation of spoken language to printed text. The electronic translation of spoken language may permit erroneous, or at times, nonsensical words or phrases to be inadvertently transcribed; Although I have reviewed the note for such errors, some may still exist.

## 2020-07-22 ENCOUNTER — HOSPITAL ENCOUNTER (OUTPATIENT)
Dept: CARDIOLOGY | Facility: HOSPITAL | Age: 60
End: 2020-07-22

## 2020-07-24 ENCOUNTER — APPOINTMENT (OUTPATIENT)
Dept: CARDIOLOGY | Facility: HOSPITAL | Age: 60
End: 2020-07-24

## 2020-07-24 RX ORDER — PRASUGREL 10 MG/1
10 TABLET, FILM COATED ORAL DAILY
Qty: 90 TABLET | Refills: 0 | Status: SHIPPED | OUTPATIENT
Start: 2020-07-24 | End: 2020-08-03

## 2020-07-24 RX ORDER — NITROGLYCERIN 0.4 MG/1
0.4 TABLET SUBLINGUAL SEE ADMIN INSTRUCTIONS
Qty: 25 TABLET | Refills: 0 | Status: SHIPPED | OUTPATIENT
Start: 2020-07-24 | End: 2020-08-19

## 2020-08-03 ENCOUNTER — HOSPITAL ENCOUNTER (OUTPATIENT)
Dept: CARDIOLOGY | Facility: HOSPITAL | Age: 60
Discharge: HOME | End: 2020-08-03

## 2020-08-03 VITALS
OXYGEN SATURATION: 97 % | DIASTOLIC BLOOD PRESSURE: 74 MMHG | BODY MASS INDEX: 36.29 KG/M2 | HEIGHT: 59 IN | HEART RATE: 55 BPM | RESPIRATION RATE: 14 BRPM | SYSTOLIC BLOOD PRESSURE: 150 MMHG | WEIGHT: 180 LBS

## 2020-08-03 LAB
BH CV STRESS BP STAGE 1: NORMAL
BH CV STRESS COMMENTS STAGE 1: NORMAL
BH CV STRESS DOSE REGADENOSON STAGE 1: 0.4
BH CV STRESS DURATION MIN STAGE 1: 2
BH CV STRESS DURATION SEC STAGE 1: 31
BH CV STRESS GRADE STAGE 1: 0
BH CV STRESS HR STAGE 1: 91
BH CV STRESS METS STAGE 1: 1.4
BH CV STRESS PROTOCOL 1: NORMAL
BH CV STRESS RECOVERY BP: NORMAL MMHG
BH CV STRESS RECOVERY HR: 58 BPM
BH CV STRESS RECOVERY O2: 97 %
BH CV STRESS SPEED STAGE 1: 1
BH CV STRESS STAGE 1: 1
LV EF NUC BP: 60 %
MAXIMAL PREDICTED HEART RATE: 159 BPM
PERCENT MAX PREDICTED HR: 57.23 %
STRESS BASELINE BP: NORMAL MMHG
STRESS BASELINE HR: 55 BPM
STRESS O2 SAT REST: 97 %
STRESS PERCENT HR: 67 %
STRESS POST ESTIMATED WORKLOAD: 1.4 METS
STRESS POST EXERCISE DUR MIN: 2 MIN
STRESS POST EXERCISE DUR SEC: 31 SEC
STRESS POST PEAK BP: NORMAL MMHG
STRESS POST PEAK HR: 91 BPM
STRESS TARGET HR: 135 BPM

## 2020-08-03 PROCEDURE — 25010000002 AMINOPHYLLINE PER 250 MG: Performed by: INTERNAL MEDICINE

## 2020-08-03 PROCEDURE — 25010000002 REGADENOSON 0.4 MG/5ML SOLUTION: Performed by: INTERNAL MEDICINE

## 2020-08-03 PROCEDURE — 78452 HT MUSCLE IMAGE SPECT MULT: CPT

## 2020-08-03 PROCEDURE — 93017 CV STRESS TEST TRACING ONLY: CPT

## 2020-08-03 PROCEDURE — 78452 HT MUSCLE IMAGE SPECT MULT: CPT | Performed by: INTERNAL MEDICINE

## 2020-08-03 PROCEDURE — 93018 CV STRESS TEST I&R ONLY: CPT | Performed by: INTERNAL MEDICINE

## 2020-08-03 PROCEDURE — 0 TECHNETIUM SESTAMIBI: Performed by: INTERNAL MEDICINE

## 2020-08-03 PROCEDURE — 93016 CV STRESS TEST SUPVJ ONLY: CPT | Performed by: INTERNAL MEDICINE

## 2020-08-03 PROCEDURE — A9500 TC99M SESTAMIBI: HCPCS | Performed by: INTERNAL MEDICINE

## 2020-08-03 RX ORDER — PRASUGREL 10 MG/1
TABLET, FILM COATED ORAL
Qty: 90 TABLET | Refills: 0 | Status: SHIPPED | OUTPATIENT
Start: 2020-08-03 | End: 2020-12-28

## 2020-08-03 RX ORDER — AMINOPHYLLINE DIHYDRATE 25 MG/ML
125 INJECTION, SOLUTION INTRAVENOUS ONCE
Status: COMPLETED | OUTPATIENT
Start: 2020-08-03 | End: 2020-08-03

## 2020-08-03 RX ADMIN — TECHNETIUM TC 99M SESTAMIBI 1 DOSE: 1 INJECTION INTRAVENOUS at 12:00

## 2020-08-03 RX ADMIN — TECHNETIUM TC 99M SESTAMIBI 1 DOSE: 1 INJECTION INTRAVENOUS at 08:48

## 2020-08-03 RX ADMIN — REGADENOSON 0.4 MG: 0.08 INJECTION, SOLUTION INTRAVENOUS at 12:00

## 2020-08-03 RX ADMIN — AMINOPHYLLINE 125 MG: 25 INJECTION, SOLUTION INTRAVENOUS at 12:03

## 2020-08-05 ENCOUNTER — HOSPITAL ENCOUNTER (OUTPATIENT)
Dept: CARDIOLOGY | Facility: HOSPITAL | Age: 60
Discharge: HOME | End: 2020-08-05
Admitting: NURSE PRACTITIONER

## 2020-08-05 ENCOUNTER — HOSPITAL ENCOUNTER (OUTPATIENT)
Dept: CARDIOLOGY | Facility: HOSPITAL | Age: 60
Discharge: HOME | End: 2020-08-05

## 2020-08-05 VITALS
HEIGHT: 59 IN | DIASTOLIC BLOOD PRESSURE: 75 MMHG | BODY MASS INDEX: 36.29 KG/M2 | HEART RATE: 62 BPM | SYSTOLIC BLOOD PRESSURE: 127 MMHG | WEIGHT: 180 LBS

## 2020-08-05 LAB
AORTIC DIMENSIONLESS INDEX: 0.9 (DI)
BH CV ECHO MEAS - ACS: 1.9 CM
BH CV ECHO MEAS - AO MAX PG (FULL): 1.6 MMHG
BH CV ECHO MEAS - AO MAX PG: 6.7 MMHG
BH CV ECHO MEAS - AO MEAN PG (FULL): 0 MMHG
BH CV ECHO MEAS - AO MEAN PG: 3 MMHG
BH CV ECHO MEAS - AO ROOT AREA (BSA CORRECTED): 1.5
BH CV ECHO MEAS - AO ROOT AREA: 5.7 CM^2
BH CV ECHO MEAS - AO ROOT DIAM: 2.7 CM
BH CV ECHO MEAS - AO V2 MAX: 129 CM/SEC
BH CV ECHO MEAS - AO V2 MEAN: 89 CM/SEC
BH CV ECHO MEAS - AO V2 VTI: 30.3 CM
BH CV ECHO MEAS - ASC AORTA: 3.4 CM
BH CV ECHO MEAS - AVA(I,A): 2.2 CM^2
BH CV ECHO MEAS - AVA(I,D): 2.2 CM^2
BH CV ECHO MEAS - AVA(V,A): 2.2 CM^2
BH CV ECHO MEAS - AVA(V,D): 2.2 CM^2
BH CV ECHO MEAS - BSA(HAYCOCK): 1.9 M^2
BH CV ECHO MEAS - BSA: 1.8 M^2
BH CV ECHO MEAS - BZI_BMI: 36.4 KILOGRAMS/M^2
BH CV ECHO MEAS - BZI_METRIC_HEIGHT: 149.9 CM
BH CV ECHO MEAS - BZI_METRIC_WEIGHT: 81.6 KG
BH CV ECHO MEAS - EDV(CUBED): 59.3 ML
BH CV ECHO MEAS - EDV(MOD-SP2): 61 ML
BH CV ECHO MEAS - EDV(MOD-SP4): 62 ML
BH CV ECHO MEAS - EDV(TEICH): 65.9 ML
BH CV ECHO MEAS - EF(CUBED): 76.7 %
BH CV ECHO MEAS - EF(MOD-BP): 63.3 %
BH CV ECHO MEAS - EF(MOD-SP2): 65.6 %
BH CV ECHO MEAS - EF(MOD-SP4): 64.5 %
BH CV ECHO MEAS - EF(TEICH): 69.4 %
BH CV ECHO MEAS - ESV(CUBED): 13.8 ML
BH CV ECHO MEAS - ESV(MOD-SP2): 21 ML
BH CV ECHO MEAS - ESV(MOD-SP4): 22 ML
BH CV ECHO MEAS - ESV(TEICH): 20.2 ML
BH CV ECHO MEAS - FS: 38.5 %
BH CV ECHO MEAS - IVS/LVPW: 0.8
BH CV ECHO MEAS - IVSD: 0.8 CM
BH CV ECHO MEAS - LAT PEAK E' VEL: 10.1 CM/SEC
BH CV ECHO MEAS - LV DIASTOLIC VOL/BSA (35-75): 35.2 ML/M^2
BH CV ECHO MEAS - LV MASS(C)D: 105.3 GRAMS
BH CV ECHO MEAS - LV MASS(C)DI: 59.7 GRAMS/M^2
BH CV ECHO MEAS - LV MAX PG: 5 MMHG
BH CV ECHO MEAS - LV MEAN PG: 3 MMHG
BH CV ECHO MEAS - LV SYSTOLIC VOL/BSA (12-30): 12.5 ML/M^2
BH CV ECHO MEAS - LV V1 MAX: 112 CM/SEC
BH CV ECHO MEAS - LV V1 MEAN: 79 CM/SEC
BH CV ECHO MEAS - LV V1 VTI: 26.2 CM
BH CV ECHO MEAS - LVIDD: 3.9 CM
BH CV ECHO MEAS - LVIDS: 2.4 CM
BH CV ECHO MEAS - LVLD AP2: 6 CM
BH CV ECHO MEAS - LVLD AP4: 6.4 CM
BH CV ECHO MEAS - LVLS AP2: 5 CM
BH CV ECHO MEAS - LVLS AP4: 5.8 CM
BH CV ECHO MEAS - LVOT AREA (M): 2.5 CM^2
BH CV ECHO MEAS - LVOT AREA: 2.5 CM^2
BH CV ECHO MEAS - LVOT DIAM: 1.8 CM
BH CV ECHO MEAS - LVPWD: 1 CM
BH CV ECHO MEAS - MED PEAK E' VEL: 8.1 CM/SEC
BH CV ECHO MEAS - MR MAX PG: 72.6 MMHG
BH CV ECHO MEAS - MR MAX VEL: 426 CM/SEC
BH CV ECHO MEAS - MV A DUR: 0.14 SEC
BH CV ECHO MEAS - MV A MAX VEL: 69.4 CM/SEC
BH CV ECHO MEAS - MV DEC SLOPE: 553.5 CM/SEC^2
BH CV ECHO MEAS - MV DEC TIME: 129 SEC
BH CV ECHO MEAS - MV E MAX VEL: 81 CM/SEC
BH CV ECHO MEAS - MV E/A: 1.2
BH CV ECHO MEAS - MV MAX PG: 2.9 MMHG
BH CV ECHO MEAS - MV MEAN PG: 1 MMHG
BH CV ECHO MEAS - MV P1/2T MAX VEL: 96.4 CM/SEC
BH CV ECHO MEAS - MV P1/2T: 51 MSEC
BH CV ECHO MEAS - MV V2 MAX: 84.6 CM/SEC
BH CV ECHO MEAS - MV V2 MEAN: 45.8 CM/SEC
BH CV ECHO MEAS - MV V2 VTI: 26.3 CM
BH CV ECHO MEAS - MVA P1/2T LCG: 2.3 CM^2
BH CV ECHO MEAS - MVA(P1/2T): 4.3 CM^2
BH CV ECHO MEAS - MVA(VTI): 2.5 CM^2
BH CV ECHO MEAS - PA ACC TIME: 0.08 SEC
BH CV ECHO MEAS - PA MAX PG (FULL): 2.8 MMHG
BH CV ECHO MEAS - PA MAX PG: 6.2 MMHG
BH CV ECHO MEAS - PA PR(ACCEL): 41 MMHG
BH CV ECHO MEAS - PA V2 MAX: 124 CM/SEC
BH CV ECHO MEAS - PULM A REVS DUR: 0.12 SEC
BH CV ECHO MEAS - PULM A REVS VEL: 30.8 CM/SEC
BH CV ECHO MEAS - PULM DIAS VEL: 39 CM/SEC
BH CV ECHO MEAS - PULM S/D: 1.5
BH CV ECHO MEAS - PULM SYS VEL: 57 CM/SEC
BH CV ECHO MEAS - PVA(V,A): 2.1 CM^2
BH CV ECHO MEAS - PVA(V,D): 2.1 CM^2
BH CV ECHO MEAS - QP/QS: 0.94
BH CV ECHO MEAS - RAP SYSTOLE: 3 MMHG
BH CV ECHO MEAS - RV MAX PG: 3.4 MMHG
BH CV ECHO MEAS - RV MEAN PG: 2 MMHG
BH CV ECHO MEAS - RV V1 MAX: 92 CM/SEC
BH CV ECHO MEAS - RV V1 MEAN: 65.3 CM/SEC
BH CV ECHO MEAS - RV V1 VTI: 22.1 CM
BH CV ECHO MEAS - RVOT AREA: 2.8 CM^2
BH CV ECHO MEAS - RVOT DIAM: 1.9 CM
BH CV ECHO MEAS - RVSP: 16 MMHG
BH CV ECHO MEAS - SI(AO): 98.4 ML/M^2
BH CV ECHO MEAS - SI(CUBED): 25.8 ML/M^2
BH CV ECHO MEAS - SI(LVOT): 37.8 ML/M^2
BH CV ECHO MEAS - SI(MOD-SP2): 22.7 ML/M^2
BH CV ECHO MEAS - SI(MOD-SP4): 22.7 ML/M^2
BH CV ECHO MEAS - SI(TEICH): 25.9 ML/M^2
BH CV ECHO MEAS - SV(AO): 173.5 ML
BH CV ECHO MEAS - SV(CUBED): 45.5 ML
BH CV ECHO MEAS - SV(LVOT): 66.7 ML
BH CV ECHO MEAS - SV(MOD-SP2): 40 ML
BH CV ECHO MEAS - SV(MOD-SP4): 40 ML
BH CV ECHO MEAS - SV(RVOT): 62.7 ML
BH CV ECHO MEAS - SV(TEICH): 45.8 ML
BH CV ECHO MEAS - TAPSE (>1.6): 1.9 CM2
BH CV ECHO MEAS - TR MAX PG: 13 MMHG
BH CV ECHO MEAS - TR MAX VEL: 180 CM/SEC
BH CV ECHO MEASUREMENTS AVERAGE E/E' RATIO: 8.9
BH CV XLRA - RV BASE: 3.1 CM
BH CV XLRA - RV LENGTH: 5.6 CM
BH CV XLRA - RV MID: 2.4 CM
BH CV XLRA - TDI S': 12.8 CM/SEC
BH CV XLRA MEAS LEFT DIST CCA EDV: 18.9 CM/SEC
BH CV XLRA MEAS LEFT DIST CCA PSV: 64.5 CM/SEC
BH CV XLRA MEAS LEFT DIST ICA EDV: -27.3 CM/SEC
BH CV XLRA MEAS LEFT DIST ICA PSV: -88.3 CM/SEC
BH CV XLRA MEAS LEFT MID ICA EDV: -27.3 CM/SEC
BH CV XLRA MEAS LEFT MID ICA PSV: -92.5 CM/SEC
BH CV XLRA MEAS LEFT PROX CCA EDV: 20.3 CM/SEC
BH CV XLRA MEAS LEFT PROX CCA PSV: 86.9 CM/SEC
BH CV XLRA MEAS LEFT PROX ECA EDV: 22.5 CM/SEC
BH CV XLRA MEAS LEFT PROX ECA PSV: 146 CM/SEC
BH CV XLRA MEAS LEFT PROX ICA EDV: -25.2 CM/SEC
BH CV XLRA MEAS LEFT PROX ICA PSV: -79.2 CM/SEC
BH CV XLRA MEAS LEFT PROX SCLA PSV: 91.3 CM/SEC
BH CV XLRA MEAS LEFT VERTEBRAL A EDV: 18.6 CM/SEC
BH CV XLRA MEAS LEFT VERTEBRAL A PSV: 64.6 CM/SEC
BH CV XLRA MEAS RIGHT DIST CCA EDV: 18 CM/SEC
BH CV XLRA MEAS RIGHT DIST CCA PSV: 63.4 CM/SEC
BH CV XLRA MEAS RIGHT DIST ICA EDV: -21.7 CM/SEC
BH CV XLRA MEAS RIGHT DIST ICA PSV: -82.7 CM/SEC
BH CV XLRA MEAS RIGHT MID ICA EDV: 25 CM/SEC
BH CV XLRA MEAS RIGHT MID ICA PSV: 107 CM/SEC
BH CV XLRA MEAS RIGHT PROX CCA EDV: 16.8 CM/SEC
BH CV XLRA MEAS RIGHT PROX CCA PSV: 65.9 CM/SEC
BH CV XLRA MEAS RIGHT PROX ECA EDV: -14.1 CM/SEC
BH CV XLRA MEAS RIGHT PROX ECA PSV: -134.8 CM/SEC
BH CV XLRA MEAS RIGHT PROX ICA EDV: -38.4 CM/SEC
BH CV XLRA MEAS RIGHT PROX ICA PSV: -123 CM/SEC
BH CV XLRA MEAS RIGHT PROX SCLA PSV: 101.6 CM/SEC
BH CV XLRA MEAS RIGHT VERTEBRAL A EDV: -9.9 CM/SEC
BH CV XLRA MEAS RIGHT VERTEBRAL A PSV: -39.5 CM/SEC
LEFT ARM BP: NORMAL MMHG
LEFT ATRIUM VOLUME INDEX: 20.9 ML/M2
MAXIMAL PREDICTED HEART RATE: 159 BPM
RIGHT ARM BP: NORMAL MMHG
STRESS TARGET HR: 135 BPM

## 2020-08-05 PROCEDURE — 93306 TTE W/DOPPLER COMPLETE: CPT

## 2020-08-05 PROCEDURE — 93306 TTE W/DOPPLER COMPLETE: CPT | Performed by: INTERNAL MEDICINE

## 2020-08-05 PROCEDURE — 93880 EXTRACRANIAL BILAT STUDY: CPT

## 2020-08-10 ENCOUNTER — OFFICE VISIT (OUTPATIENT)
Dept: CARDIOLOGY | Facility: CLINIC | Age: 60
End: 2020-08-10

## 2020-08-10 VITALS
HEIGHT: 59 IN | WEIGHT: 179 LBS | SYSTOLIC BLOOD PRESSURE: 136 MMHG | BODY MASS INDEX: 36.08 KG/M2 | HEART RATE: 62 BPM | DIASTOLIC BLOOD PRESSURE: 70 MMHG

## 2020-08-10 DIAGNOSIS — R94.39 ABNORMAL NUCLEAR STRESS TEST: Primary | ICD-10-CM

## 2020-08-10 DIAGNOSIS — I25.119 CORONARY ARTERY DISEASE INVOLVING NATIVE CORONARY ARTERY OF NATIVE HEART WITH ANGINA PECTORIS (HCC): ICD-10-CM

## 2020-08-10 DIAGNOSIS — R06.02 SOB (SHORTNESS OF BREATH): ICD-10-CM

## 2020-08-10 DIAGNOSIS — I10 ESSENTIAL HYPERTENSION: ICD-10-CM

## 2020-08-10 PROCEDURE — 99214 OFFICE O/P EST MOD 30 MIN: CPT | Performed by: INTERNAL MEDICINE

## 2020-08-10 NOTE — PROGRESS NOTES
" Subjective:        Lily Unger is a 60 y.o. female who here for follow up    CC  ABNORMAL CARDIAC FUN TEST  HPI  60-year-old female with known history of the coronary artery disease, benign essential arterial hypertension as well as shortness of breath here for the follow-up after the stress test which is abnormal patient continues to complains of shortness of breath on exertion     Problem List Items Addressed This Visit        Cardiovascular and Mediastinum    Coronary artery disease involving native heart with angina pectoris (CMS/HCC) - Primary    Hypertension       Respiratory    SOB (shortness of breath)        .    The following portions of the patient's history were reviewed and updated as appropriate: allergies, current medications, past family history, past medical history, past social history, past surgical history and problem list.    Past Medical History:   Diagnosis Date   • Abnormal stress test    • Asthma    • CAD (coronary artery disease)    • Crescendo angina (CMS/HCC)    • Diabetes mellitus (CMS/HCC)    • H/O angioplasty    • Hiatal hernia    • Hyperlipidemia    • Hypertension    • Obesity    • MILAGROS on CPAP    • Recurrent urinary tract infection      reports that she has quit smoking. She has never used smokeless tobacco. She reports that she drinks alcohol. She reports that she does not use drugs.   Family History   Problem Relation Age of Onset   • Heart attack Father    • Heart disease Father    • Heart attack Brother    • Heart disease Brother    • Heart attack Maternal Grandfather    • Heart disease Maternal Grandfather    • Heart disease Mother        Review of Systems  Constitutional: No wt loss, fever, fatigue  Gastrointestinal: No nausea, abdominal pain  Behavioral/Psych: No insomnia or anxiety   Cardiovascular shortness of breath on exertion  Objective:       Physical Exam  /70   Pulse 62   Ht 149.9 cm (59\")   Wt 81.2 kg (179 lb)   BMI 36.15 kg/m²   General appearance: " No acute changes   Neck: Trachea midline; NECK, supple, no thyromegaly or lymphadenopathy   Lungs: Normal size and shape, normal breath sounds, equal distribution of air, no rales and rhonchi   CV: S1-S2 regular, no murmurs, no rub, no gallop   Abdomen: Soft, non-tender; no masses , no abnormal abdominal sounds   Extremities: No deformity , normal color , no peripheral edema   Skin: Normal temperature, turgor and texture; no rash, ulcers          Procedures      Echocardiogram:        Current Outpatient Medications:   •  albuterol (PROVENTIL) (2.5 MG/3ML) 0.083% nebulizer solution, Take 2.5 mg by nebulization Every 6 (Six) Hours As Needed., Disp: , Rfl: 11  •  aspirin 81 MG EC tablet, Take 81 mg by mouth daily., Disp: , Rfl:   •  bisoprolol (ZEBeta) 5 MG tablet, TAKE ONE-HALF TABLET BY MOUTH DAILY (Patient taking differently: Take 2.5 mg by mouth Daily.), Disp: 45 tablet, Rfl: 2  •  butalbital-acetaminophen-caffeine (FIORICET, ESGIC) -40 MG per tablet, Take 1 tablet by mouth Every 4 (Four) Hours As Needed for Headache., Disp: , Rfl:   •  DULoxetine (CYMBALTA) 30 MG capsule, Take 30 mg by mouth Daily., Disp: , Rfl: 5  •  Ertugliflozin L-PyroglutamicAc (STEGLATRO) 15 MG tablet, Take 15 mg by mouth Every Morning., Disp: , Rfl:   •  Insulin Degludec (TRESIBA SC), Inject  under the skin into the appropriate area as directed., Disp: , Rfl:   •  metFORMIN (GLUCOPHAGE) 1000 MG tablet, Take  by mouth 2 (Two) Times a Day., Disp: , Rfl: 0  •  Multiple Vitamins-Minerals (MULTIVITAMIN WITH MINERALS) tablet tablet, Take 1 tablet by mouth Daily., Disp: , Rfl:   •  nitroglycerin (NITROSTAT) 0.4 MG SL tablet, Place 1 tablet under the tongue See Admin Instructions., Disp: 25 tablet, Rfl: 0  •  Omega-3 Fatty Acids (FISH OIL) 1000 MG capsule capsule, Take 1,000 mg by mouth Daily With Breakfast., Disp: , Rfl:   •  ONETOUCH VERIO test strip, , Disp: , Rfl: 0  •  pantoprazole (PROTONIX) 40 MG EC tablet, Take 40 mg by mouth Daily.,  Disp: , Rfl: 0  •  prasugrel (EFFIENT) 10 MG tablet, TAKE 1 TABLET BY MOUTH EVERY DAY, Disp: 90 tablet, Rfl: 0  •  pregabalin (Lyrica) 75 MG capsule, Take 75 mg by mouth 2 (Two) Times a Day., Disp: , Rfl:   •  rosuvastatin (CRESTOR) 10 MG tablet, TAKE 1 TABLET BY MOUTH EVERY DAY, Disp: 90 tablet, Rfl: 1  •  silver sulfadiazine (SILVADENE, SSD) 1 % cream, APPLY TO AFFECTED AREA TWICE A DAY UNTIL THE BURN IS HEALED, Disp: , Rfl: 0  •  SITagliptin (JANUVIA) 50 MG tablet, Take 100 mg by mouth Daily., Disp: , Rfl:   •  SYMBICORT 160-4.5 MCG/ACT inhaler, Take 2 puffs by mouth 2 (Two) Times a Day., Disp: , Rfl: 0  •  VENTOLIN  (90 BASE) MCG/ACT inhaler, Inhale 1 puff Every 4 (Four) Hours As Needed., Disp: , Rfl: 1   Assessment:        Patient Active Problem List   Diagnosis   • Chest pain, atypical   • Shortness of breath   • Syncope and collapse   • Weakness   • Coronary artery disease involving native heart with angina pectoris (CMS/HCC)   • Soft tissue lesion of shoulder region   • Disorder of rotator cuff   • Shoulder pain   • Rotator cuff tear arthropathy   • Unstable angina pectoris (CMS/HCC)   • Injury of kidney   • Type 2 diabetes mellitus (CMS/HCC)   • Hypertension   • Hypotension   • Hypovolemia   • Bilateral carotid artery disease (CMS/HCC)   • Medical non-compliance   • Dizziness   • Viral illness   • SOB (shortness of breath)   • Asthma exacerbation   • CKD (chronic kidney disease) stage 3, GFR 30-59 ml/min (CMS/HCC)   • Asthma   Interpretation Summary     · Proximal right internal carotid artery mild stenosis.  · Proximal left internal carotid artery mild stenosis.     Interpretation Summary     · Calculated EF = 63.3%.  · There is no evidence of pericardial effusion      Interpretation Summary        · Findings consistent with an equivocal ECG stress test.  · Left ventricular ejection fraction is normal (Calculated EF = 60%).  · Myocardial perfusion imaging indicates a small-sized, mildly severe area  of ischemia located in the anterior wall.  · Impressions are consistent with an intermediate risk study.                   Plan:            ICD-10-CM ICD-9-CM   1. Coronary artery disease involving native coronary artery of native heart with angina pectoris (CMS/[facility]) I25.119 414.01     413.9   2. Essential hypertension I10 401.9   3. SOB (shortness of breath) R06.02 786.05     1. Coronary artery disease involving native coronary artery of native heart with angina pectoris (CMS/[facility])  Procedure, risks and options of cardiac cath explained to pt INCLUDING BUT NOT LIMITED TO MI, STROKE, DEATH, INFECTION HAEMORRHAGE, . Pt understands well and agrees with no further questions.    2. Essential hypertension  Blood pressure under control    3. SOB (shortness of breath)  Multifactorial  Procedure, risks and options of cardiac cath explained to pt INCLUDING BUT NOT LIMITED TO MI, STROKE, DEATH, INFECTION HAEMORRHAGE, . Pt understands well and agrees with no further questions.  COUNSELING:    Lily Horner was given to patient for the following topics: diagnostic results, risk factor reductions, impressions, risks and benefits of treatment options and importance of treatment compliance .       SMOKING COUNSELING:    [unfilled]    Dictated using Dragon dictation

## 2020-08-10 NOTE — H&P (VIEW-ONLY)
" Subjective:        Lily Unger is a 60 y.o. female who here for follow up    CC  ABNORMAL CARDIAC FUN TEST  HPI  60-year-old female with known history of the coronary artery disease, benign essential arterial hypertension as well as shortness of breath here for the follow-up after the stress test which is abnormal patient continues to complains of shortness of breath on exertion     Problem List Items Addressed This Visit        Cardiovascular and Mediastinum    Coronary artery disease involving native heart with angina pectoris (CMS/HCC) - Primary    Hypertension       Respiratory    SOB (shortness of breath)        .    The following portions of the patient's history were reviewed and updated as appropriate: allergies, current medications, past family history, past medical history, past social history, past surgical history and problem list.    Past Medical History:   Diagnosis Date   • Abnormal stress test    • Asthma    • CAD (coronary artery disease)    • Crescendo angina (CMS/HCC)    • Diabetes mellitus (CMS/HCC)    • H/O angioplasty    • Hiatal hernia    • Hyperlipidemia    • Hypertension    • Obesity    • MILAGROS on CPAP    • Recurrent urinary tract infection      reports that she has quit smoking. She has never used smokeless tobacco. She reports that she drinks alcohol. She reports that she does not use drugs.   Family History   Problem Relation Age of Onset   • Heart attack Father    • Heart disease Father    • Heart attack Brother    • Heart disease Brother    • Heart attack Maternal Grandfather    • Heart disease Maternal Grandfather    • Heart disease Mother        Review of Systems  Constitutional: No wt loss, fever, fatigue  Gastrointestinal: No nausea, abdominal pain  Behavioral/Psych: No insomnia or anxiety   Cardiovascular shortness of breath on exertion  Objective:       Physical Exam  /70   Pulse 62   Ht 149.9 cm (59\")   Wt 81.2 kg (179 lb)   BMI 36.15 kg/m²   General appearance: " No acute changes   Neck: Trachea midline; NECK, supple, no thyromegaly or lymphadenopathy   Lungs: Normal size and shape, normal breath sounds, equal distribution of air, no rales and rhonchi   CV: S1-S2 regular, no murmurs, no rub, no gallop   Abdomen: Soft, non-tender; no masses , no abnormal abdominal sounds   Extremities: No deformity , normal color , no peripheral edema   Skin: Normal temperature, turgor and texture; no rash, ulcers          Procedures      Echocardiogram:        Current Outpatient Medications:   •  albuterol (PROVENTIL) (2.5 MG/3ML) 0.083% nebulizer solution, Take 2.5 mg by nebulization Every 6 (Six) Hours As Needed., Disp: , Rfl: 11  •  aspirin 81 MG EC tablet, Take 81 mg by mouth daily., Disp: , Rfl:   •  bisoprolol (ZEBeta) 5 MG tablet, TAKE ONE-HALF TABLET BY MOUTH DAILY (Patient taking differently: Take 2.5 mg by mouth Daily.), Disp: 45 tablet, Rfl: 2  •  butalbital-acetaminophen-caffeine (FIORICET, ESGIC) -40 MG per tablet, Take 1 tablet by mouth Every 4 (Four) Hours As Needed for Headache., Disp: , Rfl:   •  DULoxetine (CYMBALTA) 30 MG capsule, Take 30 mg by mouth Daily., Disp: , Rfl: 5  •  Ertugliflozin L-PyroglutamicAc (STEGLATRO) 15 MG tablet, Take 15 mg by mouth Every Morning., Disp: , Rfl:   •  Insulin Degludec (TRESIBA SC), Inject  under the skin into the appropriate area as directed., Disp: , Rfl:   •  metFORMIN (GLUCOPHAGE) 1000 MG tablet, Take  by mouth 2 (Two) Times a Day., Disp: , Rfl: 0  •  Multiple Vitamins-Minerals (MULTIVITAMIN WITH MINERALS) tablet tablet, Take 1 tablet by mouth Daily., Disp: , Rfl:   •  nitroglycerin (NITROSTAT) 0.4 MG SL tablet, Place 1 tablet under the tongue See Admin Instructions., Disp: 25 tablet, Rfl: 0  •  Omega-3 Fatty Acids (FISH OIL) 1000 MG capsule capsule, Take 1,000 mg by mouth Daily With Breakfast., Disp: , Rfl:   •  ONETOUCH VERIO test strip, , Disp: , Rfl: 0  •  pantoprazole (PROTONIX) 40 MG EC tablet, Take 40 mg by mouth Daily.,  Disp: , Rfl: 0  •  prasugrel (EFFIENT) 10 MG tablet, TAKE 1 TABLET BY MOUTH EVERY DAY, Disp: 90 tablet, Rfl: 0  •  pregabalin (Lyrica) 75 MG capsule, Take 75 mg by mouth 2 (Two) Times a Day., Disp: , Rfl:   •  rosuvastatin (CRESTOR) 10 MG tablet, TAKE 1 TABLET BY MOUTH EVERY DAY, Disp: 90 tablet, Rfl: 1  •  silver sulfadiazine (SILVADENE, SSD) 1 % cream, APPLY TO AFFECTED AREA TWICE A DAY UNTIL THE BURN IS HEALED, Disp: , Rfl: 0  •  SITagliptin (JANUVIA) 50 MG tablet, Take 100 mg by mouth Daily., Disp: , Rfl:   •  SYMBICORT 160-4.5 MCG/ACT inhaler, Take 2 puffs by mouth 2 (Two) Times a Day., Disp: , Rfl: 0  •  VENTOLIN  (90 BASE) MCG/ACT inhaler, Inhale 1 puff Every 4 (Four) Hours As Needed., Disp: , Rfl: 1   Assessment:        Patient Active Problem List   Diagnosis   • Chest pain, atypical   • Shortness of breath   • Syncope and collapse   • Weakness   • Coronary artery disease involving native heart with angina pectoris (CMS/HCC)   • Soft tissue lesion of shoulder region   • Disorder of rotator cuff   • Shoulder pain   • Rotator cuff tear arthropathy   • Unstable angina pectoris (CMS/HCC)   • Injury of kidney   • Type 2 diabetes mellitus (CMS/HCC)   • Hypertension   • Hypotension   • Hypovolemia   • Bilateral carotid artery disease (CMS/HCC)   • Medical non-compliance   • Dizziness   • Viral illness   • SOB (shortness of breath)   • Asthma exacerbation   • CKD (chronic kidney disease) stage 3, GFR 30-59 ml/min (CMS/HCC)   • Asthma   Interpretation Summary     · Proximal right internal carotid artery mild stenosis.  · Proximal left internal carotid artery mild stenosis.     Interpretation Summary     · Calculated EF = 63.3%.  · There is no evidence of pericardial effusion      Interpretation Summary        · Findings consistent with an equivocal ECG stress test.  · Left ventricular ejection fraction is normal (Calculated EF = 60%).  · Myocardial perfusion imaging indicates a small-sized, mildly severe area  of ischemia located in the anterior wall.  · Impressions are consistent with an intermediate risk study.                   Plan:            ICD-10-CM ICD-9-CM   1. Coronary artery disease involving native coronary artery of native heart with angina pectoris (CMS/[facility]) I25.119 414.01     413.9   2. Essential hypertension I10 401.9   3. SOB (shortness of breath) R06.02 786.05     1. Coronary artery disease involving native coronary artery of native heart with angina pectoris (CMS/[facility])  Procedure, risks and options of cardiac cath explained to pt INCLUDING BUT NOT LIMITED TO MI, STROKE, DEATH, INFECTION HAEMORRHAGE, . Pt understands well and agrees with no further questions.    2. Essential hypertension  Blood pressure under control    3. SOB (shortness of breath)  Multifactorial  Procedure, risks and options of cardiac cath explained to pt INCLUDING BUT NOT LIMITED TO MI, STROKE, DEATH, INFECTION HAEMORRHAGE, . Pt understands well and agrees with no further questions.  COUNSELING:    Lily Horner was given to patient for the following topics: diagnostic results, risk factor reductions, impressions, risks and benefits of treatment options and importance of treatment compliance .       SMOKING COUNSELING:    [unfilled]    Dictated using Dragon dictation

## 2020-08-11 ENCOUNTER — TRANSCRIBE ORDERS (OUTPATIENT)
Dept: SLEEP MEDICINE | Facility: HOSPITAL | Age: 60
End: 2020-08-11

## 2020-08-11 ENCOUNTER — HOSPITAL ENCOUNTER (OUTPATIENT)
Dept: CARDIOLOGY | Facility: HOSPITAL | Age: 60
Discharge: HOME | End: 2020-08-11
Admitting: INTERNAL MEDICINE

## 2020-08-11 DIAGNOSIS — Z01.818 OTHER SPECIFIED PRE-OPERATIVE EXAMINATION: Primary | ICD-10-CM

## 2020-08-11 LAB
ANION GAP SERPL CALCULATED.3IONS-SCNC: 8.9 MMOL/L (ref 5–15)
APTT PPP: 32.1 SECONDS (ref 22.7–35.4)
BASOPHILS # BLD AUTO: 0.08 10*3/MM3 (ref 0–0.2)
BASOPHILS NFR BLD AUTO: 0.8 % (ref 0–1.5)
BUN SERPL-MCNC: 16 MG/DL (ref 8–23)
BUN/CREAT SERPL: 18.2 (ref 7–25)
CALCIUM SPEC-SCNC: 10.1 MG/DL (ref 8.6–10.5)
CHLORIDE SERPL-SCNC: 102 MMOL/L (ref 98–107)
CO2 SERPL-SCNC: 29.1 MMOL/L (ref 22–29)
CREAT SERPL-MCNC: 0.88 MG/DL (ref 0.57–1)
DEPRECATED RDW RBC AUTO: 48.6 FL (ref 37–54)
EOSINOPHIL # BLD AUTO: 0.42 10*3/MM3 (ref 0–0.4)
EOSINOPHIL NFR BLD AUTO: 4.1 % (ref 0.3–6.2)
ERYTHROCYTE [DISTWIDTH] IN BLOOD BY AUTOMATED COUNT: 16.9 % (ref 12.3–15.4)
GFR SERPL CREATININE-BSD FRML MDRD: 65 ML/MIN/1.73
GLUCOSE SERPL-MCNC: 118 MG/DL (ref 65–99)
HCT VFR BLD AUTO: 41 % (ref 34–46.6)
HGB BLD-MCNC: 12.4 G/DL (ref 12–15.9)
IMM GRANULOCYTES # BLD AUTO: 0.04 10*3/MM3 (ref 0–0.05)
IMM GRANULOCYTES NFR BLD AUTO: 0.4 % (ref 0–0.5)
INR PPP: 1.02 (ref 0.9–1.1)
LYMPHOCYTES # BLD AUTO: 2.69 10*3/MM3 (ref 0.7–3.1)
LYMPHOCYTES NFR BLD AUTO: 26.1 % (ref 19.6–45.3)
MCH RBC QN AUTO: 24.3 PG (ref 26.6–33)
MCHC RBC AUTO-ENTMCNC: 30.2 G/DL (ref 31.5–35.7)
MCV RBC AUTO: 80.2 FL (ref 79–97)
MONOCYTES # BLD AUTO: 1.13 10*3/MM3 (ref 0.1–0.9)
MONOCYTES NFR BLD AUTO: 11 % (ref 5–12)
NEUTROPHILS NFR BLD AUTO: 5.93 10*3/MM3 (ref 1.7–7)
NEUTROPHILS NFR BLD AUTO: 57.6 % (ref 42.7–76)
NRBC BLD AUTO-RTO: 0 /100 WBC (ref 0–0.2)
PLATELET # BLD AUTO: 381 10*3/MM3 (ref 140–450)
PMV BLD AUTO: 10.6 FL (ref 6–12)
POTASSIUM SERPL-SCNC: 5.3 MMOL/L (ref 3.5–5.2)
PROTHROMBIN TIME: 13.3 SECONDS (ref 11.7–14.2)
RBC # BLD AUTO: 5.11 10*6/MM3 (ref 3.77–5.28)
SODIUM SERPL-SCNC: 140 MMOL/L (ref 136–145)
WBC # BLD AUTO: 10.29 10*3/MM3 (ref 3.4–10.8)

## 2020-08-11 PROCEDURE — 85025 COMPLETE CBC W/AUTO DIFF WBC: CPT | Performed by: INTERNAL MEDICINE

## 2020-08-11 PROCEDURE — 80048 BASIC METABOLIC PNL TOTAL CA: CPT | Performed by: INTERNAL MEDICINE

## 2020-08-11 PROCEDURE — 85610 PROTHROMBIN TIME: CPT | Performed by: INTERNAL MEDICINE

## 2020-08-11 PROCEDURE — 36415 COLL VENOUS BLD VENIPUNCTURE: CPT | Performed by: INTERNAL MEDICINE

## 2020-08-11 PROCEDURE — 85730 THROMBOPLASTIN TIME PARTIAL: CPT | Performed by: INTERNAL MEDICINE

## 2020-08-12 ENCOUNTER — LAB (OUTPATIENT)
Dept: LAB | Facility: HOSPITAL | Age: 60
End: 2020-08-12

## 2020-08-12 DIAGNOSIS — Z01.818 OTHER SPECIFIED PRE-OPERATIVE EXAMINATION: ICD-10-CM

## 2020-08-12 PROCEDURE — C9803 HOPD COVID-19 SPEC COLLECT: HCPCS

## 2020-08-12 PROCEDURE — U0004 COV-19 TEST NON-CDC HGH THRU: HCPCS

## 2020-08-13 LAB
REF LAB TEST METHOD: NORMAL
SARS-COV-2 RNA RESP QL NAA+PROBE: NOT DETECTED

## 2020-08-14 ENCOUNTER — HOSPITAL ENCOUNTER (OUTPATIENT)
Facility: HOSPITAL | Age: 60
Setting detail: HOSPITAL OUTPATIENT SURGERY
Discharge: HOME | End: 2020-08-14
Attending: INTERNAL MEDICINE | Admitting: INTERNAL MEDICINE

## 2020-08-14 VITALS
DIASTOLIC BLOOD PRESSURE: 66 MMHG | OXYGEN SATURATION: 94 % | HEART RATE: 49 BPM | BODY MASS INDEX: 36.08 KG/M2 | SYSTOLIC BLOOD PRESSURE: 120 MMHG | WEIGHT: 179 LBS | TEMPERATURE: 97.3 F | RESPIRATION RATE: 18 BRPM | HEIGHT: 59 IN

## 2020-08-14 DIAGNOSIS — I10 ESSENTIAL HYPERTENSION: ICD-10-CM

## 2020-08-14 DIAGNOSIS — R06.02 SOB (SHORTNESS OF BREATH): ICD-10-CM

## 2020-08-14 DIAGNOSIS — R94.39 ABNORMAL NUCLEAR STRESS TEST: ICD-10-CM

## 2020-08-14 DIAGNOSIS — I25.119 CORONARY ARTERY DISEASE INVOLVING NATIVE CORONARY ARTERY OF NATIVE HEART WITH ANGINA PECTORIS (HCC): ICD-10-CM

## 2020-08-14 LAB
GLUCOSE BLDC GLUCOMTR-MCNC: 141 MG/DL (ref 70–130)
GLUCOSE BLDC GLUCOMTR-MCNC: 178 MG/DL (ref 70–130)

## 2020-08-14 PROCEDURE — C1769 GUIDE WIRE: HCPCS | Performed by: INTERNAL MEDICINE

## 2020-08-14 PROCEDURE — 0 IOPAMIDOL PER 1 ML: Performed by: INTERNAL MEDICINE

## 2020-08-14 PROCEDURE — 92920 PRQ TRLUML C ANGIOP 1ART&/BR: CPT | Performed by: INTERNAL MEDICINE

## 2020-08-14 PROCEDURE — 25010000002 MIDAZOLAM PER 1 MG: Performed by: INTERNAL MEDICINE

## 2020-08-14 PROCEDURE — 93458 L HRT ARTERY/VENTRICLE ANGIO: CPT | Performed by: INTERNAL MEDICINE

## 2020-08-14 PROCEDURE — C1887 CATHETER, GUIDING: HCPCS | Performed by: INTERNAL MEDICINE

## 2020-08-14 PROCEDURE — 25010000002 ATROPINE PER 0.01 MG: Performed by: INTERNAL MEDICINE

## 2020-08-14 PROCEDURE — 25010000002 FENTANYL CITRATE (PF) 100 MCG/2ML SOLUTION: Performed by: INTERNAL MEDICINE

## 2020-08-14 PROCEDURE — 25010000002 PHENYLEPHRINE PER 1 ML: Performed by: INTERNAL MEDICINE

## 2020-08-14 PROCEDURE — C1725 CATH, TRANSLUMIN NON-LASER: HCPCS | Performed by: INTERNAL MEDICINE

## 2020-08-14 PROCEDURE — 82962 GLUCOSE BLOOD TEST: CPT

## 2020-08-14 PROCEDURE — C1894 INTRO/SHEATH, NON-LASER: HCPCS | Performed by: INTERNAL MEDICINE

## 2020-08-14 PROCEDURE — 25010000002 HEPARIN (PORCINE) PER 1000 UNITS: Performed by: INTERNAL MEDICINE

## 2020-08-14 PROCEDURE — 85347 COAGULATION TIME ACTIVATED: CPT

## 2020-08-14 RX ORDER — LIDOCAINE HYDROCHLORIDE 20 MG/ML
INJECTION, SOLUTION INFILTRATION; PERINEURAL AS NEEDED
Status: DISCONTINUED | OUTPATIENT
Start: 2020-08-14 | End: 2020-08-14 | Stop reason: HOSPADM

## 2020-08-14 RX ORDER — HEPARIN SODIUM 1000 [USP'U]/ML
INJECTION, SOLUTION INTRAVENOUS; SUBCUTANEOUS AS NEEDED
Status: DISCONTINUED | OUTPATIENT
Start: 2020-08-14 | End: 2020-08-14 | Stop reason: HOSPADM

## 2020-08-14 RX ORDER — ACETAMINOPHEN 325 MG/1
650 TABLET ORAL EVERY 4 HOURS PRN
Status: DISCONTINUED | OUTPATIENT
Start: 2020-08-14 | End: 2020-08-14 | Stop reason: HOSPADM

## 2020-08-14 RX ORDER — PRASUGREL 10 MG/1
TABLET, FILM COATED ORAL AS NEEDED
Status: DISCONTINUED | OUTPATIENT
Start: 2020-08-14 | End: 2020-08-14 | Stop reason: HOSPADM

## 2020-08-14 RX ORDER — FENTANYL CITRATE 50 UG/ML
INJECTION, SOLUTION INTRAMUSCULAR; INTRAVENOUS AS NEEDED
Status: DISCONTINUED | OUTPATIENT
Start: 2020-08-14 | End: 2020-08-14 | Stop reason: HOSPADM

## 2020-08-14 RX ORDER — ATROPINE SULFATE 1 MG/ML
INJECTION, SOLUTION INTRAMUSCULAR; INTRAVENOUS; SUBCUTANEOUS AS NEEDED
Status: DISCONTINUED | OUTPATIENT
Start: 2020-08-14 | End: 2020-08-14 | Stop reason: HOSPADM

## 2020-08-14 RX ORDER — ASPIRIN 81 MG/1
TABLET, CHEWABLE ORAL AS NEEDED
Status: DISCONTINUED | OUTPATIENT
Start: 2020-08-14 | End: 2020-08-14 | Stop reason: HOSPADM

## 2020-08-14 RX ORDER — MIDAZOLAM HYDROCHLORIDE 1 MG/ML
INJECTION INTRAMUSCULAR; INTRAVENOUS AS NEEDED
Status: DISCONTINUED | OUTPATIENT
Start: 2020-08-14 | End: 2020-08-14 | Stop reason: HOSPADM

## 2020-08-14 RX ORDER — OXYCODONE AND ACETAMINOPHEN 7.5; 325 MG/1; MG/1
1 TABLET ORAL ONCE AS NEEDED
Status: COMPLETED | OUTPATIENT
Start: 2020-08-14 | End: 2020-08-14

## 2020-08-14 RX ORDER — SUMATRIPTAN 50 MG/1
50 TABLET, FILM COATED ORAL
COMMUNITY

## 2020-08-14 RX ORDER — SODIUM CHLORIDE 0.9 % (FLUSH) 0.9 %
10 SYRINGE (ML) INJECTION AS NEEDED
Status: DISCONTINUED | OUTPATIENT
Start: 2020-08-14 | End: 2020-08-14 | Stop reason: HOSPADM

## 2020-08-14 RX ORDER — SODIUM CHLORIDE 9 MG/ML
250 INJECTION, SOLUTION INTRAVENOUS ONCE AS NEEDED
Status: DISCONTINUED | OUTPATIENT
Start: 2020-08-14 | End: 2020-08-14 | Stop reason: HOSPADM

## 2020-08-14 RX ORDER — SODIUM CHLORIDE 0.9 % (FLUSH) 0.9 %
3 SYRINGE (ML) INJECTION EVERY 12 HOURS SCHEDULED
Status: DISCONTINUED | OUTPATIENT
Start: 2020-08-14 | End: 2020-08-14 | Stop reason: HOSPADM

## 2020-08-14 RX ORDER — SODIUM CHLORIDE 9 MG/ML
75 INJECTION, SOLUTION INTRAVENOUS CONTINUOUS
Status: DISCONTINUED | OUTPATIENT
Start: 2020-08-14 | End: 2020-08-14 | Stop reason: HOSPADM

## 2020-08-14 RX ADMIN — SODIUM CHLORIDE 75 ML/HR: 9 INJECTION, SOLUTION INTRAVENOUS at 07:57

## 2020-08-14 RX ADMIN — OXYCODONE HYDROCHLORIDE AND ACETAMINOPHEN 1 TABLET: 7.5; 325 TABLET ORAL at 10:24

## 2020-08-14 NOTE — DISCHARGE INSTRUCTIONS
You were given Percocet (pain pills) today at 10:24 am.        [facility]  [place] [address]    Coronary Angiogram with Angioplasty (Radial Approach) After Care    Refer to this sheet in the next few weeks. These instructions provide you with information on caring for yourself after your procedure. Your health care provider may also give you more specific instructions. Your treatment has been planned according to current medical practices, but problems sometimes occur. Call your health care provider if you have any problems or questions after your procedure.       Home Care Instructions:  · You may shower the day after the procedure. Remove the bandage (dressing) and gently wash the site with plain soap and water. Gently pat the site dry. You may apply a band aid daily for 2 days if desired.    · Do not apply powder or lotion to the site.  · Do not submerge the affected site in water for 3 to 5 days or until the site is completely healed.   · Do not flex or bend the affected arm for 24 hours.  · Do not lift, push or pull anything over 5 pounds for 5 days after your procedure.  For a reference, a gallon of milk weighs 8 pounds.    · Do not operate machinery or power tools for 24 hours.  · Inspect the site at least twice daily. You may notice some bruising at the site and it may be tender for 1 to 2 weeks.     · Increase your fluid intake for the next 2 days.    · Keep arm elevated for 24 hours.  For the remainder of the day, keep your arm in the “Pledge of Allegiance” position when up and about.    · Limit your activity for the next 48 hours and avoid strenuous activity as directed by your physician.   · Cardiac Rehab may or may not be ordered.  Please consult with your physician  · You may drive 24 hours after the procedure unless otherwise instructed by your caregiver.  · A responsible adult should be with you for the first 24 hours after you arrive home.   · Do not make any  important legal decisions or sign legal papers for 24 hours. Do not drink alcohol for 24 hours.    · Take medicines only as directed by your health care provider.  Blood thinners may be prescribed after your procedure to improve blood flow through the stent.    · Metformin or any medications containing Metformin should not be taken for 48 hours after your procedure.    · Eat a heart-healthy diet. This should include plenty of fresh fruits and vegetables. Meat should be lean cuts. Avoid the following types of food:    ¨ Food that is high in salt.    ¨ Canned or highly processed food.    ¨ Food that is high in saturated fat or sugar.    ¨ Fried food.    · Make any other lifestyle changes recommended by your health care provider. This may include:    ¨ Not using any tobacco products including cigarettes, chewing tobacco, or electronic cigarettes.   ¨ Managing your weight.    ¨ Getting regular exercise.    ¨ Managing your blood pressure.    ¨ Limiting your alcohol intake.    ¨ Managing other health problems, such as diabetes.    · If you need an MRI after your heart stent was placed, be sure to tell the health care provider who orders the MRI that you have a heart stent.    · Keep all follow-up visits as directed by your health care provider.    ·   Call Your Doctor If:  · You have unusual pain at the radial/ulnar (wrist) site.  · You have redness, warmth, swelling, or pain at the radial/ulnar (wrist) site.  · You have drainage (other than a small amount of blood on the dressing).  · `You have chills or a fever > 101.  · Your arm becomes pale or dark, cool, tingly, or numb.  · You develop chest pain, shortness of breath, feel faint or pass out.    · You have heavy bleeding from the site, hold pressure on the site for 20 minutes.  If the bleeding stops, apply a fresh bandage and call your cardiologist.  However, if you continue to have bleeding, call 911.        Make Sure You:   · Understand these instructions.  · Will  watch your condition.  · Will get help right away if you are not doing well or get worse.      [facility]  [address] [place] [address]    Coronary Angiogram (Femoral Approach) After Care    Refer to this sheet in the next few weeks. These instructions provide you with information on caring for yourself after your procedure. Your health care provider may also give you more specific instructions. Your treatment has been planned according to current medical practices, but problems sometimes occur. Call your health care provider if you have any problems or questions after your procedure.    WHAT TO EXPECT AFTER THE PROCEDURE    • The insertion site may be tender for a few days after your procedure.  • Minor discomfort or tenderness and a small bump at the catheter insertion site.  The bump should decrease in size and tenderness within 1 to 2 weeks.   HOME CARE INSTRUCTIONS    · Take medicines only as directed by your health care provider. Blood thinners may be prescribed after your procedure to improve blood flow through the stent.  · Metformin or any medications containing Metformin should not be taken for 48 hours after your procedure.    · Cardiac Rehab may or may not be ordered.  Please consult with your physician.   · Do not apply powder or lotion to the site.    · Check your insertion site every day for redness, swelling, or fluid leaking from the insertion site.    · Increase your fluid intake for the next 2 days.    · Hold direct pressure over the site when you cough, sneeze, laugh or change positions.  Do this for the next 2 days.    · Avoid strenuous activity as directed by your physician.  · Follow instructions about when you can drive and return to work as directed by your physician.     · Do not take baths, swim, or use a hot tub until your health care provider approves.   · You may shower 24 hours after the procedure. Remove the bandage (dressing) and gently wash the site with  plain soap and water.  Gently pat the site dry.  Do not rub the insertion area with a washcloth or towel.  You may apply a band aid daily for 2 days if desired.   · Limit your activity for the first 48 hours.  Do not bend, squat, or lift anything over 5 pounds.  However, we recommend lifting nothing heavier than a gallon of milk.   · Do not operate machinery or power tools for 24 hours.  · A responsible adult should be with you for the first 24 hours after you arrive home. Do not make any important legal decisions or sign legal papers for 24 hours.  Do not drink alcohol for 24 hours.    · Eat a heart-healthy diet. This should include plenty of fresh fruits and vegetables. Meat should be lean cuts. Avoid the following types of food:    ¨ Food that is high in salt.    ¨ Canned or highly processed food.    ¨ Food that is high in saturated fat or sugar.    ¨ Fried food.    · Make any other lifestyle changes recommended by your health care provider. This may include:    ¨ Not using any tobacco products including cigarettes, chewing tobacco, or electronic cigarettes.   ¨ Managing your weight.    ¨ Getting regular exercise.    ¨ Managing your blood pressure.    ¨ Limiting your alcohol intake.    ¨ Managing other health problems, such as diabetes.    · If you need an MRI after your heart stent was placed, be sure to tell the health care provider who orders the MRI that you have a heart stent.    · Keep all follow-up visits as directed by your health care provider.    SEEK IMMEDIATE MEDICAL CARE IF:    · You have heavy bleeding from the site.  If this happens, hold pressure on the site and call 911.    · You develop chest pain, shortness of breath, feel faint, or pass out.  · You have bleeding, swelling larger than a walnut, or drainage from the catheter insertion site.  · You develop pain, discoloration, coldness, numbness, tingling, or severe bruising in the leg that held the catheter.  · You develop bleeding from any  other place such as from the bowels.   · You have a fever > 101 or chills.  MAKE SURE YOU:  · Understand these instructions.  · Will watch your condition.  · Will get help right away if you are not doing well or get worse.

## 2020-08-14 NOTE — PERIOPERATIVE NURSING NOTE
Pt states she has episodes like this where she feels lightheaded, gets nauseated , and needs to lay down for a while.

## 2020-08-14 NOTE — PERIOPERATIVE NURSING NOTE
Notified Dr. Weathers that patient started feeling nauseated and sweaty. Blood pressures slightly lower. Dr. weathers states to keep her 30 min-hour longer and monitor- if feeling better, she can continue with discharge

## 2020-08-18 LAB — ACT BLD: 252 SECONDS (ref 82–152)

## 2020-08-19 DIAGNOSIS — Z98.61 S/P PTCA (PERCUTANEOUS TRANSLUMINAL CORONARY ANGIOPLASTY): Primary | ICD-10-CM

## 2020-08-19 RX ORDER — NITROGLYCERIN 0.4 MG/1
0.4 TABLET SUBLINGUAL SEE ADMIN INSTRUCTIONS
Qty: 25 TABLET | Refills: 0 | Status: SHIPPED | OUTPATIENT
Start: 2020-08-19 | End: 2023-02-17 | Stop reason: SDUPTHER

## 2020-09-01 ENCOUNTER — OFFICE VISIT (OUTPATIENT)
Dept: CARDIOLOGY | Facility: CLINIC | Age: 60
End: 2020-09-01

## 2020-09-01 VITALS
BODY MASS INDEX: 36.49 KG/M2 | HEART RATE: 60 BPM | WEIGHT: 181 LBS | SYSTOLIC BLOOD PRESSURE: 124 MMHG | DIASTOLIC BLOOD PRESSURE: 75 MMHG | HEIGHT: 59 IN

## 2020-09-01 DIAGNOSIS — Z09 HOSPITAL DISCHARGE FOLLOW-UP: ICD-10-CM

## 2020-09-01 DIAGNOSIS — G47.33 OBSTRUCTIVE SLEEP APNEA SYNDROME: ICD-10-CM

## 2020-09-01 DIAGNOSIS — E66.01 SEVERE OBESITY (BMI 35.0-39.9) WITH COMORBIDITY (HCC): ICD-10-CM

## 2020-09-01 DIAGNOSIS — I10 ESSENTIAL HYPERTENSION: ICD-10-CM

## 2020-09-01 DIAGNOSIS — I25.119 CORONARY ARTERY DISEASE INVOLVING NATIVE HEART WITH ANGINA PECTORIS, UNSPECIFIED VESSEL OR LESION TYPE (HCC): Primary | ICD-10-CM

## 2020-09-01 PROCEDURE — 99213 OFFICE O/P EST LOW 20 MIN: CPT | Performed by: NURSE PRACTITIONER

## 2020-09-01 NOTE — PROGRESS NOTES
Subjective:        Lily Unger is a 60 y.o. female who here for follow up    Chief Complaint   Patient presents with   • Follow-up     CATH FOLLOW UP          HPI     Lily Unger is a 60-year-old female, who is current with this provider.  She has a history of CAD, diabetes mellitus, hiatal hernia, hyperlipidemia, hypertension, obesity, and MILAGROS with CPAP compliance.  Previous lipid panel on 10/29/2019 revealed , HDL 40, LDL 40, and triglycerides 117.  Control noted at that time.  Cardiac cath on 8/18/2020 revealed LAD had a proximal stent widely patent with diagonal branch which was large was jailed, Angioplasty with 2.25/12 trek balloon reduced to 99% down to 10 to 20%, mid LAD also had approximately 50% stenosis with minimal irregularity of the rest of the LAD diagonal and  branches.  Circumflex artery was nondominant with early atherosclerotic plaque, right coronary artery was dominant with a proximal 30% stenosis.  Successful angioplasty of the ostial diagonal branch 99% reduced to 10 to 20% with a 2.25/12 trek balloon.  Recommendations was dual antiplatelet therapy for at least 1 year.  It had been explained the risk of thrombus leading to acute MI.  Is a high morbidity and mortality rate.  Carotid on 8/5/2020 revealed proximal right and left carotid artery shows mild stenosis.  Echo revealed EF 63.3%, no evidence of pericardial effusion. Denies chest pain, and palpitations. She states she has some shortness of breath.        The following portions of the patient's history were reviewed and updated as appropriate: allergies, current medications, past family history, past medical history, past social history, past surgical history and problem list.    Past Medical History:   Diagnosis Date   • Abnormal stress test    • Asthma    • CAD (coronary artery disease)    • Crescendo angina (CMS/[facility])    • Diabetes mellitus (CMS/[facility])    • H/O angioplasty    • Hiatal hernia    • Hyperlipidemia     • Hypertension    • Obesity    • MILAGROS on CPAP    • Recurrent urinary tract infection          reports that she has quit smoking. She has never used smokeless tobacco. She reports that she drinks alcohol. She reports that she does not use drugs.     Family History   Problem Relation Age of Onset   • Heart attack Father    • Heart disease Father    • Heart attack Brother    • Heart disease Brother    • Heart attack Maternal Grandfather    • Heart disease Maternal Grandfather    • Heart disease Mother        ROS     Review of Systems  Constitutional: no wt loss, fever, fatigue  Gastrointestinal: no nausea, abdominal pain  Behavioral/Psych: no insomnia or anxiety  Cardiovascular:Denies chest pain and palpitations.+ some shortness of breath that comes and goes.       Objective:           Physical Exam   Constitutional: She is oriented to person, place, and time. She appears well-developed and well-nourished.   HENT:   Head: Normocephalic.   Right Ear: External ear normal.   Left Ear: External ear normal.   Eyes: EOM are normal.   Neck: Normal range of motion. No JVD present.   Cardiovascular: Normal rate, regular rhythm, normal heart sounds and intact distal pulses. Exam reveals no gallop and no friction rub.   No murmur heard.  +bruit neck.    R groin no s/s of infection or hematoma noted   Pulmonary/Chest: Effort normal and breath sounds normal. No stridor. No respiratory distress. She has no rales.   Abdominal: Soft. Bowel sounds are normal. She exhibits no distension. There is no tenderness. There is no guarding.   Musculoskeletal: Normal range of motion. She exhibits no edema or tenderness.   Neurological: She is alert and oriented to person, place, and time. She has normal reflexes.   Skin: Skin is warm.   Psychiatric: She has a normal mood and affect. Judgment normal.   Nursing note and vitals reviewed.       Procedures     Interpretation Summary     · Calculated EF = 63.3%.  · There is no evidence of pericardial  effusion         Interpretation Summary     · Proximal right internal carotid artery mild stenosis.  · Proximal left internal carotid artery mild stenosis.     HEMODYNAMIC / ANGIOGRAPHIC DATA:    1. Left ventricular end diastolic pressure was 10 mmHg.  2. Left ventriculography revealed an EF around 60 %.    3. The left main is normal left main.  4. The left anterior descending artery is *.Left anterior descending had a proximal stent widely patent with the diagonal branch which was large which was jailed, was angioplastied with a 2.25/12 trek balloon reduced from 99% down to the 10 to 20%, mid LAD also had approximately 50% stenosis with minimum irregularity of the rest of the LAD diagonal and  branches  5. The left circumflex is .The circumflex artery was a nondominant with early atherosclerotic plaque  6. The right coronary artery is .Right coronary artery was dominant with a proximal 30% stenosis  7. Successful angioplasty of the ostial diagonal branch 99% reduced to 10 to 20% with 2.25/12 trek balloon     DIMITRIOS FLOW    PRE.. 3.....       POST.... 3..     TYPE OF LESION........C.....     RECOMMENDATIONS:  Post-procedure care will focus on prevention of any ischemic events and congestive complications.  Aggressive risk factor modification will be carried out.     Importance of taking dual antiplatelets for one year  has been explained, risk of stent thrombosis leading to the acute MI, which carries high morbidity and mortality has been explained     Discontinuation or interruptions of these medications should be under the strict guidance of appropriate health professional         Interpretation Summary        · Findings consistent with an equivocal ECG stress test.  · Left ventricular ejection fraction is normal (Calculated EF = 60%).  · Myocardial perfusion imaging indicates a small-sized, mildly severe area of ischemia located in the anterior wall.  · Impressions are consistent with an intermediate risk  study.     Asymptomatic for chest pain. Specificity of study reduced secondary to motion artifact due to poor exercise tolerance.  ECG is  equivocal for  suggestion of ischemia  Ectopy: Rare PVC at baseline.   Blood pressure response:  Baseline Hypertensive  150/74, Peak (post Lexiscan) 130/74, Recovery:130//63  Pharmacologic study due to inability to tolerate increasing speed and grade of treadmill due to c/o SOA with minimal exertion and Beta-blocker therapy.  Participated in Low Level exercise and tolerance is poor.     Supervised by: Dr. Harper                 Interpretation Summary        · Findings consistent with an equivocal ECG stress test.  · Left ventricular ejection fraction is normal (Calculated EF = 70%).  · Myocardial perfusion imaging indicates a normal myocardial perfusion study with no evidence of ischemia.  · Impressions are consistent with a low risk study.     Interpretation Summary     · Left atrial cavity size is mildly dilated.  · Mild mitral valve regurgitation is present  · Mild tricuspid valve regurgitation is present.  · Calculated EF = 66%.  · There is no evidence of pericardial effusion        HEMODYNAMIC / ANGIOGRAPHIC DATA:    1. Pulmonary capillary wedge pressure was 12.   2. Pulmonary artery systolic pressure was 35/15.  3. Right atrial pressure was 8.  4. Cardiac index was 2.1.  5. Left ventricular end diastolic pressure was 10 mmHg.  6. Left ventriculography revealed the EF to be 50%.  7. The left main is normal.  8. The LAD is .Left anterior descending shows minimum irregularity with a midportion 30-40% stenosis  9. Circumflex artery was a nondominant with known atherosclerotic stenosis  10. The right coronary artery is .Right coronary artery was a dominant with ostial 90% was reduced to 0% with 3.5/8 Xience stent  11. Mild pulmonary hypertension  Successful angioplasty and stent to the ostial RCA 90% reduced to 0% with 3.5/8 Xience stent, type of the lesion   b2  RECOMMENDATIONS:  Post-procedure care will focus on prevention of any ischemic events and congestive complications.       Importance of taking dual antiplatelets for one year  has been explained, risk of stent thrombosis leading to the acute MI, which carries high morbidity and mortality has been explained     Discontinuation or interruptions of these medications should be under the strict guidance of appropriate health professional      Current Outpatient Medications:   •  albuterol (PROVENTIL) (2.5 MG/3ML) 0.083% nebulizer solution, Take 2.5 mg by nebulization Every 6 (Six) Hours As Needed., Disp: , Rfl: 11  •  aspirin 81 MG EC tablet, Take 81 mg by mouth daily., Disp: , Rfl:   •  bisoprolol (ZEBeta) 5 MG tablet, TAKE ONE-HALF TABLET BY MOUTH DAILY (Patient taking differently: Take 2.5 mg by mouth Daily.), Disp: 45 tablet, Rfl: 2  •  DULoxetine (CYMBALTA) 30 MG capsule, Take 30 mg by mouth Daily., Disp: , Rfl: 5  •  Ertugliflozin L-PyroglutamicAc (STEGLATRO) 15 MG tablet, Take 15 mg by mouth Every Morning., Disp: , Rfl:   •  Insulin Degludec (TRESIBA SC), Inject  under the skin into the appropriate area as directed., Disp: , Rfl:   •  metFORMIN (GLUCOPHAGE) 1000 MG tablet, Take  by mouth 2 (Two) Times a Day., Disp: , Rfl: 0  •  Multiple Vitamins-Minerals (MULTIVITAMIN WITH MINERALS) tablet tablet, Take 1 tablet by mouth Daily., Disp: , Rfl:   •  Omega-3 Fatty Acids (FISH OIL) 1000 MG capsule capsule, Take 1,000 mg by mouth Daily With Breakfast., Disp: , Rfl:   •  pantoprazole (PROTONIX) 40 MG EC tablet, Take 40 mg by mouth Daily., Disp: , Rfl: 0  •  prasugrel (EFFIENT) 10 MG tablet, TAKE 1 TABLET BY MOUTH EVERY DAY, Disp: 90 tablet, Rfl: 0  •  pregabalin (Lyrica) 75 MG capsule, Take 75 mg by mouth 2 (Two) Times a Day., Disp: , Rfl:   •  rosuvastatin (CRESTOR) 10 MG tablet, TAKE 1 TABLET BY MOUTH EVERY DAY, Disp: 90 tablet, Rfl: 1  •  SITagliptin (JANUVIA) 50 MG tablet, Take 100 mg by mouth Daily., Disp: , Rfl:    •  SUMAtriptan (IMITREX) 50 MG tablet, Take 50 mg by mouth Every 2 (Two) Hours As Needed for Migraine. Take one tablet at onset of headache. May repeat dose one time in 2 hours if headache not relieved., Disp: , Rfl:   •  SYMBICORT 160-4.5 MCG/ACT inhaler, Take 2 puffs by mouth 2 (Two) Times a Day., Disp: , Rfl: 0  •  VENTOLIN  (90 BASE) MCG/ACT inhaler, Inhale 1 puff Every 4 (Four) Hours As Needed., Disp: , Rfl: 1  •  nitroglycerin (NITROSTAT) 0.4 MG SL tablet, PLACE 1 TABLET UNDER THE TONGUE SEE ADMIN INSTRUCTIONS., Disp: 25 tablet, Rfl: 0  •  ONETOUCH VERIO test strip, , Disp: , Rfl: 0     Assessment:        Patient Active Problem List   Diagnosis   • Chest pain, atypical   • Shortness of breath   • Syncope and collapse   • Weakness   • Coronary artery disease involving native heart with angina pectoris (CMS/HCC)   • Soft tissue lesion of shoulder region   • Disorder of rotator cuff   • Shoulder pain   • Rotator cuff tear arthropathy   • Unstable angina pectoris (CMS/HCC)   • Injury of kidney   • Type 2 diabetes mellitus (CMS/HCC)   • Hypertension   • Hypotension   • Hypovolemia   • Bilateral carotid artery disease (CMS/HCC)   • Medical non-compliance   • Dizziness   • Viral illness   • SOB (shortness of breath)   • Asthma exacerbation   • CKD (chronic kidney disease) stage 3, GFR 30-59 ml/min (CMS/HCC)   • Asthma   • Coronary artery disease involving native coronary artery of native heart with angina pectoris (CMS/HCC)   • Essential hypertension   • Abnormal nuclear stress test               Plan:   1. Hospital follow up visit CAD s/p cardiac catheterization. R radial and r groin site no s/s of infection or hematoma noted. Continue BB, Effient, asa and statin. She will follow up her pulmonologist regarding shortness of breath.    The importance of taking dual antiplatelets for one year  has been explained, risk of stent thrombosis leading to the acute MI, which carries high morbidity and mortality has  been explained. Discontinuation or interruptions of these medications should be under the strict guidance of appropriate health professional.    Risk reduction for the coronary artery disease, controlling the blood pressure, blood sugar management, cholesterol management, exercise, stress management, and proper compliance with medications and follow-up has been discussed.     2. Hypertension: Her blood pressure is stable. Continue BB.      Educated patient on exercising for at least 30 minutes a day for 2 to 3 days a week. Importance of controlling hypertension and blood pressure checkup on the regular basis has been explained. Hypertension as a silent killer has been discussed. Risk reduction of the weight and regular exercises to control the hypertension has been explained.     3. Sleep apnea: CPAP compliant.       6. Bruit: Previous doppler as above.    7. Obesity: BMI 36.6         No diagnosis found.    There are no diagnoses linked to this encounter.    COUNSELING: tess Horner was given to patient for the following topics: diagnostic results, risk factor reductions, impressions, risks and benefits of treatment options and importance of treatment compliance .       SMOKING COUNSELING: denies      functional stress test      It was explained to the patient that stress testing carries 85% specificity/sensitivity, and does not rule out future cardiac event.  Risks of the procedure were explained to the patient including shortness of breath, induction of myocardial infarction, and dizziness.  Patient is agreeable to proceeding with stress testing.     Sincerely,   BRETT Beth  [facility]  09/01/20  10:36     EMR Dragon/Transcription disclaimer:   Much of this encounter note is an electronic transcription/translation of spoken language to printed text. The electronic translation of spoken language may permit erroneous, or at times, nonsensical words or phrases to  be inadvertently transcribed; Although I have reviewed the note for such errors, some may still exist.

## 2020-09-02 ENCOUNTER — TELEPHONE (OUTPATIENT)
Dept: CARDIAC REHAB | Facility: HOSPITAL | Age: 60
End: 2020-09-02

## 2020-09-02 ENCOUNTER — OFFICE VISIT (OUTPATIENT)
Dept: CARDIAC REHAB | Facility: HOSPITAL | Age: 60
End: 2020-09-02

## 2020-09-02 DIAGNOSIS — Z98.61 S/P PTCA (PERCUTANEOUS TRANSLUMINAL CORONARY ANGIOPLASTY): Primary | ICD-10-CM

## 2020-09-02 PROCEDURE — 93797 PHYS/QHP OP CAR RHAB WO ECG: CPT

## 2020-09-02 NOTE — TELEPHONE ENCOUNTER
I just saw patient for her first cardiac rehab visit.  She scored very high on the PHQ-9 which we use as our depression scoring tool.  Her score was 22 and as I told her, I am required to report this to you.  I will fax the actual form over to your office so you can review it also.    Pt states that you have discussed this issue with her in the past and she doesn't really feel that the Cymbalta helps her much.     She still has lots of trouble sleeping and mentioned that you did okay her to use liquid OTC melatonin, but she was reluctant to pay the $17 to purchase.  I assured her that it would be well worth the money because lack of adequate sleep could be contributing to some or all of her health issues.      I encouraged her to discuss with you at her next visit.  Thank you for helping care for this very nice lady!

## 2020-09-03 ENCOUNTER — HOSPITAL ENCOUNTER (OUTPATIENT)
Dept: CARDIOLOGY | Facility: HOSPITAL | Age: 60
Discharge: HOME | End: 2020-09-03
Admitting: NURSE PRACTITIONER

## 2020-09-03 VITALS
HEIGHT: 59 IN | HEART RATE: 61 BPM | OXYGEN SATURATION: 97 % | SYSTOLIC BLOOD PRESSURE: 137 MMHG | BODY MASS INDEX: 36.08 KG/M2 | WEIGHT: 179 LBS | RESPIRATION RATE: 16 BRPM | DIASTOLIC BLOOD PRESSURE: 65 MMHG

## 2020-09-03 LAB
BH CV STRESS BP STAGE 1: NORMAL
BH CV STRESS DURATION MIN STAGE 1: 2
BH CV STRESS DURATION SEC STAGE 1: 12
BH CV STRESS GRADE STAGE 1: 10
BH CV STRESS HR STAGE 1: 90
BH CV STRESS METS STAGE 1: 4.6
BH CV STRESS PROTOCOL 1: NORMAL
BH CV STRESS RECOVERY BP: NORMAL MMHG
BH CV STRESS RECOVERY HR: 64 BPM
BH CV STRESS SPEED STAGE 1: 1.7
BH CV STRESS STAGE 1: 1
MAXIMAL PREDICTED HEART RATE: 158 BPM
PERCENT MAX PREDICTED HR: 56.96 %
STRESS BASELINE BP: NORMAL MMHG
STRESS BASELINE HR: 60 BPM
STRESS O2 SAT REST: 98 %
STRESS PERCENT HR: 67 %
STRESS POST ESTIMATED WORKLOAD: 4.6 METS
STRESS POST EXERCISE DUR MIN: 2 MIN
STRESS POST EXERCISE DUR SEC: 12 SEC
STRESS POST PEAK BP: NORMAL MMHG
STRESS POST PEAK HR: 90 BPM
STRESS TARGET HR: 134 BPM

## 2020-09-03 PROCEDURE — 93016 CV STRESS TEST SUPVJ ONLY: CPT | Performed by: INTERNAL MEDICINE

## 2020-09-03 PROCEDURE — 93018 CV STRESS TEST I&R ONLY: CPT | Performed by: INTERNAL MEDICINE

## 2020-09-03 PROCEDURE — 93017 CV STRESS TEST TRACING ONLY: CPT

## 2020-09-03 RX ORDER — BISOPROLOL FUMARATE 5 MG/1
2.5 TABLET, FILM COATED ORAL DAILY
Qty: 45 TABLET | Refills: 1 | Status: SHIPPED | OUTPATIENT
Start: 2020-09-03 | End: 2021-02-22

## 2020-09-29 ENCOUNTER — TELEPHONE (OUTPATIENT)
Dept: CARDIOLOGY | Facility: CLINIC | Age: 60
End: 2020-09-29

## 2020-09-29 NOTE — TELEPHONE ENCOUNTER
----- Message from BRETT Beth sent at 9/23/2020 10:04 AM EDT -----  Please let her know her treadmill stress test showed no ECG evidence of myocardial ischemia.Negative clinical evidence of myocardial ischemia. Findings consistent with a normal ECG stress test.    Thank you, BRETT Beth

## 2020-10-02 ENCOUNTER — TREATMENT (OUTPATIENT)
Dept: CARDIAC REHAB | Facility: HOSPITAL | Age: 60
End: 2020-10-02

## 2020-10-02 DIAGNOSIS — Z98.61 S/P PTCA (PERCUTANEOUS TRANSLUMINAL CORONARY ANGIOPLASTY): Primary | ICD-10-CM

## 2020-10-02 LAB — GLUCOSE BLDC GLUCOMTR-MCNC: 138 MG/DL (ref 70–130)

## 2020-10-02 PROCEDURE — 93798 PHYS/QHP OP CAR RHAB W/ECG: CPT

## 2020-10-02 PROCEDURE — 82962 GLUCOSE BLOOD TEST: CPT

## 2020-10-05 ENCOUNTER — TREATMENT (OUTPATIENT)
Dept: CARDIAC REHAB | Facility: HOSPITAL | Age: 60
End: 2020-10-05

## 2020-10-05 DIAGNOSIS — Z98.61 S/P PTCA (PERCUTANEOUS TRANSLUMINAL CORONARY ANGIOPLASTY): Primary | ICD-10-CM

## 2020-10-05 PROCEDURE — 93798 PHYS/QHP OP CAR RHAB W/ECG: CPT

## 2020-10-12 ENCOUNTER — APPOINTMENT (OUTPATIENT)
Dept: CARDIAC REHAB | Facility: HOSPITAL | Age: 60
End: 2020-10-12

## 2020-10-14 ENCOUNTER — APPOINTMENT (OUTPATIENT)
Dept: CARDIAC REHAB | Facility: HOSPITAL | Age: 60
End: 2020-10-14

## 2020-10-16 ENCOUNTER — APPOINTMENT (OUTPATIENT)
Dept: CARDIAC REHAB | Facility: HOSPITAL | Age: 60
End: 2020-10-16

## 2020-10-19 ENCOUNTER — APPOINTMENT (OUTPATIENT)
Dept: CARDIAC REHAB | Facility: HOSPITAL | Age: 60
End: 2020-10-19

## 2020-10-21 ENCOUNTER — APPOINTMENT (OUTPATIENT)
Dept: CARDIAC REHAB | Facility: HOSPITAL | Age: 60
End: 2020-10-21

## 2020-10-23 ENCOUNTER — APPOINTMENT (OUTPATIENT)
Dept: CARDIAC REHAB | Facility: HOSPITAL | Age: 60
End: 2020-10-23

## 2020-10-26 ENCOUNTER — APPOINTMENT (OUTPATIENT)
Dept: CARDIAC REHAB | Facility: HOSPITAL | Age: 60
End: 2020-10-26

## 2020-10-28 ENCOUNTER — APPOINTMENT (OUTPATIENT)
Dept: CARDIAC REHAB | Facility: HOSPITAL | Age: 60
End: 2020-10-28

## 2020-10-30 ENCOUNTER — APPOINTMENT (OUTPATIENT)
Dept: CARDIAC REHAB | Facility: HOSPITAL | Age: 60
End: 2020-10-30

## 2020-11-02 ENCOUNTER — APPOINTMENT (OUTPATIENT)
Dept: CARDIAC REHAB | Facility: HOSPITAL | Age: 60
End: 2020-11-02

## 2020-11-04 ENCOUNTER — APPOINTMENT (OUTPATIENT)
Dept: CARDIAC REHAB | Facility: HOSPITAL | Age: 60
End: 2020-11-04

## 2020-11-04 RX ORDER — ROSUVASTATIN CALCIUM 10 MG/1
TABLET, COATED ORAL
Qty: 90 TABLET | Refills: 1 | Status: SHIPPED | OUTPATIENT
Start: 2020-11-04 | End: 2021-04-26

## 2020-11-06 ENCOUNTER — APPOINTMENT (OUTPATIENT)
Dept: CARDIAC REHAB | Facility: HOSPITAL | Age: 60
End: 2020-11-06

## 2020-11-09 ENCOUNTER — APPOINTMENT (OUTPATIENT)
Dept: CARDIAC REHAB | Facility: HOSPITAL | Age: 60
End: 2020-11-09

## 2020-11-11 ENCOUNTER — APPOINTMENT (OUTPATIENT)
Dept: CARDIAC REHAB | Facility: HOSPITAL | Age: 60
End: 2020-11-11

## 2020-11-13 ENCOUNTER — APPOINTMENT (OUTPATIENT)
Dept: CARDIAC REHAB | Facility: HOSPITAL | Age: 60
End: 2020-11-13

## 2020-11-16 ENCOUNTER — APPOINTMENT (OUTPATIENT)
Dept: CARDIAC REHAB | Facility: HOSPITAL | Age: 60
End: 2020-11-16

## 2020-11-18 ENCOUNTER — APPOINTMENT (OUTPATIENT)
Dept: CARDIAC REHAB | Facility: HOSPITAL | Age: 60
End: 2020-11-18

## 2020-11-20 ENCOUNTER — APPOINTMENT (OUTPATIENT)
Dept: CARDIAC REHAB | Facility: HOSPITAL | Age: 60
End: 2020-11-20

## 2020-11-23 ENCOUNTER — APPOINTMENT (OUTPATIENT)
Dept: CARDIAC REHAB | Facility: HOSPITAL | Age: 60
End: 2020-11-23

## 2020-11-25 ENCOUNTER — APPOINTMENT (OUTPATIENT)
Dept: CARDIAC REHAB | Facility: HOSPITAL | Age: 60
End: 2020-11-25

## 2020-11-27 ENCOUNTER — APPOINTMENT (OUTPATIENT)
Dept: CARDIAC REHAB | Facility: HOSPITAL | Age: 60
End: 2020-11-27

## 2020-11-30 ENCOUNTER — APPOINTMENT (OUTPATIENT)
Dept: CARDIAC REHAB | Facility: HOSPITAL | Age: 60
End: 2020-11-30

## 2020-12-02 ENCOUNTER — APPOINTMENT (OUTPATIENT)
Dept: CARDIAC REHAB | Facility: HOSPITAL | Age: 60
End: 2020-12-02

## 2020-12-04 ENCOUNTER — APPOINTMENT (OUTPATIENT)
Dept: CARDIAC REHAB | Facility: HOSPITAL | Age: 60
End: 2020-12-04

## 2020-12-28 RX ORDER — PRASUGREL 10 MG/1
TABLET, FILM COATED ORAL
Qty: 90 TABLET | Refills: 0 | Status: SHIPPED | OUTPATIENT
Start: 2020-12-28 | End: 2021-03-22

## 2021-02-22 RX ORDER — BISOPROLOL FUMARATE 5 MG/1
TABLET, FILM COATED ORAL
Qty: 45 TABLET | Refills: 1 | Status: SHIPPED | OUTPATIENT
Start: 2021-02-22 | End: 2021-08-13

## 2021-03-01 ENCOUNTER — OFFICE VISIT (OUTPATIENT)
Dept: CARDIOLOGY | Facility: CLINIC | Age: 61
End: 2021-03-01

## 2021-03-01 VITALS
SYSTOLIC BLOOD PRESSURE: 132 MMHG | BODY MASS INDEX: 36.89 KG/M2 | DIASTOLIC BLOOD PRESSURE: 73 MMHG | WEIGHT: 183 LBS | HEART RATE: 62 BPM | HEIGHT: 59 IN

## 2021-03-01 DIAGNOSIS — I25.119 CORONARY ARTERY DISEASE INVOLVING NATIVE CORONARY ARTERY OF NATIVE HEART WITH ANGINA PECTORIS (HCC): Primary | ICD-10-CM

## 2021-03-01 DIAGNOSIS — I10 ESSENTIAL HYPERTENSION: ICD-10-CM

## 2021-03-01 PROCEDURE — 99213 OFFICE O/P EST LOW 20 MIN: CPT | Performed by: INTERNAL MEDICINE

## 2021-03-01 RX ORDER — SUCRALFATE 1 G/1
1 TABLET ORAL NIGHTLY
COMMUNITY
Start: 2021-01-03 | End: 2021-08-24

## 2021-03-01 RX ORDER — OMEPRAZOLE 40 MG/1
40 CAPSULE, DELAYED RELEASE ORAL DAILY
COMMUNITY

## 2021-03-01 RX ORDER — LANCETS 30 GAUGE
EACH MISCELLANEOUS
COMMUNITY
Start: 2020-11-27

## 2021-03-01 NOTE — PROGRESS NOTES
6 MONTH FOLLOW UP    Subjective:        Lily Unger is a 60 y.o. female who here for follow up    CC  CAD  HPI  60-year-old female with known history of the coronary artery disease, benign essential arterial hypertension with a history of the angioplasty here for the follow-up with no complaints of chest pains tightness heaviness or the pressure sensation     Problems Addressed this Visit        Cardiac and Vasculature    Coronary artery disease involving native heart with angina pectoris (CMS/HCC) - Primary    Essential hypertension      Diagnoses       Codes Comments    Coronary artery disease involving native coronary artery of native heart with angina pectoris (CMS/HCC)    -  Primary ICD-10-CM: I25.119  ICD-9-CM: 414.01, 413.9     Essential hypertension     ICD-10-CM: I10  ICD-9-CM: 401.9         .    The following portions of the patient's history were reviewed and updated as appropriate: allergies, current medications, past family history, past medical history, past social history, past surgical history and problem list.    Past Medical History:   Diagnosis Date   • Abnormal stress test    • Asthma    • CAD (coronary artery disease)    • Crescendo angina (CMS/HCC)    • Diabetes mellitus (CMS/HCC)    • H/O angioplasty    • Hiatal hernia    • Hyperlipidemia    • Hypertension    • Obesity    • MILAGROS on CPAP    • Recurrent urinary tract infection      reports that she has quit smoking. She has never used smokeless tobacco. She reports current alcohol use. She reports that she does not use drugs.   Family History   Problem Relation Age of Onset   • Heart attack Father    • Heart disease Father    • Heart attack Brother    • Heart disease Brother    • Heart attack Maternal Grandfather    • Heart disease Maternal Grandfather    • Heart disease Mother        Review of Systems  Constitutional: No wt loss, fever, fatigue  Gastrointestinal: No nausea, abdominal pain  Behavioral/Psych: No insomnia or anxiety    "Cardiovascular no chest pains or tightness in the chest  Objective:       Physical Exam  /73   Pulse 62   Ht 149.9 cm (59\")   Wt 83 kg (183 lb)   BMI 36.96 kg/m²   General appearance: No acute changes   Neck: Trachea midline; NECK, supple, no thyromegaly or lymphadenopathy   Lungs: Normal size and shape, normal breath sounds, equal distribution of air, no rales and rhonchi   CV: S1-S2 regular, no murmurs, no rub, no gallop   Abdomen: Soft, non-tender; no masses , no abnormal abdominal sounds   Extremities: No deformity , normal color , no peripheral edema   Skin: Normal temperature, turgor and texture; no rash, ulcers          Procedures      Echocardiogram:        Current Outpatient Medications:   •  albuterol (PROVENTIL) (2.5 MG/3ML) 0.083% nebulizer solution, Take 2.5 mg by nebulization Every 6 (Six) Hours As Needed., Disp: , Rfl: 11  •  aspirin 81 MG EC tablet, Take 81 mg by mouth daily., Disp: , Rfl:   •  bisoprolol (ZEBeta) 5 MG tablet, TAKE 1/2 TABLET BY MOUTH EVERY DAY, Disp: 45 tablet, Rfl: 1  •  DULoxetine (CYMBALTA) 30 MG capsule, Take 30 mg by mouth Daily., Disp: , Rfl: 5  •  Ertugliflozin L-PyroglutamicAc (STEGLATRO) 15 MG tablet, Take 15 mg by mouth Every Morning., Disp: , Rfl:   •  Insulin Degludec (TRESIBA SC), Inject  under the skin into the appropriate area as directed., Disp: , Rfl:   •  metFORMIN (GLUCOPHAGE) 1000 MG tablet, Take  by mouth 2 (Two) Times a Day., Disp: , Rfl: 0  •  Multiple Vitamins-Minerals (MULTIVITAMIN WITH MINERALS) tablet tablet, Take 1 tablet by mouth Daily., Disp: , Rfl:   •  nitroglycerin (NITROSTAT) 0.4 MG SL tablet, PLACE 1 TABLET UNDER THE TONGUE SEE ADMIN INSTRUCTIONS., Disp: 25 tablet, Rfl: 0  •  NovoFine 32G X 6 MM misc, USE 1 (ONE) EACH DAILY, Disp: , Rfl:   •  Omega-3 Fatty Acids (FISH OIL) 1000 MG capsule capsule, Take 1,000 mg by mouth Daily With Breakfast., Disp: , Rfl:   •  omeprazole (priLOSEC) 40 MG capsule, Take 40 mg by mouth Daily., Disp: , Rfl: "   •  OneTouch Delica Lancets 30G misc, TEST 1 (ONE) EACH DAILY, Disp: , Rfl:   •  ONETOUCH VERIO test strip, , Disp: , Rfl: 0  •  prasugrel (EFFIENT) 10 MG tablet, TAKE 1 TABLET BY MOUTH EVERY DAY, Disp: 90 tablet, Rfl: 0  •  pregabalin (Lyrica) 75 MG capsule, Take 75 mg by mouth 2 (Two) Times a Day., Disp: , Rfl:   •  rosuvastatin (CRESTOR) 10 MG tablet, TAKE 1 TABLET BY MOUTH EVERY DAY, Disp: 90 tablet, Rfl: 1  •  SITagliptin (JANUVIA) 50 MG tablet, Take 100 mg by mouth Daily., Disp: , Rfl:   •  sucralfate (CARAFATE) 1 g tablet, TAKE 1 TAB BEFORE MEALS AND AT BEDTIME, Disp: , Rfl:   •  SUMAtriptan (IMITREX) 50 MG tablet, Take 50 mg by mouth Every 2 (Two) Hours As Needed for Migraine. Take one tablet at onset of headache. May repeat dose one time in 2 hours if headache not relieved., Disp: , Rfl:   •  SYMBICORT 160-4.5 MCG/ACT inhaler, Take 2 puffs by mouth 2 (Two) Times a Day., Disp: , Rfl: 0  •  VENTOLIN  (90 BASE) MCG/ACT inhaler, Inhale 1 puff Every 4 (Four) Hours As Needed., Disp: , Rfl: 1   Assessment:        Patient Active Problem List   Diagnosis   • Chest pain, atypical   • Shortness of breath   • Syncope and collapse   • Weakness   • Coronary artery disease involving native heart with angina pectoris (CMS/HCC)   • Soft tissue lesion of shoulder region   • Disorder of rotator cuff   • Shoulder pain   • Rotator cuff tear arthropathy   • Unstable angina pectoris (CMS/HCC)   • Injury of kidney   • Type 2 diabetes mellitus (CMS/HCC)   • Hypertension   • Hypotension   • Hypovolemia   • Bilateral carotid artery disease (CMS/HCC)   • Medical non-compliance   • Dizziness   • Viral illness   • SOB (shortness of breath)   • Asthma exacerbation   • CKD (chronic kidney disease) stage 3, GFR 30-59 ml/min (CMS/HCC)   • Asthma   • Coronary artery disease involving native coronary artery of native heart with angina pectoris (CMS/HCC)   • Essential hypertension   • Abnormal nuclear stress test               Plan:             ICD-10-CM ICD-9-CM   1. Coronary artery disease involving native coronary artery of native heart with angina pectoris (CMS/[facility])  I25.119 414.01     413.9   2. Essential hypertension  I10 401.9     1. Coronary artery disease involving native coronary artery of native heart with angina pectoris (CMS/[facility])  No angina pectoris    2. Essential hypertension  Blood pressure under control       STOP EFFIENT    SEE IN 6 MONTHS  COUNSELING:    Lily Horner was given to patient for the following topics: diagnostic results, risk factor reductions, impressions, risks and benefits of treatment options and importance of treatment compliance .       SMOKING COUNSELING:    [unfilled]    Dictated using Dragon dictation

## 2021-03-16 ENCOUNTER — BULK ORDERING (OUTPATIENT)
Dept: CASE MANAGEMENT | Facility: OTHER | Age: 61
End: 2021-03-16

## 2021-03-16 DIAGNOSIS — Z23 IMMUNIZATION DUE: ICD-10-CM

## 2021-03-22 RX ORDER — PRASUGREL 10 MG/1
TABLET, FILM COATED ORAL
Qty: 90 TABLET | Refills: 1 | Status: SHIPPED | OUTPATIENT
Start: 2021-03-22 | End: 2021-04-26

## 2021-04-26 ENCOUNTER — PRE-ADMISSION TESTING (OUTPATIENT)
Dept: PREADMISSION TESTING | Facility: HOSPITAL | Age: 61
End: 2021-04-26

## 2021-04-26 VITALS
SYSTOLIC BLOOD PRESSURE: 139 MMHG | HEART RATE: 64 BPM | BODY MASS INDEX: 33.91 KG/M2 | TEMPERATURE: 97.8 F | DIASTOLIC BLOOD PRESSURE: 66 MMHG | RESPIRATION RATE: 18 BRPM | WEIGHT: 179.6 LBS | OXYGEN SATURATION: 98 % | HEIGHT: 61 IN

## 2021-04-26 LAB
ALBUMIN SERPL-MCNC: 4.1 G/DL (ref 3.5–5.2)
ALBUMIN/GLOB SERPL: 1.2 G/DL
ALP SERPL-CCNC: 72 U/L (ref 39–117)
ALT SERPL W P-5'-P-CCNC: 29 U/L (ref 1–33)
ANION GAP SERPL CALCULATED.3IONS-SCNC: 10.5 MMOL/L (ref 5–15)
AST SERPL-CCNC: 32 U/L (ref 1–32)
BASOPHILS # BLD AUTO: 0.03 10*3/MM3 (ref 0–0.2)
BASOPHILS NFR BLD AUTO: 0.5 % (ref 0–1.5)
BILIRUB SERPL-MCNC: 0.2 MG/DL (ref 0–1.2)
BUN SERPL-MCNC: 9 MG/DL (ref 8–23)
BUN/CREAT SERPL: 11.1 (ref 7–25)
CALCIUM SPEC-SCNC: 9.1 MG/DL (ref 8.6–10.5)
CHLORIDE SERPL-SCNC: 103 MMOL/L (ref 98–107)
CO2 SERPL-SCNC: 25.5 MMOL/L (ref 22–29)
CREAT SERPL-MCNC: 0.81 MG/DL (ref 0.57–1)
DEPRECATED RDW RBC AUTO: 46.5 FL (ref 37–54)
EOSINOPHIL # BLD AUTO: 0.1 10*3/MM3 (ref 0–0.4)
EOSINOPHIL NFR BLD AUTO: 1.8 % (ref 0.3–6.2)
ERYTHROCYTE [DISTWIDTH] IN BLOOD BY AUTOMATED COUNT: 17.7 % (ref 12.3–15.4)
GFR SERPL CREATININE-BSD FRML MDRD: 72 ML/MIN/1.73
GLOBULIN UR ELPH-MCNC: 3.3 GM/DL
GLUCOSE SERPL-MCNC: 153 MG/DL (ref 65–99)
HCT VFR BLD AUTO: 39.7 % (ref 34–46.6)
HGB BLD-MCNC: 11.9 G/DL (ref 12–15.9)
IMM GRANULOCYTES # BLD AUTO: 0.04 10*3/MM3 (ref 0–0.05)
IMM GRANULOCYTES NFR BLD AUTO: 0.7 % (ref 0–0.5)
LYMPHOCYTES # BLD AUTO: 1.53 10*3/MM3 (ref 0.7–3.1)
LYMPHOCYTES NFR BLD AUTO: 26.9 % (ref 19.6–45.3)
MCH RBC QN AUTO: 22.5 PG (ref 26.6–33)
MCHC RBC AUTO-ENTMCNC: 30 G/DL (ref 31.5–35.7)
MCV RBC AUTO: 75 FL (ref 79–97)
MONOCYTES # BLD AUTO: 0.72 10*3/MM3 (ref 0.1–0.9)
MONOCYTES NFR BLD AUTO: 12.7 % (ref 5–12)
NEUTROPHILS NFR BLD AUTO: 3.26 10*3/MM3 (ref 1.7–7)
NEUTROPHILS NFR BLD AUTO: 57.4 % (ref 42.7–76)
NRBC BLD AUTO-RTO: 0 /100 WBC (ref 0–0.2)
PLATELET # BLD AUTO: 300 10*3/MM3 (ref 140–450)
PMV BLD AUTO: 9.1 FL (ref 6–12)
POTASSIUM SERPL-SCNC: 4.9 MMOL/L (ref 3.5–5.2)
PROT SERPL-MCNC: 7.4 G/DL (ref 6–8.5)
QT INTERVAL: 419 MS
RBC # BLD AUTO: 5.29 10*6/MM3 (ref 3.77–5.28)
SARS-COV-2 ORF1AB RESP QL NAA+PROBE: DETECTED
SODIUM SERPL-SCNC: 139 MMOL/L (ref 136–145)
WBC # BLD AUTO: 5.68 10*3/MM3 (ref 3.4–10.8)

## 2021-04-26 PROCEDURE — 36415 COLL VENOUS BLD VENIPUNCTURE: CPT

## 2021-04-26 PROCEDURE — C9803 HOPD COVID-19 SPEC COLLECT: HCPCS

## 2021-04-26 PROCEDURE — 93005 ELECTROCARDIOGRAM TRACING: CPT

## 2021-04-26 PROCEDURE — 85025 COMPLETE CBC W/AUTO DIFF WBC: CPT

## 2021-04-26 PROCEDURE — 80053 COMPREHEN METABOLIC PANEL: CPT

## 2021-04-26 PROCEDURE — 93010 ELECTROCARDIOGRAM REPORT: CPT | Performed by: INTERNAL MEDICINE

## 2021-04-26 PROCEDURE — U0004 COV-19 TEST NON-CDC HGH THRU: HCPCS

## 2021-04-26 RX ORDER — ROSUVASTATIN CALCIUM 10 MG/1
TABLET, COATED ORAL
Qty: 90 TABLET | Refills: 1 | Status: SHIPPED | OUTPATIENT
Start: 2021-04-26 | End: 2021-10-01

## 2021-04-26 NOTE — DISCHARGE INSTRUCTIONS
ARRIVE DAY OF SURGERY AS NOTIFIED THE DAY BEFORE TO MAIN SURGERY THEN TO [facility]        Take the following medications the morning of surgery:  BISOPROLOL, OMEPRAZOLE      If you are on prescription narcotic pain medication to control your pain you may also take that medication the morning of surgery.    General Instructions:  • Do not eat solid food after midnight the night before surgery.  • You may drink clear liquids day of surgery but must stop at least one hour before your hospital arrival time.  • It is beneficial for you to have a clear drink that contains carbohydrates the day of surgery.  We suggest a 12 to 20 ounce bottle of Gatorade or Powerade for non-diabetic patients or a 12 to 20 ounce bottle of G2 or Powerade Zero for diabetic patients. (Pediatric patients, are not advised to drink a 12 to 20 ounce carbohydrate drink)    Clear liquids are liquids you can see through.  Nothing red in color.     Plain water                               Sports drinks  Sodas                                   Gelatin (Jell-O)  Fruit juices without pulp such as white grape juice and apple juice  Popsicles that contain no fruit or yogurt  Tea or coffee (no cream or milk added)  Gatorade / Powerade  G2 / Powerade Zero    • Infants may have breast milk up to four hours before surgery.  • Infants drinking formula may drink formula up to six hours before surgery.   • Patients who avoid smoking, chewing tobacco and alcohol for 4 weeks prior to surgery have a reduced risk of post-operative complications.  Quit smoking as many days before surgery as you can.  • Do not smoke, use chewing tobacco or drink alcohol the day of surgery.   • If applicable bring your C-PAP/ BI-PAP machine.  • Bring any papers given to you in the doctor’s office.  • Wear clean comfortable clothes.  • Do not wear contact lenses, false eyelashes or make-up.  Bring a case for your glasses.   • Bring crutches or walker if applicable.  • Remove all  piercings.  Leave jewelry and any other valuables at home.  • Hair extensions with metal clips must be removed prior to surgery.  • The Pre-Admission Testing nurse will instruct you to bring medications if unable to obtain an accurate list in Pre-Admission Testing.          Preventing a Surgical Site Infection:  • For 2 to 3 days before surgery, avoid shaving with a razor because the razor can irritate skin and make it easier to develop an infection.    • Any areas of open skin can increase the risk of a post-operative wound infection by allowing bacteria to enter and travel throughout the body.  Notify your surgeon if you have any skin wounds / rashes even if it is not near the expected surgical site.  The area will need assessed to determine if surgery should be delayed until it is healed.  • The night prior to surgery shower using a fresh bar of anti-bacterial soap (such as Dial) and clean washcloth.  Sleep in a clean bed with clean clothing.  Do not allow pets to sleep with you.  • Shower on the morning of surgery using a fresh bar of anti-bacterial soap (such as Dial) and clean washcloth.  Dry with a clean towel and dress in clean clothing.  • Ask your surgeon if you will be receiving antibiotics prior to surgery.  • Make sure you, your family, and all healthcare providers clean their hands with soap and water or an alcohol based hand  before caring for you or your wound.    Day of surgery:  Your arrival time is approximately two hours before your scheduled surgery time.  Upon arrival, a Pre-op nurse and Anesthesiologist will review your health history, obtain vital signs, and answer questions you may have.  The only belongings needed at this time will be a list of your home medications and if applicable your C-PAP/BI-PAP machine.  A Pre-op nurse will start an IV and you may receive medication in preparation for surgery, including something to help you relax.     Please be aware that surgery does come  with discomfort.  We want to make every effort to control your discomfort so please discuss any uncontrolled symptoms with your nurse.   Your doctor will most likely have prescribed pain medications.      If you are going home after surgery you will receive individualized written care instructions before being discharged.  A responsible adult must drive you to and from the hospital on the day of your surgery and stay with you for 24 hours.  Discharge prescriptions can be filled by the hospital pharmacy during regular pharmacy hours.  If you are having surgery late in the day/evening your prescription may be e-prescribed to your pharmacy.  Please verify your pharmacy hours or chose a 24 hour pharmacy to avoid not having access to your prescription because your pharmacy has closed for the day.    If you are staying overnight following surgery, you will be transported to your hospital room following the recovery period.  Psychiatric has all private rooms.    If you have any questions please call Pre-Admission Testing at [phone].  Deductibles and co-payments are collected on the day of service. Please be prepared to pay the required co-pay, deductible or deposit on the day of service as defined by your plan.    Patient Education for Self-Quarantine Process    Following your COVID testing, we strongly recommend that you do not leave your home after you have been tested for COVID except to get medical care. This includes not going to work, school or to public areas.  If this is not possible for you to do please limit your activities to only required outings.  Be sure to wear a mask when you are with other people, practice social distancing and wash your hands frequently.      The following items provide additional details to keep you safe.  • Wash your hands with soap and water frequently for at least 20 seconds.   • Avoid touching your eyes, nose and mouth with unwashed hands.  • Do not share anything  - utensils, towels, food from the same bowl.   • Have your own utensils, drinking glass, dishes, towels and bedding.   • Do not have visitors.   • Do use FaceTime to stay in touch with family and friends.  • You should stay in a specific room away from others if possible.   • Stay at least 6 feet away from others in the home if you cannot have a dedicated room to yourself.   • Do not snuggle with your pet. While the CDC says there is no evidence that pets can spread COVID-19 or be infected from humans, it is probably best to avoid “petting, snuggling, being kissed or licked and sharing food (during self-quarantine)”, according to the CDC.   • Sanitize household surfaces daily. Include all high touch areas (door handles, light switches, phones, countertops, etc.)  • Do not share a bathroom with others, if possible.   • Wear a mask around others in your home if you are unable to stay in a separate room or 6 feet apart. If  you are unable to wear a mask, have your family member wear a mask if they must be within 6 feet of you.   Call your surgeon immediately if you experience any of the following symptoms:  • Sore Throat  • Shortness of Breath or difficulty breathing  • Cough  • Chills  • Body soreness or muscle pain  • Headache  • Fever  • New loss of taste or smell  • Do not arrive for your surgery ill.  Your procedure will need to be rescheduled to another time.  You will need to call your physician before the day of surgery to avoid any unnecessary exposure to hospital staff as well as other patients.

## 2021-08-13 RX ORDER — BISOPROLOL FUMARATE 5 MG/1
TABLET, FILM COATED ORAL
Qty: 45 TABLET | Refills: 0 | Status: SHIPPED | OUTPATIENT
Start: 2021-08-13 | End: 2021-12-20

## 2021-08-13 NOTE — PROGRESS NOTES
Case Management Discharge Note    Final Note: Pt was dc'd home    Destination      No service has been selected for the patient.      Durable Medical Equipment      No service has been selected for the patient.      Dialysis/Infusion      No service has been selected for the patient.      Home Medical Care      No service has been selected for the patient.      Community Resources      No service has been selected for the patient.        Other: Other    Final Discharge Disposition Code: 01 - home or self-care   2019, normal as per pt

## 2021-08-24 ENCOUNTER — OFFICE VISIT (OUTPATIENT)
Dept: CARDIOLOGY | Facility: CLINIC | Age: 61
End: 2021-08-24

## 2021-08-24 VITALS
BODY MASS INDEX: 33.99 KG/M2 | DIASTOLIC BLOOD PRESSURE: 75 MMHG | WEIGHT: 180 LBS | HEIGHT: 61 IN | SYSTOLIC BLOOD PRESSURE: 155 MMHG | HEART RATE: 58 BPM

## 2021-08-24 DIAGNOSIS — I25.119 CORONARY ARTERY DISEASE INVOLVING NATIVE CORONARY ARTERY OF NATIVE HEART WITH ANGINA PECTORIS (HCC): Primary | ICD-10-CM

## 2021-08-24 DIAGNOSIS — I10 ESSENTIAL HYPERTENSION: ICD-10-CM

## 2021-08-24 DIAGNOSIS — G47.33 OBSTRUCTIVE SLEEP APNEA SYNDROME: ICD-10-CM

## 2021-08-24 PROCEDURE — 99214 OFFICE O/P EST MOD 30 MIN: CPT | Performed by: NURSE PRACTITIONER

## 2021-08-24 RX ORDER — AMLODIPINE BESYLATE 5 MG/1
5 TABLET ORAL DAILY
Qty: 90 TABLET | Refills: 3 | Status: SHIPPED | OUTPATIENT
Start: 2021-08-24 | End: 2021-09-23

## 2021-08-24 RX ORDER — PREGABALIN 150 MG/1
150 CAPSULE ORAL 2 TIMES DAILY
COMMUNITY
Start: 2021-08-20 | End: 2023-02-17

## 2021-08-24 NOTE — PROGRESS NOTES
Subjective:        Lily Unegr is a 61 y.o. female who here for follow up    Chief Complaint   Patient presents with   • Coronary Artery Disease     6 MO FOLLOW UP          HPI     Lily Unger is a 61-year-old female, who is current with me.  She has a history of CAD, diabetes mellitus, hiatal hernia, hyperlipidemia, hypertension, obesity, MILAGROS with CPAP compliance.  Lipid panel in 2019 revealed , HDL 40, LDL 40 and triglycerides 117.  Stress test in September 2020 revealed no ECG evidence of myocardial ischemia.  No clinical evidence of myocardial ischemia.  Cardiac cath in 2020 revealed LAD had a proximal stent widely patent with diagonal branch which was large was jailed, angioplasty with 2.25/12 trek balloon reduced to 99%.  The LAD also approximately 50% stenosis with minimal irregularity of the rest of the LAD diagonal and  branches.  Circumflex artery was nondominant with early vascular plaque, RCA was dominant with proximal 30% stenosis.  Successful angioplasty of the ostial diagonal branch 99% reduced to 10 to 20%. Carotid Doppler on August 2020 revealed proximal right and left carotid artery shows mild stenosis.  Echo reveals EF 63.3%, see full report as below.      The following portions of the patient's history were reviewed and updated as appropriate: allergies, current medications, past family history, past medical history, past social history, past surgical history and problem list.    Past Medical History:   Diagnosis Date   • Abnormal stress test    • Asthma    • CAD (coronary artery disease)    • Crescendo angina (CMS/[facility])    • Diabetes mellitus (CMS/HCC)    • H/O angioplasty    • Hiatal hernia    • Hyperlipidemia    • Hypertension    • Obesity    • MILAGROS on CPAP    • Recurrent urinary tract infection    • Shoulder pain, right    • Torn rotator cuff          reports that she quit smoking about 29 years ago. Her smoking use included cigarettes. She has never used smokeless  tobacco. She reports current alcohol use. She reports that she does not use drugs.     Family History   Problem Relation Age of Onset   • Heart attack Father    • Heart disease Father    • Heart attack Brother    • Heart disease Brother    • Heart attack Maternal Grandfather    • Heart disease Maternal Grandfather    • Heart disease Mother    • Malig Hyperthermia Neg Hx        ROS     Review of Systems  Constitutional: No wt loss, fever, fatigue  Gastrointestinal: No nausea, abdominal pain  Behavioral/Psych: No insomnia or anxiety  Cardiovascular: Denies chest pain and palpitations. shortness of breath        Objective:           Physical Exam  Vitals and nursing note reviewed.   Constitutional:       Appearance: She is well-developed.   HENT:      Head: Normocephalic.      Right Ear: External ear normal.      Left Ear: External ear normal.   Neck:      Vascular: No JVD.   Cardiovascular:      Rate and Rhythm: Normal rate and regular rhythm.      Pulses: Intact distal pulses.      Heart sounds: Normal heart sounds. No murmur heard.   No friction rub. No gallop.    Pulmonary:      Effort: Pulmonary effort is normal. No respiratory distress.      Breath sounds: Normal breath sounds. No stridor. No rales.   Abdominal:      General: Bowel sounds are normal. There is no distension.      Palpations: Abdomen is soft.      Tenderness: There is no abdominal tenderness. There is no guarding.   Musculoskeletal:         General: No tenderness. Normal range of motion.      Cervical back: Normal range of motion.   Skin:     General: Skin is warm.   Neurological:      Mental Status: She is alert and oriented to person, place, and time.      Deep Tendon Reflexes: Reflexes are normal and symmetric.   Psychiatric:         Judgment: Judgment normal.          Procedures     Interpretation Summary     · Calculated EF = 63.3%.  · There is no evidence of pericardial effusion         Interpretation Summary     · Proximal right internal  carotid artery mild stenosis.  · Proximal left internal carotid artery mild stenosis.     HEMODYNAMIC / ANGIOGRAPHIC DATA:    1. Left ventricular end diastolic pressure was 10 mmHg.  2. Left ventriculography revealed an EF around 60 %.    3. The left main is normal left main.  4. The left anterior descending artery is *.Left anterior descending had a proximal stent widely patent with the diagonal branch which was large which was jailed, was angioplastied with a 2.25/12 trek balloon reduced from 99% down to the 10 to 20%, mid LAD also had approximately 50% stenosis with minimum irregularity of the rest of the LAD diagonal and  branches  5. The left circumflex is .The circumflex artery was a nondominant with early atherosclerotic plaque  6. The right coronary artery is .Right coronary artery was dominant with a proximal 30% stenosis  7. Successful angioplasty of the ostial diagonal branch 99% reduced to 10 to 20% with 2.25/12 trek balloon     DIMITRIOS FLOW    PRE.. 3.....       POST.... 3..     TYPE OF LESION........C.....     RECOMMENDATIONS:  Post-procedure care will focus on prevention of any ischemic events and congestive complications.  Aggressive risk factor modification will be carried out.     Importance of taking dual antiplatelets for one year  has been explained, risk of stent thrombosis leading to the acute MI, which carries high morbidity and mortality has been explained     Discontinuation or interruptions of these medications should be under the strict guidance of appropriate health professional         Interpretation Summary        · Findings consistent with an equivocal ECG stress test.  · Left ventricular ejection fraction is normal (Calculated EF = 60%).  · Myocardial perfusion imaging indicates a small-sized, mildly severe area of ischemia located in the anterior wall.  · Impressions are consistent with an intermediate risk study.     Asymptomatic for chest pain. Specificity of study reduced  secondary to motion artifact due to poor exercise tolerance.  ECG is  equivocal for  suggestion of ischemia  Ectopy: Rare PVC at baseline.   Blood pressure response:  Baseline Hypertensive  150/74, Peak (post Lexiscan) 130/74, Recovery:130//63  Pharmacologic study due to inability to tolerate increasing speed and grade of treadmill due to c/o SOA with minimal exertion and Beta-blocker therapy.  Participated in Low Level exercise and tolerance is poor.     Supervised by: Dr. Hraper                 Interpretation Summary        · Findings consistent with an equivocal ECG stress test.  · Left ventricular ejection fraction is normal (Calculated EF = 70%).  · Myocardial perfusion imaging indicates a normal myocardial perfusion study with no evidence of ischemia.  · Impressions are consistent with a low risk study.     Interpretation Summary     · Left atrial cavity size is mildly dilated.  · Mild mitral valve regurgitation is present  · Mild tricuspid valve regurgitation is present.  · Calculated EF = 66%.  · There is no evidence of pericardial effusion        HEMODYNAMIC / ANGIOGRAPHIC DATA:    1. Pulmonary capillary wedge pressure was 12.   2. Pulmonary artery systolic pressure was 35/15.  3. Right atrial pressure was 8.  4. Cardiac index was 2.1.  5. Left ventricular end diastolic pressure was 10 mmHg.  6. Left ventriculography revealed the EF to be 50%.  7. The left main is normal.  8. The LAD is .Left anterior descending shows minimum irregularity with a midportion 30-40% stenosis  9. Circumflex artery was a nondominant with known atherosclerotic stenosis  10. The right coronary artery is .Right coronary artery was a dominant with ostial 90% was reduced to 0% with 3.5/8 Xience stent  11. Mild pulmonary hypertension  Successful angioplasty and stent to the ostial RCA 90% reduced to 0% with 3.5/8 Xience stent, type of the lesion  b2  RECOMMENDATIONS:  Post-procedure care will focus on prevention of any  ischemic events and congestive complications.       Importance of taking dual antiplatelets for one year  has been explained, risk of stent thrombosis leading to the acute MI, which carries high morbidity and mortality has been explained     Discontinuation or interruptions of these medications should be under the strict guidance of appropriate health professional      Current Outpatient Medications:   •  aspirin 81 MG EC tablet, Take 81 mg by mouth daily., Disp: , Rfl:   •  bisoprolol (ZEBeta) 5 MG tablet, TAKE 1/2 TABLET BY MOUTH EVERY DAY, Disp: 45 tablet, Rfl: 0  •  DULoxetine (CYMBALTA) 30 MG capsule, Take 30 mg by mouth Daily., Disp: , Rfl: 5  •  Ertugliflozin L-PyroglutamicAc (STEGLATRO) 15 MG tablet, Take 15 mg by mouth Every Morning., Disp: , Rfl:   •  Insulin Degludec (TRESIBA SC), Inject 20 mg under the skin into the appropriate area as directed Every Night., Disp: , Rfl:   •  metFORMIN (GLUCOPHAGE) 1000 MG tablet, Take 1,000 mg by mouth 2 (Two) Times a Day., Disp: , Rfl: 0  •  Multiple Vitamins-Minerals (MULTIVITAMIN WITH MINERALS) tablet tablet, Take 1 tablet by mouth Daily., Disp: , Rfl:   •  omeprazole (priLOSEC) 40 MG capsule, Take 40 mg by mouth Daily., Disp: , Rfl:   •  pregabalin (LYRICA) 150 MG capsule, Take 150 mg by mouth 2 (Two) Times a Day., Disp: , Rfl:   •  rosuvastatin (CRESTOR) 10 MG tablet, TAKE 1 TABLET BY MOUTH EVERY DAY (Patient taking differently: Take 10 mg by mouth Every Night.), Disp: 90 tablet, Rfl: 1  •  SITagliptin (JANUVIA) 100 MG tablet, Take 100 mg by mouth Daily., Disp: , Rfl:   •  SUMAtriptan (IMITREX) 50 MG tablet, Take 50 mg by mouth Every 2 (Two) Hours As Needed for Migraine. Take one tablet at onset of headache. May repeat dose one time in 2 hours if headache not relieved., Disp: , Rfl:   •  SYMBICORT 160-4.5 MCG/ACT inhaler, Take 2 puffs by mouth 2 (Two) Times a Day As Needed., Disp: , Rfl: 0  •  VENTOLIN  (90 BASE) MCG/ACT inhaler, Inhale 1 puff Every 4 (Four)  Hours As Needed., Disp: , Rfl: 1  •  albuterol (PROVENTIL) (2.5 MG/3ML) 0.083% nebulizer solution, Take 2.5 mg by nebulization Every 6 (Six) Hours As Needed., Disp: , Rfl: 11  •  nitroglycerin (NITROSTAT) 0.4 MG SL tablet, PLACE 1 TABLET UNDER THE TONGUE SEE ADMIN INSTRUCTIONS., Disp: 25 tablet, Rfl: 0  •  NovoFine 32G X 6 MM misc, USE 1 (ONE) EACH DAILY, Disp: , Rfl:   •  Omega-3 Fatty Acids (FISH OIL) 1000 MG capsule capsule, Take 1,000 mg by mouth Daily With Breakfast., Disp: , Rfl:   •  OneTouch Delica Lancets 30G misc, TEST 1 (ONE) EACH DAILY, Disp: , Rfl:   •  ONETOUCH VERIO test strip, , Disp: , Rfl: 0     Assessment:        Patient Active Problem List   Diagnosis   • Chest pain, atypical   • Shortness of breath   • Syncope and collapse   • Weakness   • Coronary artery disease involving native heart with angina pectoris (CMS/HCC)   • Soft tissue lesion of shoulder region   • Disorder of rotator cuff   • Shoulder pain   • Rotator cuff tear arthropathy   • Unstable angina pectoris (CMS/HCC)   • Injury of kidney   • Type 2 diabetes mellitus (CMS/HCC)   • Hypertension   • Hypotension   • Hypovolemia   • Bilateral carotid artery disease (CMS/HCC)   • Medical non-compliance   • Dizziness   • Viral illness   • SOB (shortness of breath)   • Asthma exacerbation   • CKD (chronic kidney disease) stage 3, GFR 30-59 ml/min (CMS/HCC)   • Asthma   • Coronary artery disease involving native coronary artery of native heart with angina pectoris (CMS/HCC)   • Essential hypertension   • Abnormal nuclear stress test               Plan:   1.  CAD: Previous ischemic work-up as above.  She denies any chest pain at this time.  Continue beta-blockade, Effient, aspirin and statin.    Risk reduction for the coronary artery disease, controlling the blood pressure, blood sugar management, cholesterol management, exercise, stress management, and proper compliance with medications and follow-up has been discussed    2.  Hypertension: Her  blood pressure has been elevated sys 160's. Today it is 155/75. I will add norvasc. She will do a blood pressure diary and brings those results with her.  Continue beta-blockade and norvasc.    Educated patient on exercising for at least 30 minutes a day for 2 to 3 days a week. Importance of controlling hypertension and blood pressure checkup on the regular basis has been explained. Hypertension as a silent killer has been discussed. Risk reduction of the weight and regular exercises to control the hypertension has been explained.    3.  Sleep apnea: CPAP compliant        4. Bruit: Previous Doppler as above.      5. Obesity: BMI 34.01.  Discussed exercise and diet.       No diagnosis found.    There are no diagnoses linked to this encounter.    COUNSELING: tess Horner was given to patient for the following topics: diagnostic results, risk factor reductions, impressions, risks and benefits of treatment options and importance of treatment compliance .       SMOKING COUNSELING:  Denies    Add norvasc .She will follow-up in 2 weeks for results, unless she needs to be seen sooner.    Sincerely,   BRETT Beth  [facility]  08/24/21  11:45 EDT     EMR Dragon/Transcription disclaimer:   Much of this encounter note is an electronic transcription/translation of spoken language to printed text. The electronic translation of spoken language may permit erroneous, or at times, nonsensical words or phrases to be inadvertently transcribed; Although I have reviewed the note for such errors, some may still exist.

## 2021-09-17 ENCOUNTER — OFFICE VISIT (OUTPATIENT)
Dept: CARDIOLOGY | Facility: CLINIC | Age: 61
End: 2021-09-17

## 2021-09-17 VITALS
SYSTOLIC BLOOD PRESSURE: 137 MMHG | HEIGHT: 61 IN | BODY MASS INDEX: 34.36 KG/M2 | HEART RATE: 60 BPM | DIASTOLIC BLOOD PRESSURE: 76 MMHG | WEIGHT: 182 LBS

## 2021-09-17 DIAGNOSIS — Z01.30 BLOOD PRESSURE CHECK: ICD-10-CM

## 2021-09-17 DIAGNOSIS — G47.33 OBSTRUCTIVE SLEEP APNEA SYNDROME: ICD-10-CM

## 2021-09-17 DIAGNOSIS — I77.9 BILATERAL CAROTID ARTERY DISEASE, UNSPECIFIED TYPE (HCC): ICD-10-CM

## 2021-09-17 DIAGNOSIS — I10 ESSENTIAL HYPERTENSION: ICD-10-CM

## 2021-09-17 DIAGNOSIS — I25.119 CORONARY ARTERY DISEASE INVOLVING NATIVE HEART WITH ANGINA PECTORIS, UNSPECIFIED VESSEL OR LESION TYPE (HCC): ICD-10-CM

## 2021-09-17 DIAGNOSIS — R06.83 HABITUAL SNORING: Primary | ICD-10-CM

## 2021-09-17 PROCEDURE — 99213 OFFICE O/P EST LOW 20 MIN: CPT | Performed by: NURSE PRACTITIONER

## 2021-09-17 RX ORDER — LIDOCAINE 50 MG/G
PATCH TOPICAL
Status: ON HOLD | COMMUNITY
Start: 2021-09-16 | End: 2022-01-12

## 2021-09-17 RX ORDER — METHOCARBAMOL 750 MG/1
750 TABLET, FILM COATED ORAL AS NEEDED
COMMUNITY
Start: 2021-09-16 | End: 2022-02-02

## 2021-09-17 RX ORDER — DICLOFENAC POTASSIUM 50 MG/1
50 TABLET, FILM COATED ORAL AS NEEDED
COMMUNITY
Start: 2021-09-16

## 2021-09-17 NOTE — PROGRESS NOTES
Subjective:        Lily Unger is a 61 y.o. female who here for follow up    No chief complaint on file.    Blood pressure recheck    Coronary Artery Disease         Lily Unger is a 61-year-old female, who is current with me.  She has a history of CAD, diabetes mellitus, hiatal hernia, hyperlipidemia, hypertension, obesity, MILAGROS with CPAP compliance.  Lipid panel in 2019 revealed , HDL 40, LDL 40 and triglycerides 117.  Stress test in September 2020 revealed no ECG evidence of myocardial ischemia.  No clinical evidence of myocardial ischemia.  Cardiac cath in 2020 revealed LAD had a proximal stent widely patent with diagonal branch which was large was jailed, angioplasty with 2.25/12 trek balloon reduced to 99%.  The LAD also approximately 50% stenosis with minimal irregularity of the rest of the LAD diagonal and  branches.  Circumflex artery was nondominant with early vascular plaque, RCA was dominant with proximal 30% stenosis.  Successful angioplasty of the ostial diagonal branch 99% reduced to 10 to 20%. Carotid Doppler on August 2020 revealed proximal right and left carotid artery shows mild stenosis.  Echo reveals EF 63.3%, see full report as below.     History as above, reviewed and still relevant.     The following portions of the patient's history were reviewed and updated as appropriate: allergies, current medications, past family history, past medical history, past social history, past surgical history and problem list.    Past Medical History:   Diagnosis Date   • Abnormal stress test    • Asthma    • CAD (coronary artery disease)    • Crescendo angina (CMS/HCC)    • Diabetes mellitus (CMS/HCC)    • H/O angioplasty    • Hiatal hernia    • Hyperlipidemia    • Hypertension    • Obesity    • MILAGROS on CPAP    • Recurrent urinary tract infection    • Shoulder pain, right    • Torn rotator cuff          reports that she quit smoking about 29 years ago. Her smoking use included  cigarettes. She has never used smokeless tobacco. She reports current alcohol use. She reports that she does not use drugs.     Family History   Problem Relation Age of Onset   • Heart attack Father    • Heart disease Father    • Heart attack Brother    • Heart disease Brother    • Heart attack Maternal Grandfather    • Heart disease Maternal Grandfather    • Heart disease Mother    • Malig Hyperthermia Neg Hx        ROS     Review of Systems  Constitutional: No wt loss, fever, fatigue  Gastrointestinal: No nausea, abdominal pain  Behavioral/Psych: No insomnia or anxiety  Cardiovascular: Denies chest pain and palpitations. shortness of breath        Objective:           Physical Exam  Vitals and nursing note reviewed.   Constitutional:       Appearance: She is well-developed.   HENT:      Head: Normocephalic.      Right Ear: External ear normal.      Left Ear: External ear normal.   Neck:      Vascular: No JVD.   Cardiovascular:      Rate and Rhythm: Normal rate and regular rhythm.      Pulses: Intact distal pulses.      Heart sounds: Normal heart sounds. No murmur heard.   No friction rub. No gallop.    Pulmonary:      Effort: Pulmonary effort is normal. No respiratory distress.      Breath sounds: Normal breath sounds. No stridor. No rales.   Abdominal:      General: Bowel sounds are normal. There is no distension.      Palpations: Abdomen is soft.      Tenderness: There is no abdominal tenderness. There is no guarding.   Musculoskeletal:         General: No tenderness. Normal range of motion.      Cervical back: Normal range of motion.   Skin:     General: Skin is warm.   Neurological:      Mental Status: She is alert and oriented to person, place, and time.      Deep Tendon Reflexes: Reflexes are normal and symmetric.   Psychiatric:         Judgment: Judgment normal.          Procedures     Interpretation Summary     · Calculated EF = 63.3%.  · There is no evidence of pericardial effusion         Interpretation  Summary     · Proximal right internal carotid artery mild stenosis.  · Proximal left internal carotid artery mild stenosis.     HEMODYNAMIC / ANGIOGRAPHIC DATA:    1. Left ventricular end diastolic pressure was 10 mmHg.  2. Left ventriculography revealed an EF around 60 %.    3. The left main is normal left main.  4. The left anterior descending artery is *.Left anterior descending had a proximal stent widely patent with the diagonal branch which was large which was jailed, was angioplastied with a 2.25/12 trek balloon reduced from 99% down to the 10 to 20%, mid LAD also had approximately 50% stenosis with minimum irregularity of the rest of the LAD diagonal and  branches  5. The left circumflex is .The circumflex artery was a nondominant with early atherosclerotic plaque  6. The right coronary artery is .Right coronary artery was dominant with a proximal 30% stenosis  7. Successful angioplasty of the ostial diagonal branch 99% reduced to 10 to 20% with 2.25/12 trek balloon     DIMITRIOS FLOW    PRE.. 3.....       POST.... 3..     TYPE OF LESION........C.....     RECOMMENDATIONS:  Post-procedure care will focus on prevention of any ischemic events and congestive complications.  Aggressive risk factor modification will be carried out.     Importance of taking dual antiplatelets for one year  has been explained, risk of stent thrombosis leading to the acute MI, which carries high morbidity and mortality has been explained     Discontinuation or interruptions of these medications should be under the strict guidance of appropriate health professional         Interpretation Summary        · Findings consistent with an equivocal ECG stress test.  · Left ventricular ejection fraction is normal (Calculated EF = 60%).  · Myocardial perfusion imaging indicates a small-sized, mildly severe area of ischemia located in the anterior wall.  · Impressions are consistent with an intermediate risk study.     Asymptomatic for  chest pain. Specificity of study reduced secondary to motion artifact due to poor exercise tolerance.  ECG is  equivocal for  suggestion of ischemia  Ectopy: Rare PVC at baseline.   Blood pressure response:  Baseline Hypertensive  150/74, Peak (post Lexiscan) 130/74, Recovery:130//63  Pharmacologic study due to inability to tolerate increasing speed and grade of treadmill due to c/o SOA with minimal exertion and Beta-blocker therapy.  Participated in Low Level exercise and tolerance is poor.     Supervised by: Dr. Harper                 Interpretation Summary        · Findings consistent with an equivocal ECG stress test.  · Left ventricular ejection fraction is normal (Calculated EF = 70%).  · Myocardial perfusion imaging indicates a normal myocardial perfusion study with no evidence of ischemia.  · Impressions are consistent with a low risk study.     Interpretation Summary     · Left atrial cavity size is mildly dilated.  · Mild mitral valve regurgitation is present  · Mild tricuspid valve regurgitation is present.  · Calculated EF = 66%.  · There is no evidence of pericardial effusion        HEMODYNAMIC / ANGIOGRAPHIC DATA:    1. Pulmonary capillary wedge pressure was 12.   2. Pulmonary artery systolic pressure was 35/15.  3. Right atrial pressure was 8.  4. Cardiac index was 2.1.  5. Left ventricular end diastolic pressure was 10 mmHg.  6. Left ventriculography revealed the EF to be 50%.  7. The left main is normal.  8. The LAD is .Left anterior descending shows minimum irregularity with a midportion 30-40% stenosis  9. Circumflex artery was a nondominant with known atherosclerotic stenosis  10. The right coronary artery is .Right coronary artery was a dominant with ostial 90% was reduced to 0% with 3.5/8 Xience stent  11. Mild pulmonary hypertension  Successful angioplasty and stent to the ostial RCA 90% reduced to 0% with 3.5/8 Xience stent, type of the lesion  b2  RECOMMENDATIONS:  Post-procedure  care will focus on prevention of any ischemic events and congestive complications.       Importance of taking dual antiplatelets for one year  has been explained, risk of stent thrombosis leading to the acute MI, which carries high morbidity and mortality has been explained     Discontinuation or interruptions of these medications should be under the strict guidance of appropriate health professional      Current Outpatient Medications:   •  albuterol (PROVENTIL) (2.5 MG/3ML) 0.083% nebulizer solution, Take 2.5 mg by nebulization Every 6 (Six) Hours As Needed., Disp: , Rfl: 11  •  amLODIPine (NORVASC) 5 MG tablet, Take 1 tablet by mouth Daily for 30 days., Disp: 90 tablet, Rfl: 3  •  aspirin 81 MG EC tablet, Take 81 mg by mouth daily., Disp: , Rfl:   •  bisoprolol (ZEBeta) 5 MG tablet, TAKE 1/2 TABLET BY MOUTH EVERY DAY, Disp: 45 tablet, Rfl: 0  •  DULoxetine (CYMBALTA) 30 MG capsule, Take 30 mg by mouth Daily., Disp: , Rfl: 5  •  Ertugliflozin L-PyroglutamicAc (STEGLATRO) 15 MG tablet, Take 15 mg by mouth Every Morning., Disp: , Rfl:   •  Insulin Degludec (TRESIBA SC), Inject 20 mg under the skin into the appropriate area as directed Every Night., Disp: , Rfl:   •  metFORMIN (GLUCOPHAGE) 1000 MG tablet, Take 1,000 mg by mouth 2 (Two) Times a Day., Disp: , Rfl: 0  •  Multiple Vitamins-Minerals (MULTIVITAMIN WITH MINERALS) tablet tablet, Take 1 tablet by mouth Daily., Disp: , Rfl:   •  nitroglycerin (NITROSTAT) 0.4 MG SL tablet, PLACE 1 TABLET UNDER THE TONGUE SEE ADMIN INSTRUCTIONS., Disp: 25 tablet, Rfl: 0  •  NovoFine 32G X 6 MM misc, USE 1 (ONE) EACH DAILY, Disp: , Rfl:   •  Omega-3 Fatty Acids (FISH OIL) 1000 MG capsule capsule, Take 1,000 mg by mouth Daily With Breakfast., Disp: , Rfl:   •  omeprazole (priLOSEC) 40 MG capsule, Take 40 mg by mouth Daily., Disp: , Rfl:   •  OneTouch Delica Lancets 30G misc, TEST 1 (ONE) EACH DAILY, Disp: , Rfl:   •  ONETOUCH VERIO test strip, , Disp: , Rfl: 0  •  pregabalin  (LYRICA) 150 MG capsule, Take 150 mg by mouth 2 (Two) Times a Day., Disp: , Rfl:   •  rosuvastatin (CRESTOR) 10 MG tablet, TAKE 1 TABLET BY MOUTH EVERY DAY (Patient taking differently: Take 10 mg by mouth Every Night.), Disp: 90 tablet, Rfl: 1  •  SITagliptin (JANUVIA) 100 MG tablet, Take 100 mg by mouth Daily., Disp: , Rfl:   •  SUMAtriptan (IMITREX) 50 MG tablet, Take 50 mg by mouth Every 2 (Two) Hours As Needed for Migraine. Take one tablet at onset of headache. May repeat dose one time in 2 hours if headache not relieved., Disp: , Rfl:   •  SYMBICORT 160-4.5 MCG/ACT inhaler, Take 2 puffs by mouth 2 (Two) Times a Day As Needed., Disp: , Rfl: 0  •  VENTOLIN  (90 BASE) MCG/ACT inhaler, Inhale 1 puff Every 4 (Four) Hours As Needed., Disp: , Rfl: 1     Assessment:        Patient Active Problem List   Diagnosis   • Chest pain, atypical   • Shortness of breath   • Syncope and collapse   • Weakness   • Coronary artery disease involving native heart with angina pectoris (CMS/HCC)   • Soft tissue lesion of shoulder region   • Disorder of rotator cuff   • Shoulder pain   • Rotator cuff tear arthropathy   • Unstable angina pectoris (CMS/HCC)   • Injury of kidney   • Type 2 diabetes mellitus (CMS/HCC)   • Hypertension   • Hypotension   • Hypovolemia   • Bilateral carotid artery disease (CMS/HCC)   • Medical non-compliance   • Dizziness   • Viral illness   • SOB (shortness of breath)   • Asthma exacerbation   • CKD (chronic kidney disease) stage 3, GFR 30-59 ml/min (CMS/HCC)   • Asthma   • Coronary artery disease involving native coronary artery of native heart with angina pectoris (CMS/HCC)   • Essential hypertension   • Abnormal nuclear stress test               Plan:   1. Hypertension: Here today for blood pressure recheck after medication adjustment. Controlled on beta-blockade and Norvasc.    Educated patient on exercising for at least 30 minutes a day for 2 to 3 days a week. Importance of controlling hypertension  and blood pressure checkup on the regular basis has been explained. Hypertension as a silent killer has been discussed. Risk reduction of the weight and regular exercises to control the hypertension has been explained.      2. CAD: Previous work-up as above. She denies any chest pain at this time. Continue aspirin, statin, beta-blockade. Her PCP manages her cholesterol and labs.    Risk reduction for the coronary artery disease, controlling the blood pressure, blood sugar management, cholesterol management, exercise, stress management, and proper compliance with medications and follow-up has been discussed    3.  Sleep apnea: CPAP compliant        4. Bruit: Previous Doppler as above.      5. Obesity: BMI 34.01.  Discussed exercise and diet.       No diagnosis found.    There are no diagnoses linked to this encounter.    COUNSELING: tess Horner was given to patient for the following topics: diagnostic results, risk factor reductions, impressions, risks and benefits of treatment options and importance of treatment compliance .       SMOKING COUNSELING:  Denies     She will follow-up in 6 months, unless she needs to be seen sooner.    Sincerely,   BRETT Beth  [facility]  09/17/21  09:36 EDT     EMR Dragon/Transcription disclaimer:   Much of this encounter note is an electronic transcription/translation of spoken language to printed text. The electronic translation of spoken language may permit erroneous, or at times, nonsensical words or phrases to be inadvertently transcribed; Although I have reviewed the note for such errors, some may still exist.

## 2021-10-01 RX ORDER — ROSUVASTATIN CALCIUM 10 MG/1
TABLET, COATED ORAL
Qty: 90 TABLET | Refills: 1 | Status: SHIPPED | OUTPATIENT
Start: 2021-10-01 | End: 2022-03-30

## 2021-12-13 ENCOUNTER — OFFICE VISIT (OUTPATIENT)
Dept: CARDIOLOGY | Facility: CLINIC | Age: 61
End: 2021-12-13

## 2021-12-13 VITALS
HEIGHT: 61 IN | SYSTOLIC BLOOD PRESSURE: 152 MMHG | BODY MASS INDEX: 33.99 KG/M2 | DIASTOLIC BLOOD PRESSURE: 72 MMHG | WEIGHT: 180 LBS | HEART RATE: 57 BPM

## 2021-12-13 DIAGNOSIS — I10 ESSENTIAL HYPERTENSION: ICD-10-CM

## 2021-12-13 DIAGNOSIS — R07.2 PRECORDIAL PAIN: Primary | ICD-10-CM

## 2021-12-13 DIAGNOSIS — I25.119 CORONARY ARTERY DISEASE INVOLVING NATIVE CORONARY ARTERY OF NATIVE HEART WITH ANGINA PECTORIS (HCC): ICD-10-CM

## 2021-12-13 PROCEDURE — 99213 OFFICE O/P EST LOW 20 MIN: CPT | Performed by: INTERNAL MEDICINE

## 2021-12-13 RX ORDER — AMLODIPINE BESYLATE 5 MG/1
TABLET ORAL
COMMUNITY
Start: 2021-11-19 | End: 2022-04-05

## 2021-12-13 NOTE — PROGRESS NOTES
ER Follow up    Subjective:        Lily Unger is a 61 y.o. female who here for follow up    CC  Er visit for sweats  Dizziness  Occasional cp  Can not sleep with cp and sob  HPI  61-year-old female with known history of coronary artery disease, hypertension and atypical chest pain went to the emergency room with the sweats dizziness occasional chest pains     Problems Addressed this Visit        Cardiac and Vasculature    Coronary artery disease involving native coronary artery of native heart with angina pectoris (HCC)    Relevant Medications    amLODIPine (NORVASC) 5 MG tablet    Essential hypertension    Relevant Medications    amLODIPine (NORVASC) 5 MG tablet      Other Visit Diagnoses     Precordial pain    -  Primary    Relevant Orders    Stress Test With Myocardial Perfusion One Day    Adult Transthoracic Echo Complete W/ Cont if Necessary Per Protocol      Diagnoses       Codes Comments    Precordial pain    -  Primary ICD-10-CM: R07.2  ICD-9-CM: 786.51     Coronary artery disease involving native coronary artery of native heart with angina pectoris (HCC)     ICD-10-CM: I25.119  ICD-9-CM: 414.01, 413.9     Essential hypertension     ICD-10-CM: I10  ICD-9-CM: 401.9         .    The following portions of the patient's history were reviewed and updated as appropriate: allergies, current medications, past family history, past medical history, past social history, past surgical history and problem list.    Past Medical History:   Diagnosis Date   • Abnormal stress test    • Asthma    • CAD (coronary artery disease)    • Crescendo angina (HCC)    • Diabetes mellitus (HCC)    • H/O angioplasty    • Hiatal hernia    • Hyperlipidemia    • Hypertension    • Obesity    • MILAGROS on CPAP    • Recurrent urinary tract infection    • Shoulder pain, right    • Torn rotator cuff      reports that she quit smoking about 29 years ago. Her smoking use included cigarettes. She has never used smokeless tobacco. She reports  "current alcohol use. She reports that she does not use drugs.   Family History   Problem Relation Age of Onset   • Heart attack Father    • Heart disease Father    • Heart attack Brother    • Heart disease Brother    • Heart attack Maternal Grandfather    • Heart disease Maternal Grandfather    • Heart disease Mother    • Malig Hyperthermia Neg Hx        Review of Systems  Constitutional: No wt loss, fever, fatigue  Gastrointestinal: No nausea, abdominal pain  Behavioral/Psych: No insomnia or anxiety   Cardiovascular chest pain and shortness of breath  Objective:       Physical Exam  /72   Pulse 57   Ht 154.9 cm (61\")   Wt 81.6 kg (180 lb)   BMI 34.01 kg/m²   General appearance: No acute changes   Neck: Trachea midline; NECK, supple, no thyromegaly or lymphadenopathy   Lungs: Normal size and shape, normal breath sounds, equal distribution of air, no rales and rhonchi   CV: S1-S2 regular, no murmurs, no rub, no gallop   Abdomen: Soft, nontender; no masses , no abnormal abdominal sounds   Extremities: No deformity , normal color , no peripheral edema   Skin: Normal temperature, turgor and texture; no rash, ulcers          Procedures      Echocardiogram:        Current Outpatient Medications:   •  albuterol (PROVENTIL) (2.5 MG/3ML) 0.083% nebulizer solution, Take 2.5 mg by nebulization Every 6 (Six) Hours As Needed., Disp: , Rfl: 11  •  amLODIPine (NORVASC) 5 MG tablet, , Disp: , Rfl:   •  aspirin 81 MG EC tablet, Take 81 mg by mouth daily., Disp: , Rfl:   •  bisoprolol (ZEBeta) 5 MG tablet, TAKE 1/2 TABLET BY MOUTH EVERY DAY, Disp: 45 tablet, Rfl: 0  •  diclofenac (CATAFLAM) 50 MG tablet, 50 mg., Disp: , Rfl:   •  DULoxetine (CYMBALTA) 30 MG capsule, Take 30 mg by mouth Daily., Disp: , Rfl: 5  •  Ertugliflozin L-PyroglutamicAc (STEGLATRO) 15 MG tablet, Take 15 mg by mouth Every Morning., Disp: , Rfl:   •  Insulin Degludec (TRESIBA SC), Inject 20 mg under the skin into the appropriate area as directed Every " Night., Disp: , Rfl:   •  lidocaine (Lidoderm) 5 %, , Disp: , Rfl:   •  metFORMIN (GLUCOPHAGE) 1000 MG tablet, Take 1,000 mg by mouth 2 (Two) Times a Day., Disp: , Rfl: 0  •  methocarbamol (ROBAXIN) 750 MG tablet, 750 mg., Disp: , Rfl:   •  Multiple Vitamins-Minerals (MULTIVITAMIN WITH MINERALS) tablet tablet, Take 1 tablet by mouth Daily., Disp: , Rfl:   •  nitroglycerin (NITROSTAT) 0.4 MG SL tablet, PLACE 1 TABLET UNDER THE TONGUE SEE ADMIN INSTRUCTIONS., Disp: 25 tablet, Rfl: 0  •  NovoFine 32G X 6 MM misc, USE 1 (ONE) EACH DAILY, Disp: , Rfl:   •  Omega-3 Fatty Acids (FISH OIL) 1000 MG capsule capsule, Take 1,000 mg by mouth Daily With Breakfast., Disp: , Rfl:   •  omeprazole (priLOSEC) 40 MG capsule, Take 40 mg by mouth Daily., Disp: , Rfl:   •  OneTouch Delica Lancets 30G misc, TEST 1 (ONE) EACH DAILY, Disp: , Rfl:   •  ONETOUCH VERIO test strip, , Disp: , Rfl: 0  •  pregabalin (LYRICA) 150 MG capsule, Take 150 mg by mouth 2 (Two) Times a Day., Disp: , Rfl:   •  rosuvastatin (CRESTOR) 10 MG tablet, TAKE 1 TABLET BY MOUTH EVERY DAY, Disp: 90 tablet, Rfl: 1  •  SITagliptin (JANUVIA) 100 MG tablet, Take 100 mg by mouth Daily., Disp: , Rfl:   •  SUMAtriptan (IMITREX) 50 MG tablet, Take 50 mg by mouth Every 2 (Two) Hours As Needed for Migraine. Take one tablet at onset of headache. May repeat dose one time in 2 hours if headache not relieved., Disp: , Rfl:   •  SYMBICORT 160-4.5 MCG/ACT inhaler, Take 2 puffs by mouth 2 (Two) Times a Day As Needed., Disp: , Rfl: 0  •  VENTOLIN  (90 BASE) MCG/ACT inhaler, Inhale 1 puff Every 4 (Four) Hours As Needed., Disp: , Rfl: 1   Assessment:        Patient Active Problem List   Diagnosis   • Chest pain, atypical   • Shortness of breath   • Syncope and collapse   • Weakness   • Coronary artery disease involving native heart with angina pectoris (HCC)   • Soft tissue lesion of shoulder region   • Disorder of rotator cuff   • Shoulder pain   • Rotator cuff tear arthropathy    • Unstable angina pectoris (HCC)   • Injury of kidney   • Type 2 diabetes mellitus (HCC)   • Hypertension   • Hypotension   • Hypovolemia   • Bilateral carotid artery disease (HCC)   • Medical non-compliance   • Dizziness   • Viral illness   • SOB (shortness of breath)   • Asthma exacerbation   • CKD (chronic kidney disease) stage 3, GFR 30-59 ml/min (HCC)   • Asthma   • Coronary artery disease involving native coronary artery of native heart with angina pectoris (HCC)   • Essential hypertension   • Abnormal nuclear stress test               Plan:            ICD-10-CM ICD-9-CM   1. Precordial pain  R07.2 786.51   2. Coronary artery disease involving native coronary artery of native heart with angina pectoris (HCC)  I25.119 414.01     413.9   3. Essential hypertension  I10 401.9     1. Precordial pain  Considering the patient's symptoms as well as clinical situation and  EKG findings, along with cardiac risk factors, ischemic workup is necessary to rule out ischemic cardiomyopathy, stress induced arrhythmias, and functional capacity for diagnosis as well as prognostic consideration    - Stress Test With Myocardial Perfusion One Day  - Adult Transthoracic Echo Complete W/ Cont if Necessary Per Protocol    2. Coronary artery disease involving native coronary artery of native heart with angina pectoris (HCC)  Considering patient's medical condition as well as the risk factors, patient will require echocardiogram for further evaluation for the LV function, four-chamber evaluation, including the pressures, valvular function and  pericardial disease and pericardial effusion      3. Essential hypertension  Blood pressure under control       Walking rene, echo  Follow up  COUNSELING:    Lily Horner was given to patient for the following topics: diagnostic results, risk factor reductions, impressions, risks and benefits of treatment options and importance of treatment compliance .       SMOKING  COUNSELING:    [unfilled]    Dictated using Dragon dictation

## 2021-12-20 RX ORDER — BISOPROLOL FUMARATE 5 MG/1
TABLET, FILM COATED ORAL
Qty: 45 TABLET | Refills: 1 | Status: SHIPPED | OUTPATIENT
Start: 2021-12-20 | End: 2022-06-13

## 2021-12-20 NOTE — TELEPHONE ENCOUNTER
Rx Refill Note  Requested Prescriptions     Pending Prescriptions Disp Refills   • bisoprolol (ZEBeta) 5 MG tablet [Pharmacy Med Name: BISOPROLOL FUMARATE 5 MG TAB] 45 tablet 1     Sig: TAKE 1/2 TABLET BY MOUTH EVERY DAY      Last office visit with prescribing clinician: 12/13/2021      Next office visit with prescribing clinician: 12/27/2021            Daysi Landa, [facility]  12/20/21, 08:47 EST

## 2021-12-21 ENCOUNTER — HOSPITAL ENCOUNTER (OUTPATIENT)
Dept: CARDIOLOGY | Facility: HOSPITAL | Age: 61
Discharge: HOME | End: 2021-12-21

## 2021-12-21 ENCOUNTER — HOSPITAL ENCOUNTER (OUTPATIENT)
Dept: CARDIOLOGY | Facility: HOSPITAL | Age: 61
Discharge: HOME | End: 2021-12-21
Admitting: INTERNAL MEDICINE

## 2021-12-21 VITALS
BODY MASS INDEX: 33.99 KG/M2 | HEART RATE: 56 BPM | SYSTOLIC BLOOD PRESSURE: 130 MMHG | DIASTOLIC BLOOD PRESSURE: 60 MMHG | WEIGHT: 180 LBS | RESPIRATION RATE: 16 BRPM | HEIGHT: 61 IN

## 2021-12-21 VITALS
DIASTOLIC BLOOD PRESSURE: 68 MMHG | HEIGHT: 61 IN | SYSTOLIC BLOOD PRESSURE: 168 MMHG | BODY MASS INDEX: 33.99 KG/M2 | WEIGHT: 180 LBS | HEART RATE: 59 BPM

## 2021-12-21 LAB
ASCENDING AORTA: 2.8 CM
BH CV ECHO MEAS - ACS: 2 CM
BH CV ECHO MEAS - AO MAX PG (FULL): 3.3 MMHG
BH CV ECHO MEAS - AO MAX PG: 7 MMHG
BH CV ECHO MEAS - AO MEAN PG (FULL): 2.2 MMHG
BH CV ECHO MEAS - AO MEAN PG: 3.7 MMHG
BH CV ECHO MEAS - AO ROOT AREA (BSA CORRECTED): 1.5
BH CV ECHO MEAS - AO ROOT AREA: 6.1 CM^2
BH CV ECHO MEAS - AO ROOT DIAM: 2.8 CM
BH CV ECHO MEAS - AO V2 MAX: 132.7 CM/SEC
BH CV ECHO MEAS - AO V2 MEAN: 91.8 CM/SEC
BH CV ECHO MEAS - AO V2 VTI: 32 CM
BH CV ECHO MEAS - ASC AORTA: 2.8 CM
BH CV ECHO MEAS - AVA(I,A): 1.8 CM^2
BH CV ECHO MEAS - AVA(I,D): 1.8 CM^2
BH CV ECHO MEAS - AVA(V,A): 2 CM^2
BH CV ECHO MEAS - AVA(V,D): 2 CM^2
BH CV ECHO MEAS - BSA(HAYCOCK): 1.9 M^2
BH CV ECHO MEAS - BSA: 1.8 M^2
BH CV ECHO MEAS - BZI_BMI: 34 KILOGRAMS/M^2
BH CV ECHO MEAS - BZI_METRIC_HEIGHT: 154.9 CM
BH CV ECHO MEAS - BZI_METRIC_WEIGHT: 81.6 KG
BH CV ECHO MEAS - EDV(CUBED): 92.9 ML
BH CV ECHO MEAS - EDV(MOD-SP2): 51 ML
BH CV ECHO MEAS - EDV(MOD-SP4): 54 ML
BH CV ECHO MEAS - EDV(TEICH): 93.9 ML
BH CV ECHO MEAS - EF(CUBED): 74.9 %
BH CV ECHO MEAS - EF(MOD-BP): 61 %
BH CV ECHO MEAS - EF(MOD-SP2): 60.8 %
BH CV ECHO MEAS - EF(MOD-SP4): 61.1 %
BH CV ECHO MEAS - EF(TEICH): 66.9 %
BH CV ECHO MEAS - ESV(CUBED): 23.4 ML
BH CV ECHO MEAS - ESV(MOD-SP2): 20 ML
BH CV ECHO MEAS - ESV(MOD-SP4): 21 ML
BH CV ECHO MEAS - ESV(TEICH): 31.1 ML
BH CV ECHO MEAS - FS: 36.9 %
BH CV ECHO MEAS - IVS/LVPW: 0.75
BH CV ECHO MEAS - IVSD: 0.95 CM
BH CV ECHO MEAS - LA DIMENSION: 3.4 CM
BH CV ECHO MEAS - LA/AO: 1.2
BH CV ECHO MEAS - LAT PEAK E' VEL: 9.5 CM/SEC
BH CV ECHO MEAS - LV DIASTOLIC VOL/BSA (35-75): 29.9 ML/M^2
BH CV ECHO MEAS - LV MASS(C)D: 179.8 GRAMS
BH CV ECHO MEAS - LV MASS(C)DI: 99.5 GRAMS/M^2
BH CV ECHO MEAS - LV MAX PG: 3.8 MMHG
BH CV ECHO MEAS - LV MEAN PG: 1.5 MMHG
BH CV ECHO MEAS - LV SYSTOLIC VOL/BSA (12-30): 11.6 ML/M^2
BH CV ECHO MEAS - LV V1 MAX: 97 CM/SEC
BH CV ECHO MEAS - LV V1 MEAN: 55.7 CM/SEC
BH CV ECHO MEAS - LV V1 VTI: 21.2 CM
BH CV ECHO MEAS - LVIDD: 4.5 CM
BH CV ECHO MEAS - LVIDS: 2.9 CM
BH CV ECHO MEAS - LVLD AP2: 5.5 CM
BH CV ECHO MEAS - LVLD AP4: 6.5 CM
BH CV ECHO MEAS - LVLS AP2: 4.4 CM
BH CV ECHO MEAS - LVLS AP4: 5.1 CM
BH CV ECHO MEAS - LVOT AREA (M): 2.8 CM^2
BH CV ECHO MEAS - LVOT AREA: 2.7 CM^2
BH CV ECHO MEAS - LVOT DIAM: 1.9 CM
BH CV ECHO MEAS - LVPWD: 1.3 CM
BH CV ECHO MEAS - MED PEAK E' VEL: 7.5 CM/SEC
BH CV ECHO MEAS - MR MAX PG: 74.6 MMHG
BH CV ECHO MEAS - MR MAX VEL: 431.9 CM/SEC
BH CV ECHO MEAS - MV A DUR: 0.11 SEC
BH CV ECHO MEAS - MV A MAX VEL: 84.9 CM/SEC
BH CV ECHO MEAS - MV DEC SLOPE: 225.9 CM/SEC^2
BH CV ECHO MEAS - MV DEC TIME: 224 SEC
BH CV ECHO MEAS - MV E MAX VEL: 78.6 CM/SEC
BH CV ECHO MEAS - MV E/A: 0.93
BH CV ECHO MEAS - MV MAX PG: 3.7 MMHG
BH CV ECHO MEAS - MV MEAN PG: 1.5 MMHG
BH CV ECHO MEAS - MV P1/2T MAX VEL: 101.1 CM/SEC
BH CV ECHO MEAS - MV P1/2T: 131 MSEC
BH CV ECHO MEAS - MV V2 MAX: 96.5 CM/SEC
BH CV ECHO MEAS - MV V2 MEAN: 56.3 CM/SEC
BH CV ECHO MEAS - MV V2 VTI: 32.9 CM
BH CV ECHO MEAS - MVA P1/2T LCG: 2.2 CM^2
BH CV ECHO MEAS - MVA(P1/2T): 1.7 CM^2
BH CV ECHO MEAS - MVA(VTI): 1.8 CM^2
BH CV ECHO MEAS - PA ACC TIME: 0.1 SEC
BH CV ECHO MEAS - PA MAX PG (FULL): 0.82 MMHG
BH CV ECHO MEAS - PA MAX PG: 4.3 MMHG
BH CV ECHO MEAS - PA PR(ACCEL): 33.8 MMHG
BH CV ECHO MEAS - PA V2 MAX: 103.2 CM/SEC
BH CV ECHO MEAS - PVA(V,A): 2.8 CM^2
BH CV ECHO MEAS - PVA(V,D): 2.8 CM^2
BH CV ECHO MEAS - QP/QS: 1.2
BH CV ECHO MEAS - RV MAX PG: 3.4 MMHG
BH CV ECHO MEAS - RV MEAN PG: 2.1 MMHG
BH CV ECHO MEAS - RV V1 MAX: 92.6 CM/SEC
BH CV ECHO MEAS - RV V1 MEAN: 67.8 CM/SEC
BH CV ECHO MEAS - RV V1 VTI: 22.7 CM
BH CV ECHO MEAS - RVDD: 2.8 CM
BH CV ECHO MEAS - RVOT AREA: 3.1 CM^2
BH CV ECHO MEAS - RVOT DIAM: 2 CM
BH CV ECHO MEAS - SI(AO): 108.5 ML/M^2
BH CV ECHO MEAS - SI(CUBED): 38.5 ML/M^2
BH CV ECHO MEAS - SI(LVOT): 31.9 ML/M^2
BH CV ECHO MEAS - SI(MOD-SP2): 17.2 ML/M^2
BH CV ECHO MEAS - SI(MOD-SP4): 18.3 ML/M^2
BH CV ECHO MEAS - SI(TEICH): 34.8 ML/M^2
BH CV ECHO MEAS - SV(AO): 196 ML
BH CV ECHO MEAS - SV(CUBED): 69.6 ML
BH CV ECHO MEAS - SV(LVOT): 57.7 ML
BH CV ECHO MEAS - SV(MOD-SP2): 31 ML
BH CV ECHO MEAS - SV(MOD-SP4): 33 ML
BH CV ECHO MEAS - SV(RVOT): 69.8 ML
BH CV ECHO MEAS - SV(TEICH): 62.8 ML
BH CV ECHO MEAS - TAPSE (>1.6): 2.7 CM
BH CV ECHO MEASUREMENTS AVERAGE E/E' RATIO: 9.25
BH CV XLRA - RV BASE: 3.4 CM
BH CV XLRA - RV LENGTH: 5.6 CM
BH CV XLRA - RV MID: 2.4 CM
LEFT ATRIUM VOLUME INDEX: 23.3 ML/M2
SINUS: 2.8 CM
STJ: 2.8 CM

## 2021-12-21 PROCEDURE — 93306 TTE W/DOPPLER COMPLETE: CPT | Performed by: INTERNAL MEDICINE

## 2021-12-21 PROCEDURE — 78452 HT MUSCLE IMAGE SPECT MULT: CPT | Performed by: INTERNAL MEDICINE

## 2021-12-21 PROCEDURE — 25010000002 REGADENOSON 0.4 MG/5ML SOLUTION: Performed by: INTERNAL MEDICINE

## 2021-12-21 PROCEDURE — 0 TECHNETIUM SESTAMIBI: Performed by: INTERNAL MEDICINE

## 2021-12-21 PROCEDURE — 78452 HT MUSCLE IMAGE SPECT MULT: CPT

## 2021-12-21 PROCEDURE — 93018 CV STRESS TEST I&R ONLY: CPT | Performed by: INTERNAL MEDICINE

## 2021-12-21 PROCEDURE — 93306 TTE W/DOPPLER COMPLETE: CPT

## 2021-12-21 PROCEDURE — A9500 TC99M SESTAMIBI: HCPCS | Performed by: INTERNAL MEDICINE

## 2021-12-21 PROCEDURE — 93017 CV STRESS TEST TRACING ONLY: CPT

## 2021-12-21 RX ADMIN — TECHNETIUM TC 99M SESTAMIBI 1 DOSE: 1 INJECTION INTRAVENOUS at 08:16

## 2021-12-21 RX ADMIN — TECHNETIUM TC 99M SESTAMIBI 1 DOSE: 1 INJECTION INTRAVENOUS at 11:51

## 2021-12-21 RX ADMIN — REGADENOSON 0.4 MG: 0.08 INJECTION, SOLUTION INTRAVENOUS at 11:51

## 2021-12-23 ENCOUNTER — TELEPHONE (OUTPATIENT)
Dept: CARDIOLOGY | Facility: CLINIC | Age: 61
End: 2021-12-23

## 2021-12-23 LAB
BH CV IMMEDIATE POST TECH DATA BLOOD PRESSURE: NORMAL MMHG
BH CV IMMEDIATE POST TECH DATA HEART RATE: 91 BPM
BH CV REST NUCLEAR ISOTOPE DOSE: 10.9 MCI
BH CV STRESS BP STAGE 1: NORMAL
BH CV STRESS COMMENTS STAGE 1: NORMAL
BH CV STRESS DOSE REGADENOSON STAGE 1: 0.4
BH CV STRESS DURATION MIN STAGE 1: 1
BH CV STRESS DURATION SEC STAGE 1: 55
BH CV STRESS GRADE STAGE 1: 0
BH CV STRESS HR STAGE 1: 93
BH CV STRESS METS STAGE 1: 1.6
BH CV STRESS NUCLEAR ISOTOPE DOSE: 30.1 MCI
BH CV STRESS PROTOCOL 1: NORMAL
BH CV STRESS RECOVERY BP: NORMAL MMHG
BH CV STRESS RECOVERY HR: 68 BPM
BH CV STRESS SPEED STAGE 1: 0.9
BH CV STRESS STAGE 1: 1
BH CV THREE MINUTE POST TECH DATA BLOOD PRESSURE: NORMAL MMHG
BH CV THREE MINUTE POST TECH DATA HEART RATE: 73 BPM
LV EF NUC BP: 60 %
MAXIMAL PREDICTED HEART RATE: 157 BPM
PERCENT MAX PREDICTED HR: 59.24 %
STRESS BASELINE BP: NORMAL MMHG
STRESS BASELINE HR: 55 BPM
STRESS PERCENT HR: 70 %
STRESS POST ESTIMATED WORKLOAD: 1.6 METS
STRESS POST EXERCISE DUR MIN: 1 MIN
STRESS POST EXERCISE DUR SEC: 55 SEC
STRESS POST PEAK BP: NORMAL MMHG
STRESS POST PEAK HR: 93 BPM
STRESS TARGET HR: 133 BPM

## 2021-12-23 NOTE — TELEPHONE ENCOUNTER
----- Message from Yolanda Rodriguez sent at 12/22/2021 11:59 AM EST -----  Regarding: RESULTS/CLEARANCE 1/5  Contact: [phone]  HAD APPT ON 12/27, CALL WITH RESULTS FOR SURGERY ON 1/5    Per Dr. LOPEZ patient needs an office visit before being cleared.     VM was full and will continue to call.     Vm no longer in use. 12-30-21

## 2022-01-03 ENCOUNTER — OFFICE VISIT (OUTPATIENT)
Dept: CARDIOLOGY | Facility: CLINIC | Age: 62
End: 2022-01-03

## 2022-01-03 VITALS
HEIGHT: 61 IN | DIASTOLIC BLOOD PRESSURE: 71 MMHG | WEIGHT: 179 LBS | BODY MASS INDEX: 33.79 KG/M2 | HEART RATE: 58 BPM | SYSTOLIC BLOOD PRESSURE: 128 MMHG

## 2022-01-03 DIAGNOSIS — R94.39 ABNORMAL NUCLEAR STRESS TEST: ICD-10-CM

## 2022-01-03 DIAGNOSIS — I10 PRIMARY HYPERTENSION: ICD-10-CM

## 2022-01-03 DIAGNOSIS — I25.119 CORONARY ARTERY DISEASE INVOLVING NATIVE CORONARY ARTERY OF NATIVE HEART WITH ANGINA PECTORIS: Primary | ICD-10-CM

## 2022-01-03 DIAGNOSIS — Z51.81 ENCOUNTER FOR THERAPEUTIC DRUG MONITORING: ICD-10-CM

## 2022-01-03 PROCEDURE — 99214 OFFICE O/P EST MOD 30 MIN: CPT | Performed by: INTERNAL MEDICINE

## 2022-01-03 NOTE — H&P (VIEW-ONLY)
TEST RESULTS, CLEARANCE FOR SHOULDER SURGERY ON 1/5/22 DR. DOUGLAS   Subjective:        Lily Unger is a 61 y.o. female who here for follow up    CC  Shortness of breath and chest pain  HPI  61 years old female with a known history of coronary artery disease here for the follow-up with shortness of breath and chest pain intermittent mild to moderate in intensity     Problems Addressed this Visit        Cardiac and Vasculature    Hypertension    Coronary artery disease involving native coronary artery of native heart with angina pectoris (HCC) - Primary    Relevant Orders    Case Request Cath Lab: Left Heart Cath (Completed)    CBC & Differential (Completed)    Basic Metabolic Panel (Completed)    COVID PRE-OP / PRE-PROCEDURE SCREENING ORDER (NO ISOLATION) - Swab, Nasopharynx (Completed)    Abnormal nuclear stress test    Relevant Orders    Case Request Cath Lab: Left Heart Cath (Completed)    CBC & Differential (Completed)    Basic Metabolic Panel (Completed)    COVID PRE-OP / PRE-PROCEDURE SCREENING ORDER (NO ISOLATION) - Swab, Nasopharynx (Completed)      Other Visit Diagnoses     Encounter for therapeutic drug monitoring        Relevant Orders    aPTT (Completed)    Protime-INR (Completed)      Diagnoses       Codes Comments    Coronary artery disease involving native coronary artery of native heart with angina pectoris (HCC)    -  Primary ICD-10-CM: I25.119  ICD-9-CM: 414.01, 413.9     Abnormal nuclear stress test     ICD-10-CM: R94.39  ICD-9-CM: 794.39     Encounter for therapeutic drug monitoring     ICD-10-CM: Z51.81  ICD-9-CM: V58.83     Primary hypertension     ICD-10-CM: I10  ICD-9-CM: 401.9       Patient's hemoglobin also not overly positive.  .Interpretation Summary       · Findings consistent with an equivocal ECG stress test.  · Left ventricular ejection fraction is normal. (Calculated EF = 60%).  · Myocardial perfusion imaging indicates a medium-sized, moderately severe area of ischemia  "located in the anterior wall.  · Impressions are consistent with a high risk study.    Interpretation Summary    · Calculated left ventricular EF = 61% Estimated left ventricular EF was in agreement with the calculated left ventricular EF.  · Left ventricular diastolic function was normal.  · The right ventricular cavity is borderline dilated.  · The right atrial cavity is borderline dilated.  · There is no evidence of pericardial effusion.        The following portions of the patient's history were reviewed and updated as appropriate: allergies, current medications, past family history, past medical history, past social history, past surgical history and problem list.    Past Medical History:   Diagnosis Date   • Abnormal stress test    • Asthma    • CAD (coronary artery disease)    • Crescendo angina (HCC)    • Diabetes mellitus (HCC)    • H/O angioplasty    • Hiatal hernia    • Hyperlipidemia    • Hypertension    • Obesity    • MILAGROS on CPAP    • Recurrent urinary tract infection    • Rotator cuff tear, right    • Shoulder pain, right    • Torn rotator cuff      reports that she quit smoking about 30 years ago. Her smoking use included cigarettes. She has never used smokeless tobacco. She reports current alcohol use. She reports that she does not use drugs.   Family History   Problem Relation Age of Onset   • Heart attack Father    • Heart disease Father    • Heart attack Brother    • Heart disease Brother    • Heart attack Maternal Grandfather    • Heart disease Maternal Grandfather    • Heart disease Mother    • Malig Hyperthermia Neg Hx        Review of Systems  Constitutional: No wt loss, fever, fatigue  Gastrointestinal: No nausea, abdominal pain  Behavioral/Psych: No insomnia or anxiety   Cardiovascular shortness of breath and chest pain  Objective:       Physical Exam  /71   Pulse 58   Ht 154.9 cm (61\")   Wt 81.2 kg (179 lb)   BMI 33.82 kg/m²   General appearance: No acute changes   Neck: Trachea " midline; NECK, supple, no thyromegaly or lymphadenopathy   Lungs: Normal size and shape, normal breath sounds, equal distribution of air, no rales and rhonchi   CV: S1-S2 regular, no murmurs, no rub, no gallop   Abdomen: Soft, nontender; no masses , no abnormal abdominal sounds   Extremities: No deformity , normal color , no peripheral edema   Skin: Normal temperature, turgor and texture; no rash, ulcers          Procedures      Echocardiogram:        Current Outpatient Medications:   •  albuterol (PROVENTIL) (2.5 MG/3ML) 0.083% nebulizer solution, Take 2.5 mg by nebulization Every 6 (Six) Hours As Needed., Disp: , Rfl: 11  •  amLODIPine (NORVASC) 5 MG tablet, , Disp: , Rfl:   •  aspirin 81 MG EC tablet, Take 81 mg by mouth daily., Disp: , Rfl:   •  bisoprolol (ZEBeta) 5 MG tablet, TAKE 1/2 TABLET BY MOUTH EVERY DAY, Disp: 45 tablet, Rfl: 1  •  diclofenac (CATAFLAM) 50 MG tablet, 50 mg., Disp: , Rfl:   •  DULoxetine (CYMBALTA) 30 MG capsule, Take 30 mg by mouth Daily., Disp: , Rfl: 5  •  Ertugliflozin L-PyroglutamicAc (STEGLATRO) 15 MG tablet, Take 15 mg by mouth Every Morning., Disp: , Rfl:   •  Insulin Degludec (TRESIBA SC), Inject 25 Units under the skin into the appropriate area as directed Every Night., Disp: , Rfl:   •  lidocaine (Lidoderm) 5 %, , Disp: , Rfl:   •  metFORMIN (GLUCOPHAGE) 1000 MG tablet, Take 1,000 mg by mouth 2 (Two) Times a Day., Disp: , Rfl: 0  •  methocarbamol (ROBAXIN) 750 MG tablet, 750 mg., Disp: , Rfl:   •  Multiple Vitamins-Minerals (MULTIVITAMIN WITH MINERALS) tablet tablet, Take 1 tablet by mouth Daily., Disp: , Rfl:   •  nitroglycerin (NITROSTAT) 0.4 MG SL tablet, PLACE 1 TABLET UNDER THE TONGUE SEE ADMIN INSTRUCTIONS., Disp: 25 tablet, Rfl: 0  •  NovoFine 32G X 6 MM misc, USE 1 (ONE) EACH DAILY, Disp: , Rfl:   •  Omega-3 Fatty Acids (FISH OIL) 1000 MG capsule capsule, Take 1,000 mg by mouth Daily With Breakfast., Disp: , Rfl:   •  omeprazole (priLOSEC) 40 MG capsule, Take 40 mg by  mouth Daily., Disp: , Rfl:   •  OneTouch Delica Lancets 30G misc, TEST 1 (ONE) EACH DAILY, Disp: , Rfl:   •  ONETOUCH VERIO test strip, , Disp: , Rfl: 0  •  pregabalin (LYRICA) 150 MG capsule, Take 150 mg by mouth 2 (Two) Times a Day., Disp: , Rfl:   •  rosuvastatin (CRESTOR) 10 MG tablet, TAKE 1 TABLET BY MOUTH EVERY DAY, Disp: 90 tablet, Rfl: 1  •  SITagliptin (JANUVIA) 100 MG tablet, Take 100 mg by mouth Daily., Disp: , Rfl:   •  SUMAtriptan (IMITREX) 50 MG tablet, Take 50 mg by mouth Every 2 (Two) Hours As Needed for Migraine. Take one tablet at onset of headache. May repeat dose one time in 2 hours if headache not relieved., Disp: , Rfl:   •  SYMBICORT 160-4.5 MCG/ACT inhaler, Take 2 puffs by mouth 2 (Two) Times a Day As Needed., Disp: , Rfl: 0  •  VENTOLIN  (90 BASE) MCG/ACT inhaler, Inhale 1 puff Every 4 (Four) Hours As Needed., Disp: , Rfl: 1   Assessment:        Patient Active Problem List   Diagnosis   • Chest pain, atypical   • Shortness of breath   • Syncope and collapse   • Weakness   • Coronary artery disease involving native heart with angina pectoris (HCC)   • Soft tissue lesion of shoulder region   • Disorder of rotator cuff   • Shoulder pain   • Rotator cuff tear arthropathy   • Unstable angina pectoris (HCC)   • Injury of kidney   • Type 2 diabetes mellitus (HCC)   • Hypertension   • Hypotension   • Hypovolemia   • Bilateral carotid artery disease (HCC)   • Medical non-compliance   • Dizziness   • Viral illness   • SOB (shortness of breath)   • Asthma exacerbation   • CKD (chronic kidney disease) stage 3, GFR 30-59 ml/min ([facility])   • Asthma   • Coronary artery disease involving native coronary artery of native heart with angina pectoris (HCC)   • Essential hypertension   • Abnormal nuclear stress test               Plan:            ICD-10-CM ICD-9-CM   1. Coronary artery disease involving native coronary artery of native heart with angina pectoris (HCC)  I25.119 414.01     413.9   2. Abnormal  nuclear stress test  R94.39 794.39   3. Encounter for therapeutic drug monitoring  Z51.81 V58.83   4. Primary hypertension  I10 401.9     1. Coronary artery disease involving native coronary artery of native heart with angina pectoris (HCC)  With continued stiffness and shortness of breath with proceed with a diagnostic heart catheter  - Case Request Cath Lab: Left Heart Cath  - CBC & Differential; Future  - Basic Metabolic Panel  - COVID PRE-OP / PRE-PROCEDURE SCREENING ORDER (NO ISOLATION) - Swab, Nasopharynx; Future    2. Abnormal nuclear stress test    - Case Request Cath Lab: Left Heart Cath  - CBC & Differential; Future  - Basic Metabolic Panel  - COVID PRE-OP / PRE-PROCEDURE SCREENING ORDER (NO ISOLATION) - Swab, Nasopharynx; Future    3. Encounter for therapeutic drug monitoring    - aPTT; Future  - Protime-INR; Future    4. Primary hypertension       Procedure, risks and options of cardiac cath explained to pt INCLUDING BUT NOT LIMITED TO MI, STROKE, DEATH, INFECTION HAEMORRHAGE, . Pt understands well and agrees with no further questions.    COUNSELING:    Lily Horner was given to patient for the following topics: diagnostic results, risk factor reductions, impressions, risks and benefits of treatment options and importance of treatment compliance .       SMOKING COUNSELING:    [unfilled]    Dictated using Dragon dictation

## 2022-01-03 NOTE — PROGRESS NOTES
TEST RESULTS, CLEARANCE FOR SHOULDER SURGERY ON 1/5/22 DR. DOUGLAS   Subjective:        Lily Unger is a 61 y.o. female who here for follow up    CC  Shortness of breath and chest pain  HPI  61 years old female with a known history of coronary artery disease here for the follow-up with shortness of breath and chest pain intermittent mild to moderate in intensity     Problems Addressed this Visit        Cardiac and Vasculature    Hypertension    Coronary artery disease involving native coronary artery of native heart with angina pectoris (HCC) - Primary    Relevant Orders    Case Request Cath Lab: Left Heart Cath (Completed)    CBC & Differential (Completed)    Basic Metabolic Panel (Completed)    COVID PRE-OP / PRE-PROCEDURE SCREENING ORDER (NO ISOLATION) - Swab, Nasopharynx (Completed)    Abnormal nuclear stress test    Relevant Orders    Case Request Cath Lab: Left Heart Cath (Completed)    CBC & Differential (Completed)    Basic Metabolic Panel (Completed)    COVID PRE-OP / PRE-PROCEDURE SCREENING ORDER (NO ISOLATION) - Swab, Nasopharynx (Completed)      Other Visit Diagnoses     Encounter for therapeutic drug monitoring        Relevant Orders    aPTT (Completed)    Protime-INR (Completed)      Diagnoses       Codes Comments    Coronary artery disease involving native coronary artery of native heart with angina pectoris (HCC)    -  Primary ICD-10-CM: I25.119  ICD-9-CM: 414.01, 413.9     Abnormal nuclear stress test     ICD-10-CM: R94.39  ICD-9-CM: 794.39     Encounter for therapeutic drug monitoring     ICD-10-CM: Z51.81  ICD-9-CM: V58.83     Primary hypertension     ICD-10-CM: I10  ICD-9-CM: 401.9       Patient's hemoglobin also not overly positive.  .Interpretation Summary       · Findings consistent with an equivocal ECG stress test.  · Left ventricular ejection fraction is normal. (Calculated EF = 60%).  · Myocardial perfusion imaging indicates a medium-sized, moderately severe area of ischemia  "located in the anterior wall.  · Impressions are consistent with a high risk study.    Interpretation Summary    · Calculated left ventricular EF = 61% Estimated left ventricular EF was in agreement with the calculated left ventricular EF.  · Left ventricular diastolic function was normal.  · The right ventricular cavity is borderline dilated.  · The right atrial cavity is borderline dilated.  · There is no evidence of pericardial effusion.        The following portions of the patient's history were reviewed and updated as appropriate: allergies, current medications, past family history, past medical history, past social history, past surgical history and problem list.    Past Medical History:   Diagnosis Date   • Abnormal stress test    • Asthma    • CAD (coronary artery disease)    • Crescendo angina (HCC)    • Diabetes mellitus (HCC)    • H/O angioplasty    • Hiatal hernia    • Hyperlipidemia    • Hypertension    • Obesity    • MILAGROS on CPAP    • Recurrent urinary tract infection    • Rotator cuff tear, right    • Shoulder pain, right    • Torn rotator cuff      reports that she quit smoking about 30 years ago. Her smoking use included cigarettes. She has never used smokeless tobacco. She reports current alcohol use. She reports that she does not use drugs.   Family History   Problem Relation Age of Onset   • Heart attack Father    • Heart disease Father    • Heart attack Brother    • Heart disease Brother    • Heart attack Maternal Grandfather    • Heart disease Maternal Grandfather    • Heart disease Mother    • Malig Hyperthermia Neg Hx        Review of Systems  Constitutional: No wt loss, fever, fatigue  Gastrointestinal: No nausea, abdominal pain  Behavioral/Psych: No insomnia or anxiety   Cardiovascular shortness of breath and chest pain  Objective:       Physical Exam  /71   Pulse 58   Ht 154.9 cm (61\")   Wt 81.2 kg (179 lb)   BMI 33.82 kg/m²   General appearance: No acute changes   Neck: Trachea " midline; NECK, supple, no thyromegaly or lymphadenopathy   Lungs: Normal size and shape, normal breath sounds, equal distribution of air, no rales and rhonchi   CV: S1-S2 regular, no murmurs, no rub, no gallop   Abdomen: Soft, nontender; no masses , no abnormal abdominal sounds   Extremities: No deformity , normal color , no peripheral edema   Skin: Normal temperature, turgor and texture; no rash, ulcers          Procedures      Echocardiogram:        Current Outpatient Medications:   •  albuterol (PROVENTIL) (2.5 MG/3ML) 0.083% nebulizer solution, Take 2.5 mg by nebulization Every 6 (Six) Hours As Needed., Disp: , Rfl: 11  •  amLODIPine (NORVASC) 5 MG tablet, , Disp: , Rfl:   •  aspirin 81 MG EC tablet, Take 81 mg by mouth daily., Disp: , Rfl:   •  bisoprolol (ZEBeta) 5 MG tablet, TAKE 1/2 TABLET BY MOUTH EVERY DAY, Disp: 45 tablet, Rfl: 1  •  diclofenac (CATAFLAM) 50 MG tablet, 50 mg., Disp: , Rfl:   •  DULoxetine (CYMBALTA) 30 MG capsule, Take 30 mg by mouth Daily., Disp: , Rfl: 5  •  Ertugliflozin L-PyroglutamicAc (STEGLATRO) 15 MG tablet, Take 15 mg by mouth Every Morning., Disp: , Rfl:   •  Insulin Degludec (TRESIBA SC), Inject 25 Units under the skin into the appropriate area as directed Every Night., Disp: , Rfl:   •  lidocaine (Lidoderm) 5 %, , Disp: , Rfl:   •  metFORMIN (GLUCOPHAGE) 1000 MG tablet, Take 1,000 mg by mouth 2 (Two) Times a Day., Disp: , Rfl: 0  •  methocarbamol (ROBAXIN) 750 MG tablet, 750 mg., Disp: , Rfl:   •  Multiple Vitamins-Minerals (MULTIVITAMIN WITH MINERALS) tablet tablet, Take 1 tablet by mouth Daily., Disp: , Rfl:   •  nitroglycerin (NITROSTAT) 0.4 MG SL tablet, PLACE 1 TABLET UNDER THE TONGUE SEE ADMIN INSTRUCTIONS., Disp: 25 tablet, Rfl: 0  •  NovoFine 32G X 6 MM misc, USE 1 (ONE) EACH DAILY, Disp: , Rfl:   •  Omega-3 Fatty Acids (FISH OIL) 1000 MG capsule capsule, Take 1,000 mg by mouth Daily With Breakfast., Disp: , Rfl:   •  omeprazole (priLOSEC) 40 MG capsule, Take 40 mg by  mouth Daily., Disp: , Rfl:   •  OneTouch Delica Lancets 30G misc, TEST 1 (ONE) EACH DAILY, Disp: , Rfl:   •  ONETOUCH VERIO test strip, , Disp: , Rfl: 0  •  pregabalin (LYRICA) 150 MG capsule, Take 150 mg by mouth 2 (Two) Times a Day., Disp: , Rfl:   •  rosuvastatin (CRESTOR) 10 MG tablet, TAKE 1 TABLET BY MOUTH EVERY DAY, Disp: 90 tablet, Rfl: 1  •  SITagliptin (JANUVIA) 100 MG tablet, Take 100 mg by mouth Daily., Disp: , Rfl:   •  SUMAtriptan (IMITREX) 50 MG tablet, Take 50 mg by mouth Every 2 (Two) Hours As Needed for Migraine. Take one tablet at onset of headache. May repeat dose one time in 2 hours if headache not relieved., Disp: , Rfl:   •  SYMBICORT 160-4.5 MCG/ACT inhaler, Take 2 puffs by mouth 2 (Two) Times a Day As Needed., Disp: , Rfl: 0  •  VENTOLIN  (90 BASE) MCG/ACT inhaler, Inhale 1 puff Every 4 (Four) Hours As Needed., Disp: , Rfl: 1   Assessment:        Patient Active Problem List   Diagnosis   • Chest pain, atypical   • Shortness of breath   • Syncope and collapse   • Weakness   • Coronary artery disease involving native heart with angina pectoris (HCC)   • Soft tissue lesion of shoulder region   • Disorder of rotator cuff   • Shoulder pain   • Rotator cuff tear arthropathy   • Unstable angina pectoris (HCC)   • Injury of kidney   • Type 2 diabetes mellitus (HCC)   • Hypertension   • Hypotension   • Hypovolemia   • Bilateral carotid artery disease (HCC)   • Medical non-compliance   • Dizziness   • Viral illness   • SOB (shortness of breath)   • Asthma exacerbation   • CKD (chronic kidney disease) stage 3, GFR 30-59 ml/min ([facility])   • Asthma   • Coronary artery disease involving native coronary artery of native heart with angina pectoris (HCC)   • Essential hypertension   • Abnormal nuclear stress test               Plan:            ICD-10-CM ICD-9-CM   1. Coronary artery disease involving native coronary artery of native heart with angina pectoris (HCC)  I25.119 414.01     413.9   2. Abnormal  nuclear stress test  R94.39 794.39   3. Encounter for therapeutic drug monitoring  Z51.81 V58.83   4. Primary hypertension  I10 401.9     1. Coronary artery disease involving native coronary artery of native heart with angina pectoris (HCC)  With continued stiffness and shortness of breath with proceed with a diagnostic heart catheter  - Case Request Cath Lab: Left Heart Cath  - CBC & Differential; Future  - Basic Metabolic Panel  - COVID PRE-OP / PRE-PROCEDURE SCREENING ORDER (NO ISOLATION) - Swab, Nasopharynx; Future    2. Abnormal nuclear stress test    - Case Request Cath Lab: Left Heart Cath  - CBC & Differential; Future  - Basic Metabolic Panel  - COVID PRE-OP / PRE-PROCEDURE SCREENING ORDER (NO ISOLATION) - Swab, Nasopharynx; Future    3. Encounter for therapeutic drug monitoring    - aPTT; Future  - Protime-INR; Future    4. Primary hypertension       Procedure, risks and options of cardiac cath explained to pt INCLUDING BUT NOT LIMITED TO MI, STROKE, DEATH, INFECTION HAEMORRHAGE, . Pt understands well and agrees with no further questions.    COUNSELING:    Lily Horner was given to patient for the following topics: diagnostic results, risk factor reductions, impressions, risks and benefits of treatment options and importance of treatment compliance .       SMOKING COUNSELING:    [unfilled]    Dictated using Dragon dictation

## 2022-01-04 ENCOUNTER — APPOINTMENT (OUTPATIENT)
Dept: PREADMISSION TESTING | Facility: HOSPITAL | Age: 62
End: 2022-01-04

## 2022-01-11 ENCOUNTER — LAB (OUTPATIENT)
Dept: LAB | Facility: HOSPITAL | Age: 62
End: 2022-01-11

## 2022-01-11 DIAGNOSIS — I25.119 CORONARY ARTERY DISEASE INVOLVING NATIVE CORONARY ARTERY OF NATIVE HEART WITH ANGINA PECTORIS: ICD-10-CM

## 2022-01-11 DIAGNOSIS — Z51.81 ENCOUNTER FOR THERAPEUTIC DRUG MONITORING: ICD-10-CM

## 2022-01-11 DIAGNOSIS — R94.39 ABNORMAL NUCLEAR STRESS TEST: ICD-10-CM

## 2022-01-11 LAB
ANION GAP SERPL CALCULATED.3IONS-SCNC: 9.2 MMOL/L (ref 5–15)
APTT PPP: 27.8 SECONDS (ref 22.7–35.4)
BASOPHILS # BLD AUTO: 0.06 10*3/MM3 (ref 0–0.2)
BASOPHILS NFR BLD AUTO: 0.6 % (ref 0–1.5)
BUN SERPL-MCNC: 12 MG/DL (ref 8–23)
BUN/CREAT SERPL: 15 (ref 7–25)
CALCIUM SPEC-SCNC: 10.6 MG/DL (ref 8.6–10.5)
CHLORIDE SERPL-SCNC: 99 MMOL/L (ref 98–107)
CO2 SERPL-SCNC: 29.8 MMOL/L (ref 22–29)
CREAT SERPL-MCNC: 0.8 MG/DL (ref 0.57–1)
DEPRECATED RDW RBC AUTO: 46.7 FL (ref 37–54)
EOSINOPHIL # BLD AUTO: 0.3 10*3/MM3 (ref 0–0.4)
EOSINOPHIL NFR BLD AUTO: 2.9 % (ref 0.3–6.2)
ERYTHROCYTE [DISTWIDTH] IN BLOOD BY AUTOMATED COUNT: 17 % (ref 12.3–15.4)
GFR SERPL CREATININE-BSD FRML MDRD: 72 ML/MIN/1.73
GLUCOSE SERPL-MCNC: 272 MG/DL (ref 65–99)
HCT VFR BLD AUTO: 43.7 % (ref 34–46.6)
HGB BLD-MCNC: 12.8 G/DL (ref 12–15.9)
IMM GRANULOCYTES # BLD AUTO: 0.04 10*3/MM3 (ref 0–0.05)
IMM GRANULOCYTES NFR BLD AUTO: 0.4 % (ref 0–0.5)
INR PPP: 1.04 (ref 0.9–1.1)
LYMPHOCYTES # BLD AUTO: 2.95 10*3/MM3 (ref 0.7–3.1)
LYMPHOCYTES NFR BLD AUTO: 28.2 % (ref 19.6–45.3)
MCH RBC QN AUTO: 23 PG (ref 26.6–33)
MCHC RBC AUTO-ENTMCNC: 29.3 G/DL (ref 31.5–35.7)
MCV RBC AUTO: 78.5 FL (ref 79–97)
MONOCYTES # BLD AUTO: 0.97 10*3/MM3 (ref 0.1–0.9)
MONOCYTES NFR BLD AUTO: 9.3 % (ref 5–12)
NEUTROPHILS NFR BLD AUTO: 58.6 % (ref 42.7–76)
NEUTROPHILS NFR BLD AUTO: 6.14 10*3/MM3 (ref 1.7–7)
NRBC BLD AUTO-RTO: 0 /100 WBC (ref 0–0.2)
PLATELET # BLD AUTO: 429 10*3/MM3 (ref 140–450)
PMV BLD AUTO: 9.4 FL (ref 6–12)
POTASSIUM SERPL-SCNC: 4.7 MMOL/L (ref 3.5–5.2)
PROTHROMBIN TIME: 13.4 SECONDS (ref 11.7–14.2)
RBC # BLD AUTO: 5.57 10*6/MM3 (ref 3.77–5.28)
SARS-COV-2 ORF1AB RESP QL NAA+PROBE: NOT DETECTED
SODIUM SERPL-SCNC: 138 MMOL/L (ref 136–145)
WBC NRBC COR # BLD: 10.46 10*3/MM3 (ref 3.4–10.8)

## 2022-01-11 PROCEDURE — 85025 COMPLETE CBC W/AUTO DIFF WBC: CPT

## 2022-01-11 PROCEDURE — 85730 THROMBOPLASTIN TIME PARTIAL: CPT

## 2022-01-11 PROCEDURE — 36415 COLL VENOUS BLD VENIPUNCTURE: CPT | Performed by: INTERNAL MEDICINE

## 2022-01-11 PROCEDURE — U0004 COV-19 TEST NON-CDC HGH THRU: HCPCS

## 2022-01-11 PROCEDURE — C9803 HOPD COVID-19 SPEC COLLECT: HCPCS

## 2022-01-11 PROCEDURE — 80048 BASIC METABOLIC PNL TOTAL CA: CPT | Performed by: INTERNAL MEDICINE

## 2022-01-11 PROCEDURE — 85610 PROTHROMBIN TIME: CPT

## 2022-01-12 ENCOUNTER — HOSPITAL ENCOUNTER (OUTPATIENT)
Facility: HOSPITAL | Age: 62
Setting detail: HOSPITAL OUTPATIENT SURGERY
Discharge: HOME | End: 2022-01-12
Attending: INTERNAL MEDICINE | Admitting: INTERNAL MEDICINE

## 2022-01-12 VITALS
RESPIRATION RATE: 16 BRPM | HEART RATE: 61 BPM | OXYGEN SATURATION: 99 % | BODY MASS INDEX: 33.99 KG/M2 | DIASTOLIC BLOOD PRESSURE: 60 MMHG | WEIGHT: 180 LBS | HEIGHT: 61 IN | SYSTOLIC BLOOD PRESSURE: 125 MMHG | TEMPERATURE: 96.2 F

## 2022-01-12 DIAGNOSIS — R94.39 ABNORMAL NUCLEAR STRESS TEST: ICD-10-CM

## 2022-01-12 DIAGNOSIS — I25.119 CORONARY ARTERY DISEASE INVOLVING NATIVE CORONARY ARTERY OF NATIVE HEART WITH ANGINA PECTORIS: ICD-10-CM

## 2022-01-12 LAB
ACT BLD: 315 SECONDS (ref 82–152)
GLUCOSE BLDC GLUCOMTR-MCNC: 176 MG/DL (ref 70–130)
GLUCOSE BLDC GLUCOMTR-MCNC: 200 MG/DL (ref 70–130)

## 2022-01-12 PROCEDURE — 99152 MOD SED SAME PHYS/QHP 5/>YRS: CPT | Performed by: INTERNAL MEDICINE

## 2022-01-12 PROCEDURE — 82962 GLUCOSE BLOOD TEST: CPT

## 2022-01-12 PROCEDURE — 25010000002 FENTANYL CITRATE (PF) 50 MCG/ML SOLUTION: Performed by: INTERNAL MEDICINE

## 2022-01-12 PROCEDURE — 0 IOPAMIDOL PER 1 ML: Performed by: INTERNAL MEDICINE

## 2022-01-12 PROCEDURE — C1887 CATHETER, GUIDING: HCPCS | Performed by: INTERNAL MEDICINE

## 2022-01-12 PROCEDURE — 93458 L HRT ARTERY/VENTRICLE ANGIO: CPT | Performed by: INTERNAL MEDICINE

## 2022-01-12 PROCEDURE — 92928 PRQ TCAT PLMT NTRAC ST 1 LES: CPT | Performed by: INTERNAL MEDICINE

## 2022-01-12 PROCEDURE — C1725 CATH, TRANSLUMIN NON-LASER: HCPCS | Performed by: INTERNAL MEDICINE

## 2022-01-12 PROCEDURE — 25010000002 HEPARIN (PORCINE) PER 1000 UNITS: Performed by: INTERNAL MEDICINE

## 2022-01-12 PROCEDURE — C1874 STENT, COATED/COV W/DEL SYS: HCPCS | Performed by: INTERNAL MEDICINE

## 2022-01-12 PROCEDURE — C1769 GUIDE WIRE: HCPCS | Performed by: INTERNAL MEDICINE

## 2022-01-12 PROCEDURE — 85347 COAGULATION TIME ACTIVATED: CPT

## 2022-01-12 PROCEDURE — 93571 IV DOP VEL&/PRESS C FLO 1ST: CPT | Performed by: INTERNAL MEDICINE

## 2022-01-12 PROCEDURE — 25010000002 MIDAZOLAM PER 1 MG: Performed by: INTERNAL MEDICINE

## 2022-01-12 PROCEDURE — 25010000002 ATROPINE PER 0.01 MG: Performed by: INTERNAL MEDICINE

## 2022-01-12 PROCEDURE — C1894 INTRO/SHEATH, NON-LASER: HCPCS | Performed by: INTERNAL MEDICINE

## 2022-01-12 PROCEDURE — C9600 PERC DRUG-EL COR STENT SING: HCPCS | Performed by: INTERNAL MEDICINE

## 2022-01-12 PROCEDURE — 99153 MOD SED SAME PHYS/QHP EA: CPT | Performed by: INTERNAL MEDICINE

## 2022-01-12 PROCEDURE — 63710000001 INSULIN REGULAR HUMAN PER 5 UNITS: Performed by: INTERNAL MEDICINE

## 2022-01-12 DEVICE — XIENCE SKYPOINT™ EVEROLIMUS ELUTING CORONARY STENT SYSTEM 3.00 MM X 18 MM / RAPID-EXCHANGE
Type: IMPLANTABLE DEVICE | Status: FUNCTIONAL
Brand: XIENCE SKYPOINT™

## 2022-01-12 RX ORDER — MIDAZOLAM HYDROCHLORIDE 1 MG/ML
INJECTION INTRAMUSCULAR; INTRAVENOUS AS NEEDED
Status: DISCONTINUED | OUTPATIENT
Start: 2022-01-12 | End: 2022-01-12 | Stop reason: HOSPADM

## 2022-01-12 RX ORDER — SODIUM CHLORIDE 0.9 % (FLUSH) 0.9 %
3 SYRINGE (ML) INJECTION EVERY 12 HOURS SCHEDULED
Status: DISCONTINUED | OUTPATIENT
Start: 2022-01-12 | End: 2022-01-12 | Stop reason: HOSPADM

## 2022-01-12 RX ORDER — ATROPINE SULFATE 1 MG/ML
INJECTION, SOLUTION INTRAMUSCULAR; INTRAVENOUS; SUBCUTANEOUS AS NEEDED
Status: DISCONTINUED | OUTPATIENT
Start: 2022-01-12 | End: 2022-01-12 | Stop reason: HOSPADM

## 2022-01-12 RX ORDER — HEPARIN SODIUM 1000 [USP'U]/ML
INJECTION, SOLUTION INTRAVENOUS; SUBCUTANEOUS AS NEEDED
Status: DISCONTINUED | OUTPATIENT
Start: 2022-01-12 | End: 2022-01-12 | Stop reason: HOSPADM

## 2022-01-12 RX ORDER — ASPIRIN 81 MG/1
TABLET, CHEWABLE ORAL AS NEEDED
Status: DISCONTINUED | OUTPATIENT
Start: 2022-01-12 | End: 2022-01-12 | Stop reason: HOSPADM

## 2022-01-12 RX ORDER — ACETAMINOPHEN 325 MG/1
650 TABLET ORAL EVERY 4 HOURS PRN
Status: DISCONTINUED | OUTPATIENT
Start: 2022-01-12 | End: 2022-01-12 | Stop reason: HOSPADM

## 2022-01-12 RX ORDER — FENTANYL CITRATE 50 UG/ML
INJECTION, SOLUTION INTRAMUSCULAR; INTRAVENOUS AS NEEDED
Status: DISCONTINUED | OUTPATIENT
Start: 2022-01-12 | End: 2022-01-12 | Stop reason: HOSPADM

## 2022-01-12 RX ORDER — PHENYLEPHRINE HYDROCHLORIDE 10 MG/ML
INJECTION INTRAVENOUS AS NEEDED
Status: DISCONTINUED | OUTPATIENT
Start: 2022-01-12 | End: 2022-01-12 | Stop reason: HOSPADM

## 2022-01-12 RX ORDER — LIDOCAINE HYDROCHLORIDE 20 MG/ML
INJECTION, SOLUTION INFILTRATION; PERINEURAL AS NEEDED
Status: DISCONTINUED | OUTPATIENT
Start: 2022-01-12 | End: 2022-01-12 | Stop reason: HOSPADM

## 2022-01-12 RX ORDER — SODIUM CHLORIDE 0.9 % (FLUSH) 0.9 %
10 SYRINGE (ML) INJECTION AS NEEDED
Status: DISCONTINUED | OUTPATIENT
Start: 2022-01-12 | End: 2022-01-12 | Stop reason: HOSPADM

## 2022-01-12 RX ORDER — SODIUM CHLORIDE 9 MG/ML
75 INJECTION, SOLUTION INTRAVENOUS CONTINUOUS
Status: DISCONTINUED | OUTPATIENT
Start: 2022-01-12 | End: 2022-01-12 | Stop reason: HOSPADM

## 2022-01-12 RX ADMIN — SODIUM CHLORIDE 75 ML/HR: 9 INJECTION, SOLUTION INTRAVENOUS at 08:59

## 2022-01-12 RX ADMIN — INSULIN HUMAN 4 UNITS: 100 INJECTION, SOLUTION PARENTERAL at 15:18

## 2022-01-12 RX ADMIN — ACETAMINOPHEN 650 MG: 325 TABLET, FILM COATED ORAL at 12:41

## 2022-01-12 NOTE — PERIOPERATIVE NURSING NOTE
Called and spoke with Dr. Harper about pt's blood glucose, insulin ordered. RN stated that pt was stating she was SOA, Dr. Harper said that it is okay and it was probably due to the Brilinta she was given.

## 2022-01-12 NOTE — DISCHARGE INSTRUCTIONS
Resume your Metformin in 48 hours    Start your [facility]  [address] [place] [id number]    Coronary Angioplasty with or without  Stent (Radial Approach) After Care    Refer to this sheet in the next few weeks. These instructions provide you with information on caring for yourself after your procedure. Your health care provider may also give you more specific instructions. Your treatment has been planned according to current medical practices, but problems sometimes occur. Call your health care provider if you have any problems or questions after your procedure.       Home Care Instructions:  · You may shower the day after the procedure. Remove the bandage (dressing) and gently wash the site with plain soap and water. Gently pat the site dry. You may apply a band aid daily for 2 days if desired.    · Do not apply powder or lotion to the site.  · Do not submerge the affected site in water for 3 to 5 days or until the site is completely healed.   · Do not flex or bend at the wrist with affected arm for 24 hours.  · Do not lift, push or pull anything over 5 pounds for 5 days after your procedure or as directed by your physician.  For a reference, a gallon of milk weighs 8 pounds.    · Do not operate machinery or power tools for 24 hours.  · Inspect the site at least twice daily. You may notice some bruising at the site and it may be tender for 1 to 2 weeks.     · Increase your fluid intake for the next 2 days.    · Keep arm elevated for 24 hours.  For the remainder of the day, keep your arm in the “Pledge of Allegiance” position when up and about.    · Limit your activity for the next 48 hours and avoid strenuous activity as directed by your physician.   · Cardiac Rehab may or may not be ordered.  Please consult with your physician  · You may drive 24 hours after the procedure unless otherwise instructed by your caregiver.  · A responsible adult should be with you for the  first 24 hours after you arrive home.   · Do not make any important legal decisions or sign legal papers for 24 hours. Do not drink alcohol for 24 hours.    · Take medicines only as directed by your health care provider.  Blood thinners may be prescribed after your procedure to improve blood flow through the stent.    · Metformin or any medications containing Metformin should not be taken for 48 hours after your procedure.    · Eat a heart-healthy diet. This should include plenty of fresh fruits and vegetables. Meat should be lean cuts. Avoid the following types of food:    ¨ Food that is high in salt.    ¨ Canned or highly processed food.    ¨ Food that is high in saturated fat or sugar.    ¨ Fried food.    · Make any other lifestyle changes recommended by your health care provider. This may include:    ¨ Not using any tobacco products including cigarettes, chewing tobacco, or electronic cigarettes.   ¨ Managing your weight.    ¨ Getting regular exercise.    ¨ Managing your blood pressure.    ¨ Limiting your alcohol intake.    ¨ Managing other health problems, such as diabetes.    · If you need an MRI after your heart stent was placed, be sure to tell the health care provider who orders the MRI that you have a heart stent.    · Keep all follow-up visits as directed by your health care provider.    ·   Call Your Doctor If:    · You have unusual pain at the radial/ulnar (wrist) site.  · You have redness, warmth, swelling, or pain at the radial/ulnar (wrist) site.  · You have drainage (other than a small amount of blood on the dressing).  · `You have chills or a fever > 101.  · Your arm becomes pale or dark, cool, tingly, or numb.  · You develop chest pain, shortness of breath, feel faint or pass out.    · You have heavy bleeding from the site.  If you do, hold pressure on the site for 20 minutes.  If the bleeding stops, apply a fresh bandage and call your cardiologist.  However, if you continue to have bleeding,  maintain pressure and call 911.    · You have any symptoms of a stroke.  Remember BE FAST  · B-balance. Sudden trouble walking or loss of balance.  · E-eyes.  Sudden changes in how you see or a sudden onset of a very bad headache.   · F-face. Sudden weakness or loss of feeling of the face or facial droop on one side.   · A-arms Sudden weakness or numbness in one arm. One arm drifts down if they are both held out in front of you. This happens suddenly and usually on one side of the body.   · S-speech.  Sudden trouble speaking, slurred speech or trouble understanding what people are saying.   · T-time  Time to call emergency services.  Write down the symptoms and the time they started.

## 2022-01-14 ENCOUNTER — TELEPHONE (OUTPATIENT)
Dept: CARDIOLOGY | Facility: CLINIC | Age: 62
End: 2022-01-14

## 2022-01-14 NOTE — TELEPHONE ENCOUNTER
----- Message from Liane Bradshaw sent at 1/13/2022 11:40 AM EST -----  Regarding: BRILINTA SHE WAS PRESCRIBED YESTERDAY IS REALLY MAKING HER SOB  Contact: [phone]    Per Dr. LOPEZ patient needs to switch from Brilinta to Plavix 75 mg.     Patient informed and prescriptions sent

## 2022-02-02 ENCOUNTER — OFFICE VISIT (OUTPATIENT)
Dept: CARDIOLOGY | Facility: CLINIC | Age: 62
End: 2022-02-02

## 2022-02-02 ENCOUNTER — TELEPHONE (OUTPATIENT)
Dept: CARDIAC REHAB | Facility: HOSPITAL | Age: 62
End: 2022-02-02

## 2022-02-02 VITALS
BODY MASS INDEX: 33.99 KG/M2 | OXYGEN SATURATION: 97 % | HEART RATE: 64 BPM | HEIGHT: 61 IN | SYSTOLIC BLOOD PRESSURE: 130 MMHG | WEIGHT: 180 LBS | DIASTOLIC BLOOD PRESSURE: 70 MMHG

## 2022-02-02 DIAGNOSIS — Z09 HOSPITAL DISCHARGE FOLLOW-UP: ICD-10-CM

## 2022-02-02 DIAGNOSIS — G47.33 OBSTRUCTIVE SLEEP APNEA SYNDROME: ICD-10-CM

## 2022-02-02 DIAGNOSIS — I25.119 CORONARY ARTERY DISEASE INVOLVING NATIVE CORONARY ARTERY OF NATIVE HEART WITH ANGINA PECTORIS: ICD-10-CM

## 2022-02-02 DIAGNOSIS — E78.5 HYPERLIPIDEMIA, UNSPECIFIED HYPERLIPIDEMIA TYPE: ICD-10-CM

## 2022-02-02 DIAGNOSIS — I10 ESSENTIAL HYPERTENSION: ICD-10-CM

## 2022-02-02 DIAGNOSIS — Z95.820 S/P ANGIOPLASTY WITH STENT: Primary | ICD-10-CM

## 2022-02-02 DIAGNOSIS — E66.2 CLASS 1 OBESITY WITH ALVEOLAR HYPOVENTILATION AND BODY MASS INDEX (BMI) OF 30.0 TO 30.9 IN ADULT, UNSPECIFIED WHETHER SERIOUS COMORBIDITY PRESENT: ICD-10-CM

## 2022-02-02 PROCEDURE — 99214 OFFICE O/P EST MOD 30 MIN: CPT | Performed by: NURSE PRACTITIONER

## 2022-02-02 RX ORDER — TICAGRELOR 90 MG/1
TABLET ORAL
Qty: 60 TABLET | Refills: 2 | Status: SHIPPED | OUTPATIENT
Start: 2022-02-02 | End: 2022-05-04

## 2022-02-02 NOTE — TELEPHONE ENCOUNTER
Called pt secondary to referral for cardiac rehab. Left VM with my contact number. She came to our program in Sept/Oct 2020. She did not complete the program. Will need to discuss her interest in attending again or commitment to finish program.

## 2022-02-09 ENCOUNTER — HOSPITAL ENCOUNTER (OUTPATIENT)
Dept: CARDIOLOGY | Facility: HOSPITAL | Age: 62
Discharge: HOME | End: 2022-02-09
Admitting: NURSE PRACTITIONER

## 2022-02-09 VITALS
DIASTOLIC BLOOD PRESSURE: 58 MMHG | SYSTOLIC BLOOD PRESSURE: 140 MMHG | BODY MASS INDEX: 33.99 KG/M2 | HEIGHT: 61 IN | RESPIRATION RATE: 18 BRPM | WEIGHT: 180 LBS | HEART RATE: 75 BPM

## 2022-02-09 PROCEDURE — 93018 CV STRESS TEST I&R ONLY: CPT | Performed by: INTERNAL MEDICINE

## 2022-02-09 PROCEDURE — 93017 CV STRESS TEST TRACING ONLY: CPT

## 2022-02-10 LAB
BH CV STRESS BP STAGE 1: NORMAL
BH CV STRESS DURATION MIN STAGE 1: 2
BH CV STRESS DURATION SEC STAGE 1: 32
BH CV STRESS GRADE STAGE 1: 10
BH CV STRESS HR STAGE 1: 99
BH CV STRESS METS STAGE 1: 3.6
BH CV STRESS PROTOCOL 1: NORMAL
BH CV STRESS RECOVERY BP: NORMAL MMHG
BH CV STRESS RECOVERY HR: 73 BPM
BH CV STRESS RECOVERY O2: 96 %
BH CV STRESS SPEED STAGE 1: 1.7
BH CV STRESS STAGE 1: 1
MAXIMAL PREDICTED HEART RATE: 157 BPM
PERCENT MAX PREDICTED HR: 63.06 %
STRESS BASELINE BP: NORMAL MMHG
STRESS BASELINE HR: 75 BPM
STRESS O2 SAT REST: 97 %
STRESS PERCENT HR: 74 %
STRESS POST ESTIMATED WORKLOAD: 3.6 METS
STRESS POST EXERCISE DUR MIN: 2 MIN
STRESS POST EXERCISE DUR SEC: 32 SEC
STRESS POST PEAK BP: NORMAL MMHG
STRESS POST PEAK HR: 99 BPM
STRESS TARGET HR: 133 BPM

## 2022-03-30 RX ORDER — ROSUVASTATIN CALCIUM 10 MG/1
TABLET, COATED ORAL
Qty: 90 TABLET | Refills: 1 | Status: SHIPPED | OUTPATIENT
Start: 2022-03-30 | End: 2022-09-22

## 2022-04-05 RX ORDER — AMLODIPINE BESYLATE 5 MG/1
TABLET ORAL
Qty: 90 TABLET | Refills: 1 | Status: SHIPPED | OUTPATIENT
Start: 2022-04-05 | End: 2023-02-17 | Stop reason: SDUPTHER

## 2022-05-04 RX ORDER — TICAGRELOR 90 MG/1
TABLET ORAL
Qty: 60 TABLET | Refills: 2 | Status: SHIPPED | OUTPATIENT
Start: 2022-05-04 | End: 2022-07-25

## 2022-06-13 RX ORDER — BISOPROLOL FUMARATE 5 MG/1
TABLET, FILM COATED ORAL
Qty: 45 TABLET | Refills: 1 | Status: SHIPPED | OUTPATIENT
Start: 2022-06-13 | End: 2022-11-30

## 2022-06-16 ENCOUNTER — TRANSCRIBE ORDERS (OUTPATIENT)
Dept: ADMINISTRATIVE | Facility: HOSPITAL | Age: 62
End: 2022-06-16

## 2022-06-16 DIAGNOSIS — R10.9 ABDOMINAL PAIN, UNSPECIFIED ABDOMINAL LOCATION: Primary | ICD-10-CM

## 2022-07-15 ENCOUNTER — HOSPITAL ENCOUNTER (OUTPATIENT)
Dept: ULTRASOUND IMAGING | Facility: HOSPITAL | Age: 62
Discharge: HOME | End: 2022-07-15
Admitting: GENERAL PRACTICE

## 2022-07-15 ENCOUNTER — HOSPITAL ENCOUNTER (OUTPATIENT)
Dept: NUCLEAR MEDICINE | Facility: HOSPITAL | Age: 62
Discharge: HOME | End: 2022-07-15

## 2022-07-15 DIAGNOSIS — R10.9 ABDOMINAL PAIN, UNSPECIFIED ABDOMINAL LOCATION: ICD-10-CM

## 2022-07-15 PROCEDURE — A9537 TC99M MEBROFENIN: HCPCS | Performed by: GENERAL PRACTICE

## 2022-07-15 PROCEDURE — 76705 ECHO EXAM OF ABDOMEN: CPT

## 2022-07-15 PROCEDURE — 78226 HEPATOBILIARY SYSTEM IMAGING: CPT

## 2022-07-15 PROCEDURE — 0 TECHNETIUM TC 99M MEBROFENIN KIT: Performed by: GENERAL PRACTICE

## 2022-07-15 RX ORDER — KIT FOR THE PREPARATION OF TECHNETIUM TC 99M MEBROFENIN 45 MG/10ML
1 INJECTION, POWDER, LYOPHILIZED, FOR SOLUTION INTRAVENOUS
Status: COMPLETED | OUTPATIENT
Start: 2022-07-15 | End: 2022-07-15

## 2022-07-15 RX ADMIN — MEBROFENIN 1 DOSE: 45 INJECTION, POWDER, LYOPHILIZED, FOR SOLUTION INTRAVENOUS at 07:56

## 2022-07-25 RX ORDER — TICAGRELOR 90 MG/1
TABLET ORAL
Qty: 60 TABLET | Refills: 2 | Status: SHIPPED | OUTPATIENT
Start: 2022-07-25 | End: 2022-08-08 | Stop reason: ALTCHOICE

## 2022-08-08 ENCOUNTER — OFFICE VISIT (OUTPATIENT)
Dept: CARDIOLOGY | Facility: CLINIC | Age: 62
End: 2022-08-08

## 2022-08-08 VITALS
DIASTOLIC BLOOD PRESSURE: 74 MMHG | BODY MASS INDEX: 33.23 KG/M2 | HEART RATE: 71 BPM | WEIGHT: 176 LBS | HEIGHT: 61 IN | SYSTOLIC BLOOD PRESSURE: 152 MMHG

## 2022-08-08 DIAGNOSIS — I10 ESSENTIAL HYPERTENSION: ICD-10-CM

## 2022-08-08 DIAGNOSIS — I25.119 CORONARY ARTERY DISEASE INVOLVING NATIVE CORONARY ARTERY OF NATIVE HEART WITH ANGINA PECTORIS: Primary | ICD-10-CM

## 2022-08-08 DIAGNOSIS — R06.02 SOB (SHORTNESS OF BREATH): ICD-10-CM

## 2022-08-08 PROCEDURE — 99214 OFFICE O/P EST MOD 30 MIN: CPT | Performed by: INTERNAL MEDICINE

## 2022-08-08 PROCEDURE — 93000 ELECTROCARDIOGRAM COMPLETE: CPT | Performed by: INTERNAL MEDICINE

## 2022-08-08 NOTE — PROGRESS NOTES
6 month follow up    Subjective:        Lily Unger is a 62 y.o. female who here for follow up    CC  Follow-up coronary artery disease hypertension shortness of breath  HPI  62-year-old female with coronary artery disease hypertension shortness of breath with a recent angioplasty and stent here for the follow-up with no chest pain     Problems Addressed this Visit        Cardiac and Vasculature    Coronary artery disease involving native coronary artery of native heart with angina pectoris (HCC) - Primary    Relevant Orders    Treadmill Stress Test    Essential hypertension       Pulmonary and Pneumonias    SOB (shortness of breath)      Diagnoses       Codes Comments    Coronary artery disease involving native coronary artery of native heart with angina pectoris (HCC)    -  Primary ICD-10-CM: I25.119  ICD-9-CM: 414.01, 413.9     Essential hypertension     ICD-10-CM: I10  ICD-9-CM: 401.9     SOB (shortness of breath)     ICD-10-CM: R06.02  ICD-9-CM: 786.05         .    The following portions of the patient's history were reviewed and updated as appropriate: allergies, current medications, past family history, past medical history, past social history, past surgical history and problem list.    Past Medical History:   Diagnosis Date   • Abnormal stress test    • Asthma    • CAD (coronary artery disease)    • Chronic kidney disease    • COVID    • Crescendo angina (HCC)    • Diabetes mellitus (HCC)    • H/O angioplasty    • Hiatal hernia    • Hyperlipidemia    • Hypertension    • Obesity    • Recurrent urinary tract infection    • Rotator cuff tear, right    • Shoulder pain, right    • Sleep apnea     NO MACHINE   • Torn rotator cuff      reports that she quit smoking about 30 years ago. Her smoking use included cigarettes. She has never used smokeless tobacco. She reports previous alcohol use. She reports that she does not use drugs.   Family History   Problem Relation Age of Onset   • Heart attack Father   "  • Heart disease Father    • Heart attack Brother    • Heart disease Brother    • Heart attack Maternal Grandfather    • Heart disease Maternal Grandfather    • Heart disease Mother    • Malig Hyperthermia Neg Hx        Review of Systems  Constitutional: No wt loss, fever, fatigue  Gastrointestinal: No nausea, abdominal pain  Behavioral/Psych: No insomnia or anxiety   Cardiovascular shortness of breath  Objective:       Physical Exam  /74   Pulse 71   Ht 154.9 cm (61\")   Wt 79.8 kg (176 lb)   BMI 33.25 kg/m²   General appearance: No acute changes   Neck: Trachea midline; NECK, supple, no thyromegaly or lymphadenopathy   Lungs: Normal size and shape, normal breath sounds, equal distribution of air, no rales and rhonchi   CV: S1-S2 regular, no murmurs, no rub, no gallop   Abdomen: Soft, nontender; no masses , no abnormal abdominal sounds   Extremities: No deformity , normal color , no peripheral edema   Skin: Normal temperature, turgor and texture; no rash, ulcers            ECG 12 Lead    Date/Time: 8/8/2022 10:04 AM  Performed by: Roe Harper MD  Authorized by: Roe Harper MD   Comparison: compared with previous ECG   Similar to previous ECG  Rhythm: sinus rhythm  ST Flattening: all    Clinical impression: non-specific ECG            · Successful right and left coronary angiogram, LV pressures  · Successful IFR of mid left anterior descending artery  · Successful angioplasty and stent to mid LAD 80% reduced to 0% with 3.0/18 Xience stent  · Normal left main  · Left anterior descending, showed midportion 80% with positive IFR reduced to 0% with 3.0/18 Xience stent, first large diagonal branch 100% occluded, early atherosclerotic plaque of the rest of the LAD diagonal and  branches  · Circumflex artery was a nondominant and normal  · Right coronary artery was dominant with PDA 70 to 80% stenosis, minimal irregularity of the rest of the right coronary artery  · LVEDP of " 10        Echocardiogram:        Current Outpatient Medications:   •  albuterol (PROVENTIL) (2.5 MG/3ML) 0.083% nebulizer solution, Take 2.5 mg by nebulization Every 6 (Six) Hours As Needed., Disp: , Rfl: 11  •  amLODIPine (NORVASC) 5 MG tablet, TAKE 1 TABLET BY MOUTH EVERY DAY, Disp: 90 tablet, Rfl: 1  •  aspirin 81 MG EC tablet, Take 81 mg by mouth daily., Disp: , Rfl:   •  bisoprolol (ZEBeta) 5 MG tablet, TAKE 1/2 TABLET BY MOUTH EVERY DAY, Disp: 45 tablet, Rfl: 1  •  Brilinta 90 MG tablet tablet, TAKE 1 TABLET BY MOUTH TWICE A DAY, Disp: 60 tablet, Rfl: 2  •  diclofenac (CATAFLAM) 50 MG tablet, 50 mg As Needed., Disp: , Rfl:   •  Ertugliflozin L-PyroglutamicAc (STEGLATRO) 15 MG tablet, Take 15 mg by mouth Every Morning., Disp: , Rfl:   •  Insulin Degludec (TRESIBA SC), Inject 25 Units under the skin into the appropriate area as directed Every Night., Disp: , Rfl:   •  metFORMIN (GLUCOPHAGE) 1000 MG tablet, Take 1,000 mg by mouth 2 (Two) Times a Day., Disp: , Rfl: 0  •  Multiple Vitamins-Minerals (MULTIVITAMIN WITH MINERALS) tablet tablet, Take 1 tablet by mouth Daily., Disp: , Rfl:   •  nitroglycerin (NITROSTAT) 0.4 MG SL tablet, PLACE 1 TABLET UNDER THE TONGUE SEE ADMIN INSTRUCTIONS., Disp: 25 tablet, Rfl: 0  •  NovoFine 32G X 6 MM misc, USE 1 (ONE) EACH DAILY, Disp: , Rfl:   •  Omega-3 Fatty Acids (FISH OIL) 1000 MG capsule capsule, Take 1,000 mg by mouth Daily With Breakfast., Disp: , Rfl:   •  omeprazole (priLOSEC) 40 MG capsule, Take 40 mg by mouth Daily., Disp: , Rfl:   •  OneTouch Delica Lancets 30G misc, TEST 1 (ONE) EACH DAILY, Disp: , Rfl:   •  ONETOUCH VERIO test strip, , Disp: , Rfl: 0  •  pregabalin (LYRICA) 150 MG capsule, Take 150 mg by mouth 2 (Two) Times a Day., Disp: , Rfl:   •  rosuvastatin (CRESTOR) 10 MG tablet, TAKE 1 TABLET BY MOUTH EVERY DAY, Disp: 90 tablet, Rfl: 1  •  SITagliptin (JANUVIA) 100 MG tablet, Take 100 mg by mouth Daily., Disp: , Rfl:   •  SUMAtriptan (IMITREX) 50 MG tablet,  Take 50 mg by mouth Every 2 (Two) Hours As Needed for Migraine. Take one tablet at onset of headache. May repeat dose one time in 2 hours if headache not relieved., Disp: , Rfl:   •  VENTOLIN  (90 BASE) MCG/ACT inhaler, Inhale 1 puff Every 4 (Four) Hours As Needed., Disp: , Rfl: 1   Assessment:        Patient Active Problem List   Diagnosis   • Chest pain, atypical   • Shortness of breath   • Syncope and collapse   • Weakness   • Coronary artery disease involving native heart with angina pectoris (HCC)   • Soft tissue lesion of shoulder region   • Disorder of rotator cuff   • Shoulder pain   • Rotator cuff tear arthropathy   • Unstable angina pectoris (HCC)   • Injury of kidney   • Type 2 diabetes mellitus (HCC)   • Hypertension   • Hypotension   • Hypovolemia   • Bilateral carotid artery disease (HCC)   • Medical non-compliance   • Dizziness   • Viral illness   • SOB (shortness of breath)   • Asthma exacerbation   • CKD (chronic kidney disease) stage 3, GFR 30-59 ml/min ([facility])   • Asthma   • Coronary artery disease involving native coronary artery of native heart with angina pectoris (HCC)   • Essential hypertension   • Abnormal nuclear stress test               Plan:            ICD-10-CM ICD-9-CM   1. Coronary artery disease involving native coronary artery of native heart with angina pectoris (HCC)  I25.119 414.01     413.9   2. Essential hypertension  I10 401.9   3. SOB (shortness of breath)  R06.02 786.05     1. Coronary artery disease involving native coronary artery of native heart with angina pectoris (HCC)  Considering the patient's symptoms as well as clinical situation and  EKG findings, along with cardiac risk factors, ischemic workup is necessary to rule out ischemic cardiomyopathy, stress induced arrhythmias, and functional capacity for diagnosis as well as prognostic consideration    - Treadmill Stress Test; Future    2. Essential hypertension  Blood pressure under control    3. SOB (shortness  of breath)      STOP BRILLINTA DUE TO SOB    1 YR ETT  COUNSELING:    Lily Horner was given to patient for the following topics: diagnostic results, risk factor reductions, impressions, risks and benefits of treatment options and importance of treatment compliance .       SMOKING COUNSELING:    [unfilled]    Dictated using Dragon dictation

## 2022-09-22 NOTE — TELEPHONE ENCOUNTER
Rx Refill Note  Requested Prescriptions     Pending Prescriptions Disp Refills   • rosuvastatin (CRESTOR) 10 MG tablet [Pharmacy Med Name: ROSUVASTATIN CALCIUM 10 MG TAB] 90 tablet 1     Sig: TAKE 1 TABLET BY MOUTH EVERY DAY      Last office visit with prescribing clinician: 8/8/2022      Next office visit with prescribing clinician: 8/7/2023            Daysi Landa, [facility]  09/22/22, 07:59 EDT

## 2022-09-26 RX ORDER — ROSUVASTATIN CALCIUM 10 MG/1
TABLET, COATED ORAL
Qty: 90 TABLET | Refills: 1 | Status: SHIPPED | OUTPATIENT
Start: 2022-09-26

## 2022-11-30 RX ORDER — BISOPROLOL FUMARATE 5 MG/1
TABLET, FILM COATED ORAL
Qty: 45 TABLET | Refills: 1 | Status: SHIPPED | OUTPATIENT
Start: 2022-11-30 | End: 2023-02-02

## 2023-02-02 RX ORDER — BISOPROLOL FUMARATE 5 MG/1
TABLET, FILM COATED ORAL
Qty: 45 TABLET | Refills: 1 | Status: SHIPPED | OUTPATIENT
Start: 2023-02-02 | End: 2023-02-17 | Stop reason: SDUPTHER

## 2023-02-02 NOTE — TELEPHONE ENCOUNTER
Rx Refill Note  Requested Prescriptions     Pending Prescriptions Disp Refills   • bisoprolol (ZEBeta) 5 MG tablet [Pharmacy Med Name: BISOPROLOL FUMARATE 5 MG TAB] 45 tablet 1     Sig: TAKE 1/2 TABLET BY MOUTH EVERY DAY      Last office visit with prescribing clinician: 8/8/2022   Last telemedicine visit with prescribing clinician: Visit date not found   Next office visit with prescribing clinician: 8/7/2023                         Would you like a call back once the refill request has been completed: [] Yes [] No    If the office needs to give you a call back, can they leave a voicemail: [] Yes [] No    Daysi Landa, [facility]  02/02/23, 12:31 EST

## 2023-02-17 ENCOUNTER — OFFICE VISIT (OUTPATIENT)
Dept: CARDIOLOGY | Facility: CLINIC | Age: 63
End: 2023-02-17
Payer: MEDICARE

## 2023-02-17 VITALS
BODY MASS INDEX: 26.24 KG/M2 | HEART RATE: 64 BPM | WEIGHT: 139 LBS | HEIGHT: 61 IN | DIASTOLIC BLOOD PRESSURE: 88 MMHG | SYSTOLIC BLOOD PRESSURE: 160 MMHG

## 2023-02-17 DIAGNOSIS — I25.119 CORONARY ARTERY DISEASE INVOLVING NATIVE CORONARY ARTERY OF NATIVE HEART WITH ANGINA PECTORIS: ICD-10-CM

## 2023-02-17 DIAGNOSIS — R55 SYNCOPE AND COLLAPSE: Primary | ICD-10-CM

## 2023-02-17 DIAGNOSIS — R06.02 SOB (SHORTNESS OF BREATH): ICD-10-CM

## 2023-02-17 DIAGNOSIS — G47.33 OBSTRUCTIVE SLEEP APNEA SYNDROME: ICD-10-CM

## 2023-02-17 DIAGNOSIS — I10 ESSENTIAL HYPERTENSION: ICD-10-CM

## 2023-02-17 DIAGNOSIS — R07.2 PRECORDIAL PAIN: ICD-10-CM

## 2023-02-17 DIAGNOSIS — E66.2 CLASS 1 OBESITY WITH ALVEOLAR HYPOVENTILATION AND BODY MASS INDEX (BMI) OF 30.0 TO 30.9 IN ADULT, UNSPECIFIED WHETHER SERIOUS COMORBIDITY PRESENT: ICD-10-CM

## 2023-02-17 PROCEDURE — 93000 ELECTROCARDIOGRAM COMPLETE: CPT | Performed by: NURSE PRACTITIONER

## 2023-02-17 PROCEDURE — 99214 OFFICE O/P EST MOD 30 MIN: CPT | Performed by: NURSE PRACTITIONER

## 2023-02-17 RX ORDER — NITROGLYCERIN 0.4 MG/1
0.4 TABLET SUBLINGUAL SEE ADMIN INSTRUCTIONS
Qty: 25 TABLET | Refills: 3 | Status: SHIPPED | OUTPATIENT
Start: 2023-02-17

## 2023-02-17 RX ORDER — BISOPROLOL FUMARATE 5 MG/1
2.5 TABLET, FILM COATED ORAL DAILY
Qty: 45 TABLET | Refills: 1 | Status: SHIPPED | OUTPATIENT
Start: 2023-02-17 | End: 2023-04-05 | Stop reason: SDUPTHER

## 2023-02-17 RX ORDER — PROMETHAZINE HYDROCHLORIDE 25 MG/1
TABLET ORAL
COMMUNITY
End: 2023-04-05

## 2023-02-17 RX ORDER — ZINC OXIDE 20 %
OINTMENT (GRAM) TOPICAL
COMMUNITY

## 2023-02-17 RX ORDER — TRIAMCINOLONE ACETONIDE 1 MG/G
CREAM TOPICAL
COMMUNITY
End: 2023-04-05

## 2023-02-17 RX ORDER — DEXTROMETHORPHAN HYDROBROMIDE AND PROMETHAZINE HYDROCHLORIDE 15; 6.25 MG/5ML; MG/5ML
SYRUP ORAL
COMMUNITY
Start: 2023-02-09 | End: 2023-04-05

## 2023-02-17 RX ORDER — AMLODIPINE BESYLATE 5 MG/1
5 TABLET ORAL DAILY
Qty: 90 TABLET | Refills: 1 | Status: SHIPPED | OUTPATIENT
Start: 2023-02-17 | End: 2023-04-05

## 2023-02-17 NOTE — PROGRESS NOTES
Subjective:        Lily Unger is a 62 y.o. female who here for follow up    No chief complaint on file.    Syncope    HPI  This is a 62-year-old female who is current with this provider.  She has a history of hypertension, hyperlipidemia, former smoker, CKD, CAD, DDD, and sleep apnea.  He does not use a CPAP machine.  She is here today for syncope after going up stairs, with shortness of breath. Sometimes with chest pain.    Stress test in 2022 showed no evidence of myocardial ischemia.  No clinical evidence of myocardial ischemia.  It was consistent with a normal ECG stress test.  January 2022 with stent placement and angioplasty to the mid LAD normal left main.  Complex is nondominant and normal.  Noted with a PDA 70 to 80% stenosis, minimal irregularity of the rest of the right coronary artery.  Echo December 2021 revealed EF 61%, LV diastolic function was normal, R VC is borderline dilated, RAC is borderline dilated, no evidence of pericardial effusion.  Carotid Doppler in 2020 revealed mild stenosis.    The following portions of the patient's history were reviewed and updated as appropriate: allergies, current medications, past family history, past medical history, past social history, past surgical history and problem list.    Past Medical History:   Diagnosis Date   • Abnormal stress test    • Asthma    • CAD (coronary artery disease)    • Chronic kidney disease    • COVID    • Crescendo angina (HCC)    • Diabetes mellitus (HCC)    • H/O angioplasty    • Hiatal hernia    • Hyperlipidemia    • Hypertension    • Obesity    • Recurrent urinary tract infection    • Rotator cuff tear, right    • Shoulder pain, right    • Sleep apnea     NO MACHINE   • Torn rotator cuff          reports that she quit smoking about 31 years ago. Her smoking use included cigarettes. She has never used smokeless tobacco. She reports that she does not currently use alcohol. She reports that she does not use drugs.     Family  History   Problem Relation Age of Onset   • Heart attack Father    • Heart disease Father    • Heart attack Brother    • Heart disease Brother    • Heart attack Maternal Grandfather    • Heart disease Maternal Grandfather    • Heart disease Mother    • Malig Hyperthermia Neg Hx        ROS     Review of Systems  Constitutional: No wt loss, fever, fatigue  Gastrointestinal: No nausea, abdominal pain  Behavioral/Psych: No insomnia or anxiety  Cardiovascular: +chest pain, shortness of breath, and syncope. These symptoms come and go.      Objective:           Vitals and nursing note reviewed.   Constitutional:       Appearance: Well-developed.   HENT:      Head: Normocephalic.      Right Ear: External ear normal.      Left Ear: External ear normal.   Neck:      Vascular: No JVD.   Pulmonary:      Effort: Pulmonary effort is normal. No respiratory distress.      Breath sounds: Normal breath sounds. No stridor. No rales.   Cardiovascular:      Normal rate. Regular rhythm.      No gallop.   Pulses:     Intact distal pulses.   Abdominal:      General: Bowel sounds are normal. There is no distension.      Palpations: Abdomen is soft.      Tenderness: There is no abdominal tenderness. There is no guarding.   Musculoskeletal: Normal range of motion.         General: No tenderness.      Cervical back: Normal range of motion. Skin:     General: Skin is warm.   Neurological:      Mental Status: Alert and oriented to person, place, and time.      Deep Tendon Reflexes: Reflexes are normal and symmetric.   Psychiatric:         Judgment: Judgment normal.           ECG 12 Lead    Date/Time: 2/17/2023 9:51 AM  Performed by: Rose Castañeda APRN  Authorized by: Rose Castañeda APRN   Comparison: compared with previous ECG   Rhythm: sinus rhythm  Rate: normal  BPM: 63  T flattening: all    Clinical impression: non-specific ECG           2022    Interpretation Summary       • No ECG evidence of myocardial ischemia. Negative  clinical evidence of myocardial ischemia. Findings consistent with a normal ECG stress test.     Asymptomatic for chest pain. ECG is negative for ischemia. Functional Study to clear for Cardiac Rehab.   Ectopy: occasional PAC and PVC at baseline and recovery, none in exercise.   B/P: Baseline Hypertensive 140/58, Peak 158/78, Recovery:144//60  On  Beta-blocker therapy   Exercise Tolerance:poor, Reached  17 Of  20 Austin Scale, Stage I  Duke treadmill score +1.069  Estimates  5-year survival of 95  % . Using the Duke score there is a medium risk  of angiographic coronary disease     Supervised by:  Daysi WEST.       2022    HEMODYNAMIC / ANGIOGRAPHIC DATA:    1. Left ventricular end diastolic pressure was 10 mmHg.    2. The left main is normal left main.  3. The left anterior descending artery is .Left anterior descending, showed midportion 80% with positive IFR reduced to 0% with 3.0/18 Xience stent, first large diagonal branch 100% occluded, early atherosclerotic plaque of the rest of the LAD diagonal and  branches  4. The left circumflex is nondominant and normal.  5. The right coronary artery is.  Noted with the PDA 70 to 80% stenosis, minimal irregularity of the rest of the right coronary artery.  6. Successful IFR of mid LAD 0.84  7. Successful angioplasty and stent to mid LAD 80% reduced to 0% with 3.0/18 Xience stent     DIMITRIOS FLOW    PRE..3.....       POST...3...     TYPE OF LESION......B2.......     RECOMMENDATIONS:  Post-procedure care will focus on prevention of any ischemic events and congestive complications.  Aggressive risk factor modification will be carried out.  Importance of taking dual antiplatelets for one year  has been explained, risk of stent thrombosis leading to the acute MI, which carries high morbidity and mortality has been explained     Discontinuation or interruptions of these medications should be under the strict guidance of appropriate health  professional          2021    Interpretation Summary    • Calculated left ventricular EF = 61% Estimated left ventricular EF was in agreement with the calculated left ventricular EF.  • Left ventricular diastolic function was normal.  • The right ventricular cavity is borderline dilated.  • The right atrial cavity is borderline dilated.  • There is no evidence of pericardial effusion.       Interpretation Summary    • Proximal right internal carotid artery mild stenosis.  • Proximal left internal carotid artery mild stenosis.         Current Outpatient Medications:   •  bisoprolol (ZEBeta) 5 MG tablet, TAKE 1/2 TABLET BY MOUTH EVERY DAY, Disp: 45 tablet, Rfl: 1  •  albuterol (PROVENTIL) (2.5 MG/3ML) 0.083% nebulizer solution, Take 2.5 mg by nebulization Every 6 (Six) Hours As Needed., Disp: , Rfl: 11  •  amLODIPine (NORVASC) 5 MG tablet, TAKE 1 TABLET BY MOUTH EVERY DAY, Disp: 90 tablet, Rfl: 1  •  aspirin 81 MG EC tablet, Take 81 mg by mouth daily., Disp: , Rfl:   •  diclofenac (CATAFLAM) 50 MG tablet, 50 mg As Needed., Disp: , Rfl:   •  Ertugliflozin L-PyroglutamicAc (STEGLATRO) 15 MG tablet, Take 15 mg by mouth Every Morning., Disp: , Rfl:   •  Insulin Degludec (TRESIBA SC), Inject 25 Units under the skin into the appropriate area as directed Every Night., Disp: , Rfl:   •  metFORMIN (GLUCOPHAGE) 1000 MG tablet, Take 1,000 mg by mouth 2 (Two) Times a Day., Disp: , Rfl: 0  •  Multiple Vitamins-Minerals (MULTIVITAMIN WITH MINERALS) tablet tablet, Take 1 tablet by mouth Daily., Disp: , Rfl:   •  nitroglycerin (NITROSTAT) 0.4 MG SL tablet, PLACE 1 TABLET UNDER THE TONGUE SEE ADMIN INSTRUCTIONS., Disp: 25 tablet, Rfl: 0  •  NovoFine 32G X 6 MM misc, USE 1 (ONE) EACH DAILY, Disp: , Rfl:   •  Omega-3 Fatty Acids (FISH OIL) 1000 MG capsule capsule, Take 1,000 mg by mouth Daily With Breakfast., Disp: , Rfl:   •  omeprazole (priLOSEC) 40 MG capsule, Take 40 mg by mouth Daily., Disp: , Rfl:   •  OneTouch Delica Lancets 30G  misc, TEST 1 (ONE) EACH DAILY, Disp: , Rfl:   •  ONETOUCH VERIO test strip, , Disp: , Rfl: 0  •  pregabalin (LYRICA) 150 MG capsule, Take 150 mg by mouth 2 (Two) Times a Day., Disp: , Rfl:   •  rosuvastatin (CRESTOR) 10 MG tablet, TAKE 1 TABLET BY MOUTH EVERY DAY, Disp: 90 tablet, Rfl: 1  •  SITagliptin (JANUVIA) 100 MG tablet, Take 100 mg by mouth Daily., Disp: , Rfl:   •  SUMAtriptan (IMITREX) 50 MG tablet, Take 50 mg by mouth Every 2 (Two) Hours As Needed for Migraine. Take one tablet at onset of headache. May repeat dose one time in 2 hours if headache not relieved., Disp: , Rfl:   •  VENTOLIN  (90 BASE) MCG/ACT inhaler, Inhale 1 puff Every 4 (Four) Hours As Needed., Disp: , Rfl: 1     Assessment:        Patient Active Problem List   Diagnosis   • Chest pain, atypical   • Shortness of breath   • Syncope and collapse   • Weakness   • Coronary artery disease involving native heart with angina pectoris (HCC)   • Soft tissue lesion of shoulder region   • Disorder of rotator cuff   • Shoulder pain   • Rotator cuff tear arthropathy   • Unstable angina pectoris (HCC)   • Injury of kidney   • Type 2 diabetes mellitus (HCC)   • Hypertension   • Hypotension   • Hypovolemia   • Bilateral carotid artery disease (HCC)   • Medical non-compliance   • Dizziness   • Viral illness   • SOB (shortness of breath)   • Asthma exacerbation   • CKD (chronic kidney disease) stage 3, GFR 30-59 ml/min ([facility])   • Asthma   • Coronary artery disease involving native coronary artery of native heart with angina pectoris (HCC)   • Essential hypertension   • Abnormal nuclear stress test               Plan:     1.  Syncope: She will have a stress, echo, ZIO, and carotid Doppler      It was explained to the patient that stress testing carries 85% specificity/sensitivity, and does not rule out future cardiac event.  Risks of the procedure were explained to the patient including shortness of breath, induction of myocardial infarction, and  dizziness.  Patient is agreeable to proceeding with stress testing.     2.  CAD: She denies any chest pain.  Previous ischemic work-up as above.Continue aspirin, beta-blockade, and Crestor.    Risk reduction for the coronary artery disease, controlling the blood pressure, blood sugar management, cholesterol management, exercise, stress management, and proper compliance with medications and follow-up has been discussed.    3.  Hypertension: Today in the office her blood pressure is 160/88 and heart rate is 64.  She states she did not take her blood pressure medications this morning. Continue beta-blockade.    Educated patient on exercising for at least 30 minutes a day for 2 to 3 days a week. Importance of controlling hypertension and blood pressure checkup on the regular basis has been explained. Hypertension as a silent killer has been discussed. Risk reduction of the weight and regular exercises to control the hypertension has been explained.    4. Shortness of breath    5. Chest pain comes and goes     6. Bunny: She will see pulmonary regarding getting her cpap machine or get retested.        No diagnosis found.    There are no diagnoses linked to this encounter.    COUNSELING: tess Horner was given to patient for the following topics: diagnostic results, risk factor reductions, impressions, risks and benefits of treatment options and importance of treatment compliance .       SMOKING COUNSELING: denies        She will have a stress, echo, carotid and Zio    Sincerely,   BRETT Beth  [facility]  02/17/23  09:23 EST    EMR Dragon/Transcription disclaimer:   Much of this encounter note is an electronic transcription/translation of spoken language to printed text. The electronic translation of spoken language may permit erroneous, or at times, nonsensical words or phrases to be inadvertently transcribed; Although I have reviewed the note for such errors, some may  still exist.

## 2023-02-28 ENCOUNTER — HOSPITAL ENCOUNTER (OUTPATIENT)
Dept: CARDIOLOGY | Facility: HOSPITAL | Age: 63
Discharge: HOME | End: 2023-02-28
Payer: MEDICARE

## 2023-02-28 ENCOUNTER — HOSPITAL ENCOUNTER (OUTPATIENT)
Dept: CARDIOLOGY | Facility: HOSPITAL | Age: 63
Discharge: HOME | End: 2023-02-28
Admitting: NURSE PRACTITIONER
Payer: MEDICARE

## 2023-02-28 VITALS
BODY MASS INDEX: 32.47 KG/M2 | OXYGEN SATURATION: 99 % | HEIGHT: 61 IN | SYSTOLIC BLOOD PRESSURE: 136 MMHG | DIASTOLIC BLOOD PRESSURE: 68 MMHG | HEART RATE: 56 BPM | WEIGHT: 172 LBS

## 2023-02-28 VITALS
SYSTOLIC BLOOD PRESSURE: 150 MMHG | HEIGHT: 61 IN | WEIGHT: 172 LBS | HEART RATE: 54 BPM | DIASTOLIC BLOOD PRESSURE: 71 MMHG | BODY MASS INDEX: 32.47 KG/M2

## 2023-02-28 PROCEDURE — 78452 HT MUSCLE IMAGE SPECT MULT: CPT

## 2023-02-28 PROCEDURE — 78452 HT MUSCLE IMAGE SPECT MULT: CPT | Performed by: INTERNAL MEDICINE

## 2023-02-28 PROCEDURE — A9500 TC99M SESTAMIBI: HCPCS | Performed by: INTERNAL MEDICINE

## 2023-02-28 PROCEDURE — 25010000002 REGADENOSON 0.4 MG/5ML SOLUTION: Performed by: INTERNAL MEDICINE

## 2023-02-28 PROCEDURE — 93018 CV STRESS TEST I&R ONLY: CPT | Performed by: INTERNAL MEDICINE

## 2023-02-28 PROCEDURE — 93017 CV STRESS TEST TRACING ONLY: CPT

## 2023-02-28 PROCEDURE — 93306 TTE W/DOPPLER COMPLETE: CPT

## 2023-02-28 PROCEDURE — 0 TECHNETIUM SESTAMIBI: Performed by: INTERNAL MEDICINE

## 2023-02-28 PROCEDURE — 93306 TTE W/DOPPLER COMPLETE: CPT | Performed by: INTERNAL MEDICINE

## 2023-02-28 RX ADMIN — REGADENOSON 0.4 MG: 0.08 INJECTION, SOLUTION INTRAVENOUS at 09:00

## 2023-02-28 RX ADMIN — TECHNETIUM TC 99M SESTAMIBI 1 DOSE: 1 INJECTION INTRAVENOUS at 09:00

## 2023-02-28 RX ADMIN — TECHNETIUM TC 99M SESTAMIBI 1 DOSE: 1 INJECTION INTRAVENOUS at 07:11

## 2023-03-01 LAB
AORTIC DIMENSIONLESS INDEX: 0.7 (DI)
BH CV ECHO MEAS - ACS: 2.15 CM
BH CV ECHO MEAS - AO MAX PG: 5.2 MMHG
BH CV ECHO MEAS - AO MEAN PG: 2.8 MMHG
BH CV ECHO MEAS - AO ROOT DIAM: 2.9 CM
BH CV ECHO MEAS - AO V2 MAX: 113.8 CM/SEC
BH CV ECHO MEAS - AO V2 VTI: 27.9 CM
BH CV ECHO MEAS - AVA(I,D): 1.87 CM2
BH CV ECHO MEAS - EDV(CUBED): 51.4 ML
BH CV ECHO MEAS - EDV(MOD-SP2): 45 ML
BH CV ECHO MEAS - EDV(MOD-SP4): 54 ML
BH CV ECHO MEAS - EF(MOD-BP): 61.2 %
BH CV ECHO MEAS - EF(MOD-SP2): 62.2 %
BH CV ECHO MEAS - EF(MOD-SP4): 61.1 %
BH CV ECHO MEAS - ESV(CUBED): 15.6 ML
BH CV ECHO MEAS - ESV(MOD-SP2): 17 ML
BH CV ECHO MEAS - ESV(MOD-SP4): 21 ML
BH CV ECHO MEAS - FS: 32.8 %
BH CV ECHO MEAS - IVS/LVPW: 1.09 CM
BH CV ECHO MEAS - IVSD: 1.4 CM
BH CV ECHO MEAS - LA DIMENSION: 2.8 CM
BH CV ECHO MEAS - LAT PEAK E' VEL: 8.6 CM/SEC
BH CV ECHO MEAS - LV DIASTOLIC VOL/BSA (35-75): 30.5 CM2
BH CV ECHO MEAS - LV MASS(C)D: 176.7 GRAMS
BH CV ECHO MEAS - LV MAX PG: 3.1 MMHG
BH CV ECHO MEAS - LV MEAN PG: 1.27 MMHG
BH CV ECHO MEAS - LV SYSTOLIC VOL/BSA (12-30): 11.9 CM2
BH CV ECHO MEAS - LV V1 MAX: 88.3 CM/SEC
BH CV ECHO MEAS - LV V1 VTI: 19.2 CM
BH CV ECHO MEAS - LVIDD: 3.7 CM
BH CV ECHO MEAS - LVIDS: 2.5 CM
BH CV ECHO MEAS - LVOT AREA: 2.7 CM2
BH CV ECHO MEAS - LVOT DIAM: 1.86 CM
BH CV ECHO MEAS - LVPWD: 1.29 CM
BH CV ECHO MEAS - MED PEAK E' VEL: 6.9 CM/SEC
BH CV ECHO MEAS - MV A DUR: 0.13 SEC
BH CV ECHO MEAS - MV A MAX VEL: 87.3 CM/SEC
BH CV ECHO MEAS - MV DEC SLOPE: 232.2 CM/SEC2
BH CV ECHO MEAS - MV DEC TIME: 172 MSEC
BH CV ECHO MEAS - MV E MAX VEL: 79.5 CM/SEC
BH CV ECHO MEAS - MV E/A: 0.91
BH CV ECHO MEAS - MV MAX PG: 2.9 MMHG
BH CV ECHO MEAS - MV MEAN PG: 1.13 MMHG
BH CV ECHO MEAS - MV P1/2T: 103.5 MSEC
BH CV ECHO MEAS - MV V2 VTI: 31.5 CM
BH CV ECHO MEAS - MVA(P1/2T): 2.13 CM2
BH CV ECHO MEAS - MVA(VTI): 1.66 CM2
BH CV ECHO MEAS - PA ACC TIME: 0.13 SEC
BH CV ECHO MEAS - PA PR(ACCEL): 18.4 MMHG
BH CV ECHO MEAS - PA V2 MAX: 93.7 CM/SEC
BH CV ECHO MEAS - QP/QS: 1.72
BH CV ECHO MEAS - RAP SYSTOLE: 3 MMHG
BH CV ECHO MEAS - RV MAX PG: 2.6 MMHG
BH CV ECHO MEAS - RV V1 MAX: 80.5 CM/SEC
BH CV ECHO MEAS - RV V1 VTI: 18.6 CM
BH CV ECHO MEAS - RVDD: 2.8 CM
BH CV ECHO MEAS - RVOT DIAM: 2.48 CM
BH CV ECHO MEAS - RVSP: 18.6 MMHG
BH CV ECHO MEAS - SI(MOD-SP2): 15.8 ML/M2
BH CV ECHO MEAS - SI(MOD-SP4): 18.6 ML/M2
BH CV ECHO MEAS - SV(LVOT): 52.2 ML
BH CV ECHO MEAS - SV(MOD-SP2): 28 ML
BH CV ECHO MEAS - SV(MOD-SP4): 33 ML
BH CV ECHO MEAS - SV(RVOT): 89.7 ML
BH CV ECHO MEAS - TAPSE (>1.6): 2 CM
BH CV ECHO MEAS - TR MAX PG: 15.6 MMHG
BH CV ECHO MEAS - TR MAX VEL: 197.6 CM/SEC
BH CV ECHO MEASUREMENTS AVERAGE E/E' RATIO: 10.26
BH CV XLRA - RV BASE: 3 CM
BH CV XLRA - RV LENGTH: 5.5 CM
BH CV XLRA - RV MID: 1.94 CM
BH CV XLRA - TDI S': 10.7 CM/SEC
LEFT ATRIUM VOLUME INDEX: 28.1 ML/M2
MAXIMAL PREDICTED HEART RATE: 156 BPM
SINUS: 2.6 CM
STJ: 2.8 CM
STRESS TARGET HR: 133 BPM

## 2023-03-06 LAB
BH CV IMMEDIATE POST RECOVERY TECH DATA SYMPTOMS: NORMAL
BH CV IMMEDIATE POST TECH DATA BLOOD PRESSURE: NORMAL MMHG
BH CV IMMEDIATE POST TECH DATA HEART RATE: 89 BPM
BH CV REST NUCLEAR ISOTOPE DOSE: 10.9 MCI
BH CV STRESS BP STAGE 1: NORMAL
BH CV STRESS COMMENTS STAGE 1: NORMAL
BH CV STRESS DOSE REGADENOSON STAGE 1: 0.4
BH CV STRESS DURATION MIN STAGE 1: 1
BH CV STRESS DURATION SEC STAGE 1: 46
BH CV STRESS GRADE STAGE 1: 0
BH CV STRESS HR STAGE 1: 92
BH CV STRESS METS STAGE 1: 1.6
BH CV STRESS NUCLEAR ISOTOPE DOSE: 32.1 MCI
BH CV STRESS PROTOCOL 1: NORMAL
BH CV STRESS RECOVERY BP: NORMAL MMHG
BH CV STRESS RECOVERY HR: 71 BPM
BH CV STRESS RECOVERY O2: 99 %
BH CV STRESS SPEED STAGE 1: 1
BH CV STRESS STAGE 1: 1
BH CV THREE MINUTE POST TECH DATA BLOOD PRESSURE: NORMAL MMHG
BH CV THREE MINUTE POST TECH DATA HEART RATE: 80 BPM
BH CV THREE MINUTE POST TECH DATA OXYGEN SATURATION: 99 %
LV EF NUC BP: 67 %
MAXIMAL PREDICTED HEART RATE: 156 BPM
PERCENT MAX PREDICTED HR: 58.97 %
STRESS BASELINE BP: NORMAL MMHG
STRESS BASELINE HR: 54 BPM
STRESS O2 SAT REST: 99 %
STRESS PERCENT HR: 69 %
STRESS POST ESTIMATED WORKLOAD: 1.6 METS
STRESS POST EXERCISE DUR MIN: 1 MIN
STRESS POST EXERCISE DUR SEC: 46 SEC
STRESS POST PEAK BP: NORMAL MMHG
STRESS POST PEAK HR: 92 BPM
STRESS TARGET HR: 133 BPM

## 2023-03-16 ENCOUNTER — HOSPITAL ENCOUNTER (OUTPATIENT)
Dept: CARDIOLOGY | Facility: HOSPITAL | Age: 63
Discharge: HOME | End: 2023-03-16
Admitting: NURSE PRACTITIONER
Payer: MEDICARE

## 2023-03-16 DIAGNOSIS — R55 SYNCOPE AND COLLAPSE: ICD-10-CM

## 2023-03-16 DIAGNOSIS — I25.119 CORONARY ARTERY DISEASE INVOLVING NATIVE CORONARY ARTERY OF NATIVE HEART WITH ANGINA PECTORIS: ICD-10-CM

## 2023-03-16 LAB
BH CV XLRA MEAS LEFT DIST CCA EDV: 18.2 CM/SEC
BH CV XLRA MEAS LEFT DIST CCA PSV: 88.5 CM/SEC
BH CV XLRA MEAS LEFT DIST ICA EDV: -26.7 CM/SEC
BH CV XLRA MEAS LEFT DIST ICA PSV: -74.6 CM/SEC
BH CV XLRA MEAS LEFT ICA/CCA RATIO: 1.24
BH CV XLRA MEAS LEFT MID ICA EDV: -31.1 CM/SEC
BH CV XLRA MEAS LEFT MID ICA PSV: -110 CM/SEC
BH CV XLRA MEAS LEFT PROX CCA EDV: 19.6 CM/SEC
BH CV XLRA MEAS LEFT PROX CCA PSV: 80.9 CM/SEC
BH CV XLRA MEAS LEFT PROX ECA EDV: -15.7 CM/SEC
BH CV XLRA MEAS LEFT PROX ECA PSV: -175 CM/SEC
BH CV XLRA MEAS LEFT PROX ICA EDV: -10.9 CM/SEC
BH CV XLRA MEAS LEFT PROX ICA PSV: -50.6 CM/SEC
BH CV XLRA MEAS LEFT PROX SCLA PSV: 106 CM/SEC
BH CV XLRA MEAS LEFT VERTEBRAL A EDV: 15.3 CM/SEC
BH CV XLRA MEAS LEFT VERTEBRAL A PSV: 53.4 CM/SEC
BH CV XLRA MEAS RIGHT DIST CCA EDV: 15.3 CM/SEC
BH CV XLRA MEAS RIGHT DIST CCA PSV: 57.7 CM/SEC
BH CV XLRA MEAS RIGHT DIST ICA EDV: -14.9 CM/SEC
BH CV XLRA MEAS RIGHT DIST ICA PSV: -62.1 CM/SEC
BH CV XLRA MEAS RIGHT ICA/CCA RATIO: 2.63
BH CV XLRA MEAS RIGHT MID ICA EDV: -25.9 CM/SEC
BH CV XLRA MEAS RIGHT MID ICA PSV: -120 CM/SEC
BH CV XLRA MEAS RIGHT PROX CCA EDV: -11.4 CM/SEC
BH CV XLRA MEAS RIGHT PROX CCA PSV: -70.3 CM/SEC
BH CV XLRA MEAS RIGHT PROX ECA EDV: -15.7 CM/SEC
BH CV XLRA MEAS RIGHT PROX ECA PSV: -140 CM/SEC
BH CV XLRA MEAS RIGHT PROX ICA EDV: -36.1 CM/SEC
BH CV XLRA MEAS RIGHT PROX ICA PSV: -152 CM/SEC
BH CV XLRA MEAS RIGHT PROX SCLA PSV: 94.4 CM/SEC
BH CV XLRA MEAS RIGHT VERTEBRAL A EDV: 12.2 CM/SEC
BH CV XLRA MEAS RIGHT VERTEBRAL A PSV: 47.5 CM/SEC
LEFT ARM BP: NORMAL MMHG
MAXIMAL PREDICTED HEART RATE: 156 BPM
RIGHT ARM BP: NORMAL MMHG
STRESS TARGET HR: 133 BPM

## 2023-03-16 PROCEDURE — 93880 EXTRACRANIAL BILAT STUDY: CPT

## 2023-04-05 ENCOUNTER — OFFICE VISIT (OUTPATIENT)
Dept: CARDIOLOGY | Facility: CLINIC | Age: 63
End: 2023-04-05
Payer: MEDICARE

## 2023-04-05 VITALS
WEIGHT: 176 LBS | BODY MASS INDEX: 33.23 KG/M2 | HEIGHT: 61 IN | HEART RATE: 55 BPM | DIASTOLIC BLOOD PRESSURE: 64 MMHG | SYSTOLIC BLOOD PRESSURE: 131 MMHG

## 2023-04-05 DIAGNOSIS — G47.33 OBSTRUCTIVE SLEEP APNEA SYNDROME: ICD-10-CM

## 2023-04-05 DIAGNOSIS — I25.119 CORONARY ARTERY DISEASE INVOLVING NATIVE CORONARY ARTERY OF NATIVE HEART WITH ANGINA PECTORIS: ICD-10-CM

## 2023-04-05 DIAGNOSIS — R94.39 ABNORMAL NUCLEAR STRESS TEST: ICD-10-CM

## 2023-04-05 DIAGNOSIS — I65.29 SYMPTOMATIC CAROTID ARTERY STENOSIS WITHOUT INFARCTION, UNSPECIFIED LATERALITY: Primary | ICD-10-CM

## 2023-04-05 DIAGNOSIS — R07.2 PRECORDIAL PAIN: ICD-10-CM

## 2023-04-05 DIAGNOSIS — R07.82 INTERCOSTAL PAIN: ICD-10-CM

## 2023-04-05 DIAGNOSIS — E78.5 HYPERLIPIDEMIA, UNSPECIFIED HYPERLIPIDEMIA TYPE: ICD-10-CM

## 2023-04-05 DIAGNOSIS — R06.02 SOB (SHORTNESS OF BREATH): ICD-10-CM

## 2023-04-05 DIAGNOSIS — I10 ESSENTIAL HYPERTENSION: ICD-10-CM

## 2023-04-05 PROCEDURE — 99214 OFFICE O/P EST MOD 30 MIN: CPT | Performed by: NURSE PRACTITIONER

## 2023-04-05 PROCEDURE — 3075F SYST BP GE 130 - 139MM HG: CPT | Performed by: NURSE PRACTITIONER

## 2023-04-05 PROCEDURE — 3078F DIAST BP <80 MM HG: CPT | Performed by: NURSE PRACTITIONER

## 2023-04-05 RX ORDER — INSULIN DEGLUDEC INJECTION 100 U/ML
25 INJECTION, SOLUTION SUBCUTANEOUS
COMMUNITY
Start: 2023-03-06

## 2023-04-05 RX ORDER — PREGABALIN 150 MG/1
150 CAPSULE ORAL 2 TIMES DAILY
COMMUNITY
Start: 2023-03-14

## 2023-04-05 RX ORDER — BISOPROLOL FUMARATE 5 MG/1
5 TABLET, FILM COATED ORAL DAILY
Qty: 45 TABLET | Refills: 1 | Status: SHIPPED | OUTPATIENT
Start: 2023-04-05

## 2023-04-05 NOTE — H&P (VIEW-ONLY)
Subjective:        Lily Unger is a 62 y.o. female who here for follow up    Chief Complaint   Patient presents with   • Follow-up     TEST RESULT       HPI  This is a 62-year-old female who is well-known to this provider.  She has a history of CKD, CAD, hypertension, hyperlipidemia, DDD and former smoker.  She was recently seen for near syncopal episode, with some shortness of breath.  He underwent some testing which revealed    Previous lipid panel in 2019 showed , HDL 40, LDL 40 and triglycerides 117.  Recent echo February 2023 revealed EF 61 to 65%, LV diastolic function was normal, and RV systolic pressure from TV regurgitation is normal.  Test revealed small to moderate size, mildly severe area of ischemia located in the anterior wall.  Impressions was consistent with intermediate risk.    In 2022 successful angioplasty and stent to the mid LAD.  Carotid artery demonstrates 50 to 69% stenosis, left internal carotid artery demonstrates less than 50% stenosis.    The following portions of the patient's history were reviewed and updated as appropriate: allergies, current medications, past family history, past medical history, past social history, past surgical history and problem list.    Past Medical History:   Diagnosis Date   • Abnormal stress test    • Asthma    • CAD (coronary artery disease)    • Chronic kidney disease    • COVID    • Crescendo angina    • Diabetes mellitus    • H/O angioplasty    • Hiatal hernia    • Hyperlipidemia    • Hypertension    • Obesity    • Recurrent urinary tract infection    • Rotator cuff tear, right    • Shoulder pain, right    • Sleep apnea     NO MACHINE   • Torn rotator cuff          reports that she quit smoking about 31 years ago. Her smoking use included cigarettes. She has never used smokeless tobacco. She reports that she does not currently use alcohol. She reports that she does not use drugs.     Family History   Problem Relation Age of Onset   • Heart  attack Father    • Heart disease Father    • Heart attack Brother    • Heart disease Brother    • Heart attack Maternal Grandfather    • Heart disease Maternal Grandfather    • Heart disease Mother    • Malig Hyperthermia Neg Hx        ROS     Review of Systems  Constitutional: No wt loss, fever, fatigue  Gastrointestinal: No nausea, abdominal pain  Behavioral/Psych: No insomnia or anxiety  Cardiovascular + chest pain, and shortness of breath.      Objective:           Vitals and nursing note reviewed.   Constitutional:       Appearance: Well-developed.   HENT:      Head: Normocephalic.      Right Ear: External ear normal.      Left Ear: External ear normal.   Neck:      Vascular: No JVD.   Pulmonary:      Effort: Pulmonary effort is normal. No respiratory distress.      Breath sounds: Normal breath sounds. No stridor. No rales.   Cardiovascular:      Normal rate. Regular rhythm.      No gallop.   Pulses:     Intact distal pulses.   Edema:     Peripheral edema absent.   Abdominal:      General: Bowel sounds are normal. There is no distension.      Palpations: Abdomen is soft.      Tenderness: There is no abdominal tenderness. There is no guarding.   Musculoskeletal: Normal range of motion.         General: No tenderness.      Cervical back: Normal range of motion. Skin:     General: Skin is warm.   Neurological:      Mental Status: Alert and oriented to person, place, and time.      Deep Tendon Reflexes: Reflexes are normal and symmetric.   Psychiatric:         Judgment: Judgment normal.         Procedures  2023   Interpretation Summary       •  Findings consistent with a normal ECG stress test.  •  Left ventricular ejection fraction is normal (Calculated EF = 67%).  •  Myocardial perfusion imaging indicates a small-to-moderate-sized, moderately severe area of ischemia located in the anterior wall.  •  Impressions are consistent with an intermediate risk study.     Asymptomatic for chest pain. ECG is negative for  ischemia.   Ectopy: frequent PAC's in exercise and recovery, rare PVC in recovery.  B/P is appropriate. Baseline 136/68, Peak (post Lexiscan) 124/49, Recovery:132//71  On  Beta-blocker therapy    Pharmacologic study due to Beta-blocker therapy.  Participated in Low Level exercise and tolerance is fair      Supervised by:  Daysi WEST.       Interpretation Summary       •  Left ventricular ejection fraction appears to be 61 - 65%.  •  Left ventricular diastolic function was normal.  •  Estimated right ventricular systolic pressure from tricuspid regurgitation is normal (<35 mmHg).     HEMODYNAMIC / ANGIOGRAPHIC DATA:    1. Left ventricular end diastolic pressure was 10 mmHg.    2. The left main is normal left main.  3. The left anterior descending artery is .Left anterior descending, showed midportion 80% with positive IFR reduced to 0% with 3.0/18 Xience stent, first large diagonal branch 100% occluded, early atherosclerotic plaque of the rest of the LAD diagonal and  branches  4. The left circumflex is nondominant and normal.  5. The right coronary artery is.  Noted with the PDA 70 to 80% stenosis, minimal irregularity of the rest of the right coronary artery.  6. Successful IFR of mid LAD 0.84  7. Successful angioplasty and stent to mid LAD 80% reduced to 0% with 3.0/18 Xience stent     DIMITRIOS FLOW    PRE..3.....       POST...3...     TYPE OF LESION......B2.......     RECOMMENDATIONS:  Post-procedure care will focus on prevention of any ischemic events and congestive complications.  Aggressive risk factor modification will be carried out.  Importance of taking dual antiplatelets for one year  has been explained, risk of stent thrombosis leading to the acute MI, which carries high morbidity and mortality has been explained     Discontinuation or interruptions of these medications should be under the strict guidance of appropriate health professional     HEMODYNAMIC / ANGIOGRAPHIC DATA:     8. Left ventricular end diastolic pressure was 10 mmHg.  9. Left ventriculography revealed an EF around 60 %.    10. The left main is normal left main.  11. The left anterior descending artery is *.Left anterior descending had a proximal stent widely patent with the diagonal branch which was large which was jailed, was angioplastied with a 2.25/12 trek balloon reduced from 99% down to the 10 to 20%, mid LAD also had approximately 50% stenosis with minimum irregularity of the rest of the LAD diagonal and  branches  12. The left circumflex is .The circumflex artery was a nondominant with early atherosclerotic plaque  13. The right coronary artery is .Right coronary artery was dominant with a proximal 30% stenosis  14. Successful angioplasty of the ostial diagonal branch 99% reduced to 10 to 20% with 2.25/12 trek balloon     DIMITRIOS FLOW    PRE.. 3.....       POST.... 3..     TYPE OF LESION........C.....     RECOMMENDATIONS:  Post-procedure care will focus on prevention of any ischemic events and congestive complications.  Aggressive risk factor modification will be carried out.     Importance of taking dual antiplatelets for one year  has been explained, risk of stent thrombosis leading to the acute MI, which carries high morbidity and mortality has been explained         Current Outpatient Medications:   •  albuterol (PROVENTIL) (2.5 MG/3ML) 0.083% nebulizer solution, Take 2.5 mg by nebulization Every 6 (Six) Hours As Needed., Disp: , Rfl: 11  •  amLODIPine (NORVASC) 5 MG tablet, Take 1 tablet by mouth Daily., Disp: 90 tablet, Rfl: 1  •  aspirin 81 MG EC tablet, Take 1 tablet by mouth Daily., Disp: , Rfl:   •  bisoprolol (ZEBeta) 5 MG tablet, Take 0.5 tablets by mouth Daily., Disp: 45 tablet, Rfl: 1  •  diclofenac (CATAFLAM) 50 MG tablet, 50 mg As Needed., Disp: , Rfl:   •  Insulin Degludec (TRESIBA SC), Inject 25 Units under the skin into the appropriate area as directed Every Night., Disp: , Rfl:   •   metFORMIN (GLUCOPHAGE) 1000 MG tablet, Take 1,000 mg by mouth 2 (Two) Times a Day., Disp: , Rfl: 0  •  Multiple Vitamins-Minerals (MULTIVITAMIN WITH MINERALS) tablet tablet, Take 1 tablet by mouth Daily., Disp: , Rfl:   •  nitroglycerin (NITROSTAT) 0.4 MG SL tablet, Place 1 tablet under the tongue See Admin Instructions., Disp: 25 tablet, Rfl: 3  •  omeprazole (priLOSEC) 40 MG capsule, Take 40 mg by mouth Daily., Disp: , Rfl:   •  OneTouch Delica Lancets 30G misc, TEST 1 (ONE) EACH DAILY, Disp: , Rfl:   •  ONETOUCH VERIO test strip, , Disp: , Rfl: 0  •  promethazine (PHENERGAN) 25 MG tablet, promethazine 25 mg tablet  1/2-1 TABLET BY MOUTH EVERY SIX HOURS, AS NEEDED, Disp: , Rfl:   •  promethazine-dextromethorphan (PROMETHAZINE-DM) 6.25-15 MG/5ML syrup, 5 MILLILITER BY MOUTH EVERY FOUR HOURS, AS NEEDED, Disp: , Rfl:   •  rosuvastatin (CRESTOR) 10 MG tablet, TAKE 1 TABLET BY MOUTH EVERY DAY, Disp: 90 tablet, Rfl: 1  •  SUMAtriptan (IMITREX) 50 MG tablet, Take 1 tablet by mouth Every 2 (Two) Hours As Needed for Migraine. Take one tablet at onset of headache. May repeat dose one time in 2 hours if headache not relieved., Disp: , Rfl:   •  triamcinolone (KENALOG) 0.1 % cream, triamcinolone acetonide 0.1 % topical cream  APPLY TO SKIN LESIONS 4 TIMES A DAY UNTIL HEALED, Disp: , Rfl:   •  zinc oxide 20 % ointment, zinc oxide 20 % topical ointment  APPLY TO SKIN LESION 4 TIMES DAILY UNTIL HEALED, Disp: , Rfl:      Assessment:        Patient Active Problem List   Diagnosis   • Chest pain, atypical   • Shortness of breath   • Syncope and collapse   • Weakness   • Coronary artery disease involving native heart with angina pectoris   • Soft tissue lesion of shoulder region   • Disorder of rotator cuff   • Shoulder pain   • Rotator cuff tear arthropathy   • Unstable angina pectoris   • Injury of kidney   • Type 2 diabetes mellitus   • Hypertension   • Hypotension   • Hypovolemia   • Bilateral carotid artery disease   • Medical  non-compliance   • Dizziness   • Viral illness   • SOB (shortness of breath)   • Asthma exacerbation   • CKD (chronic kidney disease) stage 3, GFR 30-59 ml/min   • Asthma   • Coronary artery disease involving native coronary artery of native heart with angina pectoris   • Essential hypertension   • Abnormal nuclear stress test               Plan:   1.  Holter: Showed normal sinus rhythm with frequent PACs, PVCs and NSSVT S.  I I will increase beta-blocker.  She will monitor for symptoms of dizziness and call if this happens.    2.  Chest pain with known CAD: Stress test was a positive study.  She continues to have chest pain and shortness of breath.  She will have a cardiac cath.    Risk reduction for the coronary artery disease, controlling the blood pressure, blood sugar management, cholesterol management, exercise, stress management, and proper compliance with medications and follow-up has been discussed    Procedure, risks and options of cardiac cath explained to pt INCLUDING BUT NOT LIMITED TO MI, STROKE, DEATH, INFECTION HAEMORRHAGE, . Pt understands well and agrees with no further questions.     3.  Hypertension: Stable.  We will stop her amlodipine to increase her beta-blockade due to frequent PVCs.    4.  Shortness of breath: She will undergo a cardiac stress test.    Procedure, risks and options of cardiac cath explained to pt INCLUDING BUT NOT LIMITED TO MI, STROKE, DEATH, INFECTION HAEMORRHAGE, . Pt understands well and agrees with no further questions.      5.  MILAGROS she will follow-up with pulmonary to see if she needs a new device.           No diagnosis found.    There are no diagnoses linked to this encounter.    COUNSELING:tess Horner was given to patient for the following topics: diagnostic results, risk factor reductions, impressions, risks and benefits of treatment options and importance of treatment compliance .       SMOKING COUNSELING: denies    1. Stop norvasc. I will  increase BB to 5 mg daily due to holter results as above.    2. Refer to vasculaar for evaluation for near syncope and carotoid results.     3. cardiac cath.     Sincerely,   BRETT Beth  [facility]  04/05/23  14:22 EDT    EMR Dragon/Transcription disclaimer:   Much of this encounter note is an electronic transcription/translation of spoken language to printed text. The electronic translation of spoken language may permit erroneous, or at times, nonsensical words or phrases to be inadvertently transcribed; Although I have reviewed the note for such errors, some may still exist.

## 2023-04-18 ENCOUNTER — LAB (OUTPATIENT)
Dept: LAB | Facility: HOSPITAL | Age: 63
End: 2023-04-18
Payer: MEDICARE

## 2023-04-18 DIAGNOSIS — R07.82 INTERCOSTAL PAIN: ICD-10-CM

## 2023-04-18 DIAGNOSIS — R94.39 ABNORMAL NUCLEAR STRESS TEST: ICD-10-CM

## 2023-04-18 DIAGNOSIS — R07.2 PRECORDIAL PAIN: ICD-10-CM

## 2023-04-18 LAB
ANION GAP SERPL CALCULATED.3IONS-SCNC: 8.8 MMOL/L (ref 5–15)
APTT PPP: 27.2 SECONDS (ref 22.7–35.4)
BASOPHILS # BLD AUTO: 0.08 10*3/MM3 (ref 0–0.2)
BASOPHILS NFR BLD AUTO: 0.8 % (ref 0–1.5)
BUN SERPL-MCNC: 12 MG/DL (ref 8–23)
BUN/CREAT SERPL: 14.5 (ref 7–25)
CALCIUM SPEC-SCNC: 9.8 MG/DL (ref 8.6–10.5)
CHLORIDE SERPL-SCNC: 103 MMOL/L (ref 98–107)
CO2 SERPL-SCNC: 28.2 MMOL/L (ref 22–29)
CREAT SERPL-MCNC: 0.83 MG/DL (ref 0.57–1)
DEPRECATED RDW RBC AUTO: 44.7 FL (ref 37–54)
EGFRCR SERPLBLD CKD-EPI 2021: 78.8 ML/MIN/1.73
EOSINOPHIL # BLD AUTO: 0.24 10*3/MM3 (ref 0–0.4)
EOSINOPHIL NFR BLD AUTO: 2.3 % (ref 0.3–6.2)
ERYTHROCYTE [DISTWIDTH] IN BLOOD BY AUTOMATED COUNT: 15.9 % (ref 12.3–15.4)
GLUCOSE SERPL-MCNC: 235 MG/DL (ref 65–99)
HCT VFR BLD AUTO: 35.9 % (ref 34–46.6)
HGB BLD-MCNC: 10.8 G/DL (ref 12–15.9)
IMM GRANULOCYTES # BLD AUTO: 0.06 10*3/MM3 (ref 0–0.05)
IMM GRANULOCYTES NFR BLD AUTO: 0.6 % (ref 0–0.5)
INR PPP: 1 (ref 0.9–1.1)
LYMPHOCYTES # BLD AUTO: 2.56 10*3/MM3 (ref 0.7–3.1)
LYMPHOCYTES NFR BLD AUTO: 24.3 % (ref 19.6–45.3)
MCH RBC QN AUTO: 23.4 PG (ref 26.6–33)
MCHC RBC AUTO-ENTMCNC: 30.1 G/DL (ref 31.5–35.7)
MCV RBC AUTO: 77.9 FL (ref 79–97)
MONOCYTES # BLD AUTO: 1.15 10*3/MM3 (ref 0.1–0.9)
MONOCYTES NFR BLD AUTO: 10.9 % (ref 5–12)
NEUTROPHILS NFR BLD AUTO: 6.46 10*3/MM3 (ref 1.7–7)
NEUTROPHILS NFR BLD AUTO: 61.1 % (ref 42.7–76)
NRBC BLD AUTO-RTO: 0.1 /100 WBC (ref 0–0.2)
PLATELET # BLD AUTO: 391 10*3/MM3 (ref 140–450)
PMV BLD AUTO: 9.4 FL (ref 6–12)
POTASSIUM SERPL-SCNC: 4.9 MMOL/L (ref 3.5–5.2)
PROTHROMBIN TIME: 13.3 SECONDS (ref 11.7–14.2)
RBC # BLD AUTO: 4.61 10*6/MM3 (ref 3.77–5.28)
SODIUM SERPL-SCNC: 140 MMOL/L (ref 136–145)
WBC NRBC COR # BLD: 10.55 10*3/MM3 (ref 3.4–10.8)

## 2023-04-18 PROCEDURE — 80048 BASIC METABOLIC PNL TOTAL CA: CPT | Performed by: NURSE PRACTITIONER

## 2023-04-18 PROCEDURE — 85610 PROTHROMBIN TIME: CPT

## 2023-04-18 PROCEDURE — 85025 COMPLETE CBC W/AUTO DIFF WBC: CPT

## 2023-04-18 PROCEDURE — 85730 THROMBOPLASTIN TIME PARTIAL: CPT

## 2023-04-21 ENCOUNTER — HOSPITAL ENCOUNTER (OUTPATIENT)
Facility: HOSPITAL | Age: 63
Setting detail: HOSPITAL OUTPATIENT SURGERY
Discharge: HOME | End: 2023-04-21
Attending: INTERNAL MEDICINE | Admitting: INTERNAL MEDICINE
Payer: MEDICARE

## 2023-04-21 VITALS
WEIGHT: 180 LBS | DIASTOLIC BLOOD PRESSURE: 58 MMHG | HEART RATE: 59 BPM | RESPIRATION RATE: 18 BRPM | SYSTOLIC BLOOD PRESSURE: 138 MMHG | BODY MASS INDEX: 33.99 KG/M2 | OXYGEN SATURATION: 93 % | HEIGHT: 61 IN | TEMPERATURE: 98.3 F

## 2023-04-21 DIAGNOSIS — R07.2 PRECORDIAL PAIN: ICD-10-CM

## 2023-04-21 DIAGNOSIS — R94.39 ABNORMAL NUCLEAR STRESS TEST: ICD-10-CM

## 2023-04-21 LAB — GLUCOSE BLDC GLUCOMTR-MCNC: 173 MG/DL (ref 70–130)

## 2023-04-21 PROCEDURE — 25010000002 HEPARIN (PORCINE) PER 1000 UNITS: Performed by: INTERNAL MEDICINE

## 2023-04-21 PROCEDURE — 82962 GLUCOSE BLOOD TEST: CPT

## 2023-04-21 PROCEDURE — 25510000001 IOPAMIDOL PER 1 ML: Performed by: INTERNAL MEDICINE

## 2023-04-21 PROCEDURE — 25010000002 MIDAZOLAM PER 1 MG: Performed by: INTERNAL MEDICINE

## 2023-04-21 PROCEDURE — 93458 L HRT ARTERY/VENTRICLE ANGIO: CPT | Performed by: INTERNAL MEDICINE

## 2023-04-21 PROCEDURE — C1769 GUIDE WIRE: HCPCS | Performed by: INTERNAL MEDICINE

## 2023-04-21 PROCEDURE — 25010000002 FENTANYL CITRATE (PF) 50 MCG/ML SOLUTION: Performed by: INTERNAL MEDICINE

## 2023-04-21 PROCEDURE — C1894 INTRO/SHEATH, NON-LASER: HCPCS | Performed by: INTERNAL MEDICINE

## 2023-04-21 RX ORDER — SODIUM CHLORIDE 0.9 % (FLUSH) 0.9 %
10 SYRINGE (ML) INJECTION AS NEEDED
Status: DISCONTINUED | OUTPATIENT
Start: 2023-04-21 | End: 2023-04-21 | Stop reason: HOSPADM

## 2023-04-21 RX ORDER — LIDOCAINE HYDROCHLORIDE 20 MG/ML
INJECTION, SOLUTION INFILTRATION; PERINEURAL
Status: DISCONTINUED | OUTPATIENT
Start: 2023-04-21 | End: 2023-04-21 | Stop reason: HOSPADM

## 2023-04-21 RX ORDER — FENTANYL CITRATE 50 UG/ML
INJECTION, SOLUTION INTRAMUSCULAR; INTRAVENOUS
Status: DISCONTINUED | OUTPATIENT
Start: 2023-04-21 | End: 2023-04-21 | Stop reason: HOSPADM

## 2023-04-21 RX ORDER — SODIUM CHLORIDE 9 MG/ML
75 INJECTION, SOLUTION INTRAVENOUS CONTINUOUS
Status: DISCONTINUED | OUTPATIENT
Start: 2023-04-21 | End: 2023-04-21 | Stop reason: HOSPADM

## 2023-04-21 RX ORDER — HEPARIN SODIUM 1000 [USP'U]/ML
INJECTION, SOLUTION INTRAVENOUS; SUBCUTANEOUS
Status: DISCONTINUED | OUTPATIENT
Start: 2023-04-21 | End: 2023-04-21 | Stop reason: HOSPADM

## 2023-04-21 RX ORDER — SODIUM CHLORIDE 0.9 % (FLUSH) 0.9 %
10 SYRINGE (ML) INJECTION EVERY 12 HOURS SCHEDULED
Status: DISCONTINUED | OUTPATIENT
Start: 2023-04-21 | End: 2023-04-21 | Stop reason: HOSPADM

## 2023-04-21 RX ORDER — VERAPAMIL HYDROCHLORIDE 2.5 MG/ML
INJECTION, SOLUTION INTRAVENOUS
Status: DISCONTINUED | OUTPATIENT
Start: 2023-04-21 | End: 2023-04-21 | Stop reason: HOSPADM

## 2023-04-21 RX ORDER — MIDAZOLAM HYDROCHLORIDE 1 MG/ML
INJECTION INTRAMUSCULAR; INTRAVENOUS
Status: DISCONTINUED | OUTPATIENT
Start: 2023-04-21 | End: 2023-04-21 | Stop reason: HOSPADM

## 2023-04-21 RX ORDER — ACETAMINOPHEN 325 MG/1
650 TABLET ORAL EVERY 4 HOURS PRN
Status: CANCELLED | OUTPATIENT
Start: 2023-04-21

## 2023-04-21 RX ADMIN — SODIUM CHLORIDE 75 ML/HR: 9 INJECTION, SOLUTION INTRAVENOUS at 11:19

## 2023-04-21 NOTE — DISCHARGE INSTRUCTIONS
[facility]  [address] [place] [address]    Coronary Angiogram (Radial/Ulnar Approach) After Care    Refer to this sheet in the next few weeks. These instructions provide you with information on caring for yourself after your procedure. Your caregiver may also give you more specific instructions. Your treatment has been planned according to current medical practices, but problems sometimes occur. Call your caregiver if you have any problems or questions after your procedure.    Home Care Instructions:  You may shower the day after the procedure. Remove the bandage (dressing) and gently wash the site with plain soap and water. Gently pat the site dry. You may apply a band aid daily for 2 days if desired.    Do not apply powder or lotion to the site.  Do not submerge the affected site in water for 3 to 5 days or until the site is completely healed.   Do not lift, push or pull anything over 5 pounds for 5 days after your procedure or as directed by your physician.  As a reference, a gallon of milk weighs 8 pounds.   Inspect the site at least twice daily. You may notice some bruising at the site and it may be tender for 1 to 2 weeks.     Increase your fluid intake for the next 2 days.    Keep arm elevated for 24 hours. For the remainder of the day, keep your arm in “Pledge of Allegiance” position when up and about.     You may drive 24 hours after the procedure unless otherwise instructed by your caregiver.  Do not operate machinery or power tools for 24 hours.  A responsible adult should be with you for the first 24 hours after you arrive home. Do not make any important legal decisions or sign legal papers for 24 hours.  Do not drink alcohol for 24 hours.    Metformin or any medications containing Metformin should not be taken for 48 hours after your procedure.      Call Your Doctor if:   You have unusual pain at the radial/ulnar (wrist) site.  You have redness, warmth, swelling, or pain at the  radial/ulnar (wrist) site.  You have drainage (other than a small amount of blood on the dressing).  `You have chills or a fever > 101.  Your arm becomes pale or dark, cool, tingly, or numb.  You develop chest pain, shortness of breath, feel faint or pass out.    You have heavy bleeding from the site, hold pressure on the site for 20 minutes.  If the bleeding stops, apply a fresh bandage and call your cardiologist.  However, if you continue to have bleeding, call 911 and continue to apply pressure to the site.   You have any symptoms of a stroke.  Remember BE FAST  B-balance. Sudden trouble walking or loss of balance.  E-eyes.  Sudden changes in how you see or a sudden onset of a very bad headache.   F-face. Sudden weakness or loss of feeling of the face or facial droop on one side.   A-arms Sudden weakness or numbness in one arm.  One arm drifts down if they are both held out in front of you. This happens suddenly and usually on one side of the body.   S-speech.  Sudden trouble speaking, slurred speech or trouble understanding what are saying.   T-time  Time to call emergency services.  Write down the symptoms and the time they started.

## 2023-04-21 NOTE — Clinical Note
Hemostasis started on the right radial artery. R-Band was used in achieving hemostasis. Radial compression device applied to vessel. Hemostasis achieved successfully. Closure device additional comment: TR band @ 12ml air

## 2023-05-01 NOTE — TELEPHONE ENCOUNTER
Rx Refill Note  Requested Prescriptions     Pending Prescriptions Disp Refills   • rosuvastatin (CRESTOR) 10 MG tablet [Pharmacy Med Name: ROSUVASTATIN CALCIUM 10 MG TAB] 90 tablet 1     Sig: TAKE 1 TABLET BY MOUTH EVERY DAY      Last office visit with prescribing clinician: 8/8/2022   Last telemedicine visit with prescribing clinician: 5/10/2023   Next office visit with prescribing clinician: 8/7/2023                         Would you like a call back once the refill request has been completed: [] Yes [] No    If the office needs to give you a call back, can they leave a voicemail: [] Yes [] No    Daysi Landa, [facility]  05/01/23, 09:49 EDT

## 2023-05-04 RX ORDER — ROSUVASTATIN CALCIUM 10 MG/1
TABLET, COATED ORAL
Qty: 90 TABLET | Refills: 1 | Status: SHIPPED | OUTPATIENT
Start: 2023-05-04

## 2023-05-10 ENCOUNTER — OFFICE VISIT (OUTPATIENT)
Dept: CARDIOLOGY | Facility: CLINIC | Age: 63
End: 2023-05-10
Payer: MEDICARE

## 2023-05-10 VITALS
WEIGHT: 180 LBS | SYSTOLIC BLOOD PRESSURE: 158 MMHG | BODY MASS INDEX: 33.99 KG/M2 | HEIGHT: 61 IN | HEART RATE: 61 BPM | TEMPERATURE: 98 F | DIASTOLIC BLOOD PRESSURE: 73 MMHG

## 2023-05-10 DIAGNOSIS — G47.33 OBSTRUCTIVE SLEEP APNEA SYNDROME: ICD-10-CM

## 2023-05-10 DIAGNOSIS — I10 ESSENTIAL HYPERTENSION: ICD-10-CM

## 2023-05-10 DIAGNOSIS — I25.119 CORONARY ARTERY DISEASE INVOLVING NATIVE CORONARY ARTERY OF NATIVE HEART WITH ANGINA PECTORIS: Primary | ICD-10-CM

## 2023-05-10 DIAGNOSIS — E78.5 HYPERLIPIDEMIA, UNSPECIFIED HYPERLIPIDEMIA TYPE: ICD-10-CM

## 2023-05-10 RX ORDER — ROSUVASTATIN CALCIUM 20 MG/1
20 TABLET, COATED ORAL DAILY
Qty: 90 TABLET | Refills: 3 | Status: SHIPPED | OUTPATIENT
Start: 2023-05-10

## 2023-05-10 NOTE — PROGRESS NOTES
Subjective:        Lily Unger is a 62 y.o. female who here for follow up    Chief Complaint   Patient presents with   • Hospital Follow Up Visit       HPI  This is a 62-year-old female who is well-known to this provider.  She has a history of CAD, CKD, COVID-19, diabetes mellitus, hiatal hernia, hypertension, sleep apnea, obesity, and hyperlipidemia.  She is here today status post cardiac cath which showed moderate CAD and will be treated medically.    Holter showed normal sinus rhythm with frequent PACs, PVCs and NS SVT S.  Carotid Doppler showed right internal carotid artery demonstrates a 50 to 69% stenosis, left internal carotid artery demonstrates a less than 50% stenosis.  Echo March 2023 revealed EF 61 to 65%, LV diastolic function was normal.    The following portions of the patient's history were reviewed and updated as appropriate: allergies, current medications, past family history, past medical history, past social history, past surgical history and problem list.    Past Medical History:   Diagnosis Date   • Asthma    • CAD (coronary artery disease)    • Chronic kidney disease    • COVID    • Crescendo angina    • Diabetes mellitus    • H/O angioplasty    • Hiatal hernia    • Hyperlipidemia    • Hypertension    • Obesity    • Recurrent urinary tract infection    • Rotator cuff tear, right    • Sleep apnea     NO MACHINE   • Torn rotator cuff          reports that she quit smoking about 31 years ago. Her smoking use included cigarettes. She has never used smokeless tobacco. She reports that she does not currently use alcohol. She reports that she does not use drugs.     Family History   Problem Relation Age of Onset   • Heart attack Father    • Heart disease Father    • Heart attack Brother    • Heart disease Brother    • Heart attack Maternal Grandfather    • Heart disease Maternal Grandfather    • Heart disease Mother    • Malig Hyperthermia Neg Hx        ROS     Review of  Systems  Constitutional: No wt loss, fever, fatigue  Gastrointestinal: No nausea, abdominal pain  Behavioral/Psych: No insomnia or anxiety  Cardiovascular: Denies chest pain, and shortness of breath.      Objective:           Vitals and nursing note reviewed.   Constitutional:       Appearance: Well-developed.   HENT:      Head: Normocephalic.      Right Ear: External ear normal.      Left Ear: External ear normal.   Neck:      Vascular: No JVD.   Pulmonary:      Effort: Pulmonary effort is normal. No respiratory distress.      Breath sounds: Normal breath sounds. No stridor. No rales.   Cardiovascular:      Normal rate. Regular rhythm.      No gallop.      Comments: No s/s of infection or hematoma  Pulses:     Intact distal pulses.   Abdominal:      General: Bowel sounds are normal. There is no distension.      Palpations: Abdomen is soft.      Tenderness: There is no abdominal tenderness. There is no guarding.   Musculoskeletal: Normal range of motion.         General: No tenderness.      Cervical back: Normal range of motion. Skin:     General: Skin is warm.   Neurological:      Mental Status: Alert and oriented to person, place, and time.      Deep Tendon Reflexes: Reflexes are normal and symmetric.   Psychiatric:         Judgment: Judgment normal.         Procedures  2023    Impression:    Moderate coronary artery disease will be treated medically  Normal LV gram    Recommendations:    Medical management        I sincerely appreciate the opportunity to participate in your patient's care. Please feel free to contact me anytime if I can be of assistance in this or any other way.     Roe Harper MD  4/21/2023  13:16 EDT    3/2023  Interpretation Summary       •  Right internal carotid artery demonstrates a 50-69% stenosis.  •  Left internal carotid artery demonstrates a less than 50% stenosis.           Interpretation Summary       •  Lily Cornel monitored for 12d 8h starting on 03/01/2023  Primary  rhythm was Sinus  Rhythm.  The average heart rate, excluding ectopy, was 66 BPM with a minumim of 49 BPM  on Day 13 and a maximum of 99 BPM on Day 11. SVE: [place] was 2.91 %, max count per 24 hours 2734 SVT:25 events, longest event 12 beats on Day 12, fastest  event 173 BPM on Day 5 PVC: [place] was 0.43% max count per 24 hours 409,4 disparate morphologies  •  NSR, FREQUENT PACS, PVCS, NSSVTS     3/6/23    Interpretation Summary       •  Findings consistent with a normal ECG stress test.  •  Left ventricular ejection fraction is normal (Calculated EF = 67%).  •  Myocardial perfusion imaging indicates a small-to-moderate-sized, moderately severe area of ischemia located in the anterior wall.  •  Impressions are consistent with an intermediate risk study.     Asymptomatic for chest pain. ECG is negative for ischemia.   Ectopy: frequent PAC's in exercise and recovery, rare PVC in recovery.  B/P is appropriate. Baseline 136/68, Peak (post Lexiscan) 124/49, Recovery:132//71  On  Beta-blocker therapy    Pharmacologic study due to Beta-blocker therapy.  Participated in Low Level exercise and tolerance is fair      Supervised by:  Daysi WEST.  3/1/23    Interpretation Summary       •  Left ventricular ejection fraction appears to be 61 - 65%.  •  Left ventricular diastolic function was normal.  •  Estimated right ventricular systolic pressure from tricuspid regurgitation is normal (<35 mmHg).      Current Outpatient Medications:   •  aspirin 81 MG EC tablet, Take 1 tablet by mouth Daily., Disp: , Rfl:   •  bisoprolol (ZEBeta) 5 MG tablet, Take 1 tablet by mouth Daily., Disp: 45 tablet, Rfl: 1  •  metFORMIN (GLUCOPHAGE) 1000 MG tablet, Take 1 tablet by mouth 2 (Two) Times a Day., Disp: , Rfl: 0  •  nitroglycerin (NITROSTAT) 0.4 MG SL tablet, Place 1 tablet under the tongue See Admin Instructions., Disp: 25 tablet, Rfl: 3  •  omeprazole (priLOSEC) 40 MG capsule, Take 1 capsule by mouth Daily., Disp: , Rfl:   •   OneTouch Delica Lancets 30G misc, TEST 1 (ONE) EACH DAILY, Disp: , Rfl:   •  ONETOUCH VERIO test strip, , Disp: , Rfl: 0  •  pregabalin (LYRICA) 150 MG capsule, Take 1 capsule by mouth 2 (Two) Times a Day., Disp: , Rfl:   •  rosuvastatin (CRESTOR) 10 MG tablet, TAKE 1 TABLET BY MOUTH EVERY DAY, Disp: 90 tablet, Rfl: 1  •  SUMAtriptan (IMITREX) 50 MG tablet, Take 1 tablet by mouth Every 2 (Two) Hours As Needed for Migraine. Take one tablet at onset of headache. May repeat dose one time in 2 hours if headache not relieved., Disp: , Rfl:   •  Tresiba FlexTouch 100 UNIT/ML solution pen-injector injection, 25 Units., Disp: , Rfl:   •  albuterol (PROVENTIL) (2.5 MG/3ML) 0.083% nebulizer solution, Take 2.5 mg by nebulization Every 6 (Six) Hours As Needed., Disp: , Rfl: 11     Assessment:        Patient Active Problem List   Diagnosis   • Chest pain, atypical   • Shortness of breath   • Syncope and collapse   • Weakness   • Coronary artery disease involving native heart with angina pectoris   • Soft tissue lesion of shoulder region   • Disorder of rotator cuff   • Shoulder pain   • Rotator cuff tear arthropathy   • Unstable angina pectoris   • Injury of kidney   • Type 2 diabetes mellitus   • Hypertension   • Hypotension   • Hypovolemia   • Bilateral carotid artery disease   • Medical non-compliance   • Dizziness   • Viral illness   • SOB (shortness of breath)   • Asthma exacerbation   • CKD (chronic kidney disease) stage 3, GFR 30-59 ml/min   • Asthma   • Coronary artery disease involving native coronary artery of native heart with angina pectoris   • Essential hypertension   • Abnormal nuclear stress test   • Precordial pain               Plan:   1.  Hospital follow-up CAD status post cardiac cath.  Cardiac cath site shows no signs or symptoms of infection or hematoma noted.  She will follow-up with her primary care for other reasons for her chest pain. Increase statin, asa, and BB.    Risk reduction for the coronary  artery disease, controlling the blood pressure, blood sugar management, cholesterol management, exercise, stress management, and proper compliance with medications and follow-up has been discussed    2.  Palpitations: Improved.  She states she could not continue the increased dose of 5 mg due to dizziness. She is doing ok on 2.5 mg daily.        3.  Hypertension: Stable.    Educated patient on exercising for at least 30 minutes a day for 2 to 3 days a week. Importance of controlling hypertension and blood pressure checkup on the regular basis has been explained. Hypertension as a silent killer has been discussed. Risk reduction of the weight and regular exercises to control the hypertension has been explained.    4.  MILAGROS: She is picking up CPAP machine.      5.  Carotid stenosis.  Right internal carotid artery history is a 50 to 69% stenosis.  She follows Dr. Ezio Delgado.         No diagnosis found.    There are no diagnoses linked to this encounter.    COUNSELING: tess Horner was given to patient for the following topics: diagnostic results, risk factor reductions, impressions, risks and benefits of treatment options and importance of treatment compliance .       SMOKING COUNSELING: denies     Increase crestor to 20 mg daily. Lipid profile and cmp in 3 months with  Follow up appointment.    Sincerely,   BRETT Beth  [facility]  05/10/23  13:03 EDT    EMR Dragon/Transcription disclaimer:   Much of this encounter note is an electronic transcription/translation of spoken language to printed text. The electronic translation of spoken language may permit erroneous, or at times, nonsensical words or phrases to be inadvertently transcribed; Although I have reviewed the note for such errors, some may still exist.

## 2023-08-04 ENCOUNTER — HOSPITAL ENCOUNTER (OUTPATIENT)
Dept: CARDIOLOGY | Facility: HOSPITAL | Age: 63
Discharge: HOME | End: 2023-08-04
Payer: MEDICARE

## 2023-08-07 ENCOUNTER — OFFICE VISIT (OUTPATIENT)
Dept: CARDIOLOGY | Facility: CLINIC | Age: 63
End: 2023-08-07
Payer: MEDICARE

## 2023-08-07 VITALS
SYSTOLIC BLOOD PRESSURE: 152 MMHG | DIASTOLIC BLOOD PRESSURE: 72 MMHG | WEIGHT: 176.4 LBS | HEART RATE: 63 BPM | BODY MASS INDEX: 33.3 KG/M2 | HEIGHT: 61 IN

## 2023-08-07 DIAGNOSIS — I25.119 CORONARY ARTERY DISEASE INVOLVING NATIVE CORONARY ARTERY OF NATIVE HEART WITH ANGINA PECTORIS: Primary | ICD-10-CM

## 2023-08-07 DIAGNOSIS — E78.5 HYPERLIPIDEMIA, UNSPECIFIED HYPERLIPIDEMIA TYPE: ICD-10-CM

## 2023-08-07 DIAGNOSIS — R06.02 SOB (SHORTNESS OF BREATH): ICD-10-CM

## 2023-08-07 DIAGNOSIS — I10 ESSENTIAL HYPERTENSION: ICD-10-CM

## 2023-08-07 DIAGNOSIS — R07.2 PRECORDIAL PAIN: ICD-10-CM

## 2023-08-07 PROCEDURE — 3078F DIAST BP <80 MM HG: CPT | Performed by: INTERNAL MEDICINE

## 2023-08-07 PROCEDURE — 3077F SYST BP >= 140 MM HG: CPT | Performed by: INTERNAL MEDICINE

## 2023-08-07 PROCEDURE — 99214 OFFICE O/P EST MOD 30 MIN: CPT | Performed by: INTERNAL MEDICINE

## 2023-08-07 RX ORDER — LANOLIN ALCOHOL/MO/W.PET/CERES
CREAM (GRAM) TOPICAL
COMMUNITY
Start: 2023-05-12

## 2023-08-07 NOTE — PROGRESS NOTES
Subjective:        Lily Unger is a 63 y.o. female who here for follow up    CC  Sharp lt sided cp  HPI  63-year-old female with coronary artery disease hypertension atypical chest pains and hyperlipidemia has left-sided chest pain     Problems Addressed this Visit          Cardiac and Vasculature    Coronary artery disease involving native coronary artery of native heart with angina pectoris - Primary    Essential hypertension    Precordial pain    Hyperlipidemia       Pulmonary and Pneumonias    SOB (shortness of breath)     Diagnoses         Codes Comments    Coronary artery disease involving native coronary artery of native heart with angina pectoris    -  Primary ICD-10-CM: I25.119  ICD-9-CM: 414.01, 413.9     Precordial pain     ICD-10-CM: R07.2  ICD-9-CM: 786.51     SOB (shortness of breath)     ICD-10-CM: R06.02  ICD-9-CM: 786.05     Essential hypertension     ICD-10-CM: I10  ICD-9-CM: 401.9     Hyperlipidemia, unspecified hyperlipidemia type     ICD-10-CM: E78.5  ICD-9-CM: 272.4           .    The following portions of the patient's history were reviewed and updated as appropriate: allergies, current medications, past family history, past medical history, past social history, past surgical history and problem list.    Past Medical History:   Diagnosis Date    Asthma     CAD (coronary artery disease)     Chronic kidney disease     COVID     Crescendo angina     Diabetes mellitus     H/O angioplasty     Hiatal hernia     Hyperlipidemia     Hypertension     Obesity     Recurrent urinary tract infection     Rotator cuff tear, right     Sleep apnea     NO MACHINE    Torn rotator cuff      reports that she quit smoking about 31 years ago. Her smoking use included cigarettes. She has never used smokeless tobacco. She reports that she does not currently use alcohol. She reports that she does not use drugs.   Family History   Problem Relation Age of Onset    Heart attack Father     Heart disease Father      "Heart attack Brother     Heart disease Brother     Heart attack Maternal Grandfather     Heart disease Maternal Grandfather     Heart disease Mother     Malig Hyperthermia Neg Hx        Review of Systems  Constitutional: No wt loss, fever, fatigue  Gastrointestinal: No nausea, abdominal pain  Behavioral/Psych: No insomnia or anxiety   Cardiovascular chest pain  Objective:       Physical Exam           Physical Exam  /72 (BP Location: Left arm, Patient Position: Sitting, Cuff Size: Adult)   Pulse 63   Ht 154.9 cm (60.98\")   Wt 80 kg (176 lb 6.4 oz)   BMI 33.35 kg/mý     General appearance: No acute changes   Eyes: Sclerae conjunctivae normal, pupils reactive   HENT: Atraumatic; oropharynx clear with moist mucous membranes and no mucosal ulcerations;  Neck: Trachea midline; NECK, supple, no thyromegaly or lymphadenopathy   Lungs: Normal size and shape, normal breath sounds, equal distribution of air, no rales and rhonchi   CV: S1-S2 regular, no murmurs, no rub, no gallop   Abdomen: Soft, nontender; no masses , no abnormal abdominal sounds   Extremities: No deformity , normal color , no peripheral edema   Skin: Normal temperature, turgor and texture; no rash, ulcers  Psych: Appropriate affect, alert and oriented to person, place and time           Procedures      Echocardiogram:        Current Outpatient Medications:     aspirin 81 MG EC tablet, Take 1 tablet by mouth Daily., Disp: , Rfl:     bisoprolol (ZEBeta) 5 MG tablet, Take 1 tablet by mouth Daily. (Patient taking differently: Take 1 tablet by mouth Daily. TAKING 1/2 TAB), Disp: 45 tablet, Rfl: 1    Magnesium Oxide -Mg Supplement 400 (240 Mg) MG tablet, , Disp: , Rfl:     metFORMIN (GLUCOPHAGE) 1000 MG tablet, Take 1 tablet by mouth 2 (Two) Times a Day., Disp: , Rfl: 0    nitroglycerin (NITROSTAT) 0.4 MG SL tablet, Place 1 tablet under the tongue See Admin Instructions., Disp: 25 tablet, Rfl: 3    omeprazole (priLOSEC) 40 MG capsule, Take 1 capsule by " mouth Daily., Disp: , Rfl:     OneTouch Delica Lancets 30G misc, TEST 1 (ONE) EACH DAILY, Disp: , Rfl:     ONETOUCH VERIO test strip, , Disp: , Rfl: 0    pregabalin (LYRICA) 150 MG capsule, Take 1 capsule by mouth 2 (Two) Times a Day., Disp: , Rfl:     rosuvastatin (CRESTOR) 20 MG tablet, Take 1 tablet by mouth Daily., Disp: 90 tablet, Rfl: 3    SUMAtriptan (IMITREX) 50 MG tablet, Take 1 tablet by mouth Every 2 (Two) Hours As Needed for Migraine. Take one tablet at onset of headache. May repeat dose one time in 2 hours if headache not relieved., Disp: , Rfl:     Tresiba FlexTouch 100 UNIT/ML solution pen-injector injection, 25 Units., Disp: , Rfl:    Assessment:        Patient Active Problem List   Diagnosis    Chest pain, atypical    Shortness of breath    Syncope and collapse    Weakness    Coronary artery disease involving native heart with angina pectoris    Soft tissue lesion of shoulder region    Disorder of rotator cuff    Shoulder pain    Rotator cuff tear arthropathy    Unstable angina pectoris    Injury of kidney    Type 2 diabetes mellitus    Hypertension    Hypotension    Hypovolemia    Bilateral carotid artery disease    Medical non-compliance    Dizziness    Viral illness    SOB (shortness of breath)    Asthma exacerbation    CKD (chronic kidney disease) stage 3, GFR 30-59 ml/min    Asthma    Coronary artery disease involving native coronary artery of native heart with angina pectoris    Essential hypertension    Abnormal nuclear stress test    Precordial pain    Hyperlipidemia               Plan:            ICD-10-CM ICD-9-CM   1. Coronary artery disease involving native coronary artery of native heart with angina pectoris  I25.119 414.01     413.9   2. Precordial pain  R07.2 786.51   3. SOB (shortness of breath)  R06.02 786.05   4. Essential hypertension  I10 401.9   5. Hyperlipidemia, unspecified hyperlipidemia type  E78.5 272.4     1. Coronary artery disease involving native coronary artery of  native heart with angina pectoris  No angina pectoris    2. Precordial pain  Atypical    3. SOB (shortness of breath)  Multifactorial    4. Essential hypertension  Blood pressure control    5. Hyperlipidemia, unspecified hyperlipidemia type  Continue current treatment      6 [id number]  COUNSELING:    Lily Horner was given to patient for the following topics: diagnostic results, risk factor reductions, impressions, risks and benefits of treatment options and importance of treatment compliance .       SMOKING COUNSELING:        Dictated using Dragon dictation

## 2023-08-09 PROBLEM — E78.5 HYPERLIPIDEMIA: Status: ACTIVE | Noted: 2023-08-09

## 2023-08-24 ENCOUNTER — HOSPITAL ENCOUNTER (EMERGENCY)
Facility: HOSPITAL | Age: 63
Discharge: HOME | End: 2023-08-24
Attending: EMERGENCY MEDICINE
Payer: MEDICARE

## 2023-08-24 ENCOUNTER — APPOINTMENT (OUTPATIENT)
Dept: CT IMAGING | Facility: HOSPITAL | Age: 63
End: 2023-08-24
Payer: MEDICARE

## 2023-08-24 VITALS
TEMPERATURE: 97.9 F | OXYGEN SATURATION: 95 % | HEIGHT: 61 IN | BODY MASS INDEX: 33.61 KG/M2 | DIASTOLIC BLOOD PRESSURE: 86 MMHG | SYSTOLIC BLOOD PRESSURE: 189 MMHG | WEIGHT: 178 LBS | HEART RATE: 59 BPM | RESPIRATION RATE: 16 BRPM

## 2023-08-24 DIAGNOSIS — R11.0 NAUSEA: ICD-10-CM

## 2023-08-24 DIAGNOSIS — R10.13 EPIGASTRIC ABDOMINAL PAIN: Primary | ICD-10-CM

## 2023-08-24 LAB
ALBUMIN SERPL-MCNC: 4.1 G/DL (ref 3.5–5.2)
ALBUMIN/GLOB SERPL: 1.5 G/DL
ALP SERPL-CCNC: 103 U/L (ref 39–117)
ALT SERPL W P-5'-P-CCNC: 32 U/L (ref 1–33)
ANION GAP SERPL CALCULATED.3IONS-SCNC: 11 MMOL/L (ref 5–15)
AST SERPL-CCNC: 33 U/L (ref 1–32)
BASOPHILS # BLD AUTO: 0.07 10*3/MM3 (ref 0–0.2)
BASOPHILS NFR BLD AUTO: 0.9 % (ref 0–1.5)
BILIRUB SERPL-MCNC: 0.2 MG/DL (ref 0–1.2)
BUN SERPL-MCNC: 11 MG/DL (ref 8–23)
BUN/CREAT SERPL: 14.3 (ref 7–25)
CALCIUM SPEC-SCNC: 8.8 MG/DL (ref 8.6–10.5)
CHLORIDE SERPL-SCNC: 103 MMOL/L (ref 98–107)
CO2 SERPL-SCNC: 23 MMOL/L (ref 22–29)
CREAT SERPL-MCNC: 0.77 MG/DL (ref 0.57–1)
DEPRECATED RDW RBC AUTO: 45.4 FL (ref 37–54)
EGFRCR SERPLBLD CKD-EPI 2021: 85.7 ML/MIN/1.73
EOSINOPHIL # BLD AUTO: 0.26 10*3/MM3 (ref 0–0.4)
EOSINOPHIL NFR BLD AUTO: 3.2 % (ref 0.3–6.2)
ERYTHROCYTE [DISTWIDTH] IN BLOOD BY AUTOMATED COUNT: 17.1 % (ref 12.3–15.4)
GLOBULIN UR ELPH-MCNC: 2.7 GM/DL
GLUCOSE SERPL-MCNC: 298 MG/DL (ref 65–99)
HCT VFR BLD AUTO: 35.5 % (ref 34–46.6)
HGB BLD-MCNC: 10.8 G/DL (ref 12–15.9)
IMM GRANULOCYTES # BLD AUTO: 0.03 10*3/MM3 (ref 0–0.05)
IMM GRANULOCYTES NFR BLD AUTO: 0.4 % (ref 0–0.5)
LIPASE SERPL-CCNC: 28 U/L (ref 13–60)
LYMPHOCYTES # BLD AUTO: 2.07 10*3/MM3 (ref 0.7–3.1)
LYMPHOCYTES NFR BLD AUTO: 25.6 % (ref 19.6–45.3)
MCH RBC QN AUTO: 23 PG (ref 26.6–33)
MCHC RBC AUTO-ENTMCNC: 30.4 G/DL (ref 31.5–35.7)
MCV RBC AUTO: 75.5 FL (ref 79–97)
MONOCYTES # BLD AUTO: 0.9 10*3/MM3 (ref 0.1–0.9)
MONOCYTES NFR BLD AUTO: 11.1 % (ref 5–12)
NEUTROPHILS NFR BLD AUTO: 4.77 10*3/MM3 (ref 1.7–7)
NEUTROPHILS NFR BLD AUTO: 58.8 % (ref 42.7–76)
NRBC BLD AUTO-RTO: 0 /100 WBC (ref 0–0.2)
PLATELET # BLD AUTO: 346 10*3/MM3 (ref 140–450)
PMV BLD AUTO: 9.8 FL (ref 6–12)
POTASSIUM SERPL-SCNC: 4.3 MMOL/L (ref 3.5–5.2)
PROT SERPL-MCNC: 6.8 G/DL (ref 6–8.5)
QT INTERVAL: 474 MS
RBC # BLD AUTO: 4.7 10*6/MM3 (ref 3.77–5.28)
SODIUM SERPL-SCNC: 137 MMOL/L (ref 136–145)
WBC NRBC COR # BLD: 8.1 10*3/MM3 (ref 3.4–10.8)

## 2023-08-24 PROCEDURE — 83690 ASSAY OF LIPASE: CPT | Performed by: EMERGENCY MEDICINE

## 2023-08-24 PROCEDURE — 96374 THER/PROPH/DIAG INJ IV PUSH: CPT

## 2023-08-24 PROCEDURE — 25010000002 METOCLOPRAMIDE PER 10 MG: Performed by: EMERGENCY MEDICINE

## 2023-08-24 PROCEDURE — 99285 EMERGENCY DEPT VISIT HI MDM: CPT

## 2023-08-24 PROCEDURE — 74177 CT ABD & PELVIS W/CONTRAST: CPT

## 2023-08-24 PROCEDURE — 85025 COMPLETE CBC W/AUTO DIFF WBC: CPT | Performed by: EMERGENCY MEDICINE

## 2023-08-24 PROCEDURE — 25510000001 IOPAMIDOL 61 % SOLUTION: Performed by: EMERGENCY MEDICINE

## 2023-08-24 PROCEDURE — 36415 COLL VENOUS BLD VENIPUNCTURE: CPT

## 2023-08-24 PROCEDURE — 93010 ELECTROCARDIOGRAM REPORT: CPT | Performed by: INTERNAL MEDICINE

## 2023-08-24 PROCEDURE — 80053 COMPREHEN METABOLIC PANEL: CPT | Performed by: EMERGENCY MEDICINE

## 2023-08-24 PROCEDURE — 93005 ELECTROCARDIOGRAM TRACING: CPT | Performed by: EMERGENCY MEDICINE

## 2023-08-24 RX ORDER — ONDANSETRON 4 MG/1
4 TABLET, ORALLY DISINTEGRATING ORAL 4 TIMES DAILY PRN
Qty: 15 TABLET | Refills: 0 | Status: SHIPPED | OUTPATIENT
Start: 2023-08-24

## 2023-08-24 RX ORDER — METOCLOPRAMIDE HYDROCHLORIDE 5 MG/ML
5 INJECTION INTRAMUSCULAR; INTRAVENOUS ONCE
Status: COMPLETED | OUTPATIENT
Start: 2023-08-24 | End: 2023-08-24

## 2023-08-24 RX ADMIN — SODIUM CHLORIDE, POTASSIUM CHLORIDE, SODIUM LACTATE AND CALCIUM CHLORIDE 500 ML: 600; 310; 30; 20 INJECTION, SOLUTION INTRAVENOUS at 19:51

## 2023-08-24 RX ADMIN — METOCLOPRAMIDE 5 MG: 5 INJECTION, SOLUTION INTRAMUSCULAR; INTRAVENOUS at 21:36

## 2023-08-24 RX ADMIN — IOPAMIDOL 85 ML: 612 INJECTION, SOLUTION INTRAVENOUS at 20:12

## 2023-08-24 NOTE — ED PROVIDER NOTES
EMERGENCY DEPARTMENT ENCOUNTER    Room Number:  14/14  PCP: Jaycob Savage MD  Historian: Patient      HPI:  Chief Complaint: Abdominal pain  A complete HPI/ROS/PMH/PSH/SH/FH are unobtainable due to: None  Context: Lily Unger is a 63 y.o. female who presents to the ED c/o abdominal pain.  Patient states symptoms started about 12:00 after she ate chicken at [employer].  Started having upper abdominal pain.  Patient states she felt nauseated.  Then walked outside in the heat made her lightheaded.  Patient has had no diarrhea.  Patient states she called EMS and they transported her in.  Given Zofran with some improvement patient's abdominal pain persists but does feel less lightheaded            PAST MEDICAL HISTORY  Active Ambulatory Problems     Diagnosis Date Noted    Chest pain, atypical 01/28/2016    Shortness of breath 01/28/2016    Syncope and collapse 01/28/2016    Weakness 01/28/2016    Coronary artery disease involving native heart with angina pectoris 01/28/2016    Soft tissue lesion of shoulder region 04/22/2015    Disorder of rotator cuff 06/10/2015    Shoulder pain 04/01/2015    Rotator cuff tear arthropathy 06/10/2015    Unstable angina pectoris 10/01/2015    Injury of kidney 07/01/2016    Type 2 diabetes mellitus 07/01/2016    Hypertension 07/01/2016    Hypotension 07/01/2016    Hypovolemia 07/04/2016    Bilateral carotid artery disease 06/19/2018    Medical non-compliance 11/17/2018    Dizziness 11/17/2018    Viral illness 11/17/2018    SOB (shortness of breath) 03/12/2019    Asthma exacerbation 12/27/2019    CKD (chronic kidney disease) stage 3, GFR 30-59 ml/min 12/27/2019    Asthma 12/27/2019    Coronary artery disease involving native coronary artery of native heart with angina pectoris 08/10/2020    Essential hypertension 08/10/2020    Abnormal nuclear stress test 08/10/2020    Precordial pain 04/05/2023    Hyperlipidemia 08/09/2023     Resolved Ambulatory Problems     Diagnosis  Date Noted    No Resolved Ambulatory Problems     Past Medical History:   Diagnosis Date    CAD (coronary artery disease)     Chronic kidney disease     COVID     Crescendo angina     Diabetes mellitus     H/O angioplasty     Hiatal hernia     Obesity     Recurrent urinary tract infection     Rotator cuff tear, right     Sleep apnea     Torn rotator cuff          PAST SURGICAL HISTORY  Past Surgical History:   Procedure Laterality Date    CARDIAC CATHETERIZATION      OUTCOME: SUCCESSFUL    CARDIAC CATHETERIZATION N/A 3/20/2019    Procedure: RIGHT AND LEFT HEART CATH;  Surgeon: Roe Harper MD;  Location: [facility] CATH [facility]VASIVE LOCATION;  Service: Cardiology    CARDIAC CATHETERIZATION N/A 3/20/2019    Procedure: Coronary angiography;  Surgeon: Roe Harper MD;  Location: [facility] C[facility] LOC[facility];  Service: Cardiology    CARDIAC CATHETERIZATION N/A 3/20/2019    Procedure: Left ventriculography;  Surgeon: Roe Harper MD;  Location: [facility] CATH [facility]AS[facility] LOC[facility];  Service: Cardiology    CARDIAC CATHETERIZATION N/A 3/20/2019    Procedure: Stent BANDAR coronary;  Surgeon: Roe Harper MD;  Location: [facility] CATH [facility]AS[facility] LOC[facility];  Service: Cardiology    CARDIAC CATHETERIZATION N/A 8/14/2020    Procedure: Left Heart Cath;  Surgeon: Roe Harper MD;  Location: [facility] C[facility]AS[facility] LOC[facility];  Service: Cardiovascular;  Laterality: N/A;    CARDIAC CATHETERIZATION N/A 8/14/2020    Procedure: Coronary angiography;  Surgeon: Roe Harper MD;  Location: [facility] CATH INVASIVE LOCATION;  Service: Cardiovascular;  Laterality: N/A;    CARDIAC CATHETERIZATION N/A 8/14/2020    Procedure: Left ventriculography;  Surgeon: Roe Harper MD;  Location: [facility] CATH INVASIVE LOCATION;  Service: Cardiovascular;  Laterality: N/A;    CARDIAC CATHETERIZATION N/A 8/14/2020    Procedure: Percutaneous Coronary Intervention;  Surgeon: Roe Harper MD;  Location: [facility]  CATH INVASIVE LOCATION;  Service: Cardiovascular;  Laterality: N/A;    CARDIAC CATHETERIZATION N/A 2022    Procedure: Left Heart Cath;  Surgeon: Roe Harper MD;  Location: [facility] CATH INVASIVE LOCATION;  Service: Cardiovascular;  Laterality: N/A;    CARDIAC CATHETERIZATION N/A 2022    Procedure: Coronary angiography;  Surgeon: Roe Harper MD;  Location: [facility] CATH INVASIVE LOCATION;  Service: Cardiovascular;  Laterality: N/A;    CARDIAC CATHETERIZATION N/A 2022    Procedure: Left ventriculography;  Surgeon: Roe Harper MD;  Location: [facility] CATH INVASIVE LOCATION;  Service: Cardiovascular;  Laterality: N/A;    CARDIAC CATHETERIZATION N/A 2022    Procedure: Resting Full Cycle Ratio;  Surgeon: Roe Harper MD;  Location: [facility] CATH INVASIVE LOCATION;  Service: Cardiovascular;  Laterality: N/A;    CARDIAC CATHETERIZATION N/A 2022    Procedure: Stent BANDAR coronary;  Surgeon: Roe Harpre MD;  Location: [facility] CATH INVASIVE LOCATION;  Service: Cardiovascular;  Laterality: N/A;    CARDIAC CATHETERIZATION N/A 2023    Procedure: Left Heart Cath;  Surgeon: Roe Harper MD;  Location: [facility] CATH INVASIVE LOCATION;  Service: Cardiology;  Laterality: N/A;    CARDIAC CATHETERIZATION N/A 2023    Procedure: Coronary angiography;  Surgeon: Roe Harper MD;  Location: [facility] CATH INVASIVE LOCATION;  Service: Cardiology;  Laterality: N/A;    CARDIAC CATHETERIZATION N/A 2023    Procedure: Left ventriculography;  Surgeon: Roe Harper MD;  Location: [facility] CATH INVASIVE LOCATION;  Service: Cardiology;  Laterality: N/A;    CARDIAC ELECTROPHYSIOLOGY PROCEDURE N/A 2020    Procedure: Temporary Pacemaker;  Surgeon: Roe Harper MD;  Location: [facility] CATH INVASIVE LOCATION;  Service: Cardiovascular;  Laterality: N/A;     SECTION      CORONARY STENT PLACEMENT      LAD    HYSTERECTOMY       TONSILLECTOMY AND ADENOIDECTOMY      UMBILICAL HERNIA REPAIR           FAMILY HISTORY  Family History   Problem Relation Age of Onset    Heart attack Father     Heart disease Father     Heart attack Brother     Heart disease Brother     Heart attack Maternal Grandfather     Heart disease Maternal Grandfather     Heart disease Mother     Malig Hyperthermia Neg Hx          SOCIAL HISTORY  Social History     Socioeconomic History    Marital status:    Tobacco Use    Smoking status: Former     Types: Cigarettes     Quit date:      Years since quittin.6    Smokeless tobacco: Never   Vaping Use    Vaping Use: Never used   Substance and Sexual Activity    Alcohol use: Not Currently    Drug use: No    Sexual activity: Defer     Birth control/protection: Post-menopausal, Sponge         ALLERGIES  Hydrocodone, Penicillins, and Plavix [clopidogrel bisulfate]        REVIEW OF SYSTEMS  Review of Systems   Lightheaded, abdominal pain      PHYSICAL EXAM  ED Triage Vitals [23 1849]   Temp Heart Rate Resp BP SpO2   97.9 øF (36.6 øC) 65 18 118/68 93 %      Temp src Heart Rate Source Patient Position BP Location FiO2 (%)   -- -- -- -- --       Physical Exam      GENERAL: no acute distress  HENT: nares patent  EYES: no scleral icterus  CV: regular rhythm, normal rate  RESPIRATORY: normal effort  ABDOMEN: soft. Tender epigastric and left upper abdomen  MUSCULOSKELETAL: no deformity  NEURO: alert, moves all extremities, follows commands  PSYCH:  calm, cooperative  SKIN: warm, dry    Vital signs and nursing notes reviewed.          LAB RESULTS  Recent Results (from the past 24 hour(s))   Comprehensive Metabolic Panel    Collection Time: 23  7:17 PM    Specimen: Blood   Result Value Ref Range    Glucose 298 (H) 65 - 99 mg/dL    BUN 11 8 - 23 mg/dL    Creatinine 0.77 0.57 - 1.00 mg/dL    Sodium 137 136 - 145 mmol/L    Potassium 4.3 3.5 - 5.2 mmol/L    Chloride 103 98 - 107 mmol/L    CO2 23.0 22.0 - 29.0 mmol/L     Calcium 8.8 8.6 - 10.5 mg/dL    Total Protein 6.8 6.0 - 8.5 g/dL    Albumin 4.1 3.5 - 5.2 g/dL    ALT (SGPT) 32 1 - 33 U/L    AST (SGOT) 33 (H) 1 - 32 U/L    Alkaline Phosphatase 103 39 - 117 U/L    Total Bilirubin 0.2 0.0 - 1.2 mg/dL    Globulin 2.7 gm/dL    A/G Ratio 1.5 g/dL    BUN/Creatinine Ratio 14.3 7.0 - 25.0    Anion Gap 11.0 5.0 - 15.0 mmol/L    eGFR 85.7 >60.0 mL/min/1.73   Lipase    Collection Time: 08/24/23  7:17 PM    Specimen: Blood   Result Value Ref Range    Lipase 28 13 - 60 U/L   CBC Auto Differential    Collection Time: 08/24/23  7:17 PM    Specimen: Blood   Result Value Ref Range    WBC 8.10 3.40 - 10.80 10*3/mm3    RBC 4.70 3.77 - 5.28 10*6/mm3    Hemoglobin 10.8 (L) 12.0 - 15.9 g/dL    Hematocrit 35.5 34.0 - 46.6 %    MCV 75.5 (L) 79.0 - 97.0 fL    MCH 23.0 (L) 26.6 - 33.0 pg    MCHC 30.4 (L) 31.5 - 35.7 g/dL    RDW 17.1 (H) 12.3 - 15.4 %    RDW-SD 45.4 37.0 - 54.0 fl    MPV 9.8 6.0 - 12.0 fL    Platelets 346 140 - 450 10*3/mm3    Neutrophil % 58.8 42.7 - 76.0 %    Lymphocyte % 25.6 19.6 - 45.3 %    Monocyte % 11.1 5.0 - 12.0 %    Eosinophil % 3.2 0.3 - 6.2 %    Basophil % 0.9 0.0 - 1.5 %    Immature Grans % 0.4 0.0 - 0.5 %    Neutrophils, Absolute 4.77 1.70 - 7.00 10*3/mm3    Lymphocytes, Absolute 2.07 0.70 - 3.10 10*3/mm3    Monocytes, Absolute 0.90 0.10 - 0.90 10*3/mm3    Eosinophils, Absolute 0.26 0.00 - 0.40 10*3/mm3    Basophils, Absolute 0.07 0.00 - 0.20 10*3/mm3    Immature Grans, Absolute 0.03 0.00 - 0.05 10*3/mm3    nRBC 0.0 0.0 - 0.2 /100 WBC   ECG 12 Lead Syncope    Collection Time: 08/24/23  7:27 PM   Result Value Ref Range    QT Interval 474 ms       Ordered the above labs and reviewed the results.        RADIOLOGY  CT Abdomen Pelvis With Contrast    Result Date: 8/24/2023  CT ABDOMEN AND PELVIS WITH IV CONTRAST  HISTORY: Abdominal pain.  TECHNIQUE:  CT includes axial imaging from the lung bases to the trochanters with intravenous contrast and without use of oral contrast.  Data reconstructed in coronal and sagittal planes. Radiation dose reduction techniques were utilized, including automated exposure control and exposure modulation based on body size.  COMPARISON: None.  FINDINGS: Lung bases appear clear and there is no basilar effusion. There is diffuse fate infiltration of the liver. Gallbladder, spleen, and right adrenal gland appear normal. There is a left adrenal nodule that measures 1.3 cm and is indeterminate. The pancreas appears within normal limits. There is no ductal dilatation. Kidneys appear normal and there is no hydronephrosis.  There has been previous mesh repair of the anterior left abdominal pelvic wall. There is a small infraumbilical hernia to the right of midline that contains fat and measures 2.8 cm. There is no bowel dilatation or evidence for bowel obstruction. There is no evidence for appendicitis. There is no ascites or evidence for intra-abdominal abscess formation.      1. No evidence for acute intra-abdominal process. 2. Diffuse fat infiltration of the liver. 3. 1.3 cm left adrenal nodule is indeterminate. 4. Previous mesh repair of the anterior left abdominal pelvic wall. 5. Small infraumbilical hernia to the right of the midline contains fat without evidence for incarceration.  Radiation dose reduction techniques were utilized, including automated exposure control and exposure modulation based on body size.   This report was finalized on 8/24/2023 8:42 PM by Dr. Maicol Ryder M.D.       Ordered the above noted radiological studies.  CT abdomen independently interpreted by me and shows no evidence of obstruction          PROCEDURES  Procedures      EKG          EKG time: 1927  Rhythm/Rate: Sinus bradycardia 59  P waves and IA: Normal P waves  QRS, axis: Normal QRS  ST and T waves: Normal ST-T wave    Interpreted Contemporaneously by me, independently viewed  Unchanged compared to prior 4/26/2021      MEDICATIONS GIVEN IN ER  Medications   lactated  ringers bolus 500 mL (0 mL Intravenous Stopped 8/24/23 2052)   iopamidol (ISOVUE-300) 61 % injection 100 mL (85 mL Intravenous Given by Other 8/24/23 2012)   metoclopramide (REGLAN) injection 5 mg (5 mg Intravenous Given 8/24/23 2136)                   MEDICAL DECISION MAKING, PROGRESS, and CONSULTS     Discussion below represents my analysis of pertinent findings related to patient's condition, differential diagnosis, treatment plan and final disposition.      Additional sources:  - Discussed/ obtained information from independent historians: None    - External (non-ED) record review: Epic reviewed and patient seen by neurology 8/17/2023 for memory loss    - Chronic or social conditions impacting care: None    - Shared decision making: None      Orders placed during this visit:  Orders Placed This Encounter   Procedures    CT Abdomen Pelvis With Contrast    Comprehensive Metabolic Panel    Lipase    CBC Auto Differential    ECG 12 Lead Syncope    CBC & Differential         Additional orders considered but not ordered:  None        Differential diagnosis includes but is not limited to:    Gastritis versus gastroenteritis versus obstruction      Independent interpretation of labs, radiology studies, and discussions with consultants:  ED Course as of 08/24/23 2153   Thu Aug 24, 2023   2117 21:17 EDT  Patient presents for nausea and abdominal pain.  Patient also states she did have some nausea from the heat.  Patient states she does not take her medicines and has not taken medicines in 4 days.  States she just has not been home.  Patient states she will take her medicines when she gets home.  Patient's daughter is here who will take her as well. [SL]      ED Course User Index  [SL] Nixon Brooks MD                 DIAGNOSIS  Final diagnoses:   Epigastric abdominal pain   Nausea         DISPOSITION  DISCHARGE    Patient discharged in stable condition.    Reviewed implications of results, diagnosis, meds,  responsibility to follow up, warning signs and symptoms of possible worsening, potential complications and reasons to return to ER, including worsening symptoms    Patient/Family voiced understanding of above instructions.    Discussed plan for discharge, as there is no emergent indication for admission. Patient referred to primary care provider for BP management due to today's BP. Pt/family is agreeable and understands need for follow up and repeat testing.  Pt is aware that discharge does not mean that nothing is wrong but it indicates no emergency is present that requires admission and they must continue care with follow-up as given below or physician of their choice.     FOLLOW-UP  Jaycob Savage MD  [address] [facility] [id number] [phone]    Schedule an appointment as soon as possible for a visit            Medication List        New Prescriptions      ondansetron ODT 4 MG disintegrating tablet  Commonly known as: ZOFRAN-ODT  Place 1 tablet on the tongue 4 (Four) Times a Day As Needed for Nausea or Vomiting.            Changed      bisoprolol 5 MG tablet  Commonly known as: ZEBeta  Take 1 tablet by mouth Daily.  What changed: additional instructions               Where to Get Your Medications        These medications were sent to [id number] - [place], [place] - [address] - [phone] PH - [phone] FX  [address] [address]      Phone: [phone]   ondansetron ODT 4 MG disintegrating tablet                  Latest Documented Vital Signs:  As of 21:53 EDT  BP- (!) 189/86 HR- 59 Temp- 97.9 øF (36.6 øC) O2 sat- 95%              --    Please note that portions of this were completed with a voice recognition program.       Note Disclaimer: At [facility], we believe that sharing information builds trust and better relationships. You are receiving this note because you are receiving care at [facility] or  recently visited. It is possible you will see health information before a provider has talked with you about it. This kind of information can be easy to misunderstand. To help you fully understand what it means for your health, we urge you to discuss this note with your provider.            Nixon Brooks MD  08/24/23 [id number]

## 2023-08-24 NOTE — ED NOTES
"Patient to ER via ems from home for nausea and near syncope dizziness and SOA x 2pm. Patient reports she ate and went out in the heat and \"it whacked me like a shovel\"   Patient given  500 ml ns 4 zofran with ems  "

## 2023-09-20 ENCOUNTER — TELEPHONE (OUTPATIENT)
Dept: CARDIOLOGY | Facility: CLINIC | Age: 63
End: 2023-09-20
Payer: MEDICARE

## 2023-09-20 NOTE — TELEPHONE ENCOUNTER
----- Message from Liane Bradshaw sent at 9/20/2023  8:49 AM EDT -----  Regarding: ORDERING A MRI NEEDS TO KNOW ABOUT STENT PRIOR TO ORDERING MRI THANKS  TOM SKAGGS/DR CHARLENE MAGANA [facility] [phone]    SPOKE TO ASYA LECHUGA -SHE SAID XI[facility] STENTS ARE MRI COMPARABLE   SPOKE TO TOM AT DR MAGANA OFFICE AND INFORMED HER WHAT I WAS TOLD -SHE UNDERSTOOD

## 2023-10-22 ENCOUNTER — APPOINTMENT (OUTPATIENT)
Dept: CT IMAGING | Facility: HOSPITAL | Age: 63
DRG: 392 | End: 2023-10-22
Payer: MEDICARE

## 2023-10-22 ENCOUNTER — HOSPITAL ENCOUNTER (INPATIENT)
Facility: HOSPITAL | Age: 63
LOS: 1 days | Discharge: HOME | DRG: 392 | End: 2023-10-27
Attending: EMERGENCY MEDICINE | Admitting: INTERNAL MEDICINE
Payer: MEDICARE

## 2023-10-22 DIAGNOSIS — R11.2 NAUSEA AND VOMITING, UNSPECIFIED VOMITING TYPE: ICD-10-CM

## 2023-10-22 DIAGNOSIS — R10.84 GENERALIZED ABDOMINAL PAIN: ICD-10-CM

## 2023-10-22 DIAGNOSIS — N39.0 URINARY TRACT INFECTION WITHOUT HEMATURIA, SITE UNSPECIFIED: Primary | ICD-10-CM

## 2023-10-22 DIAGNOSIS — D50.9 MICROCYTIC ANEMIA: ICD-10-CM

## 2023-10-22 PROBLEM — R10.9 ABDOMINAL PAIN: Status: ACTIVE | Noted: 2023-10-22

## 2023-10-22 LAB
ALBUMIN SERPL-MCNC: 4.2 G/DL (ref 3.5–5.2)
ALBUMIN/GLOB SERPL: 1.4 G/DL
ALP SERPL-CCNC: 69 U/L (ref 39–117)
ALT SERPL W P-5'-P-CCNC: 27 U/L (ref 1–33)
ANION GAP SERPL CALCULATED.3IONS-SCNC: 10 MMOL/L (ref 5–15)
AST SERPL-CCNC: 24 U/L (ref 1–32)
BACTERIA UR QL AUTO: ABNORMAL /HPF
BASOPHILS # BLD AUTO: 0.07 10*3/MM3 (ref 0–0.2)
BASOPHILS NFR BLD AUTO: 0.8 % (ref 0–1.5)
BILIRUB SERPL-MCNC: 0.2 MG/DL (ref 0–1.2)
BILIRUB UR QL STRIP: NEGATIVE
BUN SERPL-MCNC: 8 MG/DL (ref 8–23)
BUN/CREAT SERPL: 9.6 (ref 7–25)
CALCIUM SPEC-SCNC: 9.5 MG/DL (ref 8.6–10.5)
CHLORIDE SERPL-SCNC: 102 MMOL/L (ref 98–107)
CLARITY UR: CLEAR
CO2 SERPL-SCNC: 27 MMOL/L (ref 22–29)
COLOR UR: YELLOW
CREAT SERPL-MCNC: 0.83 MG/DL (ref 0.57–1)
D-LACTATE SERPL-SCNC: 2.4 MMOL/L (ref 0.5–2)
DEPRECATED RDW RBC AUTO: 47 FL (ref 37–54)
EGFRCR SERPLBLD CKD-EPI 2021: 78.3 ML/MIN/1.73
EOSINOPHIL # BLD AUTO: 0.26 10*3/MM3 (ref 0–0.4)
EOSINOPHIL NFR BLD AUTO: 2.9 % (ref 0.3–6.2)
ERYTHROCYTE [DISTWIDTH] IN BLOOD BY AUTOMATED COUNT: 17.4 % (ref 12.3–15.4)
GLOBULIN UR ELPH-MCNC: 3 GM/DL
GLUCOSE BLDC GLUCOMTR-MCNC: 139 MG/DL (ref 70–130)
GLUCOSE SERPL-MCNC: 139 MG/DL (ref 65–99)
GLUCOSE UR STRIP-MCNC: NEGATIVE MG/DL
HCT VFR BLD AUTO: 37.5 % (ref 34–46.6)
HGB BLD-MCNC: 11.3 G/DL (ref 12–15.9)
HGB UR QL STRIP.AUTO: NEGATIVE
HYALINE CASTS UR QL AUTO: ABNORMAL /LPF
IMM GRANULOCYTES # BLD AUTO: 0.03 10*3/MM3 (ref 0–0.05)
IMM GRANULOCYTES NFR BLD AUTO: 0.3 % (ref 0–0.5)
KETONES UR QL STRIP: NEGATIVE
LEUKOCYTE ESTERASE UR QL STRIP.AUTO: ABNORMAL
LIPASE SERPL-CCNC: 25 U/L (ref 13–60)
LYMPHOCYTES # BLD AUTO: 2.91 10*3/MM3 (ref 0.7–3.1)
LYMPHOCYTES NFR BLD AUTO: 31.9 % (ref 19.6–45.3)
MCH RBC QN AUTO: 22.8 PG (ref 26.6–33)
MCHC RBC AUTO-ENTMCNC: 30.1 G/DL (ref 31.5–35.7)
MCV RBC AUTO: 75.6 FL (ref 79–97)
MONOCYTES # BLD AUTO: 0.95 10*3/MM3 (ref 0.1–0.9)
MONOCYTES NFR BLD AUTO: 10.4 % (ref 5–12)
NEUTROPHILS NFR BLD AUTO: 4.89 10*3/MM3 (ref 1.7–7)
NEUTROPHILS NFR BLD AUTO: 53.7 % (ref 42.7–76)
NITRITE UR QL STRIP: NEGATIVE
NRBC BLD AUTO-RTO: 0 /100 WBC (ref 0–0.2)
PH UR STRIP.AUTO: 5.5 [PH] (ref 5–8)
PLATELET # BLD AUTO: 340 10*3/MM3 (ref 140–450)
PMV BLD AUTO: 9.7 FL (ref 6–12)
POTASSIUM SERPL-SCNC: 3.5 MMOL/L (ref 3.5–5.2)
PROT SERPL-MCNC: 7.2 G/DL (ref 6–8.5)
PROT UR QL STRIP: NEGATIVE
RBC # BLD AUTO: 4.96 10*6/MM3 (ref 3.77–5.28)
RBC # UR STRIP: ABNORMAL /HPF
REF LAB TEST METHOD: ABNORMAL
SODIUM SERPL-SCNC: 139 MMOL/L (ref 136–145)
SP GR UR STRIP: 1.01 (ref 1–1.03)
SQUAMOUS #/AREA URNS HPF: ABNORMAL /HPF
UROBILINOGEN UR QL STRIP: ABNORMAL
WBC # UR STRIP: ABNORMAL /HPF
WBC NRBC COR # BLD: 9.11 10*3/MM3 (ref 3.4–10.8)

## 2023-10-22 PROCEDURE — 83690 ASSAY OF LIPASE: CPT | Performed by: EMERGENCY MEDICINE

## 2023-10-22 PROCEDURE — 80053 COMPREHEN METABOLIC PANEL: CPT | Performed by: EMERGENCY MEDICINE

## 2023-10-22 PROCEDURE — 74176 CT ABD & PELVIS W/O CONTRAST: CPT

## 2023-10-22 PROCEDURE — 99285 EMERGENCY DEPT VISIT HI MDM: CPT

## 2023-10-22 PROCEDURE — G0378 HOSPITAL OBSERVATION PER HR: HCPCS

## 2023-10-22 PROCEDURE — 87186 SC STD MICRODIL/AGAR DIL: CPT

## 2023-10-22 PROCEDURE — 85025 COMPLETE CBC W/AUTO DIFF WBC: CPT | Performed by: EMERGENCY MEDICINE

## 2023-10-22 PROCEDURE — 83605 ASSAY OF LACTIC ACID: CPT | Performed by: EMERGENCY MEDICINE

## 2023-10-22 PROCEDURE — 87086 URINE CULTURE/COLONY COUNT: CPT

## 2023-10-22 PROCEDURE — 25010000002 CEFTRIAXONE PER 250 MG: Performed by: EMERGENCY MEDICINE

## 2023-10-22 PROCEDURE — 82948 REAGENT STRIP/BLOOD GLUCOSE: CPT

## 2023-10-22 PROCEDURE — 25810000003 LACTATED RINGERS SOLUTION: Performed by: EMERGENCY MEDICINE

## 2023-10-22 PROCEDURE — 25010000002 DIPHENHYDRAMINE PER 50 MG: Performed by: EMERGENCY MEDICINE

## 2023-10-22 PROCEDURE — 87077 CULTURE AEROBIC IDENTIFY: CPT

## 2023-10-22 PROCEDURE — 25010000002 ONDANSETRON PER 1 MG: Performed by: EMERGENCY MEDICINE

## 2023-10-22 PROCEDURE — 25010000002 PROCHLORPERAZINE 10 MG/2ML SOLUTION: Performed by: EMERGENCY MEDICINE

## 2023-10-22 PROCEDURE — 81001 URINALYSIS AUTO W/SCOPE: CPT | Performed by: EMERGENCY MEDICINE

## 2023-10-22 RX ORDER — SODIUM CHLORIDE 9 MG/ML
50 INJECTION, SOLUTION INTRAVENOUS CONTINUOUS
Status: DISCONTINUED | OUTPATIENT
Start: 2023-10-22 | End: 2023-10-27

## 2023-10-22 RX ORDER — SODIUM CHLORIDE 0.9 % (FLUSH) 0.9 %
10 SYRINGE (ML) INJECTION AS NEEDED
Status: DISCONTINUED | OUTPATIENT
Start: 2023-10-22 | End: 2023-10-27 | Stop reason: HOSPADM

## 2023-10-22 RX ORDER — NICOTINE POLACRILEX 4 MG
15 LOZENGE BUCCAL
Status: DISCONTINUED | OUTPATIENT
Start: 2023-10-22 | End: 2023-10-24

## 2023-10-22 RX ORDER — DIPHENHYDRAMINE HYDROCHLORIDE 50 MG/ML
25 INJECTION INTRAMUSCULAR; INTRAVENOUS ONCE
Status: COMPLETED | OUTPATIENT
Start: 2023-10-22 | End: 2023-10-22

## 2023-10-22 RX ORDER — IBUPROFEN 600 MG/1
1 TABLET ORAL
Status: DISCONTINUED | OUTPATIENT
Start: 2023-10-22 | End: 2023-10-24

## 2023-10-22 RX ORDER — SODIUM CHLORIDE 9 MG/ML
40 INJECTION, SOLUTION INTRAVENOUS AS NEEDED
Status: DISCONTINUED | OUTPATIENT
Start: 2023-10-22 | End: 2023-10-27 | Stop reason: HOSPADM

## 2023-10-22 RX ORDER — NITROGLYCERIN 0.4 MG/1
0.4 TABLET SUBLINGUAL
Status: DISCONTINUED | OUTPATIENT
Start: 2023-10-22 | End: 2023-10-27 | Stop reason: HOSPADM

## 2023-10-22 RX ORDER — ONDANSETRON 2 MG/ML
4 INJECTION INTRAMUSCULAR; INTRAVENOUS ONCE
Status: COMPLETED | OUTPATIENT
Start: 2023-10-22 | End: 2023-10-22

## 2023-10-22 RX ORDER — ONDANSETRON 4 MG/1
4 TABLET, FILM COATED ORAL EVERY 6 HOURS PRN
Status: DISCONTINUED | OUTPATIENT
Start: 2023-10-22 | End: 2023-10-27 | Stop reason: HOSPADM

## 2023-10-22 RX ORDER — DEXTROSE MONOHYDRATE 25 G/50ML
25 INJECTION, SOLUTION INTRAVENOUS
Status: DISCONTINUED | OUTPATIENT
Start: 2023-10-22 | End: 2023-10-24

## 2023-10-22 RX ORDER — SODIUM CHLORIDE 0.9 % (FLUSH) 0.9 %
10 SYRINGE (ML) INJECTION EVERY 12 HOURS SCHEDULED
Status: DISCONTINUED | OUTPATIENT
Start: 2023-10-22 | End: 2023-10-27 | Stop reason: HOSPADM

## 2023-10-22 RX ORDER — PROCHLORPERAZINE EDISYLATE 5 MG/ML
5 INJECTION INTRAMUSCULAR; INTRAVENOUS ONCE
Status: COMPLETED | OUTPATIENT
Start: 2023-10-22 | End: 2023-10-22

## 2023-10-22 RX ORDER — ACETAMINOPHEN 325 MG/1
650 TABLET ORAL EVERY 4 HOURS PRN
Status: DISCONTINUED | OUTPATIENT
Start: 2023-10-22 | End: 2023-10-27 | Stop reason: HOSPADM

## 2023-10-22 RX ORDER — ONDANSETRON 2 MG/ML
4 INJECTION INTRAMUSCULAR; INTRAVENOUS EVERY 6 HOURS PRN
Status: DISCONTINUED | OUTPATIENT
Start: 2023-10-22 | End: 2023-10-27 | Stop reason: HOSPADM

## 2023-10-22 RX ADMIN — ONDANSETRON 4 MG: 2 INJECTION INTRAMUSCULAR; INTRAVENOUS at 21:01

## 2023-10-22 RX ADMIN — PROCHLORPERAZINE EDISYLATE 5 MG: 5 INJECTION INTRAMUSCULAR; INTRAVENOUS at 20:59

## 2023-10-22 RX ADMIN — SODIUM CHLORIDE, POTASSIUM CHLORIDE, SODIUM LACTATE AND CALCIUM CHLORIDE 1000 ML: 600; 310; 30; 20 INJECTION, SOLUTION INTRAVENOUS at 21:02

## 2023-10-22 RX ADMIN — DIPHENHYDRAMINE HYDROCHLORIDE 25 MG: 50 INJECTION, SOLUTION INTRAMUSCULAR; INTRAVENOUS at 21:00

## 2023-10-22 RX ADMIN — CEFTRIAXONE 2000 MG: 2 INJECTION, POWDER, FOR SOLUTION INTRAMUSCULAR; INTRAVENOUS at 22:35

## 2023-10-23 ENCOUNTER — PREP FOR SURGERY (OUTPATIENT)
Dept: OTHER | Facility: HOSPITAL | Age: 63
End: 2023-10-23
Payer: MEDICARE

## 2023-10-23 DIAGNOSIS — D50.9 MICROCYTIC ANEMIA: Primary | ICD-10-CM

## 2023-10-23 LAB
ANION GAP SERPL CALCULATED.3IONS-SCNC: 9 MMOL/L (ref 5–15)
BUN SERPL-MCNC: 7 MG/DL (ref 8–23)
BUN/CREAT SERPL: 9.1 (ref 7–25)
CALCIUM SPEC-SCNC: 8.6 MG/DL (ref 8.6–10.5)
CHLORIDE SERPL-SCNC: 106 MMOL/L (ref 98–107)
CO2 SERPL-SCNC: 26 MMOL/L (ref 22–29)
CREAT SERPL-MCNC: 0.77 MG/DL (ref 0.57–1)
D-LACTATE SERPL-SCNC: 1.7 MMOL/L (ref 0.5–2)
D-LACTATE SERPL-SCNC: 2.1 MMOL/L (ref 0.5–2)
DEPRECATED RDW RBC AUTO: 46.7 FL (ref 37–54)
EGFRCR SERPLBLD CKD-EPI 2021: 85.7 ML/MIN/1.73
ERYTHROCYTE [DISTWIDTH] IN BLOOD BY AUTOMATED COUNT: 17.3 % (ref 12.3–15.4)
GLUCOSE BLDC GLUCOMTR-MCNC: 110 MG/DL (ref 70–130)
GLUCOSE BLDC GLUCOMTR-MCNC: 112 MG/DL (ref 70–130)
GLUCOSE BLDC GLUCOMTR-MCNC: 124 MG/DL (ref 70–130)
GLUCOSE BLDC GLUCOMTR-MCNC: 132 MG/DL (ref 70–130)
GLUCOSE SERPL-MCNC: 162 MG/DL (ref 65–99)
HCT VFR BLD AUTO: 34 % (ref 34–46.6)
HGB BLD-MCNC: 10.1 G/DL (ref 12–15.9)
MCH RBC QN AUTO: 22.4 PG (ref 26.6–33)
MCHC RBC AUTO-ENTMCNC: 29.7 G/DL (ref 31.5–35.7)
MCV RBC AUTO: 75.4 FL (ref 79–97)
PLATELET # BLD AUTO: 262 10*3/MM3 (ref 140–450)
PMV BLD AUTO: 9.8 FL (ref 6–12)
POTASSIUM SERPL-SCNC: 3.4 MMOL/L (ref 3.5–5.2)
RBC # BLD AUTO: 4.51 10*6/MM3 (ref 3.77–5.28)
SODIUM SERPL-SCNC: 141 MMOL/L (ref 136–145)
WBC NRBC COR # BLD: 8.13 10*3/MM3 (ref 3.4–10.8)

## 2023-10-23 PROCEDURE — 25810000003 SODIUM CHLORIDE 0.9 % SOLUTION

## 2023-10-23 PROCEDURE — 82948 REAGENT STRIP/BLOOD GLUCOSE: CPT

## 2023-10-23 PROCEDURE — 99204 OFFICE O/P NEW MOD 45 MIN: CPT

## 2023-10-23 PROCEDURE — 85027 COMPLETE CBC AUTOMATED: CPT

## 2023-10-23 PROCEDURE — 80048 BASIC METABOLIC PNL TOTAL CA: CPT

## 2023-10-23 PROCEDURE — 83735 ASSAY OF MAGNESIUM: CPT | Performed by: NURSE PRACTITIONER

## 2023-10-23 PROCEDURE — G0378 HOSPITAL OBSERVATION PER HR: HCPCS

## 2023-10-23 PROCEDURE — 25010000002 ONDANSETRON PER 1 MG

## 2023-10-23 PROCEDURE — 83605 ASSAY OF LACTIC ACID: CPT | Performed by: EMERGENCY MEDICINE

## 2023-10-23 PROCEDURE — 25010000002 METOCLOPRAMIDE PER 10 MG

## 2023-10-23 RX ORDER — FAMOTIDINE 20 MG/1
20 TABLET, FILM COATED ORAL
Status: DISCONTINUED | OUTPATIENT
Start: 2023-10-24 | End: 2023-10-27 | Stop reason: HOSPADM

## 2023-10-23 RX ORDER — ASPIRIN 81 MG/1
81 TABLET ORAL DAILY
Status: DISCONTINUED | OUTPATIENT
Start: 2023-10-23 | End: 2023-10-27 | Stop reason: HOSPADM

## 2023-10-23 RX ORDER — POTASSIUM CHLORIDE 750 MG/1
40 TABLET, FILM COATED, EXTENDED RELEASE ORAL ONCE
Status: COMPLETED | OUTPATIENT
Start: 2023-10-23 | End: 2023-10-23

## 2023-10-23 RX ORDER — METOCLOPRAMIDE HYDROCHLORIDE 5 MG/ML
10 INJECTION INTRAMUSCULAR; INTRAVENOUS EVERY 6 HOURS
Status: DISCONTINUED | OUTPATIENT
Start: 2023-10-23 | End: 2023-10-26

## 2023-10-23 RX ORDER — BISOPROLOL FUMARATE 5 MG/1
5 TABLET, FILM COATED ORAL DAILY
Status: DISCONTINUED | OUTPATIENT
Start: 2023-10-23 | End: 2023-10-26

## 2023-10-23 RX ADMIN — SODIUM CHLORIDE 100 ML/HR: 9 INJECTION, SOLUTION INTRAVENOUS at 00:07

## 2023-10-23 RX ADMIN — ONDANSETRON 4 MG: 2 INJECTION INTRAMUSCULAR; INTRAVENOUS at 18:10

## 2023-10-23 RX ADMIN — ASPIRIN 81 MG: 81 TABLET, COATED ORAL at 08:50

## 2023-10-23 RX ADMIN — Medication 10 ML: at 00:07

## 2023-10-23 RX ADMIN — METOCLOPRAMIDE 10 MG: 5 INJECTION, SOLUTION INTRAMUSCULAR; INTRAVENOUS at 18:10

## 2023-10-23 RX ADMIN — ONDANSETRON 4 MG: 2 INJECTION INTRAMUSCULAR; INTRAVENOUS at 00:06

## 2023-10-23 RX ADMIN — METOCLOPRAMIDE 10 MG: 5 INJECTION, SOLUTION INTRAMUSCULAR; INTRAVENOUS at 00:49

## 2023-10-23 RX ADMIN — METOCLOPRAMIDE 10 MG: 5 INJECTION, SOLUTION INTRAMUSCULAR; INTRAVENOUS at 08:51

## 2023-10-23 RX ADMIN — METOCLOPRAMIDE 10 MG: 5 INJECTION, SOLUTION INTRAMUSCULAR; INTRAVENOUS at 14:46

## 2023-10-23 RX ADMIN — Medication 10 ML: at 08:55

## 2023-10-23 RX ADMIN — BISOPROLOL FUMARATE 5 MG: 5 TABLET, FILM COATED ORAL at 08:51

## 2023-10-23 RX ADMIN — POTASSIUM CHLORIDE 40 MEQ: 750 TABLET, EXTENDED RELEASE ORAL at 05:35

## 2023-10-23 NOTE — CONSULTS
[facility]  Initial Inpatient Consult Note    Referring Provider:     Palma Lozano PA       Reason for Consultation: Post prandial abdominal pain     Subjective     History of present illness:    63 y.o. female with a past medical history of diabetes, chronic kidney disease, hypertension, hyperlipidemia, coronary artery disease who presented to the ED yesterday with reports of 3 weeks of abdominal pain, nausea, vomiting and diarrhea.    CT abdomen pelvis completed but was unremarkable.    Labs on arrival show LFTs within normal limit, lactate 2.1 (repeat today down to 1.7), hemoglobin 10.1, microcytic/hypochromic indices.  No leukocytosis.      Seen and examined at bedside this afternoon with sister present with patient permission.  Patient reports that about 4 weeks ago she visited her primary care doctor and was placed on semaglutide for her diabetes.  She reports after the initial dose she began having abdominal pain, nausea, vomiting and diarrhea.  Denies any hematemesis, coffee-ground emesis, melena or hematochezia and has not had any fevers or chills.  She has not traveled recently or had any sick contacts or eating any questionable food.  She says that she has taken about 4 doses of the semaglutide the most recent one being between Wednesday and Friday of last week.  She reports that she has lost about 10 pounds since all of this started.  She has never had an EGD or colonoscopy and denies any family history of stomach, esophageal, liver or colon cancer.  She says that she tried to eat breakfast this morning but it made her feel extremely nauseous.  Throughout our visit today she is slowly trying to eat her lunch.  She says she has no prior knowledge of having any sort of anemia.  She is unsure how many episodes of diarrhea she is having today.    Past Medical History:  Past Medical History:   Diagnosis Date    Asthma     CAD (coronary artery disease)     Chronic kidney disease      COVID     Crescendo angina     Diabetes mellitus     H/O angioplasty     Hiatal hernia     Hyperlipidemia     Hypertension     Obesity     Recurrent urinary tract infection     Rotator cuff tear, right     Sleep apnea     NO MACHINE    Torn rotator cuff      Past Surgical History:  Past Surgical History:   Procedure Laterality Date    CARDIAC CATHETERIZATION      OUTCOME: SUCCESSFUL    CARDIAC CATHETERIZATION N/A 3/20/2019    Procedure: RIGHT AND LEFT HEART CATH;  Surgeon: Roe Harper MD;  [facility];  Service: Cardiology    CARDIAC CATHETERIZATION N/A 3/20/2019    Procedure: Coronary angiography;  Surgeon: Roe Harper MD;  Location:  [facility] CATH INVASIVE LOCATION;  Service: Cardiology    CARDIAC CATHETERIZATION N/A 3/20/2019    Procedure: Left ventriculography;  Surgeon: Roe Harper MD;  Location:  [facility] CATH INVASIVE LOCATION;  Service: Cardiology    CARDIAC CATHETERIZATION N/A 3/20/2019    Procedure: Stent BANDAR coronary;  Surgeon: Roe Harper MD;  [facility];  Service: Cardiology    CARDIAC CATHETERIZATION N/A 8/14/2020    Procedure: Left Heart Cath;  Surgeon: Roe Harper MD;  [facility];  Service: Cardiovascular;  Laterality: N/A;    CARDIAC CATHETERIZATION N/A 8/14/2020    Procedure: Coronary angiography;  Surgeon: Roe Harper MD;  [facility];  Service: Cardiovascular;  Laterality: N/A;    CARDIAC CATHETERIZATION N/A 8/14/2020    Procedure: Left ventriculography;  Surgeon: Roe Harper MD;  [facility];  Service: Cardiovascular;  Laterality: N/A;    CARDIAC CATHETERIZATION N/A 8/14/2020    Procedure: Percutaneous Coronary Intervention;  Surgeon: Roe Harper MD;  [facility];  Service: Cardiovascular;  Laterality: N/A;    CARDIAC CATHETERIZATION N/A  2022    Procedure: Left Heart Cath;  Surgeon: Roe Harper MD;  [facility];  Service: Cardiovascular;  Laterality: N/A;    CARDIAC CATHETERIZATION N/A 2022    Procedure: Coronary angiography;  Surgeon: oRe Harper MD;  [facility];  Service: Cardiovascular;  Laterality: N/A;    CARDIAC CATHETERIZATION N/A 2022    Procedure: Left ventriculography;  Surgeon: Roe Harper MD;  [facility];  Service: Cardiovascular;  Laterality: N/A;    CARDIAC CATHETERIZATION N/A 2022    Procedure: Resting Full Cycle Ratio;  Surgeon: Roe Harper MD;  [facility];  Service: Cardiovascular;  Laterality: N/A;    CARDIAC CATHETERIZATION N/A 2022    Procedure: Stent BANDAR coronary;  Surgeon: Roe Harper MD;  Location: [facility] CATH INVASIVE LOCATION;  Service: Cardiovascular;  Laterality: N/A;    CARDIAC CATHETERIZATION N/A 2023    Procedure: Left Heart Cath;  Surgeon: Roe Harper MD;  [facility];  Service: Cardiology;  Laterality: N/A;    CARDIAC CATHETERIZATION N/A 2023    Procedure: Coronary angiography;  Surgeon: Roe Harper MD;  [facility];  Service: Cardiology;  Laterality: N/A;    CARDIAC CATHETERIZATION N/A 2023    Procedure: Left ventriculography;  Surgeon: Roe Harper MD;  Location: [facility] CATH INVASIVE LOCATION;  Service: Cardiology;  Laterality: N/A;    CARDIAC ELECTROPHYSIOLOGY PROCEDURE N/A 2020    Procedure: Temporary Pacemaker;  Surgeon: Roe Harper MD;  [facility];  Service: Cardiovascular;  Laterality: N/A;     SECTION      CORONARY STENT PLACEMENT      LAD    HYSTERECTOMY      TONSILLECTOMY AND ADENOIDECTOMY      UMBILICAL HERNIA REPAIR        Social History:   Social History      Tobacco Use    Smoking status: Former     Types: Cigarettes     Quit date:      Years since quittin.8    Smokeless tobacco: Never   Substance Use Topics    Alcohol use: Not Currently      Family History:  Family History   Problem Relation Age of Onset    Heart attack Father     Heart disease Father     Heart attack Brother     Heart disease Brother     Heart attack Maternal Grandfather     Heart disease Maternal Grandfather     Heart disease Mother     Malig Hyperthermia Neg Hx        Home Meds:  Medications Prior to Admission   Medication Sig Dispense Refill Last Dose    aspirin 81 MG EC tablet Take 1 tablet by mouth Daily.   10/22/2023 at 0900    bisoprolol (ZEBeta) 5 MG tablet Take 1 tablet by mouth Daily. (Patient taking differently: Take 1 tablet by mouth Daily. TAKING 1/2 TAB) 45 tablet 1 10/22/2023 at 0900    Magnesium Oxide -Mg Supplement 400 (240 Mg) MG tablet    Past Week    metFORMIN (GLUCOPHAGE) 1000 MG tablet Take 1 tablet by mouth 2 (Two) Times a Day.  0 10/22/2023 at 0900    nitroglycerin (NITROSTAT) 0.4 MG SL tablet Place 1 tablet under the tongue See Admin Instructions. 25 tablet 3 Past Month    omeprazole (priLOSEC) 40 MG capsule Take 1 capsule by mouth Daily.   10/22/2023 at 0900    ondansetron ODT (ZOFRAN-ODT) 4 MG disintegrating tablet Place 1 tablet on the tongue 4 (Four) Times a Day As Needed for Nausea or Vomiting. 15 tablet 0 Past Month    OneTouch Delica Lancets 30G misc TEST 1 (ONE) EACH DAILY   10/22/2023    ONETOUCH VERIO test strip   0 10/22/2023    pregabalin (LYRICA) 150 MG capsule Take 1 capsule by mouth 2 (Two) Times a Day.   Past Week    rosuvastatin (CRESTOR) 20 MG tablet Take 1 tablet by mouth Daily. 90 tablet 3 10/22/2023 at 0900    Tresiba FlexTouch 100 UNIT/ML solution pen-injector injection 25 Units.   Past Week    SUMAtriptan (IMITREX) 50 MG tablet Take 1 tablet by mouth Every 2 (Two) Hours As Needed for Migraine. Take one tablet at onset of headache. May repeat  dose one time in 2 hours if headache not relieved.   More than a month     Current Meds:   aspirin, 81 mg, Oral, Daily  bisoprolol, 5 mg, Oral, Daily  cefTRIAXone, 1,000 mg, Intravenous, Q24H  insulin regular, 2-7 Units, Subcutaneous, Q6H  metoclopramide, 10 mg, Intravenous, Q6H  sodium chloride, 10 mL, Intravenous, Q12H      Allergies:  Allergies   Allergen Reactions    Hydrocodone Nausea And Vomiting and GI Intolerance    Penicillins Hives    Plavix [Clopidogrel Bisulfate] Nausea And Vomiting     Objective     Vital Signs  Temp:  [97.3 °F (36.3 °C)-98.1 °F (36.7 °C)] 97.9 °F (36.6 °C)  Heart Rate:  [56-67] 56  Resp:  [16-18] 16  BP: (139-197)/(55-87) 139/63    Physical Exam:     General: patient awake, alert and cooperative   Eyes: Normal lids and lashes, no scleral icterus   Skin: warm and dry, not jaundiced   Cardiovascular: regular rhythm and rate   Pulm: clear to auscultation bilaterally, regular and unlabored   Abdomen: soft, epigastric tenderness to palpation, nondistended; normal bowel sounds    Results Review:   I reviewed the patient's new clinical results.    Results from last 7 days   Lab Units 10/23/23  0317 10/22/23  2056   WBC 10*3/mm3 8.13 9.11   HEMOGLOBIN g/dL 10.1* 11.3*   HEMATOCRIT % 34.0 37.5   PLATELETS 10*3/mm3 262 340     Results from last 7 days   Lab Units 10/23/23  0317 10/22/23  2057   SODIUM mmol/L 141 139   POTASSIUM mmol/L 3.4* 3.5   CHLORIDE mmol/L 106 102   CO2 mmol/L 26.0 27.0   BUN mg/dL 7* 8   CREATININE mg/dL 0.77 0.83   CALCIUM mg/dL 8.6 9.5   BILIRUBIN mg/dL  --  0.2   ALK PHOS U/L  --  69   ALT (SGPT) U/L  --  27   AST (SGOT) U/L  --  24   GLUCOSE mg/dL 162* 139*         Lab Results   Lab Value Date/Time    LIPASE 25 10/22/2023 2057    LIPASE 29 10/18/2023 1711    LIPASE 28 08/24/2023 1917    LIPASE 64 (H) 06/10/2022 1351    LIPASE 24 11/18/2018 0723     Radiology:  CT Abdomen Pelvis Without Contrast   Final Result   1. Status post hysterectomy and ventral hernia repair.    2. No acute process identified.       Radiation dose reduction techniques were utilized, including automated   exposure control and exposure modulation based on body size.           This report was finalized on 10/22/2023 10:29 PM by Dr. Noe Powell M.D on Workstation: [id number]              Assessment & Plan   Active Hospital Problems    Diagnosis     **Abdominal pain        Assessment:  Abdominal pain  Nausea/vomiting  Diarrhea  Microcytic anemia    Plan:  Patient's symptoms seem to line up with when she started taking semaglutide, it is likely that this is contributing to a lot of her symptoms.  Recommend holding semaglutide at this time and discussing with her primary care physician about alternatives to treat her diabetes.  We will await stool studies though to ensure that there is no underlying infection.  Pending stool studies we would like to do EGD and colonoscopy given patient's symptoms in addition to findings of microcytic anemia on her labs.  Discussed with patient this afternoon who is agreeable to doing this either inpatient or outpatient.  We will switch patient back to clears as she was very nauseous with breakfast and see how she is doing tomorrow.  Tentatively plan for EGD and colonoscopy on Wednesday    I discussed the patients findings and my recommendations with attending, Dr. Reshma Frey PA-C      Addendum - called from OBs unit indicating plans for pt d/c today.  Will arrange for outpatient egd/colonoscopy for evaluation of her microcytic anemia

## 2023-10-23 NOTE — CASE MANAGEMENT/SOCIAL WORK
Discharge Planning Assessment  [facility]     Patient Name: Lily Unger  MRN: [id number]  Today's Date: 10/23/2023    Admit Date: 10/22/2023    Plan: Plans to return home at sara/Huang Moreira RN   Discharge Needs Assessment       Row Name 10/23/23 1158       Living Environment    People in Home sibling(s)    Name(s) of People in Home brother Samir, his wife and two adult children    Current Living Arrangements apartment    Potentially Unsafe Housing Conditions none    Primary Care Provided by self    Provides Primary Care For no one    Family Caregiver if Needed spouse    Family Caregiver Names brother Samir, his wife and two adult children    Quality of Family Relationships supportive    Able to Return to Prior Arrangements yes       Resource/Environmental Concerns    Resource/Environmental Concerns none       Transition Planning    Patient/Family Anticipates Transition to home with family    Patient/Family Anticipated Services at Transition none    Transportation Anticipated family or friend will provide       Discharge Needs Assessment    Equipment Currently Used at Home cpap;bp cuff;nebulizer;glucometer    Concerns to be Addressed no discharge needs identified    Anticipated Changes Related to Illness none    Equipment Needed After Discharge none    Provided Post Acute Provider List? N/A    Provided Post Acute Provider Quality & Resource List? N/A                   Discharge Plan       Row Name 10/23/23 1200       Plan    Plan Plans to return home at d/Huang Moreira RN    Patient/Family in Agreement with Plan yes    Provided Post Acute Provider List? N/A    Provided Post Acute Provider Quality & Resource List? N/A    Plan Comments Spoke w/ pt at bedside w/ her permission. Introduced self and explained role. All info on facesheet, including PCP as KISHORE Savage, verified. Lives in single level ap't w/ no steps to enter, w/ brother Samir, his wife and his two adult children. Able to navigate around home w/o  difficulty. Independent w/ ADLs. Uses BGM, nebulizer, CPAP and B/P cuff at home. denies need for any DME or community resources at d/c. Uses [facility] in [place] and is able to obtain and pay for meds. To return home at d/c, w/ family's assist as needed; family will transport; agreeable w/ plan. CM will continue to follow-KISHORE Moreira RN                  Continued Care and Services - Admitted Since 10/22/2023    Coordination has not been started for this encounter.          Demographic Summary       Row Name 10/23/23 1157       General Information    Admission Type observation    Arrived From emergency department    Required Notices Provided Observation Status Notice    Referral Source admission list    Reason for Consult discharge planning    Preferred Language English       Contact Information    Permission Granted to Share Info With                    Functional Status       Row Name 10/23/23 1157       Functional Status    Usual Activity Tolerance moderate    Current Activity Tolerance moderate       Functional Status, IADL    Medications independent    Meal Preparation independent    Housekeeping independent    Laundry independent    Shopping independent       Mental Status    General Appearance WDL WDL       Mental Status Summary    Recent Changes in Mental Status/Cognitive Functioning no changes                   Psychosocial       Row Name 10/23/23 1157       Behavior WDL    Behavior WDL WDL       Emotion Mood WDL    Emotion/Mood/Affect WDL WDL       Speech WDL    Speech WDL WDL       Perceptual State WDL    Perceptual State WDL WDL       Thought Process WDL    Thought Process WDL WDL       Intellectual Performance WDL    Intellectual Performance WDL WDL                   Abuse/Neglect    No documentation.                  Legal    No documentation.                  Substance Abuse    No documentation.                  Patient Forms    No documentation.                     Christie BAEZ  TEETEE Moreira

## 2023-10-23 NOTE — H&P
[facility]   HISTORY AND PHYSICAL    Patient Name: Lily Unger  : 1960  MRN: [id number]  Primary Care Physician:  Jaycob Savage MD  Date of admission: 10/22/2023    Subjective   Subjective     Chief Complaint:   Chief Complaint   Patient presents with    Abdominal Pain    Nausea    Vomiting         HPI:    Lily Unger is a 63 y.o. female with a past medical history including, but not limited to, diabetes, chronic kidney disease, hypertension, hyperlipidemia, and coronary artery disease, presented to the emergency department with a 3-week history of abdominal pain, nausea, vomiting, and diarrhea.  She states that on 2023 she saw her primary care physician and was placed on semaglutide and oxybutynin and she began taking them the next day.  She states that she woke up the next morning with abdominal pain, nausea, vomiting and diarrhea.  She did not take any additional doses of the oxybutynin or semaglutide.  Despite this her GI issues have persisted.  In the emergency department glucose was 139, lactate 2.4, hemoglobin 11.3 and urinalysis showed moderate leukocytes and too numerous to count WBCs.  Urine culture is pending.  We will continue to hydrate her overnight and treat her nausea and vomiting.  If she has an episode of diarrhea we will collect it for a gastrointestinal panel PCR.    Review of Systems   All systems were reviewed and negative except for: What was mentioned above in the HPI    Personal History     Past Medical History:   Diagnosis Date    Asthma     CAD (coronary artery disease)     Chronic kidney disease     COVID     Crescendo angina     Diabetes mellitus     H/O angioplasty     Hiatal hernia     Hyperlipidemia     Hypertension     Obesity     Recurrent urinary tract infection     Rotator cuff tear, right     Sleep apnea     NO MACHINE    Torn rotator cuff        Past Surgical History:   Procedure Laterality Date    CARDIAC CATHETERIZATION      OUTCOME:  SUCCESSFUL    CARDIAC CATHETERIZATION N/A 3/20/2019    Procedure: RIGHT AND LEFT HEART CATH;  Surgeon: Roe Harper MD;  [facility];  Service: Cardiology    CARDIAC CATHETERIZATION N/A 3/20/2019    Procedure: Coronary angiography;  Surgeon: Roe Harper MD;  Location:  [facility] CATH INVASIVE LOCATION;  Service: Cardiology    CARDIAC CATHETERIZATION N/A 3/20/2019    Procedure: Left ventriculography;  Surgeon: Roe Harper MD;  [facility];  Service: Cardiology    CARDIAC CATHETERIZATION N/A 3/20/2019    Procedure: Stent BANDAR coronary;  Surgeon: Roe Harper MD;  Location:  [facility] CATH INVASIVE LOCATION;  Service: Cardiology    CARDIAC CATHETERIZATION N/A 8/14/2020    Procedure: Left Heart Cath;  Surgeon: Roe Harper MD;  [facility];  Service: Cardiovascular;  Laterality: N/A;    CARDIAC CATHETERIZATION N/A 8/14/2020    Procedure: Coronary angiography;  Surgeon: Roe Harper MD;  [facility];  Service: Cardiovascular;  Laterality: N/A;    CARDIAC CATHETERIZATION N/A 8/14/2020    Procedure: Left ventriculography;  Surgeon: Roe Harper MD;  [facility];  Service: Cardiovascular;  Laterality: N/A;    CARDIAC CATHETERIZATION N/A 8/14/2020    Procedure: Percutaneous Coronary Intervention;  Surgeon: Roe Harper MD;  [facility];  Service: Cardiovascular;  Laterality: N/A;    CARDIAC CATHETERIZATION N/A 1/12/2022    Procedure: Left Heart Cath;  Surgeon: Roe Harper MD;  [facility];  Service: Cardiovascular;  Laterality: N/A;    CARDIAC CATHETERIZATION N/A 1/12/2022    Procedure: Coronary angiography;  Surgeon: Roe Harper MD;  [facility];  Service: Cardiovascular;  Laterality: N/A;    CARDIAC  CATHETERIZATION N/A 2022    Procedure: Left ventriculography;  Surgeon: Roe Harpre MD;  Location:  [facility] CATH INVASIVE LOCATION;  Service: Cardiovascular;  Laterality: N/A;    CARDIAC CATHETERIZATION N/A 2022    Procedure: Resting Full Cycle Ratio;  Surgeon: Roe Harper MD;  Location:  [facility] CATH INVASIVE LOCATION;  Service: Cardiovascular;  Laterality: N/A;    CARDIAC CATHETERIZATION N/A 2022    Procedure: Stent BANDAR coronary;  Surgeon: Roe Harper MD;  Location:  [facility] CATH INVASIVE LOCATION;  Service: Cardiovascular;  Laterality: N/A;    CARDIAC CATHETERIZATION N/A 2023    Procedure: Left Heart Cath;  Surgeon: Roe Harper MD;  Location:  [facility] CATH INVASIVE LOCATION;  Service: Cardiology;  Laterality: N/A;    CARDIAC CATHETERIZATION N/A 2023    Procedure: Coronary angiography;  Surgeon: Roe Harper MD;  [facility];  Service: Cardiology;  Laterality: N/A;    CARDIAC CATHETERIZATION N/A 2023    Procedure: Left ventriculography;  Surgeon: Roe Harper MD;  [facility];  Service: Cardiology;  Laterality: N/A;    CARDIAC ELECTROPHYSIOLOGY PROCEDURE N/A 2020    Procedure: Temporary Pacemaker;  Surgeon: Roe Harper MD;  Location:  [facility] CATH INVASIVE LOCATION;  Service: Cardiovascular;  Laterality: N/A;     SECTION      CORONARY STENT PLACEMENT      LAD    HYSTERECTOMY      TONSILLECTOMY AND ADENOIDECTOMY      UMBILICAL HERNIA REPAIR         Family History: family history includes Heart attack in her brother, father, and maternal grandfather; Heart disease in her brother, father, maternal grandfather, and mother. Otherwise pertinent FHx was reviewed and not pertinent to current issue.    Social History:  reports that she quit smoking about 31 years ago. Her smoking use included cigarettes. She has never used smokeless tobacco. She reports that she  does not currently use alcohol. She reports that she does not use drugs.    Home Medications:  Magnesium Oxide -Mg Supplement, OneTouch Delica Lancets 30G, SUMAtriptan, aspirin, bisoprolol, glucose blood, insulin degludec, metFORMIN, nitroglycerin, omeprazole, ondansetron ODT, pregabalin, and rosuvastatin    Allergies:  Allergies   Allergen Reactions    Hydrocodone Nausea And Vomiting and GI Intolerance    Penicillins Hives    Plavix [Clopidogrel Bisulfate] Nausea And Vomiting       Objective   Objective     Vitals:   Temp:  [98.1 °F (36.7 °C)] 98.1 °F (36.7 °C)  Heart Rate:  [61] 61  Resp:  [16] 16  BP: (169-197)/(69-87) 183/69  Physical Exam    Constitutional: Awake, alert   Eyes: PERRLA, sclerae anicteric, no conjunctival injection   HENT: NCAT, mucous membranes moist   Neck: Supple, no thyromegaly, no lymphadenopathy, trachea midline   Respiratory: Clear to auscultation bilaterally, nonlabored respirations    Cardiovascular: RRR, no murmurs, rubs, or gallops, palpable pedal pulses bilaterally   Gastrointestinal: Positive bowel sounds, soft, generalized tenderness, nondistended   Musculoskeletal: No bilateral ankle edema, no clubbing or cyanosis to extremities   Psychiatric: Appropriate affect, cooperative   Neurologic: Oriented x 3, strength symmetric in all extremities, Cranial Nerves grossly intact to confrontation, speech clear   Skin: No rashes     Result Review    Result Review:  I have personally reviewed the results from the time of this admission to 10/22/2023 22:19 EDT and agree with these findings:  [x]  Laboratory list / accordion  []  Microbiology  [x]  Radiology  []  EKG/Telemetry   []  Cardiology/Vascular   []  Pathology  [x]  Old records  []  Other:      Assessment & Plan   Assessment / Plan     Brief Patient Summary:  Lily Unger is a 63 y.o. female who was admitted to the observation unit for further evaluation and treatment of her abdominal pain, vomiting, and UTI.    Active Hospital  Problems:  Active Hospital Problems    Diagnosis     **Abdominal pain      Plan:   Abdominal pain  -N.p.o.  -Normal saline at 100 mL/h  -Repeat labs in a.m.  -Hold Ozempic  -As needed antiemetics  -CT abdomen and pelvis without contrast-no acute process identified    UTI  -Rocephin 1 g IV every 24 hours  -Urine culture pending    Diabetes  -Accu-Cheks every 6 hours  -Adult subcutaneous insulin management-low dose      DVT prophylaxis:  Mechanical DVT prophylaxis orders are present.    CODE STATUS:    Code Status (Patient has no pulse and is not breathing): CPR (Attempt to Resuscitate)  Medical Interventions (Patient has pulse or is breathing): Full Support    Admission Status:  I believe this patient meets observation status.    77 minutes have been spent by [facility] providers in the care of this patient while under observation status.      Appropriate PPE worn during patient encounter.  Hand hygeine performed before and after seeing the patient.      Electronically signed by BRETT Woodard, 10/22/23, 10:19 PM EDT.

## 2023-10-23 NOTE — ED PROVIDER NOTES
EMERGENCY DEPARTMENT ENCOUNTER    Room Number:  36/36  Date seen:  10/22/2023  PCP: Jaycob Savage MD  Historian(s): Patient, paramedics      HPI:  Chief Complaint: Abdominal pain, vomiting  A complete HPI/ROS/PMH/PSH/SH/FH are unobtainable / limited due to: None  Context: Lily Unger is a 63 y.o. female who presents to the ED c/o persistent nausea and vomiting as well as abdominal pain for the past 2 weeks.  Patient began taking Ozempic a few weeks ago and sometime after that began developing the above-mentioned gastrointestinal symptoms.  She went to an emergency department in [facility] last week on Wednesday for evaluation of the symptoms.  They conducted some basic labs and started her on some IV fluids.  She was discharged home and followed up with her PCP.  He told her to stop taking her oxybutynin medication thinking that could be contributing to her symptoms as well.  Despite making these medication changes, she is still having poor appetite with nausea and vomiting and food intolerance.  She had a normal bowel movement around 930 this morning.  She denies fevers.        PAST MEDICAL HISTORY  Active Ambulatory Problems     Diagnosis Date Noted    Chest pain, atypical 01/28/2016    Shortness of breath 01/28/2016    Syncope and collapse 01/28/2016    Weakness 01/28/2016    Coronary artery disease involving native heart with angina pectoris 01/28/2016    Soft tissue lesion of shoulder region 04/22/2015    Disorder of rotator cuff 06/10/2015    Shoulder pain 04/01/2015    Rotator cuff tear arthropathy 06/10/2015    Unstable angina pectoris 10/01/2015    Injury of kidney 07/01/2016    Type 2 diabetes mellitus 07/01/2016    Hypertension 07/01/2016    Hypotension 07/01/2016    Hypovolemia 07/04/2016    Bilateral carotid artery disease 06/19/2018    Medical non-compliance 11/17/2018    Dizziness 11/17/2018    Viral illness 11/17/2018    SOB (shortness of breath) 03/12/2019    Asthma exacerbation  12/27/2019    CKD (chronic kidney disease) stage 3, GFR 30-59 ml/min 12/27/2019    Asthma 12/27/2019    Coronary artery disease involving native coronary artery of native heart with angina pectoris 08/10/2020    Essential hypertension 08/10/2020    Abnormal nuclear stress test 08/10/2020    Precordial pain 04/05/2023    Hyperlipidemia 08/09/2023     Resolved Ambulatory Problems     Diagnosis Date Noted    No Resolved Ambulatory Problems     Past Medical History:   Diagnosis Date    CAD (coronary artery disease)     Chronic kidney disease     COVID     Crescendo angina     Diabetes mellitus     H/O angioplasty     Hiatal hernia     Obesity     Recurrent urinary tract infection     Rotator cuff tear, right     Sleep apnea     Torn rotator cuff          PAST SURGICAL HISTORY  Past Surgical History:   Procedure Laterality Date    CARDIAC CATHETERIZATION      OUTCOME: SUCCESSFUL    CARDIAC CATHETERIZATION N/A 3/20/2019    Procedure: RIGHT AND LEFT HEART CATH;  Surgeon: Roe Harper MD;  [facility];  Service: Cardiology    CARDIAC CATHETERIZATION N/A 3/20/2019    Procedure: Coronary angiography;  Surgeon: Roe Harper MD;  [facility];  Service: Cardiology    CARDIAC CATHETERIZATION N/A 3/20/2019    Procedure: Left ventriculography;  Surgeon: Roe Harper MD;  [facility];  Service: Cardiology    CARDIAC CATHETERIZATION N/A 3/20/2019    Procedure: Stent BANDAR coronary;  Surgeon: Roe Harper MD;  [facility];  Service: Cardiology    CARDIAC CATHETERIZATION N/A 8/14/2020    Procedure: Left Heart Cath;  Surgeon: Roe Harper MD;  [facility];  Service: Cardiovascular;  Laterality: N/A;    CARDIAC CATHETERIZATION N/A 8/14/2020    Procedure: Coronary angiography;  Surgeon: Roe Harper MD;  [facility];   Service: Cardiovascular;  Laterality: N/A;    CARDIAC CATHETERIZATION N/A 8/14/2020    Procedure: Left ventriculography;  Surgeon: Roe Harper MD;  [facility];  Service: Cardiovascular;  Laterality: N/A;    CARDIAC CATHETERIZATION N/A 8/14/2020    Procedure: Percutaneous Coronary Intervention;  Surgeon: Roe Harper MD;  [facility];  Service: Cardiovascular;  Laterality: N/A;    CARDIAC CATHETERIZATION N/A 1/12/2022    Procedure: Left Heart Cath;  Surgeon: Roe Harper MD;  [facility];  Service: Cardiovascular;  Laterality: N/A;    CARDIAC CATHETERIZATION N/A 1/12/2022    Procedure: Coronary angiography;  Surgeon: Roe Harper MD;  [facility];  Service: Cardiovascular;  Laterality: N/A;    CARDIAC CATHETERIZATION N/A 1/12/2022    Procedure: Left ventriculography;  Surgeon: Roe Harper MD;  Location: [facility] CATH INVASIVE LOCATION;  Service: Cardiovascular;  Laterality: N/A;    CARDIAC CATHETERIZATION N/A 1/12/2022    Procedure: Resting Full Cycle Ratio;  Surgeon: Roe Harper MD;  Location: [facility] CATH INVASIVE LOCATION;  Service: Cardiovascular;  Laterality: N/A;    CARDIAC CATHETERIZATION N/A 1/12/2022    Procedure: Stent BANDAR coronary;  Surgeon: Roe Harper MD;  Location: [facility] CATH INVASIVE LOCATION;  Service: Cardiovascular;  Laterality: N/A;    CARDIAC CATHETERIZATION N/A 4/21/2023    Procedure: Left Heart Cath;  Surgeon: Roe Harper MD;  Location: [facility] CATH INVASIVE LOCATION;  Service: Cardiology;  Laterality: N/A;    CARDIAC CATHETERIZATION N/A 4/21/2023    Procedure: Coronary angiography;  Surgeon: Roe Harper MD;  [facility];  Service: Cardiology;  Laterality: N/A;    CARDIAC CATHETERIZATION N/A 4/21/2023    Procedure: Left ventriculography;  Surgeon: Meredith  MD Roe;  Location:  [facility] CATH INVASIVE LOCATION;  Service: Cardiology;  Laterality: N/A;    CARDIAC ELECTROPHYSIOLOGY PROCEDURE N/A 2020    Procedure: Temporary Pacemaker;  Surgeon: Roe Harper MD;  Location:  [facility] CATH INVASIVE LOCATION;  Service: Cardiovascular;  Laterality: N/A;     SECTION      CORONARY STENT PLACEMENT      LAD    HYSTERECTOMY      TONSILLECTOMY AND ADENOIDECTOMY      UMBILICAL HERNIA REPAIR           FAMILY HISTORY  Family History   Problem Relation Age of Onset    Heart attack Father     Heart disease Father     Heart attack Brother     Heart disease Brother     Heart attack Maternal Grandfather     Heart disease Maternal Grandfather     Heart disease Mother     Malig Hyperthermia Neg Hx          SOCIAL HISTORY  Social History     Socioeconomic History    Marital status:    Tobacco Use    Smoking status: Former     Types: Cigarettes     Quit date:      Years since quittin.8    Smokeless tobacco: Never   Vaping Use    Vaping Use: Never used   Substance and Sexual Activity    Alcohol use: Not Currently    Drug use: No    Sexual activity: Defer     Birth control/protection: Post-menopausal, Sponge         ALLERGIES  Hydrocodone, Penicillins, and Plavix [clopidogrel bisulfate]        REVIEW OF SYSTEMS  Review of Systems   Constitutional:  Positive for activity change, appetite change, fatigue and unexpected weight change. Negative for fever.   HENT: Negative.     Eyes:  Negative for pain and visual disturbance.   Respiratory:  Negative for cough and shortness of breath.    Cardiovascular:  Negative for chest pain.   Gastrointestinal:  Positive for abdominal pain, nausea and vomiting. Negative for blood in stool and diarrhea.   Genitourinary:  Negative for dysuria.   Skin:  Negative for color change.   Neurological:  Positive for weakness. Negative for syncope and headaches.   All other systems reviewed and are negative.           PHYSICAL EXAM  ED  Triage Vitals [10/22/23 2029]   Temp Heart Rate Resp BP SpO2   98.1 °F (36.7 °C) 61 16 169/70 97 %      Temp src Heart Rate Source Patient Position BP Location FiO2 (%)   Oral Monitor -- -- --       Physical Exam      GENERAL: Pleasant, older lady, no diaphoresis, no acute distress  HENT: nares patent, normocephalic and atraumatic  EYES: no scleral icterus, EOMI, normal conjunctivae  CV: regular rhythm, normal rate, normal distal pulses  RESPIRATORY: normal effort, no stridor lungs clear to auscultation bilaterally  ABDOMEN: soft, mild to moderate diffuse tenderness in all quadrants.  Bowel sounds hypoactive but present.  No masses palpable.  MUSCULOSKELETAL: no deformity, no asymmetry  NEURO: alert, moves all extremities, follows commands  PSYCH:  calm, cooperative  SKIN: warm, dry    Vital signs and nursing notes reviewed.        LAB RESULTS  Recent Results (from the past 24 hour(s))   Lactic Acid, Plasma    Collection Time: 10/22/23  8:56 PM    Specimen: Blood   Result Value Ref Range    Lactate 2.4 (C) 0.5 - 2.0 mmol/L   CBC Auto Differential    Collection Time: 10/22/23  8:56 PM    Specimen: Blood   Result Value Ref Range    WBC 9.11 3.40 - 10.80 10*3/mm3    RBC 4.96 3.77 - 5.28 10*6/mm3    Hemoglobin 11.3 (L) 12.0 - 15.9 g/dL    Hematocrit 37.5 34.0 - 46.6 %    MCV 75.6 (L) 79.0 - 97.0 fL    MCH 22.8 (L) 26.6 - 33.0 pg    MCHC 30.1 (L) 31.5 - 35.7 g/dL    RDW 17.4 (H) 12.3 - 15.4 %    RDW-SD 47.0 37.0 - 54.0 fl    MPV 9.7 6.0 - 12.0 fL    Platelets 340 140 - 450 10*3/mm3    Neutrophil % 53.7 42.7 - 76.0 %    Lymphocyte % 31.9 19.6 - 45.3 %    Monocyte % 10.4 5.0 - 12.0 %    Eosinophil % 2.9 0.3 - 6.2 %    Basophil % 0.8 0.0 - 1.5 %    Immature Grans % 0.3 0.0 - 0.5 %    Neutrophils, Absolute 4.89 1.70 - 7.00 10*3/mm3    Lymphocytes, Absolute 2.91 0.70 - 3.10 10*3/mm3    Monocytes, Absolute 0.95 (H) 0.10 - 0.90 10*3/mm3    Eosinophils, Absolute 0.26 0.00 - 0.40 10*3/mm3    Basophils, Absolute 0.07 0.00 - 0.20  10*3/mm3    Immature Grans, Absolute 0.03 0.00 - 0.05 10*3/mm3    nRBC 0.0 0.0 - 0.2 /100 WBC   Comprehensive Metabolic Panel    Collection Time: 10/22/23  8:57 PM    Specimen: Blood   Result Value Ref Range    Glucose 139 (H) 65 - 99 mg/dL    BUN 8 8 - 23 mg/dL    Creatinine 0.83 0.57 - 1.00 mg/dL    Sodium 139 136 - 145 mmol/L    Potassium 3.5 3.5 - 5.2 mmol/L    Chloride 102 98 - 107 mmol/L    CO2 27.0 22.0 - 29.0 mmol/L    Calcium 9.5 8.6 - 10.5 mg/dL    Total Protein 7.2 6.0 - 8.5 g/dL    Albumin 4.2 3.5 - 5.2 g/dL    ALT (SGPT) 27 1 - 33 U/L    AST (SGOT) 24 1 - 32 U/L    Alkaline Phosphatase 69 39 - 117 U/L    Total Bilirubin 0.2 0.0 - 1.2 mg/dL    Globulin 3.0 gm/dL    A/G Ratio 1.4 g/dL    BUN/Creatinine Ratio 9.6 7.0 - 25.0    Anion Gap 10.0 5.0 - 15.0 mmol/L    eGFR 78.3 >60.0 mL/min/1.73   Lipase    Collection Time: 10/22/23  8:57 PM    Specimen: Blood   Result Value Ref Range    Lipase 25 13 - 60 U/L   Urinalysis With Microscopic If Indicated (No Culture) - Urine, Clean Catch    Collection Time: 10/22/23  9:47 PM    Specimen: Urine, Clean Catch   Result Value Ref Range    Color, UA Yellow Yellow, Straw    Appearance, UA Clear Clear    pH, UA 5.5 5.0 - 8.0    Specific Gravity, UA 1.012 1.005 - 1.030    Glucose, UA Negative Negative    Ketones, UA Negative Negative    Bilirubin, UA Negative Negative    Blood, UA Negative Negative    Protein, UA Negative Negative    Leuk Esterase, UA Moderate (2+) (A) Negative    Nitrite, UA Negative Negative    Urobilinogen, UA 0.2 E.U./dL 0.2 - 1.0 E.U./dL   Urinalysis, Microscopic Only - Urine, Clean Catch    Collection Time: 10/22/23  9:47 PM    Specimen: Urine, Clean Catch   Result Value Ref Range    RBC, UA 0-2 None Seen, 0-2 /HPF    WBC, UA Too Numerous to Count (A) None Seen, 0-2 /HPF    Bacteria, UA None Seen None Seen /HPF    Squamous Epithelial Cells, UA 0-2 None Seen, 0-2 /HPF    Hyaline Casts, UA 3-6 None Seen /LPF    Methodology Automated Microscopy         Ordered the above labs and reviewed the results.        RADIOLOGY  CT Abdomen Pelvis Without Contrast    Result Date: 10/22/2023  CT OF THE ABDOMEN AND PELVIS WITHOUT CONTRAST 10/22/2023  HISTORY: Abdominal pain. Vomiting.  Spiral images were obtained from the lung bases to the symphysis pubis. No intravenous or oral contrast was given.  The liver, gallbladder, spleen, pancreas, adrenals and kidneys appear unremarkable. Radiopaque mesh is seen in the anterior leftward abdomen from previous hernia repair. No bowel wall thickening or bowel dilatation is seen. Uterus has been removed. Urinary bladder is unremarkable.      1. Status post hysterectomy and ventral hernia repair. 2. No acute process identified.  Radiation dose reduction techniques were utilized, including automated exposure control and exposure modulation based on body size.   This report was finalized on 10/22/2023 10:29 PM by Dr. Noe Powell M.D on Workstation: [id number]       Ordered the above noted radiological studies. Reviewed by me in PACS.          PROCEDURES  Procedures        MEDICATIONS GIVEN IN ER  Medications   sodium chloride 0.9 % flush 10 mL (has no administration in time range)   cefTRIAXone (ROCEPHIN) 2,000 mg in sodium chloride 0.9 % 100 mL IVPB-VTB (has no administration in time range)   sodium chloride 0.9 % flush 10 mL (has no administration in time range)   sodium chloride 0.9 % flush 10 mL (has no administration in time range)   sodium chloride 0.9 % infusion 40 mL (has no administration in time range)   ondansetron (ZOFRAN) tablet 4 mg (has no administration in time range)     Or   ondansetron (ZOFRAN) injection 4 mg (has no administration in time range)   nitroglycerin (NITROSTAT) SL tablet 0.4 mg (has no administration in time range)   sodium chloride 0.9 % infusion (has no administration in time range)   acetaminophen (TYLENOL) tablet 650 mg (has no administration in time range)   cefTRIAXone (ROCEPHIN) 1,000 mg in  sodium chloride 0.9 % 100 mL IVPB-VTB (has no administration in time range)   dextrose (GLUTOSE) oral gel 15 g (has no administration in time range)   dextrose (D50W) (25 g/50 mL) IV injection 25 g (has no administration in time range)   glucagon (GLUCAGEN) injection 1 mg (has no administration in time range)   insulin regular (humuLIN R,novoLIN R) injection 2-7 Units (has no administration in time range)   lactated ringers bolus 1,000 mL (1,000 mL Intravenous New Bag 10/22/23 2102)   ondansetron (ZOFRAN) injection 4 mg (4 mg Intravenous Given 10/22/23 2101)   prochlorperazine (COMPAZINE) injection 5 mg (5 mg Intravenous Given 10/22/23 2059)   diphenhydrAMINE (BENADRYL) injection 25 mg (25 mg Intravenous Given 10/22/23 2100)           MEDICAL DECISION MAKING, PROGRESS, and CONSULTS    All labs have been independently reviewed by me.  All radiology studies have been reviewed by me and I have also reviewed the radiology report.   EKG's independently viewed and interpreted by me.  Discussion below represents my analysis of pertinent findings related to patient's condition, differential diagnosis, treatment plan and final disposition.      Additional sources:  - Discussed/ obtained information from independent historians:  I discussed with paramedics to receive report of patient's condition on arrival and treatments initiated in route to the hospital.      - External (non-ED) record review: I reviewed the previous cardiology progress note from August 7, 2023 when she had follow-up care for CAD, precordial pain, shortness of breath, hypertension and hyperlipidemia.        Orders placed during this visit:  Orders Placed This Encounter   Procedures    CT Abdomen Pelvis Without Contrast    Comprehensive Metabolic Panel    Lipase    Urinalysis With Microscopic If Indicated (No Culture) - Urine, Clean Catch    Lactic Acid, Plasma    CBC Auto Differential    STAT Lactic Acid, Reflex    Urinalysis, Microscopic Only - Urine,  Clean Catch    CBC (No Diff)    Basic Metabolic Panel    NPO Diet NPO Type: Strict NPO    Intake & Output    Weigh Patient    Oral Care    Place Sequential Compression Device    Maintain Sequential Compression Device    Telemetry - Maintain IV Access    Telemetry - Place Orders & Notify Provider of Results When Patient Experiences Acute Chest Pain, Dysrhythmia or Respiratory Distress    May Be Off Telemetry for Tests    Vital Signs    Pulse Oximetry, Continuous    Up With Assistance    Code Status and Medical Interventions:    Inpatient Gastroenterology Consult    POC Glucose Q6H    Insert Peripheral IV    Insert Peripheral IV    Initiate ED Observation Status    CBC & Differential           Differential diagnosis includes but is not limited to:    Bowel obstruction, pancreatitis, peptic ulcer disease, medication side effect, UTI, IBD, diverticulitis      Independent interpretation of labs, radiology studies, and discussions with consultants:  ED Course as of 10/22/23 2232   Sun Oct 22, 2023   2213 I independently interpreted the abdomen CT study and my findings are: No free air, no small bowel obstruction pattern   [EDUARDO]   2213 WBC, UA(!): Too Numerous to Count [EDUARDO]   2213 Lactate(!!): 2.4 [EDUARDO]   2213 Patient feeling a little better after Compazine and Benadryl here.  Resting more comfortably.  Urinalysis is suggestive of UTI with too numerous to count WBCs.  I will order 1 dose of IV Rocephin for that. [EDUARDO]   2213 I discussed with BRETT Watt in the observation unit about the patient.  She agrees to accept her for further supportive care tonight. [EDUARDO]      ED Course User Index  [EDUARDO] Maciel Shankar MD         DIAGNOSIS  Final diagnoses:   Urinary tract infection without hematuria, site unspecified   Nausea and vomiting, unspecified vomiting type   Generalized abdominal pain         DISPOSITION  To observation unit        Latest Documented Vital Signs:  As of 22:32 EDT  BP- (!) 183/69 HR- 61 Temp- 98.1 °F  (36.7 °C) (Oral) O2 sat- 98%              --    Please note that portions of this were completed with a voice recognition program.       Note Disclaimer: At [facility], we believe that sharing information builds trust and better relationships. You are receiving this note because you are receiving care at [facility] or recently visited. It is possible you will see health information before a provider has talked with you about it. This kind of information can be easy to misunderstand. To help you fully understand what it means for your health, we urge you to discuss this note with your provider.             Maciel Shankar MD  10/22/23 5628

## 2023-10-23 NOTE — PROGRESS NOTES
ED OBSERVATION PROGRESS/DISCHARGE SUMMARY    Date of Admission: 10/22/2023   LOS: 0 days   PCP: Jaycob Savage MD    Subjective  patient reports after eating breakfast this morning she had recurrence of abdominal pain and nausea, no vomiting.      Hospital Outcome: 63-year-old female admitted to the observation unit for further evaluation of abdominal pain, nausea, and vomiting for approximately 4 weeks.  Patient was started on Ozempic around September 12.  She reports that she has had 4 doses of this, most recent dose was Monday, Tuesday, or Wednesday of last week.  She does not entirely remember when she took it last but she saw her primary care provider on 10/19 and was told to stop her oxybutynin at that time.  Felt abdominal pain may be related to this.    At time of arrival to ED patient did have elevated lactate with 2.4, 2.1, down to 1.7 this morning.  CT abdomen pelvis is negative acute.  Patient with too numerous to count WBCs and urinalysis so she did receive dose of Rocephin in ED.  Urine culture pending.    Patient was seen by GI, they would like to keep patient on clear liquid diet today with tentative plans for EGD and colonoscopy on Wednesday, 10/25/2023.  I discussed the above with Dr. Lieberman who has graciously accepted the patient for admission to the hospital under care of A.  Discussed with patient and daughter who expressed understanding and are in agreement with plan.    ROS:  General: no fevers, chills  Respiratory: no cough, dyspnea  Cardiovascular: no chest pain, palpitations  Abdomen: No abdominal pain, nausea, vomiting, or diarrhea  Neurologic: No focal weakness    Objective   Physical Exam:  I have reviewed the vital signs.  Temp:  [97.3 °F (36.3 °C)-98.1 °F (36.7 °C)] 97.9 °F (36.6 °C)  Heart Rate:  [56-67] 56  Resp:  [16-18] 16  BP: (139-197)/(55-87) 139/63  General Appearance:    Alert, cooperative, no distress  Head:    Normocephalic, atraumatic  Eyes:    Sclerae  anicteric  Neck:   Supple, no mass  Lungs: Clear to auscultation bilaterally, respirations unlabored  Heart: Regular rate and rhythm, S1 and S2 normal, no murmur, rub or gallop  Abdomen:  Soft, nontender, bowel sounds active, nondistended  Extremities: No clubbing, cyanosis, or edema to lower extremities  Pulses:  2+ and symmetric in distal lower extremities  Skin: No rashes   Neurologic: Oriented x3, Normal strength to extremities    Results Review:    I have reviewed the labs, radiology results and diagnostic studies.    Results from last 7 days   Lab Units 10/23/23  0317   WBC 10*3/mm3 8.13   HEMOGLOBIN g/dL 10.1*   HEMATOCRIT % 34.0   PLATELETS 10*3/mm3 262     Results from last 7 days   Lab Units 10/23/23  0317 10/22/23  2057   SODIUM mmol/L 141 139   POTASSIUM mmol/L 3.4* 3.5   CHLORIDE mmol/L 106 102   CO2 mmol/L 26.0 27.0   BUN mg/dL 7* 8   CREATININE mg/dL 0.77 0.83   CALCIUM mg/dL 8.6 9.5   BILIRUBIN mg/dL  --  0.2   ALK PHOS U/L  --  69   ALT (SGPT) U/L  --  27   AST (SGOT) U/L  --  24   GLUCOSE mg/dL 162* 139*     Imaging Results (Last 24 Hours)       Procedure Component Value Units Date/Time    CT Abdomen Pelvis Without Contrast [[id number]] Collected: 10/22/23 2154     Updated: 10/22/23 2232    Narrative:      CT OF THE ABDOMEN AND PELVIS WITHOUT CONTRAST 10/22/2023     HISTORY: Abdominal pain. Vomiting.     Spiral images were obtained from the lung bases to the symphysis pubis.  No intravenous or oral contrast was given.     The liver, gallbladder, spleen, pancreas, adrenals and kidneys appear  unremarkable. Radiopaque mesh is seen in the anterior leftward abdomen  from previous hernia repair. No bowel wall thickening or bowel  dilatation is seen. Uterus has been removed. Urinary bladder is  unremarkable.       Impression:      1. Status post hysterectomy and ventral hernia repair.  2. No acute process identified.     Radiation dose reduction techniques were utilized, including automated  exposure  control and exposure modulation based on body size.        This report was finalized on 10/22/2023 10:29 PM by Dr. Noe Powell M.D on Workstation: [id number]               I have reviewed the medications.  ---------------------------------------------------------------------------------------------  Assessment & Plan   Assessment/Problem List    Abdominal pain      Plan:    Abdominal pain  -N.p.o.  -Normal saline at 100 mL/h  -Repeat labs in a.m.  -Hold Ozempic  -As needed antiemetics  -CT abdomen and pelvis without contrast-no acute process identified  -Consult GI  -Tentative plans for EGD and colonoscopy on Wednesday, 10/25/2023  -Admit to hospital, Dr. Lieberman/JONATHON     Pyuria  -Moderate leukocytes with too numerous to count WBC on UA  -Patient denies urinary complaints  -Patient received 1 g Rocephin in ED on 10/22 at 2229  -Urine culture pending     Type II Diabetes  -Without long-term use of insulin  -Accu-Cheks every 6 hours  -Adult subcutaneous insulin management-low dose with hypoglycemic protocol    Hypertension  -Continue beta-blocker    Disposition: Admit to hospital, Dr. Tom    52 minutes has been spent by [facility] providers in the care of this patient while under observation status     This note will serve as transfer summary/progress note    BRAD Moe 10/23/23 14:49 EDT    I have worn appropriate PPE during this patient encounter and sanitized my hands with entering and exiting patient room.

## 2023-10-23 NOTE — CONSULTS
Nutrition Services    Patient Name:  Lily Unger  YOB: 1960  MRN: [id number]  Admit Date:  10/22/2023    Assessment Date:  10/23/23    Summary: Nutrition consult per nurse admission screen.  Abdominal pain, N/V/D.  Started ozempic 10 days ago.      No PO intake available at this time.  Weight appears fairly stable per chart review - no significant loss.    RD to follow to see how she does with PO intake.    CLINICAL NUTRITION ASSESSMENT      Reason for Assessment Nurse or Nurse Practitioner Consult     Diagnosis/Problem   Abdominal pain, N/V/D   Medical/Surgical History Past Medical History:   Diagnosis Date    Asthma     CAD (coronary artery disease)     Chronic kidney disease     COVID     Crescendo angina     Diabetes mellitus     H/O angioplasty     Hiatal hernia     Hyperlipidemia     Hypertension     Obesity     Recurrent urinary tract infection     Rotator cuff tear, right     Sleep apnea     NO MACHINE    Torn rotator cuff        Past Surgical History:   Procedure Laterality Date    CARDIAC CATHETERIZATION      OUTCOME: SUCCESSFUL    CARDIAC CATHETERIZATION N/A 3/20/2019    Procedure: RIGHT AND LEFT HEART CATH;  Surgeon: Roe Harper MD;  Location: [facility] CATH INVASIVE LOCATION;  Service: Cardiology    CARDIAC CATHETERIZATION N/A 3/20/2019    Procedure: Coronary angiography;  Surgeon: Roe Harper MD;  Location: [facility] CATH INVASIVE LOC[facility];  Service: Cardiology    CARDIAC CATHETERIZATION N/A 3/20/2019    Procedure: Left ventriculography;  Surgeon: Roe Harper MD;  [facility];  Service: Cardiology    CARDIAC CATHETERIZATION N/A 3/20/2019    Procedure: Stent BANDAR coronary;  Surgeon: Roe Harper MD;  [facility];  Service: Cardiology    CARDIAC CATHETERIZATION N/A 8/14/2020    Procedure: Left Heart Cath;  Surgeon: Roe Harper MD;  Location: [facility] CATH INVASIVE LOCATION;  Service:  Cardiovascular;  Laterality: N/A;    CARDIAC CATHETERIZATION N/A 8/14/2020    Procedure: Coronary angiography;  Surgeon: Roe Harper MD;  Location: [facility] CATH [facility]VASIVE LOCATION;  Service: Cardiovascular;  Laterality: N/A;    CARDIAC CATHETERIZATION N/A 8/14/2020    Procedure: Left ventriculography;  Surgeon: Roe Harper MD;  Location: [facility] CATH INVASIVE LOCATION;  Service: Cardiovascular;  Laterality: N/A;    CARDIAC CATHETERIZATION N/A 8/14/2020    Procedure: Percutaneous Coronary Intervention;  Surgeon: Roe Harper MD;  Location: [facility] CATH INVASIVE LOCATION;  Service: Cardiovascular;  Laterality: N/A;    CARDIAC CATHETERIZATION N/A 1/12/2022    Procedure: Left Heart Cath;  Surgeon: Roe Harper MD;  Location: [facility] CATH INVASIVE LOCATION;  Service: Cardiovascular;  Laterality: N/A;    CARDIAC CATHETERIZATION N/A 1/12/2022    Procedure: Coronary angiography;  Surgeon: Roe Harper MD;  Location: [facility] CATH INVASIVE LOCATION;  Service: Cardiovascular;  Laterality: N/A;    CARDIAC CATHETERIZATION N/A 1/12/2022    Procedure: Left ventriculography;  Surgeon: Roe Harper MD;  Location: [facility] CATH INVASIVE LOCATION;  Service: Cardiovascular;  Laterality: N/A;    CARDIAC CATHETERIZATION N/A 1/12/2022    Procedure: Resting Full Cycle Ratio;  Surgeon: Roe Harper MD;  Location: [facility] CATH INVASIVE LOCATION;  Service: Cardiovascular;  Laterality: N/A;    CARDIAC CATHETERIZATION N/A 1/12/2022    Procedure: Stent BANDAR coronary;  Surgeon: Roe Harper MD;  Location: [facility] CATH INVASIVE LOCATION;  Service: Cardiovascular;  Laterality: N/A;    CARDIAC CATHETERIZATION N/A 4/21/2023    Procedure: Left Heart Cath;  Surgeon: Roe Harper MD;  Location: [facility] CATH INVASIVE LOCATION;  Service: Cardiology;  Laterality: N/A;    CARDIAC CATHETERIZATION N/A 4/21/2023    Procedure: Coronary angiography;  Surgeon: Roe Harper  "MD;  Location:  [facility] CATH INVASIVE LOCATION;  Service: Cardiology;  Laterality: N/A;    CARDIAC CATHETERIZATION N/A 2023    Procedure: Left ventriculography;  Surgeon: Roe Harper MD;  Location:  [facility] CATH INVASIVE LOCATION;  Service: Cardiology;  Laterality: N/A;    CARDIAC ELECTROPHYSIOLOGY PROCEDURE N/A 2020    Procedure: Temporary Pacemaker;  Surgeon: Roe Harper MD;  Location:  [facility] CATH INVASIVE LOCATION;  Service: Cardiovascular;  Laterality: N/A;     SECTION      CORONARY STENT PLACEMENT      LAD    HYSTERECTOMY      TONSILLECTOMY AND ADENOIDECTOMY      UMBILICAL HERNIA REPAIR          Anthropometrics        Current Height  Current Weight  BMI kg/m2 Height: 154.9 cm (61\")  Weight: 77.9 kg (171 lb 11.2 oz) (10/22/23 2214)  Body mass index is 32.44 kg/m².   Adjusted BMI (if applicable)    BMI Category Obese, Class I (30 - 34.9)   Ideal Body Weight (IBW) 105 lb (47.8 kg)   Usual Body Weight (UBW) 169-180 lb   Weight Trend Stable   Weight History Wt Readings from Last 30 Encounters:   10/22/23 2214 77.9 kg (171 lb 11.2 oz)   10/22/23 2029 76.7 kg (169 lb)   23 1901 80.7 kg (178 lb)   23 1016 80 kg (176 lb 6.4 oz)   05/10/23 1300 81.6 kg (180 lb)   23 1058 81.6 kg (180 lb)   23 1422 79.8 kg (176 lb)   23 0848 78 kg (172 lb)   23 0717 78 kg (172 lb)   23 0924 63 kg (139 lb)   22 0917 79.8 kg (176 lb)   22 1208 81.6 kg (180 lb)   22 0902 81.6 kg (180 lb)   22 0847 81.6 kg (180 lb)   22 1313 81.2 kg (179 lb)   21 0845 81.6 kg (180 lb)   21 1132 81.6 kg (180 lb)   21 1416 81.6 kg (180 lb)   21 0935 82.6 kg (182 lb)   21 1124 81.6 kg (180 lb)   21 1415 81.5 kg (179 lb 9.6 oz)   21 1108 83 kg (183 lb)   20 1429 81.2 kg (179 lb)   20 1026 82.1 kg (181 lb)   20 0750 81.2 kg (179 lb)   08/10/20 1159 81.2 kg (179 lb)   20 0926 81.6 kg (180 lb) "   08/03/20 1136 81.6 kg (180 lb)   07/21/20 1105 81.6 kg (180 lb)   12/27/19 0231 81.3 kg (179 lb 3.2 oz)   10/31/19 0958 83 kg (183 lb)      --  Labs       Pertinent Labs    Results from last 7 days   Lab Units 10/23/23  0317 10/22/23  2057   SODIUM mmol/L 141 139   POTASSIUM mmol/L 3.4* 3.5   CHLORIDE mmol/L 106 102   CO2 mmol/L 26.0 27.0   BUN mg/dL 7* 8   CREATININE mg/dL 0.77 0.83   CALCIUM mg/dL 8.6 9.5   BILIRUBIN mg/dL  --  0.2   ALK PHOS U/L  --  69   ALT (SGPT) U/L  --  27   AST (SGOT) U/L  --  24   GLUCOSE mg/dL 162* 139*     Results from last 7 days   Lab Units 10/23/23  0317 10/22/23  2057   HEMOGLOBIN g/dL 10.1*  --    HEMATOCRIT % 34.0  --    WBC 10*3/mm3 8.13  --    ALBUMIN g/dL  --  4.2     Results from last 7 days   Lab Units 10/23/23  0317 10/22/23  2056   PLATELETS 10*3/mm3 262 340     SARS-CoV-2, [facility]TE[facility]   Date Value Ref Range Status   12/03/2022 NEGATIVE Negative Final     Comment:     The 2019-CoV rRT-PCR Assay is only for use under a Food and Drug Administration Emergency Use Authorization. The performance characteristics of the assay were verified by the Clinical Laboratory at [facility]. Results should be used in   conjunction with the patient's clinical symptoms, medical history, and other clinical/laboratory findings to determine an overall clinical diagnosis. Negative results do not preclude infection with SARS-CoV-2 (COVID-19).    Test parameters have not been validated for screening asymptomatic patients.     Lab Results   Component Value Date    HGBA1C 9.8 (H) 07/26/2023          Medications           Scheduled Medications aspirin, 81 mg, Oral, Daily  bisoprolol, 5 mg, Oral, Daily  cefTRIAXone, 1,000 mg, Intravenous, Q24H  insulin regular, 2-7 Units, Subcutaneous, Q6H  metoclopramide, 10 mg, Intravenous, Q6H  sodium chloride, 10 mL, Intravenous, Q12H       Infusions sodium chloride, 100 mL/hr, Last Rate: 100 mL/hr (10/23/23 0007)       PRN Medications   acetaminophen     dextrose    dextrose    glucagon (human recombinant)    nitroglycerin    ondansetron **OR** ondansetron    Insert Peripheral IV **AND** sodium chloride    sodium chloride    sodium chloride     Physical Findings          General Findings alert, obese, oriented, on oxygen therapy   Oral/Mouth Cavity WNL   Edema  no edema   Gastrointestinal diarrhea, nausea, vomiting, last bowel movement: 10/21   Skin  skin intact   Tubes/Drains/Lines none   NFPE Not indicated at this time   --  Current Nutrition Orders & Evaluation of Intake       Oral Nutrition     Food Allergies NKFA   Current PO Diet Diet: Regular/House Diet; Texture: Regular Texture (IDDSI 7); Fluid Consistency: Thin (IDDSI 0)   Supplement n/a   PO Evaluation     % PO Intake No intake available     Factors Affecting Intake: diarrhea, nausea, vomiting   --  PES STATEMENT / NUTRITION DIAGNOSIS      Nutrition Dx Problem  Problem: Predicted Suboptimal Intake  Etiology: Factors Affecting Nutrition - abd pain, N/V/D    Signs/Symptoms: Report/Observation     NUTRITION INTERVENTION / PLAN OF CARE      Intervention Goal(s) Maintain nutrition status, Reduce/improve symptoms, Disease management/therapy, Establish PO intake, Tolerate PO , and No significant weight loss         RD Intervention/Action Continue to monitor and Care plan reviewed   --      Prescription/Orders:       PO Diet       Supplements       Enteral Nutrition       Parenteral Nutrition    New Prescription Ordered? No changes at this time   --      Monitor/Evaluation Per protocol, PO intake, Pertinent labs, Weight, GI status, Symptoms   Discharge Plan/Needs Pending clinical course   --    RD to follow per protocol.      Electronically signed by:  Skye Shah RD  10/23/23 13:03 EDT

## 2023-10-23 NOTE — PLAN OF CARE
Goal Outcome Evaluation:  Plan of Care Reviewed With: patient        Progress: improving  Outcome Evaluation: Po Potassium given for K of 3.4. No c/o abdominal pain at this time. VSS.

## 2023-10-23 NOTE — ED NOTES
Patient arrived to ED via EMS stretcher from home with complaints of abdominal pain, nausea, vomiting x2 weeks.  Patient has been seen by her PCP and taken off her oxybutynin and put on Ozempic and has been sick since.  Patient AA&Ox4 at triage.

## 2023-10-23 NOTE — PROGRESS NOTES
MD ATTESTATION NOTE    The FERNANDO and I have discussed this patient's history, physical exam, and treatment plan.  I have reviewed the documentation and personally had a face to face interaction with the patient. I affirm the documentation and agree with the treatment and plan.  The attached note describes my personal findings.      I provided a substantive portion of the care of the patient.  I personally performed the physical exam in its entirety, and below are my findings.       Brief HPI: Patient with abdominal pain nausea vomiting.  Started on Ozempic.  Also found to have UTI.    PHYSICAL EXAM  ED Triage Vitals   Temp Heart Rate Resp BP SpO2   10/22/23 2029 10/22/23 2029 10/22/23 2029 10/22/23 2029 10/22/23 2029   98.1 °F (36.7 °C) 61 16 169/70 97 %      Temp src Heart Rate Source Patient Position BP Location FiO2 (%)   10/22/23 2029 10/22/23 2029 10/23/23 0732 10/22/23 2340 --   Oral Monitor Lying Right arm          GENERAL: no acute distress  HENT: nares patent  EYES: no scleral icterus  CV: regular rhythm, normal rate  RESPIRATORY: normal effort  ABDOMEN: soft  MUSCULOSKELETAL: no deformity  NEURO: alert, moves all extremities, follows commands  PSYCH:  calm, cooperative  SKIN: warm, dry    Vital signs and nursing notes reviewed.        Plan: Continue hydration.  Continue Rocephin.

## 2023-10-24 ENCOUNTER — HOSPITAL ENCOUNTER (OUTPATIENT)
Facility: HOSPITAL | Age: 63
Setting detail: HOSPITAL OUTPATIENT SURGERY
DRG: 392 | End: 2023-10-24
Attending: INTERNAL MEDICINE | Admitting: INTERNAL MEDICINE
Payer: MEDICARE

## 2023-10-24 PROBLEM — I25.10 CAD (CORONARY ARTERY DISEASE): Status: ACTIVE | Noted: 2020-08-10

## 2023-10-24 PROBLEM — D50.9 MICROCYTIC ANEMIA: Status: ACTIVE | Noted: 2023-10-23

## 2023-10-24 PROBLEM — R11.2 NAUSEA AND VOMITING: Status: ACTIVE | Noted: 2023-10-24

## 2023-10-24 LAB
GLUCOSE BLDC GLUCOMTR-MCNC: 104 MG/DL (ref 70–130)
GLUCOSE BLDC GLUCOMTR-MCNC: 115 MG/DL (ref 70–130)
GLUCOSE BLDC GLUCOMTR-MCNC: 121 MG/DL (ref 70–130)
GLUCOSE BLDC GLUCOMTR-MCNC: 124 MG/DL (ref 70–130)
GLUCOSE BLDC GLUCOMTR-MCNC: 135 MG/DL (ref 70–130)
HBA1C MFR BLD: 8.4 % (ref 4.8–5.6)
MAGNESIUM SERPL-MCNC: 2 MG/DL (ref 1.6–2.4)

## 2023-10-24 PROCEDURE — 82948 REAGENT STRIP/BLOOD GLUCOSE: CPT

## 2023-10-24 PROCEDURE — G0378 HOSPITAL OBSERVATION PER HR: HCPCS

## 2023-10-24 PROCEDURE — 25810000003 SODIUM CHLORIDE 0.9 % SOLUTION

## 2023-10-24 PROCEDURE — 25010000002 METOCLOPRAMIDE PER 10 MG

## 2023-10-24 PROCEDURE — 83036 HEMOGLOBIN GLYCOSYLATED A1C: CPT | Performed by: NURSE PRACTITIONER

## 2023-10-24 PROCEDURE — 25010000002 ONDANSETRON PER 1 MG

## 2023-10-24 PROCEDURE — 99214 OFFICE O/P EST MOD 30 MIN: CPT | Performed by: PHYSICIAN ASSISTANT

## 2023-10-24 PROCEDURE — 25010000002 HYDRALAZINE PER 20 MG: Performed by: NURSE PRACTITIONER

## 2023-10-24 RX ORDER — DEXTROSE MONOHYDRATE 25 G/50ML
25 INJECTION, SOLUTION INTRAVENOUS
Status: DISCONTINUED | OUTPATIENT
Start: 2023-10-24 | End: 2023-10-27 | Stop reason: HOSPADM

## 2023-10-24 RX ORDER — NICOTINE POLACRILEX 4 MG
15 LOZENGE BUCCAL
Status: DISCONTINUED | OUTPATIENT
Start: 2023-10-24 | End: 2023-10-27 | Stop reason: HOSPADM

## 2023-10-24 RX ORDER — IBUPROFEN 600 MG/1
1 TABLET ORAL
Status: DISCONTINUED | OUTPATIENT
Start: 2023-10-24 | End: 2023-10-27 | Stop reason: HOSPADM

## 2023-10-24 RX ORDER — INSULIN LISPRO 100 [IU]/ML
2-7 INJECTION, SOLUTION INTRAVENOUS; SUBCUTANEOUS
Status: DISCONTINUED | OUTPATIENT
Start: 2023-10-24 | End: 2023-10-27 | Stop reason: HOSPADM

## 2023-10-24 RX ORDER — POLYETHYLENE GLYCOL 3350 17 G/17G
0.5 POWDER, FOR SOLUTION ORAL EVERY 12 HOURS
Status: DISPENSED | OUTPATIENT
Start: 2023-10-24 | End: 2023-10-25

## 2023-10-24 RX ORDER — HYDRALAZINE HYDROCHLORIDE 20 MG/ML
10 INJECTION INTRAMUSCULAR; INTRAVENOUS EVERY 6 HOURS PRN
Status: DISCONTINUED | OUTPATIENT
Start: 2023-10-24 | End: 2023-10-27 | Stop reason: HOSPADM

## 2023-10-24 RX ORDER — BISACODYL 5 MG/1
20 TABLET, DELAYED RELEASE ORAL ONCE
Status: COMPLETED | OUTPATIENT
Start: 2023-10-24 | End: 2023-10-24

## 2023-10-24 RX ADMIN — BISACODYL 20 MG: 5 TABLET, COATED ORAL at 16:36

## 2023-10-24 RX ADMIN — METOCLOPRAMIDE 10 MG: 5 INJECTION, SOLUTION INTRAMUSCULAR; INTRAVENOUS at 00:25

## 2023-10-24 RX ADMIN — METOCLOPRAMIDE 10 MG: 5 INJECTION, SOLUTION INTRAMUSCULAR; INTRAVENOUS at 19:27

## 2023-10-24 RX ADMIN — ASPIRIN 81 MG: 81 TABLET, COATED ORAL at 08:08

## 2023-10-24 RX ADMIN — POLYETHYLENE GLYCOL 3350 0.5 BOTTLE: 17 POWDER, FOR SOLUTION ORAL at 16:51

## 2023-10-24 RX ADMIN — METOCLOPRAMIDE 10 MG: 5 INJECTION, SOLUTION INTRAMUSCULAR; INTRAVENOUS at 13:39

## 2023-10-24 RX ADMIN — ONDANSETRON 4 MG: 2 INJECTION INTRAMUSCULAR; INTRAVENOUS at 06:29

## 2023-10-24 RX ADMIN — METOCLOPRAMIDE 10 MG: 5 INJECTION, SOLUTION INTRAMUSCULAR; INTRAVENOUS at 06:29

## 2023-10-24 RX ADMIN — ACETAMINOPHEN 650 MG: 325 TABLET, FILM COATED ORAL at 21:06

## 2023-10-24 RX ADMIN — SODIUM CHLORIDE 100 ML/HR: 9 INJECTION, SOLUTION INTRAVENOUS at 03:41

## 2023-10-24 RX ADMIN — FAMOTIDINE 20 MG: 20 TABLET, FILM COATED ORAL at 08:08

## 2023-10-24 RX ADMIN — ONDANSETRON 4 MG: 2 INJECTION INTRAMUSCULAR; INTRAVENOUS at 00:25

## 2023-10-24 RX ADMIN — FAMOTIDINE 20 MG: 20 TABLET, FILM COATED ORAL at 16:36

## 2023-10-24 RX ADMIN — BISOPROLOL FUMARATE 5 MG: 5 TABLET, FILM COATED ORAL at 08:08

## 2023-10-24 RX ADMIN — Medication 10 ML: at 20:19

## 2023-10-24 RX ADMIN — HYDRALAZINE HYDROCHLORIDE 10 MG: 20 INJECTION INTRAMUSCULAR; INTRAVENOUS at 16:35

## 2023-10-24 RX ADMIN — ONDANSETRON 4 MG: 2 INJECTION INTRAMUSCULAR; INTRAVENOUS at 15:44

## 2023-10-24 NOTE — PROGRESS NOTES
[facility]  Inpatient Progress Note    Reason for Follow Up:  Abd pain, nausea/vomiting    Subjective     Interval History:   Pt reports she continues to have abd pain, now in L sided abdomen. Her epigastric pain also continues which has been ongoing for last couple months.  Also still w/ nausea, but no vomiting. Last emesis yesterday.  She is on clear liquids, for EGD/colonoscopy tomorrow       Current Facility-Administered Medications:     acetaminophen (TYLENOL) tablet 650 mg, 650 mg, Oral, Q4H PRN, Dena Hatch APRN    aspirin EC tablet 81 mg, 81 mg, Oral, Daily, Dena Hatch APRN, 81 mg at 10/24/23 0808    bisoprolol (ZEBeta) tablet 5 mg, 5 mg, Oral, Daily, Dena Hatch APRN, 5 mg at 10/24/23 0808    Calcium Replacement - Follow Nurse / BPA Driven Protocol, , Does not apply, PRN, Malissa Lieberman MD    dextrose (D50W) (25 g/50 mL) IV injection 25 g, 25 g, Intravenous, Q15 Min PRN, Dena Hatch APRN    dextrose (GLUTOSE) oral gel 15 g, 15 g, Oral, Q15 Min PRN, Dena Hatch APRN    famotidine (PEPCID) tablet 20 mg, 20 mg, Oral, BID AC, Malissa Lieberman MD, 20 mg at 10/24/23 0808    glucagon (GLUCAGEN) injection 1 mg, 1 mg, Intramuscular, Q15 Min PRN, Dena Hatch APRN    insulin regular (humuLIN R,novoLIN R) injection 2-7 Units, 2-7 Units, Subcutaneous, Q6H, Dena Hatch APRN    Magnesium Standard Dose Replacement - Follow Nurse / BPA Driven Protocol, , Does not apply, PRN, Malissa Lieberman MD    metoclopramide (REGLAN) injection 10 mg, 10 mg, Intravenous, Q6H, Dena Hatch APRN, 10 mg at 10/24/23 0629    nitroglycerin (NITROSTAT) SL tablet 0.4 mg, 0.4 mg, Sublingual, Q5 Min PRN, Dena Hatch APRN    ondansetron (ZOFRAN) tablet 4 mg, 4 mg, Oral, Q6H PRN **OR** ondansetron (ZOFRAN) injection 4 mg, 4 mg, Intravenous, Q6H PRN, Dena Hatch, APRN, 4 mg at 10/24/23 0629    Phosphorus Replacement - Follow Nurse / BPA Driven Protocol, , Does not  apply, Mio RIVERS Renate Andrea, MD    Potassium Replacement - Follow Nurse / BPA Driven Protocol, , Does not apply, Mio RIVERS Renate Andrea, MD    [COMPLETED] Insert Peripheral IV, , , Once **AND** sodium chloride 0.9 % flush 10 mL, 10 mL, Intravenous, PRN, Maciel Shankar MD    sodium chloride 0.9 % flush 10 mL, 10 mL, Intravenous, Q12H, Holden, Dena S, APRN, 10 mL at 10/23/23 0855    sodium chloride 0.9 % flush 10 mL, 10 mL, Intravenous, PRN, Holden, Dena S, APRN    sodium chloride 0.9 % infusion 40 mL, 40 mL, Intravenous, PRN, Holden, Dena S, APRN    sodium chloride 0.9 % infusion, 100 mL/hr, Intravenous, Continuous, Holden, Dena S, APRN, Last Rate: 100 mL/hr at 10/24/23 0909, 100 mL/hr at 10/24/23 0909        Objective     Vital Signs  Temp:  [97.6 °F (36.4 °C)-98.1 °F (36.7 °C)] 98 °F (36.7 °C)  Heart Rate:  [55-62] 62  Resp:  [16-18] 16  BP: (155-194)/(67-81) 160/67  Body mass index is 32.44 kg/m².    Intake/Output Summary (Last 24 hours) at 10/24/2023 0934  Last data filed at 10/24/2023 0652  Gross per 24 hour   Intake 1830 ml   Output --   Net 1830 ml     No intake/output data recorded.     Physical Exam:   General: patient awake, alert and cooperative   Eyes: Normal lids and lashes, no scleral icterus   Abdomen: soft, nontender, nondistended; normal bowel sounds      Results Review:     I reviewed the patient's new clinical results.    Results from last 7 days   Lab Units 10/23/23  0317 10/22/23  2056   WBC 10*3/mm3 8.13 9.11   HEMOGLOBIN g/dL 10.1* 11.3*   HEMATOCRIT % 34.0 37.5   PLATELETS 10*3/mm3 262 340     Results from last 7 days   Lab Units 10/23/23  0317 10/22/23  2057   SODIUM mmol/L 141 139   POTASSIUM mmol/L 3.4* 3.5   CHLORIDE mmol/L 106 102   CO2 mmol/L 26.0 27.0   BUN mg/dL 7* 8   CREATININE mg/dL 0.77 0.83   CALCIUM mg/dL 8.6 9.5   BILIRUBIN mg/dL  --  0.2   ALK PHOS U/L  --  69   ALT (SGPT) U/L  --  27   AST (SGOT) U/L  --  24   GLUCOSE mg/dL 162* 139*         Lab Results   Lab Value  Date/Time    LIPASE 25 10/22/2023 2057    LIPASE 29 10/18/2023 1711    LIPASE 28 08/24/2023 1917    LIPASE 64 (H) 06/10/2022 1351    LIPASE 24 11/18/2018 0723       Radiology:  CT Abdomen Pelvis Without Contrast   Final Result   1. Status post hysterectomy and ventral hernia repair.   2. No acute process identified.       Radiation dose reduction techniques were utilized, including automated   exposure control and exposure modulation based on body size.           This report was finalized on 10/22/2023 10:29 PM by Dr. Noe Powell M.D on Workstation: [id number]              Assessment & Plan     Active Hospital Problems    Diagnosis     **Abdominal pain          Assessment:   Abdominal pain- may be due to Semaglutide  Nausea/vomiting  Diarrhea   Microcytic anemia- no overt GI bleed reported  Hx of CAD- NOT on ACs    All problems new to me  Plan:   Stay on clear liquids today, NPO at midnight. Will plan EGD/colonoscopy to evaluate her symptoms as well as microcytic anemia  Bowel prep ordered  Continue supportive care with anti-emetics/analgesics PRN    I discussed the patients findings and my recommendations with patient.         Julio Logan PA-C  [facility]  [address] [place] [address] [address] [place] [address]  Office: [phone]

## 2023-10-24 NOTE — PLAN OF CARE
Goal Outcome Evaluation:  Plan of Care Reviewed With: patient        Progress: no change  Outcome Evaluation: A&Ox4. HTN this shift, PRN hydralazine added. Ad oh. BRP, BM this morning. Bowel prep started this evening. NPO @ [facility] for EGD and colonoscopy tomorrow morning. NS @ 100 mL/h. DM wtih SSI. Zofran for nausea. D/C plans pending progress. Education provided.

## 2023-10-24 NOTE — PLAN OF CARE
Goal Outcome Evaluation:  Plan of Care Reviewed With: patient        Progress: no change  Outcome Evaluation: ambulating independently, IVF's infusing, nausea continues with little improvement, no emesis this shift, educated on b/p and glucose monitoring

## 2023-10-24 NOTE — CONSULTS
CONSULT NOTE    INTERNAL MEDICINE   [facility]       Patient Identification:  Name: Lily Unger  Age: 63 y.o.  Sex: female  :  1960  MRN: [id number]             Date of Consultation:  10/23/23          Primary Care Physician: Jaycob Savage MD                               Requesting Physician: dr carpio  Reason for Consultation:assuming care    Chief Complaint:  63 year old female who presented to the emergency room with nausea, vomiting and abdominal pain; she was started on semaglutide and oxybutynin; she has had the sypmtoms since then; she was initially sent to the observation unit; she was seen by gi and egd and colonoscopy are planned for two days from now; we are asked to assume care and admit the patient to the hospital    History of Present Illness:   As above      Past Medical History:  Past Medical History:   Diagnosis Date    Asthma     CAD (coronary artery disease)     Chronic kidney disease     COVID     Crescendo angina     Diabetes mellitus     H/O angioplasty     Hiatal hernia     Hyperlipidemia     Hypertension     Obesity     Recurrent urinary tract infection     Rotator cuff tear, right     Sleep apnea     NO MACHINE    Torn rotator cuff      Past Surgical History:  Past Surgical History:   Procedure Laterality Date    CARDIAC CATHETERIZATION      OUTCOME: SUCCESSFUL    CARDIAC CATHETERIZATION N/A 3/20/2019    Procedure: RIGHT AND LEFT HEART CATH;  Surgeon: Roe Harper MD;  [facility];  Service: Cardiology    CARDIAC CATHETERIZATION N/A 3/20/2019    Procedure: Coronary angiography;  Surgeon: Roe Harper MD;  [facility];  Service: Cardiology    CARDIAC CATHETERIZATION N/A 3/20/2019    Procedure: Left ventriculography;  Surgeon: Roe Harper MD;  [facility];  Service: Cardiology    CARDIAC CATHETERIZATION N/A 3/20/2019    Procedure: Stent  BANDAR coronary;  Surgeon: Roe Harper MD;  Location:  [facility] CATH INVASIVE LOCATION;  Service: Cardiology    CARDIAC CATHETERIZATION N/A 8/14/2020    Procedure: Left Heart Cath;  Surgeon: Roe Harper MD;  [facility];  Service: Cardiovascular;  Laterality: N/A;    CARDIAC CATHETERIZATION N/A 8/14/2020    Procedure: Coronary angiography;  Surgeon: Roe Harper MD;  [facility];  Service: Cardiovascular;  Laterality: N/A;    CARDIAC CATHETERIZATION N/A 8/14/2020    Procedure: Left ventriculography;  Surgeon: Roe Harper MD;  [facility];  Service: Cardiovascular;  Laterality: N/A;    CARDIAC CATHETERIZATION N/A 8/14/2020    Procedure: Percutaneous Coronary Intervention;  Surgeon: Roe Harper MD;  [facility];  Service: Cardiovascular;  Laterality: N/A;    CARDIAC CATHETERIZATION N/A 1/12/2022    Procedure: Left Heart Cath;  Surgeon: Roe Harper MD;  Location: [facility] CATH INVASIVE LOCATION;  Service: Cardiovascular;  Laterality: N/A;    CARDIAC CATHETERIZATION N/A 1/12/2022    Procedure: Coronary angiography;  Surgeon: Roe Harper MD;  [facility];  Service: Cardiovascular;  Laterality: N/A;    CARDIAC CATHETERIZATION N/A 1/12/2022    Procedure: Left ventriculography;  Surgeon: Roe Harper MD;  [facility];  Service: Cardiovascular;  Laterality: N/A;    CARDIAC CATHETERIZATION N/A 1/12/2022    Procedure: Resting Full Cycle Ratio;  Surgeon: Roe Harper MD;  [facility];  Service: Cardiovascular;  Laterality: N/A;    CARDIAC CATHETERIZATION N/A 1/12/2022    Procedure: Stent BANDAR coronary;  Surgeon: Roe Harper MD;  [facility];  Service: Cardiovascular;  Laterality: N/A;    CARDIAC CATHETERIZATION  N/A 2023    Procedure: Left Heart Cath;  Surgeon: Roe Harper MD;  Location:  [facility] CATH INVASIVE LOCATION;  Service: Cardiology;  Laterality: N/A;    CARDIAC CATHETERIZATION N/A 2023    Procedure: Coronary angiography;  Surgeon: Roe Harper MD;  Location:  [facility] CATH INVASIVE LOCATION;  Service: Cardiology;  Laterality: N/A;    CARDIAC CATHETERIZATION N/A 2023    Procedure: Left ventriculography;  Surgeon: Roe Harper MD;  Location:  [facility] CATH INVASIVE LOCATION;  Service: Cardiology;  Laterality: N/A;    CARDIAC ELECTROPHYSIOLOGY PROCEDURE N/A 2020    Procedure: Temporary Pacemaker;  Surgeon: Roe Harper MD;  Location:  [facility] CATH INVASIVE LOCATION;  Service: Cardiovascular;  Laterality: N/A;     SECTION      CORONARY STENT PLACEMENT      LAD    HYSTERECTOMY      TONSILLECTOMY AND ADENOIDECTOMY      UMBILICAL HERNIA REPAIR        Home Meds:  Medications Prior to Admission   Medication Sig Dispense Refill Last Dose    aspirin 81 MG EC tablet Take 1 tablet by mouth Daily.   10/22/2023 at 0900    bisoprolol (ZEBeta) 5 MG tablet Take 1 tablet by mouth Daily. (Patient taking differently: Take 1 tablet by mouth Daily. TAKING 1/2 TAB) 45 tablet 1 10/22/2023 at 0900    Magnesium Oxide -Mg Supplement 400 (240 Mg) MG tablet    Past Week    metFORMIN (GLUCOPHAGE) 1000 MG tablet Take 1 tablet by mouth 2 (Two) Times a Day.  0 10/22/2023 at 0900    nitroglycerin (NITROSTAT) 0.4 MG SL tablet Place 1 tablet under the tongue See Admin Instructions. 25 tablet 3 Past Month    omeprazole (priLOSEC) 40 MG capsule Take 1 capsule by mouth Daily.   10/22/2023 at 0900    ondansetron ODT (ZOFRAN-ODT) 4 MG disintegrating tablet Place 1 tablet on the tongue 4 (Four) Times a Day As Needed for Nausea or Vomiting. 15 tablet 0 Past Month    OneTouch Delica Lancets 30G misc TEST 1 (ONE) EACH DAILY   10/22/2023    ONETOUCH VERIO test strip   0 10/22/2023    pregabalin (LYRICA)  150 MG capsule Take 1 capsule by mouth 2 (Two) Times a Day.   Past Week    rosuvastatin (CRESTOR) 20 MG tablet Take 1 tablet by mouth Daily. 90 tablet 3 10/22/2023 at 0900    Tresiba FlexTouch 100 UNIT/ML solution pen-injector injection 25 Units.   Past Week    SUMAtriptan (IMITREX) 50 MG tablet Take 1 tablet by mouth Every 2 (Two) Hours As Needed for Migraine. Take one tablet at onset of headache. May repeat dose one time in 2 hours if headache not relieved.   More than a month     Current Meds:     Current Facility-Administered Medications:     acetaminophen (TYLENOL) tablet 650 mg, 650 mg, Oral, Q4H PRN, Sophia Hatchhy S, APRN    aspirin EC tablet 81 mg, 81 mg, Oral, Daily, Holden, Dena S, APRN, 81 mg at 10/23/23 0850    bisoprolol (ZEBeta) tablet 5 mg, 5 mg, Oral, Daily, Holden, Dena S, APRN, 5 mg at 10/23/23 0851    dextrose (D50W) (25 g/50 mL) IV injection 25 g, 25 g, Intravenous, Q15 Min PRN, Sophia Hatchhy S, APRN    dextrose (GLUTOSE) oral gel 15 g, 15 g, Oral, Q15 Min PRN, Holden Dena S, APRN    glucagon (GLUCAGEN) injection 1 mg, 1 mg, Intramuscular, Q15 Min PRN, Holden Dena S, APRN    insulin regular (humuLIN R,novoLIN R) injection 2-7 Units, 2-7 Units, Subcutaneous, Q6H, Sophia Hatchhy S, APRN    metoclopramide (REGLAN) injection 10 mg, 10 mg, Intravenous, Q6H, Holden Dena S, APRN, 10 mg at 10/23/23 1810    nitroglycerin (NITROSTAT) SL tablet 0.4 mg, 0.4 mg, Sublingual, Q5 Min PRN, Holden Dena S, APRN    ondansetron (ZOFRAN) tablet 4 mg, 4 mg, Oral, Q6H PRN **OR** ondansetron (ZOFRAN) injection 4 mg, 4 mg, Intravenous, Q6H PRN, Holden Dena S, APRN, 4 mg at 10/23/23 1810    [COMPLETED] Insert Peripheral IV, , , Once **AND** sodium chloride 0.9 % flush 10 mL, 10 mL, Intravenous, PRN, Maciel Shankar MD    sodium chloride 0.9 % flush 10 mL, 10 mL, Intravenous, Q12H, Dena Hatch APRN, 10 mL at 10/23/23 0855    sodium chloride 0.9 % flush 10 mL, 10 mL, Intravenous, PRN, Dena Hatch APRN    sodium  chloride 0.9 % infusion 40 mL, 40 mL, Intravenous, PRN, Dena Hatch S, APRN    sodium chloride 0.9 % infusion, 100 mL/hr, Intravenous, Continuous, Dena Hatch SBRETT, Last Rate: 100 mL/hr at 10/23/23 0007, 100 mL/hr at 10/23/23 0007  Allergies:  Allergies   Allergen Reactions    Hydrocodone Nausea And Vomiting and GI Intolerance    Penicillins Hives    Plavix [Clopidogrel Bisulfate] Nausea And Vomiting     Social History:   Social History     Socioeconomic History    Marital status:    Tobacco Use    Smoking status: Former     Types: Cigarettes     Quit date:      Years since quittin.8    Smokeless tobacco: Never   Vaping Use    Vaping Use: Never used   Substance and Sexual Activity    Alcohol use: Not Currently    Drug use: No    Sexual activity: Defer     Birth control/protection: Post-menopausal, Sponge     Family History:  Family History   Problem Relation Age of Onset    Heart attack Father     Heart disease Father     Heart attack Brother     Heart disease Brother     Heart attack Maternal Grandfather     Heart disease Maternal Grandfather     Heart disease Mother     Malig Hyperthermia Neg Hx           Review of Systems  See history of present illness and past medical history.  Patient denies headache, dizziness, syncope, falls, trauma, change in vision, change in hearing, change in taste, changes in weight, changes in appetite, focal weakness, numbness, or paresthesia.  Patient denies chest pain, palpitations, dyspnea, orthopnea, PND, cough, sinus pressure, rhinorrhea, epistaxis, hemoptysis, hematemesis constipation or hematochezia. Denies cold or heat intolerance, polydipsia, polyuria, polyphagia. Denies hematuria, pyuria, dysuria, hesitancy, frequency or urgency. Denies consumption of raw and under cooked meats foods or change in water source.  Denies fever, chills, sweats, night sweats.  Denies missing any routine medications.       Vitals:   BP (!) 184/81 (BP Location: Left arm,  "Patient Position: Sitting)   Pulse 57   Temp 97.6 °F (36.4 °C) (Oral)   Resp 16   Ht 154.9 cm (61\")   Wt 77.9 kg (171 lb 11.2 oz)   SpO2 94%   BMI 32.44 kg/m²   I/O:   Intake/Output Summary (Last 24 hours) at 10/23/2023 2230  Last data filed at 10/23/2023 1643  Gross per 24 hour   Intake 1340 ml   Output --   Net 1340 ml     Exam:  General Appearance:    Alert, cooperative, no distress, appears stated age   Head:    Normocephalic, without obvious abnormality, atraumatic   Eyes:    PERRL, conjunctivae/corneas clear, EOM's intact, both eyes   Ears:    Normal external ear canals, both ears   Nose:   Nares normal, septum midline, mucosa normal, no drainage    or sinus tenderness   Throat:   Lips, tongue, gums normal; oral mucosa pink and moist   Neck:   Supple, symmetrical, trachea midline, no adenopathy;     thyroid:  no enlargement/tenderness/nodules; no carotid    bruit or JVD   Back:     Symmetric, no curvature, ROM normal, no CVA tenderness   Lungs:     Decreased breath sounds bilaterally, respirations unlabored   Chest Wall:    No tenderness or deformity    Heart:    Regular rate and rhythm, S1 and S2 normal, no murmur, rub   or gallop   Abdomen:     Soft, nontender, bowel sounds active all four quadrants,     no masses, no hepatomegaly, no splenomegaly   Extremities:   Extremities normal, atraumatic, no cyanosis or edema                Data Review:  Labs in chart were reviewed.  WBC   Date Value Ref Range Status   10/23/2023 8.13 3.40 - 10.80 10*3/mm3 Final     Hemoglobin   Date Value Ref Range Status   10/23/2023 10.1 (L) 12.0 - 15.9 g/dL Final     Hematocrit   Date Value Ref Range Status   10/23/2023 34.0 34.0 - 46.6 % Final     Platelets   Date Value Ref Range Status   10/23/2023 262 140 - 450 10*3/mm3 Final     Sodium   Date Value Ref Range Status   10/23/2023 141 136 - 145 mmol/L Final     Potassium   Date Value Ref Range Status   10/23/2023 3.4 (L) 3.5 - 5.2 mmol/L Final     Chloride   Date Value Ref " Range Status   10/23/2023 106 98 - 107 mmol/L Final     CO2   Date Value Ref Range Status   10/23/2023 26.0 22.0 - 29.0 mmol/L Final     BUN   Date Value Ref Range Status   10/23/2023 7 (L) 8 - 23 mg/dL Final     Creatinine   Date Value Ref Range Status   10/23/2023 0.77 0.57 - 1.00 mg/dL Final     Glucose   Date Value Ref Range Status   10/23/2023 162 (H) 65 - 99 mg/dL Final     Calcium   Date Value Ref Range Status   10/23/2023 8.6 8.6 - 10.5 mg/dL Final               Imaging Results (Last 7 Days)       Procedure Component Value Units Date/Time    CT Abdomen Pelvis Without Contrast [[id number]] Collected: 10/22/23 2154     Updated: 10/22/23 2232    Narrative:      CT OF THE ABDOMEN AND PELVIS WITHOUT CONTRAST 10/22/2023     HISTORY: Abdominal pain. Vomiting.     Spiral images were obtained from the lung bases to the symphysis pubis.  No intravenous or oral contrast was given.     The liver, gallbladder, spleen, pancreas, adrenals and kidneys appear  unremarkable. Radiopaque mesh is seen in the anterior leftward abdomen  from previous hernia repair. No bowel wall thickening or bowel  dilatation is seen. Uterus has been removed. Urinary bladder is  unremarkable.       Impression:      1. Status post hysterectomy and ventral hernia repair.  2. No acute process identified.     Radiation dose reduction techniques were utilized, including automated  exposure control and exposure modulation based on body size.        This report was finalized on 10/22/2023 10:29 PM by Dr. Noe Powell M.D on Workstation: [id number]             Past Medical History:   Diagnosis Date    Asthma     CAD (coronary artery disease)     Chronic kidney disease     COVID     Crescendo angina     Diabetes mellitus     H/O angioplasty     Hiatal hernia     Hyperlipidemia     Hypertension     Obesity     Recurrent urinary tract infection     Rotator cuff tear, right     Sleep apnea     NO MACHINE    Torn rotator cuff        Assessment:  Active  Hospital Problems    Diagnosis  POA    **Abdominal pain [R10.9]  Yes      Resolved Hospital Problems   No resolved problems to display.   Nausea and vomiting  Diarrhea  Anemia  Cad  Ckd  Hypertension  Diabetes  hypokalemia    Plan:  Will continue accu checks, insulin  Egd and colonoscopy planned  Trend labs  Replace potassium  Bowel prep per gi  Pepcid  Dw patient and provider from the observation unit  Thanks for involving us in her care    Malissa Lieberman MD   10/23/2023  22:30 EDT

## 2023-10-24 NOTE — PROGRESS NOTES
Name: Lily Unger ADMIT: 10/22/2023   : 1960  PCP: Jaycob Savage MD    MRN: [id number] LOS: 0 days   AGE/SEX: 63 y.o. female  ROOM: [facility]     Subjective   Subjective   Seen in bed.  No family at bedside.  She continues to report just more abdominal soreness at this point.  No vomiting overnight, but still having nausea.  She is tolerating some clear liquids.  She denies any chest pain, dyspnea, cough, fever or chills.  She has overactive bladder, but currently denies any dysuria or changes from baseline.  BM was yesterday and somewhat loose, but not bloody.     Objective   Objective   Vital Signs  Temp:  [97.6 °F (36.4 °C)-98.1 °F (36.7 °C)] 98 °F (36.7 °C)  Heart Rate:  [55-62] 62  Resp:  [16-18] 16  BP: (155-194)/(67-81) 160/67  SpO2:  [93 %-96 %] 93 %  on   ;   Device (Oxygen Therapy): room air  Body mass index is 32.44 kg/m².    Physical Exam  Vitals and nursing note reviewed.   Constitutional:       Appearance: She is ill-appearing. She is not toxic-appearing.   Cardiovascular:      Rate and Rhythm: Normal rate and regular rhythm.      Pulses: Normal pulses.      Heart sounds: Normal heart sounds.   Pulmonary:      Effort: Pulmonary effort is normal. No respiratory distress.      Breath sounds: Normal breath sounds.   Abdominal:      General: Bowel sounds are normal. There is no distension.      Palpations: Abdomen is soft.      Tenderness: There is abdominal tenderness.   Musculoskeletal:         General: No swelling. Normal range of motion.   Skin:     General: Skin is warm and dry.      Findings: No bruising.   Neurological:      Mental Status: She is alert and oriented to person, place, and time.      Sensory: No sensory deficit.      Coordination: Coordination normal.   Psychiatric:         Mood and Affect: Mood normal.         Behavior: Behavior normal.       Results Review:       I reviewed the patient's new clinical results.  Results from last 7 days   Lab Units 10/23/23  2728  10/22/23  2056   WBC 10*3/mm3 8.13 9.11   HEMOGLOBIN g/dL 10.1* 11.3*   PLATELETS 10*3/mm3 262 340     Results from last 7 days   Lab Units 10/23/23  0317 10/22/23  2057   SODIUM mmol/L 141 139   POTASSIUM mmol/L 3.4* 3.5   CHLORIDE mmol/L 106 102   CO2 mmol/L 26.0 27.0   BUN mg/dL 7* 8   CREATININE mg/dL 0.77 0.83   GLUCOSE mg/dL 162* 139*   Estimated Creatinine Clearance: 68.8 mL/min (by C-G formula based on SCr of 0.77 mg/dL).  Results from last 7 days   Lab Units 10/22/23  2057   ALBUMIN g/dL 4.2   BILIRUBIN mg/dL 0.2   ALK PHOS U/L 69   AST (SGOT) U/L 24   ALT (SGPT) U/L 27     Results from last 7 days   Lab Units 10/23/23  0317 10/22/23  2057   CALCIUM mg/dL 8.6 9.5   ALBUMIN g/dL  --  4.2     Results from last 7 days   Lab Units 10/23/23  0317 10/23/23  0015 10/22/23  2056   LACTATE mmol/L 1.7 2.1* 2.4*     Glucose   Date/Time Value Ref Range Status   10/24/2023 0621 104 70 - 130 mg/dL Final   10/24/2023 0020 121 70 - 130 mg/dL Final   10/23/2023 1804 132 (H) 70 - 130 mg/dL Final   10/23/2023 1211 124 70 - 130 mg/dL Final   10/23/2023 0755 112 70 - 130 mg/dL Final   10/23/2023 0533 110 70 - 130 mg/dL Final   10/22/2023 2343 139 (H) 70 - 130 mg/dL Final       aspirin, 81 mg, Oral, Daily  bisoprolol, 5 mg, Oral, Daily  famotidine, 20 mg, Oral, BID AC  insulin regular, 2-7 Units, Subcutaneous, Q6H  metoclopramide, 10 mg, Intravenous, Q6H  polyethylene glycol, 0.5 bottle, Oral, Q12H  sodium chloride, 10 mL, Intravenous, Q12H      sodium chloride, 100 mL/hr, Last Rate: 100 mL/hr (10/24/23 0909)    Diet: Liquid Diets; Clear Liquid; Fluid Consistency: Thin (IDDSI 0)  NPO Diet NPO Type: Strict NPO       Assessment/Plan     Active Hospital Problems    Diagnosis  POA    **Abdominal pain [R10.9]  Yes    Nausea and vomiting [R11.2]  Yes    Microcytic anemia [D50.9]  Yes    Hyperlipidemia [E78.5]  Yes    CAD (coronary artery disease) [I25.10]  Yes    CKD (chronic kidney disease) stage 3, GFR 30-59 ml/min [N18.30]  Yes     Type 2 diabetes mellitus [E11.9]  Yes    Hypertension [I10]  Yes      Resolved Hospital Problems   No resolved problems to display.     Ms. Unger is a 63 y.o. female that presented to the hospital with a several week history of abdominal pain, nausea, vomiting and diarrhea.  She was apparently started on two new medications with semaglutide and oxybutynin on 9/12/2023 by her PCP.  Her symptoms started shortly thereafter.  She was placed in the observation unit and GI was consulted, but they plan for further endoscopy evaluation prompting [facility] consultation to assume care.    Abdominal pain  Nausea and vomiting  Microcytic anemia  -GI consulted.  Feels likely symptoms secondary to semaglutide and recommend holding this for now.  -Stool studies ordered but not yet obtained.  -Tentative plans for EGD and colonoscopy tomorrow given no prior endoscopic evaluation and underlying microcytic anemia.  -On clear liquid diet and would defer advancement to GI.  -Supportive care for symptoms. On IVF as well as scheduled Reglan and Pepcid.    CAD  Hypertension  -Established with Dr. Harper with cardiology.  -Last cardiac cath in April 2023 with moderate CAD and no interventions.  -Continue medical management with ASA and bisoprolol.     CKD3  -Renal function very stable.  -Trend labs- potassium mildly low- will replace and check magnesium.    DM2  -Last A1c 7/2023 at 9.8%.   -Sugars very stable at present. Appears home metformin and Tresiba held on admission.  -Will continue with correctional SSI and add back basal pending oral intake.    Discussed with patient.    VTE Prophylaxis - SCDs  Code Status - Full code  Disposition - Anticipate discharge TBD. Pending scopes and diet tolerance.      BRETT Villavicencio  [facility]  10/24/23  10:32 EDT

## 2023-10-25 ENCOUNTER — ANESTHESIA EVENT (OUTPATIENT)
Dept: GASTROENTEROLOGY | Facility: HOSPITAL | Age: 63
End: 2023-10-25
Payer: MEDICARE

## 2023-10-25 ENCOUNTER — ANESTHESIA (OUTPATIENT)
Dept: GASTROENTEROLOGY | Facility: HOSPITAL | Age: 63
End: 2023-10-25
Payer: MEDICARE

## 2023-10-25 LAB
ANION GAP SERPL CALCULATED.3IONS-SCNC: 8.6 MMOL/L (ref 5–15)
BACTERIA SPEC AEROBE CULT: ABNORMAL
BUN SERPL-MCNC: 4 MG/DL (ref 8–23)
BUN/CREAT SERPL: 5.6 (ref 7–25)
CALCIUM SPEC-SCNC: 9.2 MG/DL (ref 8.6–10.5)
CHLORIDE SERPL-SCNC: 107 MMOL/L (ref 98–107)
CO2 SERPL-SCNC: 22.4 MMOL/L (ref 22–29)
CREAT SERPL-MCNC: 0.71 MG/DL (ref 0.57–1)
DEPRECATED RDW RBC AUTO: 45.7 FL (ref 37–54)
EGFRCR SERPLBLD CKD-EPI 2021: 94.5 ML/MIN/1.73
ERYTHROCYTE [DISTWIDTH] IN BLOOD BY AUTOMATED COUNT: 17.7 % (ref 12.3–15.4)
GLUCOSE BLDC GLUCOMTR-MCNC: 115 MG/DL (ref 70–130)
GLUCOSE BLDC GLUCOMTR-MCNC: 118 MG/DL (ref 70–130)
GLUCOSE BLDC GLUCOMTR-MCNC: 137 MG/DL (ref 70–130)
GLUCOSE BLDC GLUCOMTR-MCNC: 150 MG/DL (ref 70–130)
GLUCOSE SERPL-MCNC: 118 MG/DL (ref 65–99)
HCT VFR BLD AUTO: 38.4 % (ref 34–46.6)
HGB BLD-MCNC: 11.7 G/DL (ref 12–15.9)
MCH RBC QN AUTO: 22.6 PG (ref 26.6–33)
MCHC RBC AUTO-ENTMCNC: 30.5 G/DL (ref 31.5–35.7)
MCV RBC AUTO: 74.3 FL (ref 79–97)
PLATELET # BLD AUTO: 351 10*3/MM3 (ref 140–450)
PMV BLD AUTO: 9.8 FL (ref 6–12)
POTASSIUM SERPL-SCNC: 4 MMOL/L (ref 3.5–5.2)
RBC # BLD AUTO: 5.17 10*6/MM3 (ref 3.77–5.28)
SODIUM SERPL-SCNC: 138 MMOL/L (ref 136–145)
WBC NRBC COR # BLD: 9.2 10*3/MM3 (ref 3.4–10.8)

## 2023-10-25 PROCEDURE — 88305 TISSUE EXAM BY PATHOLOGIST: CPT | Performed by: INTERNAL MEDICINE

## 2023-10-25 PROCEDURE — 43239 EGD BIOPSY SINGLE/MULTIPLE: CPT | Performed by: INTERNAL MEDICINE

## 2023-10-25 PROCEDURE — 25010000002 ONDANSETRON PER 1 MG: Performed by: INTERNAL MEDICINE

## 2023-10-25 PROCEDURE — 82948 REAGENT STRIP/BLOOD GLUCOSE: CPT

## 2023-10-25 PROCEDURE — 25010000002 PROPOFOL 10 MG/ML EMULSION: Performed by: NURSE ANESTHETIST, CERTIFIED REGISTERED

## 2023-10-25 PROCEDURE — 25010000002 METOCLOPRAMIDE PER 10 MG: Performed by: INTERNAL MEDICINE

## 2023-10-25 PROCEDURE — G0378 HOSPITAL OBSERVATION PER HR: HCPCS

## 2023-10-25 PROCEDURE — 0DB98ZX EXCISION OF DUODENUM, VIA NATURAL OR ARTIFICIAL OPENING ENDOSCOPIC, DIAGNOSTIC: ICD-10-PCS | Performed by: INTERNAL MEDICINE

## 2023-10-25 PROCEDURE — 25010000002 METOCLOPRAMIDE PER 10 MG

## 2023-10-25 PROCEDURE — 85027 COMPLETE CBC AUTOMATED: CPT | Performed by: NURSE PRACTITIONER

## 2023-10-25 PROCEDURE — 25810000003 SODIUM CHLORIDE 0.9 % SOLUTION

## 2023-10-25 PROCEDURE — 63710000001 INSULIN LISPRO (HUMAN) PER 5 UNITS: Performed by: INTERNAL MEDICINE

## 2023-10-25 PROCEDURE — 0DB68ZX EXCISION OF STOMACH, VIA NATURAL OR ARTIFICIAL OPENING ENDOSCOPIC, DIAGNOSTIC: ICD-10-PCS | Performed by: INTERNAL MEDICINE

## 2023-10-25 PROCEDURE — 25010000002 HYDRALAZINE PER 20 MG: Performed by: INTERNAL MEDICINE

## 2023-10-25 PROCEDURE — 25010000002 HYDRALAZINE PER 20 MG: Performed by: NURSE PRACTITIONER

## 2023-10-25 PROCEDURE — 25810000003 SODIUM CHLORIDE 0.9 % SOLUTION: Performed by: INTERNAL MEDICINE

## 2023-10-25 PROCEDURE — 80048 BASIC METABOLIC PNL TOTAL CA: CPT | Performed by: NURSE PRACTITIONER

## 2023-10-25 RX ORDER — PROPOFOL 10 MG/ML
VIAL (ML) INTRAVENOUS AS NEEDED
Status: DISCONTINUED | OUTPATIENT
Start: 2023-10-25 | End: 2023-10-25 | Stop reason: SURG

## 2023-10-25 RX ORDER — SODIUM CHLORIDE 9 MG/ML
1000 INJECTION, SOLUTION INTRAVENOUS CONTINUOUS
Status: DISCONTINUED | OUTPATIENT
Start: 2023-10-25 | End: 2023-10-26

## 2023-10-25 RX ORDER — LIDOCAINE HYDROCHLORIDE 10 MG/ML
0.5 INJECTION, SOLUTION INFILTRATION; PERINEURAL ONCE AS NEEDED
Status: DISCONTINUED | OUTPATIENT
Start: 2023-10-25 | End: 2023-10-25 | Stop reason: HOSPADM

## 2023-10-25 RX ORDER — LIDOCAINE HYDROCHLORIDE 20 MG/ML
INJECTION, SOLUTION INFILTRATION; PERINEURAL AS NEEDED
Status: DISCONTINUED | OUTPATIENT
Start: 2023-10-25 | End: 2023-10-25 | Stop reason: SURG

## 2023-10-25 RX ORDER — SODIUM CHLORIDE 0.9 % (FLUSH) 0.9 %
10 SYRINGE (ML) INJECTION AS NEEDED
Status: DISCONTINUED | OUTPATIENT
Start: 2023-10-25 | End: 2023-10-25 | Stop reason: HOSPADM

## 2023-10-25 RX ADMIN — ONDANSETRON 4 MG: 2 INJECTION INTRAMUSCULAR; INTRAVENOUS at 11:20

## 2023-10-25 RX ADMIN — HYDRALAZINE HYDROCHLORIDE 10 MG: 20 INJECTION INTRAMUSCULAR; INTRAVENOUS at 01:56

## 2023-10-25 RX ADMIN — METOCLOPRAMIDE 10 MG: 5 INJECTION, SOLUTION INTRAMUSCULAR; INTRAVENOUS at 05:50

## 2023-10-25 RX ADMIN — SODIUM CHLORIDE 1000 ML: 9 INJECTION, SOLUTION INTRAVENOUS at 10:05

## 2023-10-25 RX ADMIN — Medication 10 ML: at 21:38

## 2023-10-25 RX ADMIN — METOCLOPRAMIDE 10 MG: 5 INJECTION, SOLUTION INTRAMUSCULAR; INTRAVENOUS at 18:21

## 2023-10-25 RX ADMIN — ACETAMINOPHEN 650 MG: 325 TABLET, FILM COATED ORAL at 11:17

## 2023-10-25 RX ADMIN — INSULIN LISPRO 2 UNITS: 100 INJECTION, SOLUTION INTRAVENOUS; SUBCUTANEOUS at 20:55

## 2023-10-25 RX ADMIN — LIDOCAINE HYDROCHLORIDE 80 MG: 20 INJECTION, SOLUTION INFILTRATION; PERINEURAL at 10:15

## 2023-10-25 RX ADMIN — ONDANSETRON 4 MG: 2 INJECTION INTRAMUSCULAR; INTRAVENOUS at 18:23

## 2023-10-25 RX ADMIN — PROPOFOL 200 MCG/KG/MIN: 10 INJECTION, EMULSION INTRAVENOUS at 10:15

## 2023-10-25 RX ADMIN — FAMOTIDINE 20 MG: 20 TABLET, FILM COATED ORAL at 16:49

## 2023-10-25 RX ADMIN — Medication 10 ML: at 08:23

## 2023-10-25 RX ADMIN — METOCLOPRAMIDE 10 MG: 5 INJECTION, SOLUTION INTRAMUSCULAR; INTRAVENOUS at 01:49

## 2023-10-25 RX ADMIN — ACETAMINOPHEN 650 MG: 325 TABLET, FILM COATED ORAL at 01:55

## 2023-10-25 RX ADMIN — METOCLOPRAMIDE 10 MG: 5 INJECTION, SOLUTION INTRAMUSCULAR; INTRAVENOUS at 13:02

## 2023-10-25 RX ADMIN — SODIUM CHLORIDE 100 ML/HR: 9 INJECTION, SOLUTION INTRAVENOUS at 01:49

## 2023-10-25 RX ADMIN — HYDRALAZINE HYDROCHLORIDE 10 MG: 20 INJECTION INTRAMUSCULAR; INTRAVENOUS at 21:37

## 2023-10-25 RX ADMIN — FAMOTIDINE 20 MG: 20 TABLET, FILM COATED ORAL at 05:50

## 2023-10-25 RX ADMIN — BISOPROLOL FUMARATE 5 MG: 5 TABLET, FILM COATED ORAL at 08:23

## 2023-10-25 RX ADMIN — PROPOFOL 50 MG: 10 INJECTION, EMULSION INTRAVENOUS at 10:14

## 2023-10-25 NOTE — PROGRESS NOTES
Name: Lily Unger ADMIT: 10/22/2023   : 1960  PCP: Jaycob Savage MD    MRN: [id number] LOS: 0 days   AGE/SEX: 63 y.o. female  ROOM: hospitals/     Subjective   Subjective   Resting in bed.  Family member x1 at bedside.  Returned from EGD.  She states that she could not tolerate the oral bowel prep last night and did not have a colonoscopy as a result.  She states that she tried to drink the bowel prep, but began getting nauseous and vomiting.  She states she even had nausea and vomiting after being given sedation medication for EGD today.  She currently reports ongoing nausea and some abdominal pain worse on the right side.  She is having bowel movements and still having diarrhea.  She denies any chest pain or trouble breathing.     Objective   Objective   Vital Signs  Temp:  [97.9 °F (36.6 °C)-98.2 °F (36.8 °C)] 97.9 °F (36.6 °C)  Heart Rate:  [52-61] 60  Resp:  [16-20] 16  BP: (127-197)/(65-92) 145/82  SpO2:  [94 %-98 %] 96 %  on  Flow (L/min):  [4] 4;   Device (Oxygen Therapy): room air  Body mass index is 32.44 kg/m².    Physical Exam  Vitals and nursing note reviewed.   Constitutional:       Appearance: She is ill-appearing. She is not toxic-appearing.   Cardiovascular:      Rate and Rhythm: Normal rate and regular rhythm.      Pulses: Normal pulses.      Heart sounds: Normal heart sounds.   Pulmonary:      Effort: Pulmonary effort is normal. No respiratory distress.      Breath sounds: Normal breath sounds.   Abdominal:      General: Bowel sounds are normal. There is no distension.      Palpations: Abdomen is soft.      Tenderness: There is abdominal tenderness.   Musculoskeletal:         General: No swelling. Normal range of motion.   Skin:     General: Skin is warm and dry.      Findings: No bruising.   Neurological:      Mental Status: She is alert and oriented to person, place, and time.      Sensory: No sensory deficit.      Coordination: Coordination normal.   Psychiatric:          Mood and Affect: Mood normal.         Behavior: Behavior normal.      Results Review:       I reviewed the patient's new clinical results.  Results from last 7 days   Lab Units 10/25/23  0522 10/23/23  0317 10/22/23  2056   WBC 10*3/mm3 9.20 8.13 9.11   HEMOGLOBIN g/dL 11.7* 10.1* 11.3*   PLATELETS 10*3/mm3 351 262 340     Results from last 7 days   Lab Units 10/25/23  0522 10/23/23  0317 10/22/23  2057   SODIUM mmol/L 138 141 139   POTASSIUM mmol/L 4.0 3.4* 3.5   CHLORIDE mmol/L 107 106 102   CO2 mmol/L 22.4 26.0 27.0   BUN mg/dL 4* 7* 8   CREATININE mg/dL 0.71 0.77 0.83   GLUCOSE mg/dL 118* 162* 139*   Estimated Creatinine Clearance: 74.6 mL/min (by C-G formula based on SCr of 0.71 mg/dL).  Results from last 7 days   Lab Units 10/22/23  2057   ALBUMIN g/dL 4.2   BILIRUBIN mg/dL 0.2   ALK PHOS U/L 69   AST (SGOT) U/L 24   ALT (SGPT) U/L 27     Results from last 7 days   Lab Units 10/25/23  0522 10/23/23  0317 10/22/23  2057   CALCIUM mg/dL 9.2 8.6 9.5   ALBUMIN g/dL  --   --  4.2   MAGNESIUM mg/dL  --  2.0  --      Results from last 7 days   Lab Units 10/23/23  0317 10/23/23  0015 10/22/23  2056   LACTATE mmol/L 1.7 2.1* 2.4*     Hemoglobin A1C   Date/Time Value Ref Range Status   10/24/2023 1138 8.40 (H) 4.80 - 5.60 % Final     Glucose   Date/Time Value Ref Range Status   10/25/2023 1109 118 70 - 130 mg/dL Final   10/25/2023 0759 115 70 - 130 mg/dL Final   10/24/2023 2058 135 (H) 70 - 130 mg/dL Final   10/24/2023 1619 124 70 - 130 mg/dL Final   10/24/2023 1140 115 70 - 130 mg/dL Final   10/24/2023 0621 104 70 - 130 mg/dL Final   10/24/2023 0020 121 70 - 130 mg/dL Final       aspirin, 81 mg, Oral, Daily  bisoprolol, 5 mg, Oral, Daily  famotidine, 20 mg, Oral, BID AC  insulin lispro, 2-7 Units, Subcutaneous, 4x Daily AC & at Bedtime  metoclopramide, 10 mg, Intravenous, Q6H  polyethylene glycol, 0.5 bottle, Oral, Q12H  sodium chloride, 10 mL, Intravenous, Q12H      sodium chloride, 1,000 mL, Last Rate: Stopped  (10/25/23 1045)  sodium chloride, 100 mL/hr, Last Rate: Stopped (10/25/23 0930)    Diet: Gastrointestinal Diets; Fiber-Restricted; Texture: Regular Texture (IDDSI 7); Fluid Consistency: Thin (IDDSI 0)       Assessment/Plan     Active Hospital Problems    Diagnosis  POA    **Abdominal pain [R10.9]  Yes    Nausea and vomiting [R11.2]  Yes    Microcytic anemia [D50.9]  Yes    Hyperlipidemia [E78.5]  Yes    CAD (coronary artery disease) [I25.10]  Yes    CKD (chronic kidney disease) stage 3, GFR 30-59 ml/min [N18.30]  Yes    Type 2 diabetes mellitus [E11.9]  Yes    Hypertension [I10]  Yes      Resolved Hospital Problems   No resolved problems to display.     Ms. Unger is a 63 y.o. female that presented to the hospital with a several week history of abdominal pain, nausea, vomiting and diarrhea.  She was apparently started on two new medications with semaglutide and oxybutynin on 9/12/2023 by her PCP.  Her symptoms started shortly thereafter.  She was placed in the observation unit and GI was consulted, but they planned for further endoscopy evaluation prompting [facility] consultation to assume care.     Abdominal pain  Nausea and vomiting  Microcytic anemia  -GI consulted.  Feels likely symptoms secondary to semaglutide and recommend holding this for now.  -Stool studies ordered but not yet obtained.  -S/P EGD with unremarkable findings. Colonoscopy not done d/t intolerance of bowel prep secondary to nausea.  -Diet as tolerated per GI.  -Supportive care for symptoms. On IVF as well as scheduled Reglan and Pepcid.     CAD  Hypertension  -Established with Dr. Harper with cardiology.  -Last cardiac cath in April 2023 with moderate CAD and no interventions.  -Continue medical management with ASA and bisoprolol.   -BP elevated at times. Hydralazine as needed for now. Usually well controlled at home per patient reports.     CKD3  -Renal function very stable.  -Electrolytes stable.     DM2  -Last A1c 7/2023 at 9.8%.    -Sugars very stable at present. Appears home metformin and Tresiba held on admission.  -Will continue with correctional SSI and add back basal pending oral intake.     Discussed with patient and family.    Home possibly tomorrow if no further GI work up and symptoms controlled. Still with nausea and vomiting today.     VTE Prophylaxis - SCDs  Code Status - Full code  Disposition - Anticipate discharge as above.    BRETT Villavicencio  [facility]  10/25/23  11:28 EDT

## 2023-10-25 NOTE — ANESTHESIA PREPROCEDURE EVALUATION
Anesthesia Evaluation     Patient summary reviewed and Nursing notes reviewed                Airway   Mallampati: III  TM distance: >3 FB  Neck ROM: full  Dental    (+) upper dentures, lower dentures and edentulous    Pulmonary    (+) a smoker Former, COPD, asthma,shortness of breath  Cardiovascular     ECG reviewed  Rhythm: regular  Rate: normal    (+) hypertension, CAD, angina, hyperlipidemia,  carotid artery disease      Neuro/Psych  (+) dizziness/light headedness, syncope, weakness  GI/Hepatic/Renal/Endo    (+) morbid obesity, hiatal hernia, renal disease- CRI, diabetes mellitus type 2    Musculoskeletal (-) negative ROS    Abdominal    Substance History - negative use     OB/GYN negative ob/gyn ROS         Other                    Anesthesia Plan    ASA 3     MAC     intravenous induction     Anesthetic plan, risks, benefits, and alternatives have been provided, discussed and informed consent has been obtained with: patient.    Plan discussed with CRNA.    CODE STATUS:    Code Status (Patient has no pulse and is not breathing): CPR (Attempt to Resuscitate)  Medical Interventions (Patient has pulse or is breathing): Full Support

## 2023-10-25 NOTE — ANESTHESIA POSTPROCEDURE EVALUATION
Patient: Lily Unger    Procedure Summary       Date: 10/25/23 Room / Location: [facility] END[facility]COPY 6 / [facility]    Anesthesia Start: 1012 Anesthesia Stop: 1030    Procedure: ESOPHAGOGASTRODUODENOSCOPY with cold biopsies (Esophagus) Diagnosis:       Microcytic anemia      (Microcytic anemia [D50.9])    Surgeons: Reshma Duque MD Provider:     Anesthesia Type: MAC ASA Status: 3            Anesthesia Type: MAC    Vitals  Vitals Value Taken Time   /82 10/25/23 1041   Temp     Pulse 60 10/25/23 1044   Resp 16 10/25/23 1039   SpO2 93 % 10/25/23 1044   Vitals shown include unfiled device data.        Post Anesthesia Care and Evaluation    Patient location during evaluation: PACU  Patient participation: complete - patient participated  Level of consciousness: awake and alert  Pain management: adequate    Airway patency: patent  Anesthetic complications: No anesthetic complications    Cardiovascular status: acceptable  Respiratory status: acceptable  Hydration status: acceptable    Comments: --------------------            10/25/23               1039     --------------------   BP:       145/82     Pulse:      60       Resp:       16       Temp:                SpO2:      96%      --------------------

## 2023-10-25 NOTE — PLAN OF CARE
Goal Outcome Evaluation:  Plan of Care Reviewed With: patient        Progress: no change  Outcome Evaluation: A&Ox4. HTN, PRN hydralazine. EGD completed this shift. Diet advanced, vomited after lunch but able to tolerate crackers/sprite. DM with SSI. D/C plans pending progress.

## 2023-10-25 NOTE — PLAN OF CARE
Goal Outcome Evaluation:  Plan of Care Reviewed With: patient        Progress: improving  Outcome Evaluation: Patient admitted with abdominal pain, nausea and vomiting. Minimal nausea this shift, only vomiting when attempting bowel prep. Per GI, patient will only have EGD today. BP elevated throughout shift, prn meds given. NPO at midnight for EGD.

## 2023-10-26 LAB
ANION GAP SERPL CALCULATED.3IONS-SCNC: 7 MMOL/L (ref 5–15)
BUN SERPL-MCNC: 5 MG/DL (ref 8–23)
BUN/CREAT SERPL: 6.6 (ref 7–25)
CALCIUM SPEC-SCNC: 9.2 MG/DL (ref 8.6–10.5)
CHLORIDE SERPL-SCNC: 108 MMOL/L (ref 98–107)
CO2 SERPL-SCNC: 25 MMOL/L (ref 22–29)
CREAT SERPL-MCNC: 0.76 MG/DL (ref 0.57–1)
DEPRECATED RDW RBC AUTO: 48.7 FL (ref 37–54)
EGFRCR SERPLBLD CKD-EPI 2021: 87.1 ML/MIN/1.73
ERYTHROCYTE [DISTWIDTH] IN BLOOD BY AUTOMATED COUNT: 18.1 % (ref 12.3–15.4)
GLUCOSE BLDC GLUCOMTR-MCNC: 137 MG/DL (ref 70–130)
GLUCOSE BLDC GLUCOMTR-MCNC: 140 MG/DL (ref 70–130)
GLUCOSE BLDC GLUCOMTR-MCNC: 150 MG/DL (ref 70–130)
GLUCOSE BLDC GLUCOMTR-MCNC: 157 MG/DL (ref 70–130)
GLUCOSE SERPL-MCNC: 125 MG/DL (ref 65–99)
HCT VFR BLD AUTO: 37 % (ref 34–46.6)
HGB BLD-MCNC: 11.3 G/DL (ref 12–15.9)
MCH RBC QN AUTO: 23 PG (ref 26.6–33)
MCHC RBC AUTO-ENTMCNC: 30.5 G/DL (ref 31.5–35.7)
MCV RBC AUTO: 75.2 FL (ref 79–97)
PLATELET # BLD AUTO: 322 10*3/MM3 (ref 140–450)
PMV BLD AUTO: 9.8 FL (ref 6–12)
POTASSIUM SERPL-SCNC: 3.8 MMOL/L (ref 3.5–5.2)
RBC # BLD AUTO: 4.92 10*6/MM3 (ref 3.77–5.28)
SODIUM SERPL-SCNC: 140 MMOL/L (ref 136–145)
WBC NRBC COR # BLD: 8.47 10*3/MM3 (ref 3.4–10.8)

## 2023-10-26 PROCEDURE — 82948 REAGENT STRIP/BLOOD GLUCOSE: CPT

## 2023-10-26 PROCEDURE — 25010000002 ONDANSETRON PER 1 MG: Performed by: INTERNAL MEDICINE

## 2023-10-26 PROCEDURE — 63710000001 INSULIN LISPRO (HUMAN) PER 5 UNITS: Performed by: INTERNAL MEDICINE

## 2023-10-26 PROCEDURE — 25010000002 HYDRALAZINE PER 20 MG: Performed by: INTERNAL MEDICINE

## 2023-10-26 PROCEDURE — 85027 COMPLETE CBC AUTOMATED: CPT | Performed by: NURSE PRACTITIONER

## 2023-10-26 PROCEDURE — 25810000003 SODIUM CHLORIDE 0.9 % SOLUTION: Performed by: INTERNAL MEDICINE

## 2023-10-26 PROCEDURE — 63710000001 PROMETHAZINE PER 25 MG: Performed by: NURSE PRACTITIONER

## 2023-10-26 PROCEDURE — 25010000002 METOCLOPRAMIDE PER 10 MG: Performed by: INTERNAL MEDICINE

## 2023-10-26 PROCEDURE — G0378 HOSPITAL OBSERVATION PER HR: HCPCS

## 2023-10-26 PROCEDURE — 80048 BASIC METABOLIC PNL TOTAL CA: CPT | Performed by: NURSE PRACTITIONER

## 2023-10-26 PROCEDURE — 99232 SBSQ HOSP IP/OBS MODERATE 35: CPT | Performed by: NURSE PRACTITIONER

## 2023-10-26 RX ORDER — BISOPROLOL FUMARATE 5 MG/1
2.5 TABLET, FILM COATED ORAL DAILY
Status: DISCONTINUED | OUTPATIENT
Start: 2023-10-27 | End: 2023-10-27 | Stop reason: HOSPADM

## 2023-10-26 RX ORDER — SCOLOPAMINE TRANSDERMAL SYSTEM 1 MG/1
1 PATCH, EXTENDED RELEASE TRANSDERMAL
Status: DISCONTINUED | OUTPATIENT
Start: 2023-10-26 | End: 2023-10-27 | Stop reason: HOSPADM

## 2023-10-26 RX ORDER — HYDRALAZINE HYDROCHLORIDE 25 MG/1
25 TABLET, FILM COATED ORAL EVERY 8 HOURS SCHEDULED
Status: DISCONTINUED | OUTPATIENT
Start: 2023-10-26 | End: 2023-10-27 | Stop reason: HOSPADM

## 2023-10-26 RX ORDER — PROMETHAZINE HYDROCHLORIDE 6.25 MG/5ML
25 SYRUP ORAL EVERY 6 HOURS PRN
Status: DISCONTINUED | OUTPATIENT
Start: 2023-10-26 | End: 2023-10-27 | Stop reason: HOSPADM

## 2023-10-26 RX ADMIN — Medication 10 ML: at 08:10

## 2023-10-26 RX ADMIN — ASPIRIN 81 MG: 81 TABLET, COATED ORAL at 08:10

## 2023-10-26 RX ADMIN — PROMETHAZINE HYDROCHLORIDE 25 MG: 6.25 SOLUTION ORAL at 22:28

## 2023-10-26 RX ADMIN — PROMETHAZINE HYDROCHLORIDE 6.25 MG: 6.25 SOLUTION ORAL at 15:21

## 2023-10-26 RX ADMIN — METOCLOPRAMIDE 10 MG: 5 INJECTION, SOLUTION INTRAMUSCULAR; INTRAVENOUS at 00:57

## 2023-10-26 RX ADMIN — ACETAMINOPHEN 650 MG: 325 TABLET, FILM COATED ORAL at 00:57

## 2023-10-26 RX ADMIN — INSULIN LISPRO 2 UNITS: 100 INJECTION, SOLUTION INTRAVENOUS; SUBCUTANEOUS at 21:43

## 2023-10-26 RX ADMIN — ONDANSETRON 4 MG: 2 INJECTION INTRAMUSCULAR; INTRAVENOUS at 00:57

## 2023-10-26 RX ADMIN — BISOPROLOL FUMARATE 2.5 MG: 5 TABLET, FILM COATED ORAL at 08:09

## 2023-10-26 RX ADMIN — HYDRALAZINE HYDROCHLORIDE 25 MG: 25 TABLET, FILM COATED ORAL at 21:40

## 2023-10-26 RX ADMIN — ONDANSETRON 4 MG: 2 INJECTION INTRAMUSCULAR; INTRAVENOUS at 08:09

## 2023-10-26 RX ADMIN — HYDRALAZINE HYDROCHLORIDE 10 MG: 20 INJECTION INTRAMUSCULAR; INTRAVENOUS at 19:10

## 2023-10-26 RX ADMIN — FAMOTIDINE 20 MG: 20 TABLET, FILM COATED ORAL at 08:09

## 2023-10-26 RX ADMIN — FAMOTIDINE 20 MG: 20 TABLET, FILM COATED ORAL at 16:52

## 2023-10-26 RX ADMIN — HYDRALAZINE HYDROCHLORIDE 25 MG: 25 TABLET, FILM COATED ORAL at 15:21

## 2023-10-26 RX ADMIN — METOCLOPRAMIDE 10 MG: 5 INJECTION, SOLUTION INTRAMUSCULAR; INTRAVENOUS at 06:13

## 2023-10-26 RX ADMIN — SODIUM CHLORIDE 100 ML/HR: 9 INJECTION, SOLUTION INTRAVENOUS at 00:57

## 2023-10-26 RX ADMIN — SCOPALAMINE 1 PATCH: 1 PATCH, EXTENDED RELEASE TRANSDERMAL at 11:58

## 2023-10-26 NOTE — PLAN OF CARE
Goal Outcome Evaluation:  Plan of Care Reviewed With: patient        Progress: no change  Outcome Evaluation: patient ambulating independently to bathroom, voiding without difficulty, intermittent complaints of headache, b/p remains elevated even with IV hydralazine, educated on b/p and glucose monitoring

## 2023-10-26 NOTE — PROGRESS NOTES
Name: Lily Unger ADMIT: 10/22/2023   : 1960  PCP: Jaycob Savage MD    MRN: [id number] LOS: 0 days   AGE/SEX: 63 y.o. female  ROOM: [facility]     Subjective   Subjective   Resting in bed.  No family at bedside.  States she ate lunch yesterday and then shortly threw this up.  She only ate a little bit of dinner before throwing that up as well.  She has more epigastric pain this morning and denies any bowel movement in the last 2 to 3 days.  She states she did eat some pudding early this morning.  She denies any chest pain, dyspnea, cough, fever or chills.  Blood pressure has been variable, but overall high.  She states she has not tolerated amlodipine in the past and can only take 2.5 of the bisoprolol secondary to side effects.  She is established with Dr. Harper as her cardiologist.  She states she does take liquid and again at home and feels like the Zofran is mildly effective here.  She is not really noticing a difference with the Reglan that is scheduled.     Objective   Objective   Vital Signs  Temp:  [97.3 °F (36.3 °C)-98.4 °F (36.9 °C)] 98.1 °F (36.7 °C)  Heart Rate:  [55-61] 60  Resp:  [16-20] 16  BP: (127-201)/(47-94) 151/55  SpO2:  [96 %-98 %] 96 %  on  Flow (L/min):  [4] 4;   Device (Oxygen Therapy): room air  Body mass index is 32.44 kg/m².    Physical Exam  Vitals and nursing note reviewed.   Constitutional:       Appearance: She is ill-appearing. She is not toxic-appearing.   Cardiovascular:      Rate and Rhythm: Normal rate and regular rhythm.      Pulses: Normal pulses.      Heart sounds: Normal heart sounds.   Pulmonary:      Effort: Pulmonary effort is normal. No respiratory distress.      Breath sounds: Normal breath sounds.   Abdominal:      General: Bowel sounds are normal. There is no distension.      Palpations: Abdomen is soft.      Tenderness: There is abdominal tenderness.   Musculoskeletal:         General: No swelling. Normal range of motion.   Skin:      General: Skin is warm and dry.      Findings: No bruising.   Neurological:      Mental Status: She is alert and oriented to person, place, and time.      Sensory: No sensory deficit.      Coordination: Coordination normal.   Psychiatric:         Mood and Affect: Mood normal.         Behavior: Behavior normal.     Results Review:       I reviewed the patient's new clinical results.  Results from last 7 days   Lab Units 10/26/23  0436 10/25/23  0522 10/23/23  0317 10/22/23  2056   WBC 10*3/mm3 8.47 9.20 8.13 9.11   HEMOGLOBIN g/dL 11.3* 11.7* 10.1* 11.3*   PLATELETS 10*3/mm3 322 351 262 340     Results from last 7 days   Lab Units 10/26/23  0436 10/25/23  0522 10/23/23  0317 10/22/23  2057   SODIUM mmol/L 140 138 141 139   POTASSIUM mmol/L 3.8 4.0 3.4* 3.5   CHLORIDE mmol/L 108* 107 106 102   CO2 mmol/L 25.0 22.4 26.0 27.0   BUN mg/dL 5* 4* 7* 8   CREATININE mg/dL 0.76 0.71 0.77 0.83   GLUCOSE mg/dL 125* 118* 162* 139*   Estimated Creatinine Clearance: 69.7 mL/min (by C-G formula based on SCr of 0.76 mg/dL).  Results from last 7 days   Lab Units 10/22/23  2057   ALBUMIN g/dL 4.2   BILIRUBIN mg/dL 0.2   ALK PHOS U/L 69   AST (SGOT) U/L 24   ALT (SGPT) U/L 27     Results from last 7 days   Lab Units 10/26/23  0436 10/25/23  0522 10/23/23  0317 10/22/23  2057   CALCIUM mg/dL 9.2 9.2 8.6 9.5   ALBUMIN g/dL  --   --   --  4.2   MAGNESIUM mg/dL  --   --  2.0  --      Results from last 7 days   Lab Units 10/23/23  0317 10/23/23  0015 10/22/23  2056   LACTATE mmol/L 1.7 2.1* 2.4*     Hemoglobin A1C   Date/Time Value Ref Range Status   10/24/2023 1138 8.40 (H) 4.80 - 5.60 % Final     Glucose   Date/Time Value Ref Range Status   10/26/2023 0800 137 (H) 70 - 130 mg/dL Final   10/25/2023 2046 150 (H) 70 - 130 mg/dL Final   10/25/2023 1624 137 (H) 70 - 130 mg/dL Final   10/25/2023 1109 118 70 - 130 mg/dL Final   10/25/2023 0759 115 70 - 130 mg/dL Final   10/24/2023 2058 135 (H) 70 - 130 mg/dL Final   10/24/2023 1619 124 70 - 130  mg/dL Final       aspirin, 81 mg, Oral, Daily  bisoprolol, 5 mg, Oral, Daily  famotidine, 20 mg, Oral, BID AC  insulin lispro, 2-7 Units, Subcutaneous, 4x Daily AC & at Bedtime  metoclopramide, 10 mg, Intravenous, Q6H  sodium chloride, 10 mL, Intravenous, Q12H      sodium chloride, 100 mL/hr, Last Rate: 100 mL/hr (10/26/23 0734)    Diet: Gastrointestinal Diets, Diabetic Diets; Consistent Carbohydrate; Fiber-Restricted; Texture: Regular Texture (IDDSI 7); Fluid Consistency: Thin (IDDSI 0)       Assessment/Plan     Active Hospital Problems    Diagnosis  POA    **Abdominal pain [R10.9]  Yes    Nausea and vomiting [R11.2]  Yes    Microcytic anemia [D50.9]  Yes    Hyperlipidemia [E78.5]  Yes    CAD (coronary artery disease) [I25.10]  Yes    CKD (chronic kidney disease) stage 3, GFR 30-59 ml/min [N18.30]  Yes    Type 2 diabetes mellitus [E11.9]  Yes    Hypertension [I10]  Yes      Resolved Hospital Problems   No resolved problems to display.     Ms. Unger is a 63 y.o. female that presented to the hospital with a several week history of abdominal pain, nausea, vomiting and diarrhea.  She was apparently started on two new medications with semaglutide and oxybutynin on 9/12/2023 by her PCP.  Her symptoms started shortly thereafter.  She was placed in the observation unit and GI was consulted, but they planned for further endoscopy evaluation prompting [facility] consultation to assume care.     Abdominal pain  Nausea and vomiting  Microcytic anemia  -GI consulted.  Feels likely symptoms secondary to semaglutide and recommend holding this for now.  -Stool studies ordered but not yet obtained.  -S/P EGD with unremarkable findings. Colonoscopy not done d/t intolerance of bowel prep secondary to nausea.  -Diet as tolerated per GI.  -Supportive care for symptoms.  She continues to have ongoing nausea and vomiting and PO intolerance-we will continue Pepcid, but stop Reglan as she does not feel like this is very effective.  Will add  liquid Phenergan as needed and schedule a scopolamine patch to see if this has any effect-appreciate any further GI recommendations as well.  Would suspect that symptoms would start to improve if this were medication related.     CAD  Hypertension  -Established with Dr. Harper with cardiology.  -Last cardiac cath in April 2023 with moderate CAD and no interventions.  -Continue medical management with ASA and bisoprolol.   -BP still elevated at times and likely triggered by nausea and vomiting.  She has been on 5 mg of bisoprolol, but has only been taking 2.5 mg as she reports intolerance to the full dosing.  -Reports history of intolerance to amlodipine.  We will ask her cardiologist to weigh in as she is established with them in the outpatient setting.  Suspect she needs an additional agent-possibly lisinopril?     CKD3  -Renal function very stable.  -Electrolytes stable.     DM2  -Last A1c 7/2023 at 9.8%.   -Sugars very stable at present. Appears home metformin and Tresiba held on admission.  -Will continue with correctional SSI and add back basal pending oral intake.     Discussed with patient.     Home in next 1-2 days if symptoms controlled. Appreciate GI/cards input.     VTE Prophylaxis - SCDs  Code Status - Full code  Disposition - Anticipate discharge as above.       BRETT Villavicencio  [facility]  10/26/23  09:23 EDT

## 2023-10-26 NOTE — CASE MANAGEMENT/SOCIAL WORK
Continued Stay Note  [facility]     Patient Name: Lily Unger  MRN: [id number]  Today's Date: 10/26/2023    Admit Date: 10/22/2023    Plan: plans to return home- housing resources provided   Discharge Plan       Row Name 10/26/23 1559       Plan    Plan plans to return home- housing resources provided    Patient/Family in Agreement with Plan yes    Plan Comments Spoke with patient at bedside. Plan remains home. She states that she does not have running water or electricity. Spoke with [facility]  and provided her housing resources for [facility].                   Discharge Codes    No documentation.                 Expected Discharge Date and Time       Expected Discharge Date Expected Discharge Time    Oct 27, 2023               Jennifer Briones RN

## 2023-10-26 NOTE — CONSULTS
[facility]  Cardiology Consult Note    Patient Identification:  Name: Lily Unger  Age: 63 y.o.  Sex: female  :  1960  MRN: [id number]             Requesting Physician: BRETT Patrick    Reason for Consultation / Chief Complaint: uncontrolled hypertension    History of Present Illness:     Lily Unger is a 63 year old female with coronary artery disease, hypertension, hyperlipidemia, CKD, diabetes mellitus. She presented to [facility] ER with complaints abdominal pain, N/V/D. Work up in ER revealed UTI and she was admitted for further management of abdominal pain.  GI evaluated patient and felt symptoms lined up with initation of semiglutide and this has been held. She underwent EGD yesterday with unremarkable findings, she did not have colonoscopy because she was unable to tolerate prep. Cardiology has been consulted for hypertension.     Echo 23 normal EF and LV function. Stress test 2023 was abnormal and she underwent cath 2023. Cath with normal left main with 20% ostial stenosis, LAD midportion stent widely patent minimum diffuse irregularity including diagonal and perfortaor branches. Lcx nondominant with early atherosclerotic plaque. RCA dominant with 20% ostial and 20-30% distal stenosis.     Comorbid cardiac risk factors: cad. Hypertension, hyperlipidemia, dm, ckd    Past Medical History:  Past Medical History:   Diagnosis Date    Asthma     CAD (coronary artery disease)     Chronic kidney disease     COVID     Crescendo angina     Diabetes mellitus     H/O angioplasty     Hiatal hernia     Hyperlipidemia     Hypertension     Obesity     Recurrent urinary tract infection     Rotator cuff tear, right     Sleep apnea     NO MACHINE    Torn rotator cuff      Past Surgical History:  Past Surgical History:   Procedure Laterality Date    CARDIAC CATHETERIZATION      OUTCOME: SUCCESSFUL    CARDIAC CATHETERIZATION N/A 3/20/2019    Procedure: RIGHT AND  LEFT HEART CATH;  Surgeon: Roe Harper MD;  Location:  [facility] CATH INVASIVE LOCATION;  Service: Cardiology    CARDIAC CATHETERIZATION N/A 3/20/2019    Procedure: Coronary angiography;  Surgeon: Roe Harper MD;  Location:  [facility] CATH INVASIVE LOCATION;  Service: Cardiology    CARDIAC CATHETERIZATION N/A 3/20/2019    Procedure: Left ventriculography;  Surgeon: Roe Harper MD;  Location:  [facility] CATH INVASIVE LOCATION;  Service: Cardiology    CARDIAC CATHETERIZATION N/A 3/20/2019    Procedure: Stent BANDAR coronary;  Surgeon: oRe Harper MD;  Location:  [facility] CATH INVASIVE LOCATION;  Service: Cardiology    CARDIAC CATHETERIZATION N/A 8/14/2020    Procedure: Left Heart Cath;  Surgeon: Roe Harper MD;  Location:  [facility] CATH INVASIVE LOCATION;  Service: Cardiovascular;  Laterality: N/A;    CARDIAC CATHETERIZATION N/A 8/14/2020    Procedure: Coronary angiography;  Surgeon: Roe Harper MD;  [facility] C[facility];  Service: Cardiovascular;  Laterality: N/A;    CARDIAC CATHETERIZATION N/A 8/14/2020    Procedure: Left ventriculography;  Surgeon: Roe Harper MD;  Location:  [facility] CATH INVASIVE LOCATION;  Service: Cardiovascular;  Laterality: N/A;    CARDIAC CATHETERIZATION N/A 8/14/2020    Procedure: Percutaneous Coronary Intervention;  Surgeon: Roe Harper MD;  [facility];  Service: Cardiovascular;  Laterality: N/A;    CARDIAC CATHETERIZATION N/A 1/12/2022    Procedure: Left Heart Cath;  Surgeon: Roe Harper MD;  [facility];  Service: Cardiovascular;  Laterality: N/A;    CARDIAC CATHETERIZATION N/A 1/12/2022    Procedure: Coronary angiography;  Surgeon: Roe Harper MD;  [facility];  Service: Cardiovascular;  Laterality: N/A;    CARDIAC CATHETERIZATION N/A 1/12/2022    Procedure: Left ventriculography;  Surgeon:  Roe Harper MD;  Location: [facility] CATH INVASIVE LOCATION;  Service: Cardiovascular;  Laterality: N/A;    CARDIAC CATHETERIZATION N/A 2022    Procedure: Resting Full Cycle Ratio;  Surgeon: Roe Harper MD;  Location: [facility] CATH INVASIVE LOCATION;  Service: Cardiovascular;  Laterality: N/A;    CARDIAC CATHETERIZATION N/A 2022    Procedure: Stent BANDAR coronary;  Surgeon: Roe Harper MD;  Location: [facility] CATH INVASIVE LOCATION;  Service: Cardiovascular;  Laterality: N/A;    CARDIAC CATHETERIZATION N/A 2023    Procedure: Left Heart Cath;  Surgeon: Roe Harper MD;  Location: [facility] CATH INVASIVE LOCATION;  Service: Cardiology;  Laterality: N/A;    CARDIAC CATHETERIZATION N/A 2023    Procedure: Coronary angiography;  Surgeon: Roe Harper MD;  Location: [facility] CATH INVASIVE LOCATION;  Service: Cardiology;  Laterality: N/A;    CARDIAC CATHETERIZATION N/A 2023    Procedure: Left ventriculography;  Surgeon: Roe Harper MD;  Location: [facility] CATH INVASIVE LOCATION;  Service: Cardiology;  Laterality: N/A;    CARDIAC ELECTROPHYSIOLOGY PROCEDURE N/A 2020    Procedure: Temporary Pacemaker;  Surgeon: Roe Harper MD;  Location: [facility] CATH INVASIVE LOCATION;  Service: Cardiovascular;  Laterality: N/A;     SECTION      CORONARY STENT PLACEMENT      LAD    ENDOSCOPY N/A 10/25/2023    Procedure: ESOPHAGOGASTRODUODENOSCOPY with cold biopsies;  Surgeon: Reshma Duque MD;  Location: [facility] ENDOSCOPY;  Service: Gastroenterology;  Laterality: N/A;  pre: nausea and vomiting  post: normal    HYSTERECTOMY      TONSILLECTOMY AND ADENOIDECTOMY      UMBILICAL HERNIA REPAIR        Allergies:  Allergies   Allergen Reactions    Hydrocodone Nausea And Vomiting and GI Intolerance    Penicillins Hives    Oxybutynin GI Intolerance     vomitting    Ozempic (0.25 Or 0.5 Mg-Dose) [Semaglutide(0.25 Or 0.5mg-Dos)] GI Intolerance     Vomiting      Plavix [Clopidogrel Bisulfate] Nausea And Vomiting     Home Meds:  Medications Prior to Admission   Medication Sig Dispense Refill Last Dose    aspirin 81 MG EC tablet Take 1 tablet by mouth Daily.   10/22/2023 at 0900    bisoprolol (ZEBeta) 5 MG tablet Take 1 tablet by mouth Daily. (Patient taking differently: Take 1 tablet by mouth Daily. TAKING 1/2 TAB) 45 tablet 1 10/22/2023 at 0900    Magnesium Oxide -Mg Supplement 400 (240 Mg) MG tablet    Past Week    metFORMIN (GLUCOPHAGE) 1000 MG tablet Take 1 tablet by mouth 2 (Two) Times a Day.  0 10/22/2023 at 0900    nitroglycerin (NITROSTAT) 0.4 MG SL tablet Place 1 tablet under the tongue See Admin Instructions. 25 tablet 3 Past Month    omeprazole (priLOSEC) 40 MG capsule Take 1 capsule by mouth Daily.   10/22/2023 at 0900    ondansetron ODT (ZOFRAN-ODT) 4 MG disintegrating tablet Place 1 tablet on the tongue 4 (Four) Times a Day As Needed for Nausea or Vomiting. 15 tablet 0 Past Month    OneTouch Delica Lancets 30G misc TEST 1 (ONE) EACH DAILY   10/22/2023    ONETOUCH VERIO test strip   0 10/22/2023    pregabalin (LYRICA) 150 MG capsule Take 1 capsule by mouth 2 (Two) Times a Day.   Past Week    rosuvastatin (CRESTOR) 20 MG tablet Take 1 tablet by mouth Daily. 90 tablet 3 10/22/2023 at 0900    Tresiba FlexTouch 100 UNIT/ML solution pen-injector injection 25 Units.   Past Week    SUMAtriptan (IMITREX) 50 MG tablet Take 1 tablet by mouth Every 2 (Two) Hours As Needed for Migraine. Take one tablet at onset of headache. May repeat dose one time in 2 hours if headache not relieved.   More than a month     Current Meds:   [unfilled]  Social History:   Social History     Tobacco Use    Smoking status: Former     Types: Cigarettes     Quit date:      Years since quittin.8    Smokeless tobacco: Never   Substance Use Topics    Alcohol use: Not Currently      Family History:  Family History   Problem Relation Age of Onset    Heart attack Father     Heart disease  "Father     Heart attack Brother     Heart disease Brother     Heart attack Maternal Grandfather     Heart disease Maternal Grandfather     Heart disease Mother     Malig Hyperthermia Neg Hx         Review of Systems    Constitutional: No weakness,fatigue, fever, rigors, chills   Eyes: No vision changes, eye pain   ENT/oropharynx: No difficulty swallowing, sore throat, epistaxis, changes in hearing   Cardiovascular: No chest pain, chest tightness, palpitations, paroxysmal nocturnal dyspnea, orthopnea, diaphoresis, dizziness / syncopal episode   Respiratory: No shortness of breath, dyspnea on exertion, cough, wheezing hemoptysis   Gastrointestinal: No abdominal pain, nausea, vomiting, diarrhea, bloody stools   Genitourinary: No hematuria, dysuria   Neurological: No headache, tremors, numbness,  one-sided weakness    Musculoskeletal: No cramps, myalgias,  joint pain, joint swelling   Integument: No rash, edema           /85 (BP Location: Left arm, Patient Position: Lying)   Pulse 57   Temp 97.8 °F (36.6 °C) (Oral)   Resp 16   Ht 154.9 cm (61\")   Wt 77.9 kg (171 lb 11.2 oz)   SpO2 98%   BMI 32.44 kg/m²   General appearance: No acute changes   Neck: Trachea midline; NECK, supple, no thyromegaly or lymphadenopathy   Lungs: Normal size and shape, normal breath sounds, equal distribution of air, no rales and rhonchi   CV: S1-S2 regular, no murmurs, no rub, no gallop   Abdomen: Soft, nontender; no masses , no abnormal abdominal sounds   Extremities: No deformity , normal color , no peripheral edema   Skin: Normal temperature, turgor and texture; no rash, ulcers                  Cardiographics  ECG:   Prior ecg 8/24/23        Echocardiogram:   2/2023  Interpretation Summary         Left ventricular ejection fraction appears to be 61 - 65%.    Left ventricular diastolic function was normal.    Estimated right ventricular systolic pressure from tricuspid regurgitation is normal (<35 mmHg).      Cath " 4/2023  Pressures    Ao: 120/80 mmHg   LV: 120/10 mmHg  End-diastolic pressure: 10 mmHg  No significant aortic valvular gradient on pullback    Coronary Angiography    Left Main: The left main originates in the left coronary cusp. It bifurcates and gives rise to Normal left main with ostial 20% stenosis    the, LAD and circumflex system.     Left Anterior Descending: Left anterior descending midportion stent widely patent minimum diffuse distal irregularity including the diagonal and  branches    Left Circumflex: Nondominant with early atherosclerotic plaque    Right Coronary Artery: The right coronary artery originates in the right coronary cusp. Dominant with 20% ostial and 20 to 30% distal stenosis    Left Ventriculography:     Estimated Ejection Fraction: 60%   Wall motion abnormalities: None   Mitral Regurgitation: None     Impression:    Moderate coronary artery disease will be treated medically  Normal LV gram    Recommendations:    Medical management     sincerely appreciate the opportunity to participate in your patient's care. Please feel free to contact me anytime if I can be of assistance in this or any other way.    Roe Harper MD  4/21/2023  13:16 EDT     Imaging      Lab Review       Results from last 7 days   Lab Units 10/23/23  0317   MAGNESIUM mg/dL 2.0     Results from last 7 days   Lab Units 10/26/23  0436   SODIUM mmol/L 140   POTASSIUM mmol/L 3.8   BUN mg/dL 5*   CREATININE mg/dL 0.76   CALCIUM mg/dL 9.2     @[id number]@  Results from last 7 days   Lab Units 10/26/23  0436 10/25/23  0522 10/23/23  0317   WBC 10*3/mm3 8.47 9.20 8.13   HEMOGLOBIN g/dL 11.3* 11.7* 10.1*   HEMATOCRIT % 37.0 38.4 34.0   PLATELETS 10*3/mm3 322 351 262             Assessment:  Abdominal pain/Nausea/Vomiting  Anemia  Hypertension  Coronary artery disease  CKD  Diabetes mellitus type II    Recommendations / Plan:   This is a 63 year old female admitted for abdominal pain, nausea and vomiting, felt  to be due semiglutide. She underwent egd yesterday with unremarkable findings. Cardiology has been consulted for hypertension. She is on bisoprolol 5mg at home for which she is currently taking, however her BP has been 180-200s at times while admitted. She has required PRN IV hydralazine. However this morning she only took bisoprolol 2.5mg instead of 5, she tellsme she has only taken 2.5 every day because 5mg makes her sick. Hr stable. She tells me she has not been able to take amlodipine in the past, losartan dropped her bp too low. She has had prn hydralazine IV without issue and so I will start her on hydralazine 25mg three times daily.       Daysi Daigle, APRN  10/26/2023, 08:52 EDT    Patient personally interviewed and above subjective findings personally confirmed during a face to face contact with patient today  All findings of physical examination confirmed  All pertinent and performed labs, cardiac procedures ,  radiographs of the last 24 hours personally reviewed  Impression and plans discussed/elaborated and implemented jointly as described above     MD UNIQUE Martin/Transcription:   Dictated utilizing Dragon dictation

## 2023-10-26 NOTE — PROGRESS NOTES
Gastroenterology   Inpatient Progress Note    Reason for Follow Up: Abdominal pain, nausea, vomiting    Subjective     Interval History:   Patient continues to report nausea and vomiting, states that her emesis has tapered off somewhat and reports only 1/2-1 cup approximately of emesis today.  She feels that it is directly correlated to Ozempic, which was prescribed for management of her diabetes.  Her last injection was approximately 2 weeks ago per her report.  She is only tolerating small amounts of food.  She states she did tolerate a turkey sandwich today.  She has not undergone a gastric emptying study.    Current Facility-Administered Medications:     acetaminophen (TYLENOL) tablet 650 mg, 650 mg, Oral, Q4H PRN, Reshma Duque MD, 650 mg at 10/26/23 0057    aspirin EC tablet 81 mg, 81 mg, Oral, Daily, Reshma Duque MD, 81 mg at 10/26/23 0810    [START ON 10/27/2023] bisoprolol (ZEBeta) tablet 2.5 mg, 2.5 mg, Oral, Daily, Ashley Clarke APRN    Calcium Replacement - Follow Nurse / BPA Driven Protocol, , Does not apply, PRN, Reshma Duque MD    dextrose (D50W) (25 g/50 mL) IV injection 25 g, 25 g, Intravenous, Q15 Min PRN, Reshma Duque MD    dextrose (GLUTOSE) oral gel 15 g, 15 g, Oral, Q15 Min PRN, Reshma Duque MD    famotidine (PEPCID) tablet 20 mg, 20 mg, Oral, BID AC, Reshma Duque MD, 20 mg at 10/26/23 0809    glucagon (GLUCAGEN) injection 1 mg, 1 mg, Intramuscular, Q15 Min PRN, Reshma Duque MD    hydrALAZINE (APRESOLINE) injection 10 mg, 10 mg, Intravenous, Q6H PRN, Reshma Duque MD, 10 mg at 10/25/23 2137    insulin lispro (HUMALOG/ADMELOG) injection 2-7 Units, 2-7 Units, Subcutaneous, 4x Daily AC & at Bedtime, Reshma Duque MD, 2 Units at 10/25/23 2055    Magnesium Standard Dose Replacement - Follow Nurse / BPA Driven Protocol, , Does not apply, PRN, Reshma Duque MD    nitroglycerin (NITROSTAT) SL tablet 0.4 mg, 0.4 mg, Sublingual, Q5 Min PRN,  Reshma Duque MD    ondansetron (ZOFRAN) tablet 4 mg, 4 mg, Oral, Q6H PRN **OR** ondansetron (ZOFRAN) injection 4 mg, 4 mg, Intravenous, Q6H PRN, Reshma Duque MD, 4 mg at 10/26/23 0809    Phosphorus Replacement - Follow Nurse / BPA Driven Protocol, , Does not apply, Dunia RIVERS Lauren C., MD    Potassium Replacement - Follow Nurse / BPA Driven Protocol, , Does not apply, Dunia RIVERS Lauren C., MD    promethazine (PHENERGAN) 6.25 MG/5ML syrup 25 mg, 25 mg, Oral, Q6H PRN, Ashley Clarke APRN    scopolamine patch 1 mg/72 hr, 1 patch, Transdermal, Q72H, Ashley Clarke APRN    [COMPLETED] Insert Peripheral IV, , , Once **AND** sodium chloride 0.9 % flush 10 mL, 10 mL, Intravenous, PRN, Reshma Duque MD    sodium chloride 0.9 % flush 10 mL, 10 mL, Intravenous, Q12H, Reshma Duque MD, 10 mL at 10/26/23 0810    sodium chloride 0.9 % flush 10 mL, 10 mL, Intravenous, PRNDunia Lauren C., MD    sodium chloride 0.9 % infusion 40 mL, 40 mL, Intravenous, PRNDunia Lauren C., MD    sodium chloride 0.9 % infusion, 100 mL/hr, Intravenous, Continuous, Reshma Duque MD, Last Rate: 100 mL/hr at 10/26/23 1008, 100 mL/hr at 10/26/23 1008  Review of Systems:    All systems were reviewed and negative except for:  Gastrointestinal: positive for  nausea and vomiting    Objective     Vital Signs  Temp:  [97.3 °F (36.3 °C)-98.4 °F (36.9 °C)] 98.1 °F (36.7 °C)  Heart Rate:  [55-61] 60  Resp:  [16] 16  BP: (145-201)/(47-94) 151/55  Body mass index is 32.44 kg/m².    Intake/Output Summary (Last 24 hours) at 10/26/2023 1032  Last data filed at 10/26/2023 0600  Gross per 24 hour   Intake 2013 ml   Output --   Net 2013 ml     No intake/output data recorded.     Physical Exam:   General: patient awake, alert and cooperative   Eyes: no scleral icterus   Skin: warm and dry, not jaundiced   Abdomen: soft, nontender, nondistended; normal bowel sounds, no masses palpated, no periumbical lymphadenopathy   Psychiatric:  Appropriate affect and behavior     Results Review:     I reviewed the patient's new clinical results.  I reviewed the patient's new imaging results and agree with the interpretation.  I reviewed the patient's other test results and agree with the interpretation    Results from last 7 days   Lab Units 10/26/23  0436 10/25/23  0522 10/23/23  0317   WBC 10*3/mm3 8.47 9.20 8.13   HEMOGLOBIN g/dL 11.3* 11.7* 10.1*   HEMATOCRIT % 37.0 38.4 34.0   PLATELETS 10*3/mm3 322 351 262     Results from last 7 days   Lab Units 10/26/23  0436 10/25/23  0522 10/23/23  0317 10/22/23  2057   SODIUM mmol/L 140 138 141 139   POTASSIUM mmol/L 3.8 4.0 3.4* 3.5   CHLORIDE mmol/L 108* 107 106 102   CO2 mmol/L 25.0 22.4 26.0 27.0   BUN mg/dL 5* 4* 7* 8   CREATININE mg/dL 0.76 0.71 0.77 0.83   CALCIUM mg/dL 9.2 9.2 8.6 9.5   BILIRUBIN mg/dL  --   --   --  0.2   ALK PHOS U/L  --   --   --  69   ALT (SGPT) U/L  --   --   --  27   AST (SGOT) U/L  --   --   --  24   GLUCOSE mg/dL 125* 118* 162* 139*         Lab Results   Lab Value Date/Time    LIPASE 25 10/22/2023 2057    LIPASE 29 10/18/2023 1711    LIPASE 28 08/24/2023 1917    LIPASE 64 (H) 06/10/2022 1351    LIPASE 24 11/18/2018 0723       Radiology:  CT Abdomen Pelvis Without Contrast   Final Result   1. Status post hysterectomy and ventral hernia repair.   2. No acute process identified.       Radiation dose reduction techniques were utilized, including automated   exposure control and exposure modulation based on body size.           This report was finalized on 10/22/2023 10:29 PM by Dr. Noe Powell M.D on Workstation: [id number]              Assessment & Plan     Active Hospital Problems    Diagnosis     **Abdominal pain     Nausea and vomiting     Microcytic anemia     Hyperlipidemia     CAD (coronary artery disease)     CKD (chronic kidney disease) stage 3, GFR 30-59 ml/min     Type 2 diabetes mellitus     Hypertension        Assessment:  Abdominal pain  Nausea and  vomiting  Diarrhea-resolved  Microcytic anemia-no overt GI bleeding noted  History of CAD    These problems are new to me.  Plan:  EGD on 10/25 was unremarkable.  Pathology pending.  Continue supportive care with antiemetics  Symptoms likely secondary to semaglutide use-patient's last dose was approximately 2 weeks ago  Consider gastric emptying study as an outpatient.  Patient will not be able to complete test in her current state due to her nausea and vomiting.   We will continue to follow    I discussed the patients findings and my recommendations with patient.    BRETT Zelaya APRN  [facility]  [address] [place] [address]  Office: [phone]

## 2023-10-27 ENCOUNTER — TELEPHONE (OUTPATIENT)
Dept: GASTROENTEROLOGY | Facility: CLINIC | Age: 63
End: 2023-10-27

## 2023-10-27 ENCOUNTER — READMISSION MANAGEMENT (OUTPATIENT)
Dept: CALL CENTER | Facility: HOSPITAL | Age: 63
End: 2023-10-27
Payer: MEDICARE

## 2023-10-27 VITALS
TEMPERATURE: 98.3 F | SYSTOLIC BLOOD PRESSURE: 163 MMHG | RESPIRATION RATE: 18 BRPM | HEART RATE: 63 BPM | DIASTOLIC BLOOD PRESSURE: 71 MMHG | HEIGHT: 61 IN | OXYGEN SATURATION: 97 % | WEIGHT: 171.7 LBS | BODY MASS INDEX: 32.42 KG/M2

## 2023-10-27 LAB
ANION GAP SERPL CALCULATED.3IONS-SCNC: 7 MMOL/L (ref 5–15)
BUN SERPL-MCNC: 9 MG/DL (ref 8–23)
BUN/CREAT SERPL: 9.9 (ref 7–25)
CALCIUM SPEC-SCNC: 9.5 MG/DL (ref 8.6–10.5)
CHLORIDE SERPL-SCNC: 105 MMOL/L (ref 98–107)
CO2 SERPL-SCNC: 27 MMOL/L (ref 22–29)
CREAT SERPL-MCNC: 0.91 MG/DL (ref 0.57–1)
DEPRECATED RDW RBC AUTO: 48.6 FL (ref 37–54)
EGFRCR SERPLBLD CKD-EPI 2021: 70.2 ML/MIN/1.73
ERYTHROCYTE [DISTWIDTH] IN BLOOD BY AUTOMATED COUNT: 18.2 % (ref 12.3–15.4)
GLUCOSE BLDC GLUCOMTR-MCNC: 151 MG/DL (ref 70–130)
GLUCOSE BLDC GLUCOMTR-MCNC: 174 MG/DL (ref 70–130)
GLUCOSE SERPL-MCNC: 161 MG/DL (ref 65–99)
HCT VFR BLD AUTO: 34.6 % (ref 34–46.6)
HGB BLD-MCNC: 10.5 G/DL (ref 12–15.9)
MAGNESIUM SERPL-MCNC: 2.2 MG/DL (ref 1.6–2.4)
MCH RBC QN AUTO: 22.9 PG (ref 26.6–33)
MCHC RBC AUTO-ENTMCNC: 30.3 G/DL (ref 31.5–35.7)
MCV RBC AUTO: 75.5 FL (ref 79–97)
PHOSPHATE SERPL-MCNC: 2.9 MG/DL (ref 2.5–4.5)
PLATELET # BLD AUTO: 309 10*3/MM3 (ref 140–450)
PMV BLD AUTO: 9.8 FL (ref 6–12)
POTASSIUM SERPL-SCNC: 3.6 MMOL/L (ref 3.5–5.2)
RBC # BLD AUTO: 4.58 10*6/MM3 (ref 3.77–5.28)
SODIUM SERPL-SCNC: 139 MMOL/L (ref 136–145)
WBC NRBC COR # BLD: 9.25 10*3/MM3 (ref 3.4–10.8)

## 2023-10-27 PROCEDURE — 82948 REAGENT STRIP/BLOOD GLUCOSE: CPT

## 2023-10-27 PROCEDURE — 85027 COMPLETE CBC AUTOMATED: CPT | Performed by: NURSE PRACTITIONER

## 2023-10-27 PROCEDURE — 63710000001 INSULIN LISPRO (HUMAN) PER 5 UNITS: Performed by: INTERNAL MEDICINE

## 2023-10-27 PROCEDURE — 80048 BASIC METABOLIC PNL TOTAL CA: CPT | Performed by: NURSE PRACTITIONER

## 2023-10-27 PROCEDURE — 25810000003 SODIUM CHLORIDE 0.9 % SOLUTION: Performed by: NURSE PRACTITIONER

## 2023-10-27 PROCEDURE — 99232 SBSQ HOSP IP/OBS MODERATE 35: CPT | Performed by: NURSE PRACTITIONER

## 2023-10-27 PROCEDURE — 83735 ASSAY OF MAGNESIUM: CPT | Performed by: STUDENT IN AN ORGANIZED HEALTH CARE EDUCATION/TRAINING PROGRAM

## 2023-10-27 PROCEDURE — 84100 ASSAY OF PHOSPHORUS: CPT | Performed by: STUDENT IN AN ORGANIZED HEALTH CARE EDUCATION/TRAINING PROGRAM

## 2023-10-27 PROCEDURE — 25810000003 SODIUM CHLORIDE 0.9 % SOLUTION: Performed by: INTERNAL MEDICINE

## 2023-10-27 RX ORDER — POTASSIUM CHLORIDE 1.5 G/1.58G
40 POWDER, FOR SOLUTION ORAL EVERY 4 HOURS
Status: DISCONTINUED | OUTPATIENT
Start: 2023-10-27 | End: 2023-10-27

## 2023-10-27 RX ORDER — HYDRALAZINE HYDROCHLORIDE 25 MG/1
25 TABLET, FILM COATED ORAL EVERY 8 HOURS SCHEDULED
Qty: 90 TABLET | Refills: 0 | Status: SHIPPED | OUTPATIENT
Start: 2023-10-27 | End: 2023-11-26

## 2023-10-27 RX ORDER — FAMOTIDINE 20 MG/1
20 TABLET, FILM COATED ORAL
Qty: 60 TABLET | Refills: 0 | Status: SHIPPED | OUTPATIENT
Start: 2023-10-27 | End: 2023-11-26

## 2023-10-27 RX ORDER — POTASSIUM CHLORIDE 750 MG/1
40 TABLET, FILM COATED, EXTENDED RELEASE ORAL EVERY 4 HOURS
Status: COMPLETED | OUTPATIENT
Start: 2023-10-27 | End: 2023-10-27

## 2023-10-27 RX ORDER — HYDROXYZINE HYDROCHLORIDE 25 MG/1
25 TABLET, FILM COATED ORAL ONCE
Status: COMPLETED | OUTPATIENT
Start: 2023-10-27 | End: 2023-10-27

## 2023-10-27 RX ORDER — ONDANSETRON 4 MG/1
4 TABLET, FILM COATED ORAL EVERY 6 HOURS PRN
Qty: 20 TABLET | Refills: 0 | Status: SHIPPED | OUTPATIENT
Start: 2023-10-27 | End: 2023-11-01

## 2023-10-27 RX ADMIN — INSULIN LISPRO 2 UNITS: 100 INJECTION, SOLUTION INTRAVENOUS; SUBCUTANEOUS at 06:35

## 2023-10-27 RX ADMIN — ASPIRIN 81 MG: 81 TABLET, COATED ORAL at 08:28

## 2023-10-27 RX ADMIN — HYDRALAZINE HYDROCHLORIDE 25 MG: 25 TABLET, FILM COATED ORAL at 14:10

## 2023-10-27 RX ADMIN — BISOPROLOL FUMARATE 2.5 MG: 5 TABLET, FILM COATED ORAL at 08:28

## 2023-10-27 RX ADMIN — HYDRALAZINE HYDROCHLORIDE 25 MG: 25 TABLET, FILM COATED ORAL at 05:56

## 2023-10-27 RX ADMIN — POTASSIUM CHLORIDE 40 MEQ: 750 TABLET, EXTENDED RELEASE ORAL at 08:45

## 2023-10-27 RX ADMIN — SODIUM CHLORIDE 100 ML/HR: 9 INJECTION, SOLUTION INTRAVENOUS at 01:26

## 2023-10-27 RX ADMIN — FAMOTIDINE 20 MG: 20 TABLET, FILM COATED ORAL at 06:32

## 2023-10-27 RX ADMIN — POTASSIUM CHLORIDE 40 MEQ: 750 TABLET, EXTENDED RELEASE ORAL at 14:10

## 2023-10-27 RX ADMIN — HYDROXYZINE HYDROCHLORIDE 25 MG: 25 TABLET ORAL at 02:50

## 2023-10-27 RX ADMIN — INSULIN LISPRO 2 UNITS: 100 INJECTION, SOLUTION INTRAVENOUS; SUBCUTANEOUS at 11:35

## 2023-10-27 NOTE — PLAN OF CARE
Goal Outcome Evaluation:  Plan of Care Reviewed With: patient           Outcome Evaluation: BP a little better.  no c/o nausea/vomiting/ pain.  potassium replaced per protocol.  no need to re-check level per BRETT Concepcion.  cleared by GI and cardiology for DC.  home with PO hydralazine.  pt eager to go home today.

## 2023-10-27 NOTE — PROGRESS NOTES
Gastroenterology   Inpatient Progress Note    Reason for Follow Up: Abdominal pain, nausea and vomiting    Subjective     Interval History:   Patient reports that her symptoms are much improved today.  She denies any further nausea or vomiting.  States that she is very hungry.  Tolerating diet.  It has been a little over 2 weeks since her last dose of Ozempic.  Hemoglobin 10.5 today.  Plan for discharge home today.    Current Facility-Administered Medications:     acetaminophen (TYLENOL) tablet 650 mg, 650 mg, Oral, Q4H PRN, Reshma Duque MD, 650 mg at 10/26/23 0057    aspirin EC tablet 81 mg, 81 mg, Oral, Daily, Reshma Duque MD, 81 mg at 10/27/23 0828    bisoprolol (ZEBeta) tablet 2.5 mg, 2.5 mg, Oral, Daily, Ashley Clarke, APRN, 2.5 mg at 10/27/23 0828    Calcium Replacement - Follow Nurse / BPA Driven Protocol, , Does not apply, PRN, Reshma Duque MD    dextrose (D50W) (25 g/50 mL) IV injection 25 g, 25 g, Intravenous, Q15 Min PRN, Reshma Duque MD    dextrose (GLUTOSE) oral gel 15 g, 15 g, Oral, Q15 Min PRN, Reshma Duque MD    famotidine (PEPCID) tablet 20 mg, 20 mg, Oral, BID AC, Reshma Duque MD, 20 mg at 10/27/23 0632    glucagon (GLUCAGEN) injection 1 mg, 1 mg, Intramuscular, Q15 Min PRN, Reshma Duque MD    hydrALAZINE (APRESOLINE) injection 10 mg, 10 mg, Intravenous, Q6H PRN, Reshma Duque MD, 10 mg at 10/26/23 1910    hydrALAZINE (APRESOLINE) tablet 25 mg, 25 mg, Oral, Q8H, Daysi Daigle APRN, 25 mg at 10/27/23 0556    insulin lispro (HUMALOG/ADMELOG) injection 2-7 Units, 2-7 Units, Subcutaneous, 4x Daily AC & at Bedtime, Reshma Duque MD, 2 Units at 10/27/23 0635    Magnesium Standard Dose Replacement - Follow Nurse / BPA Driven Protocol, , Does not apply, PRN, Reshma Duque MD    nitroglycerin (NITROSTAT) SL tablet 0.4 mg, 0.4 mg, Sublingual, Q5 Min PRN, Reshma Duque MD    ondansetron (ZOFRAN) tablet 4 mg, 4 mg, Oral, Q6H PRN **OR**  ondansetron (ZOFRAN) injection 4 mg, 4 mg, Intravenous, Q6H PRN, Reshma Duque MD, 4 mg at 10/26/23 0809    Phosphorus Replacement - Follow Nurse / BPA Driven Protocol, , Does not apply, Dunia RIVERS Lauren C., MD    potassium chloride (K-DUR,KLOR-CON) ER tablet 40 mEq, 40 mEq, Oral, Q4H, López Wetzel MD, 40 mEq at 10/27/23 0845    Potassium Replacement - Follow Nurse / BPA Driven Protocol, , Does not apply, Dunia RIVERS Lauren C., MD    promethazine (PHENERGAN) 6.25 MG/5ML syrup 25 mg, 25 mg, Oral, Q6H PRN, Ashley Clarke APRN, 25 mg at 10/26/23 2228    scopolamine patch 1 mg/72 hr, 1 patch, Transdermal, Q72H, Ashley Clarke APRN, 1 patch at 10/26/23 1158    [COMPLETED] Insert Peripheral IV, , , Once **AND** sodium chloride 0.9 % flush 10 mL, 10 mL, Intravenous, PRN, Reshma Duque MD    sodium chloride 0.9 % flush 10 mL, 10 mL, Intravenous, Q12H, Reshma Duque MD, 10 mL at 10/26/23 0810    sodium chloride 0.9 % flush 10 mL, 10 mL, Intravenous, Dunia RIVERS Lauren C., MD    sodium chloride 0.9 % infusion 40 mL, 40 mL, Intravenous, Dunia RIVERS Lauren C., MD  Review of Systems:    The following systems were reviewed and negative;  gastrointestinal    Objective     Vital Signs  Temp:  [97.7 °F (36.5 °C)-98 °F (36.7 °C)] 98 °F (36.7 °C)  Heart Rate:  [55-76] 59  Resp:  [16-18] 18  BP: (153-196)/(70-91) 184/70  Body mass index is 32.44 kg/m².    Intake/Output Summary (Last 24 hours) at 10/27/2023 0924  Last data filed at 10/27/2023 0915  Gross per 24 hour   Intake 1527.67 ml   Output --   Net 1527.67 ml     I/O this shift:  In: 781.7 [I.V.:781.7]  Out: -      Physical Exam:   General: patient awake, alert and cooperative   Eyes: no scleral icterus   Skin: warm and dry, not jaundiced   Abdomen: soft, nontender, nondistended; normal bowel sounds, no masses palpated, no periumbical lymphadenopathy   Psychiatric: Appropriate affect and behavior     Results Review:     I reviewed the patient's new clinical  results.  I reviewed the patient's new imaging results and agree with the interpretation.  I reviewed the patient's other test results and agree with the interpretation    Results from last 7 days   Lab Units 10/27/23  0630 10/26/23  0436 10/25/23  0522   WBC 10*3/mm3 9.25 8.47 9.20   HEMOGLOBIN g/dL 10.5* 11.3* 11.7*   HEMATOCRIT % 34.6 37.0 38.4   PLATELETS 10*3/mm3 309 322 351     Results from last 7 days   Lab Units 10/27/23  0630 10/26/23  0436 10/25/23  0522 10/23/23  0317 10/22/23  2057   SODIUM mmol/L 139 140 138   < > 139   POTASSIUM mmol/L 3.6 3.8 4.0   < > 3.5   CHLORIDE mmol/L 105 108* 107   < > 102   CO2 mmol/L 27.0 25.0 22.4   < > 27.0   BUN mg/dL 9 5* 4*   < > 8   CREATININE mg/dL 0.91 0.76 0.71   < > 0.83   CALCIUM mg/dL 9.5 9.2 9.2   < > 9.5   BILIRUBIN mg/dL  --   --   --   --  0.2   ALK PHOS U/L  --   --   --   --  69   ALT (SGPT) U/L  --   --   --   --  27   AST (SGOT) U/L  --   --   --   --  24   GLUCOSE mg/dL 161* 125* 118*   < > 139*    < > = values in this interval not displayed.         Lab Results   Lab Value Date/Time    LIPASE 25 10/22/2023 2057    LIPASE 29 10/18/2023 1711    LIPASE 28 08/24/2023 1917    LIPASE 64 (H) 06/10/2022 1351    LIPASE 24 11/18/2018 0723       Radiology:  CT Abdomen Pelvis Without Contrast   Final Result   1. Status post hysterectomy and ventral hernia repair.   2. No acute process identified.       Radiation dose reduction techniques were utilized, including automated   exposure control and exposure modulation based on body size.           This report was finalized on 10/22/2023 10:29 PM by Dr. Noe Powell M.D on Workstation: [id number]              Assessment & Plan     Active Hospital Problems    Diagnosis     **Abdominal pain     Nausea and vomiting     Microcytic anemia     Hyperlipidemia     CAD (coronary artery disease)     CKD (chronic kidney disease) stage 3, GFR 30-59 ml/min     Type 2 diabetes mellitus     Hypertension         Assessment:  Abdominal pain  Nausea and vomiting  Diarrhea-resolved  Microcytic anemia-no overt GI bleeding noted  History of CAD      Plan:  EGD on 10/25 was unremarkable.  Pathology still pending.  MD to contact patient with results.  Continue supportive care with antiemetics  We feel the patient symptoms are likely secondary to semaglutide use with patient's last dose approximately 2 weeks ago  If symptoms recur after discharge to consider GES on outpatient basis.    Patient is due for outpatient colonoscopy with Dr. Duque.  Sent sent to main [facility] GI office staff to get patient scheduled.  Would then recommend follow-up in office 2 to 4 weeks after procedure to discuss results and reassess symptoms to determine if patient needs GES.  Ok for discharge home from GI standpoint.  As always, thank you for allowing us to perspective care of your patient.  We can be of further assistance, please not hesitate to reach out to us.    I discussed the patients findings and my recommendations with patient and primary care team.    BRETT Zelaya APRN  [facility] [place]  [address] [place] [address]  Office: [phone]

## 2023-10-27 NOTE — PLAN OF CARE
Goal Outcome Evaluation:  Plan of Care Reviewed With: patient        Progress: improving  Outcome Evaluation: BP elevated this am. scheduled po hydralazine given.  Po phenergan given with good relief.  blood sugar monitoring.  c/o itching this am, hydroxyzine given.

## 2023-10-27 NOTE — PROGRESS NOTES
Case Management Discharge Note      Final Note: Discharged home. Rosalind Tang RN    Provided Post Acute Provider List?: N/A  Provided Post Acute Provider Quality & Resource List?: N/A    Selected Continued Care - Discharged on 10/27/2023 Admission date: 10/22/2023 - Discharge disposition: Home or Self Care       Transportation Services  Private: Car    Final Discharge Disposition Code: 01 - home or self-care

## 2023-10-27 NOTE — PROGRESS NOTES
Continued Stay Note  [facility]     Patient Name: Lily Unger  MRN: [id number]  Today's Date: 10/27/2023    Admit Date: 10/22/2023    Plan: Home no needs   Discharge Plan       Row Name 10/27/23 1231       Plan    Plan Home no needs    Plan Comments Discharge order noted. Met with patient who confirmed DC plan is to go to her brother's home. Stated her family will assist as needed and her sister will provide transportation at DC. Denies any needs/equipment.                   Discharge Codes    No documentation.                 Expected Discharge Date and Time       Expected Discharge Date Expected Discharge Time    Oct 27, 2023               Rosalind Tang RN

## 2023-10-27 NOTE — OUTREACH NOTE
Prep Survey      Flowsheet Row Responses   [facility] facility patient discharged from? [facility]   Is LACE score < 7 ? No   Eligibility Readm Mgmt   Discharge diagnosis Abd pain-EGD this visit   Does the patient have one of the following disease processes/diagnoses(primary or secondary)? Other   Does the patient have Home health ordered? No   Is there a DME ordered? No   Prep survey completed? Yes            JESSICA ANDREW - Registered Nurse

## 2023-10-27 NOTE — PROGRESS NOTES
[facility]  Cardiology Progress Note    Patient Identification:  Name: Lily Unger  Age: 63 y.o.  Sex: female  :  1960  MRN: [id number]                 Follow Up / Chief Complaint: follow up for hypertension    Interval History: sitting up in chair, no chest pain or shortness of breath. Appetite improved       Subjective: no chest pain      Objective:    Past Medical History:  Past Medical History:   Diagnosis Date    Asthma     CAD (coronary artery disease)     Chronic kidney disease     COVID     Crescendo angina     Diabetes mellitus     H/O angioplasty     Hiatal hernia     Hyperlipidemia     Hypertension     Obesity     Recurrent urinary tract infection     Rotator cuff tear, right     Sleep apnea     NO MACHINE    Torn rotator cuff      Past Surgical History:  Past Surgical History:   Procedure Laterality Date    CARDIAC CATHETERIZATION      OUTCOME: SUCCESSFUL    CARDIAC CATHETERIZATION N/A 3/20/2019    Procedure: RIGHT AND LEFT HEART CATH;  Surgeon: Roe Harper MD;  Location:  [facility] CATH INVASIVE LOCATION;  Service: Cardiology    CARDIAC CATHETERIZATION N/A 3/20/2019    Procedure: Coronary angiography;  Surgeon: Roe Harper MD;  [facility];  Service: Cardiology    CARDIAC CATHETERIZATION N/A 3/20/2019    Procedure: Left ventriculography;  Surgeon: Roe Harper MD;  Location:  [facility] CATH INVASIVE LOCATION;  Service: Cardiology    CARDIAC CATHETERIZATION N/A 3/20/2019    Procedure: Stent BANDAR coronary;  Surgeon: Roe Harper MD;  [facility];  Service: Cardiology    CARDIAC CATHETERIZATION N/A 2020    Procedure: Left Heart Cath;  Surgeon: Roe Harper MD;  [facility];  Service: Cardiovascular;  Laterality: N/A;    CARDIAC CATHETERIZATION N/A 2020    Procedure: Coronary angiography;  Surgeon: Roe Harper MD;  [facility];  Service: Cardiovascular;  Laterality: N/A;    CARDIAC CATHETERIZATION N/A 8/14/2020    Procedure: Left ventriculography;  Surgeon: Roe Harper MD;  Location: [facility] CATH INVASIVE LOCATION;  Service: Cardiovascular;  Laterality: N/A;    CARDIAC CATHETERIZATION N/A 8/14/2020    Procedure: Percutaneous Coronary Intervention;  Surgeon: Roe Harper MD;  Location: [facility] CATH INVASIVE LOCATION;  Service: Cardiovascular;  Laterality: N/A;    CARDIAC CATHETERIZATION N/A 1/12/2022    Procedure: Left Heart Cath;  Surgeon: Roe Harper MD;  Location: [facility] CATH INVASIVE LOCATION;  Service: Cardiovascular;  Laterality: N/A;    CARDIAC CATHETERIZATION N/A 1/12/2022    Procedure: Coronary angiography;  Surgeon: Roe Harper MD;  Location: [facility] CATH INVASIVE LOCATION;  Service: Cardiovascular;  Laterality: N/A;    CARDIAC CATHETERIZATION N/A 1/12/2022    Procedure: Left ventriculography;  Surgeon: Roe Harper MD;  Location: [facility] CATH INVASIVE LOCATION;  Service: Cardiovascular;  Laterality: N/A;    CARDIAC CATHETERIZATION N/A 1/12/2022    Procedure: Resting Full Cycle Ratio;  Surgeon: Roe Harper MD;  Location: [facility] CATH INVASIVE LOCATION;  Service: Cardiovascular;  Laterality: N/A;    CARDIAC CATHETERIZATION N/A 1/12/2022    Procedure: Stent BANDAR coronary;  Surgeon: Roe Harper MD;  Location: [facility] CATH INVASIVE LOCATION;  Service: Cardiovascular;  Laterality: N/A;    CARDIAC CATHETERIZATION N/A 4/21/2023    Procedure: Left Heart Cath;  Surgeon: Roe Harper MD;  Location: [facility] CATH INVASIVE LOCATION;  Service: Cardiology;  Laterality: N/A;    CARDIAC CATHETERIZATION N/A 4/21/2023    Procedure: Coronary angiography;  Surgeon: Roe Harper MD;  Location: [facility] CATH INVASIVE LOCATION;  Service: Cardiology;  Laterality: N/A;    CARDIAC CATHETERIZATION N/A 4/21/2023    Procedure: Left ventriculography;   Surgeon: Roe Harper MD;  Location:  [facility] CATH INVASIVE LOCATION;  Service: Cardiology;  Laterality: N/A;    CARDIAC ELECTROPHYSIOLOGY PROCEDURE N/A 2020    Procedure: Temporary Pacemaker;  Surgeon: Roe Harper MD;  Location:  [facility] CATH INVASIVE LOCATION;  Service: Cardiovascular;  Laterality: N/A;     SECTION      CORONARY STENT PLACEMENT      LAD    ENDOSCOPY N/A 10/25/2023    Procedure: ESOPHAGOGASTRODUODENOSCOPY with cold biopsies;  Surgeon: Reshma Duque MD;  [facility];  Service: Gastroenterology;  Laterality: N/A;  pre: nausea and vomiting  post: normal    HYSTERECTOMY      TONSILLECTOMY AND ADENOIDECTOMY      UMBILICAL HERNIA REPAIR          Social History:   Social History     Tobacco Use    Smoking status: Former     Types: Cigarettes     Quit date:      Years since quittin.    Smokeless tobacco: Never   Substance Use Topics    Alcohol use: Not Currently      Family History:  Family History   Problem Relation Age of Onset    Heart attack Father     Heart disease Father     Heart attack Brother     Heart disease Brother     Heart attack Maternal Grandfather     Heart disease Maternal Grandfather     Heart disease Mother     Malig Hyperthermia Neg Hx           Allergies:  Allergies   Allergen Reactions    Hydrocodone Nausea And Vomiting and GI Intolerance    Penicillins Hives    Oxybutynin GI Intolerance     vomitting    Ozempic (0.25 Or 0.5 Mg-Dose) [Semaglutide(0.25 Or 0.5mg-Dos)] GI Intolerance     Vomiting     Plavix [Clopidogrel Bisulfate] Nausea And Vomiting     Scheduled Meds:  aspirin, 81 mg, Daily  bisoprolol, 2.5 mg, Daily  famotidine, 20 mg, BID AC  hydrALAZINE, 25 mg, Q8H  insulin lispro, 2-7 Units, 4x Daily AC & at Bedtime  potassium chloride ER, 40 mEq, Q4H  Scopolamine, 1 patch, Q72H  sodium chloride, 10 mL, Q12H            INTAKE AND OUTPUT:    Intake/Output Summary (Last 24 hours) at 10/27/2023 1215  Last data filed at  "10/27/2023 0915  Gross per 24 hour   Intake 1527.67 ml   Output --   Net 1527.67 ml       Review of Systems:   GI: no n/v or abd pain  Cardiac: no chest pain or palpitations  Pulmonary: no shortness of breath or cough      Constitutional:  Temp:  [97.8 °F (36.6 °C)-98.3 °F (36.8 °C)] 98.3 °F (36.8 °C)  Heart Rate:  [55-76] 63  Resp:  [16-18] 18  BP: (157-196)/(66-91) 159/66    Physical Exam:  General:  Appears in no acute distress  Eyes: eom normal no conjunctival drainage  HEENT:  No JVD. Thyroid not visibly enlarged. No mucosal pallor or cyanosis  Respiratory: Respirations regular and unlabored at rest. BBS with good air entry in all fields. No crackles, rubs or wheezes auscultated  Cardiovascular: S1S2 Regular rate and rhythm. No murmur, rub or gallop. No carotid bruits. DP/PT pulses   2+  . No pretibial pitting edema  Gastrointestinal: Abdomen soft, non tender. Bowel sounds present.   Musculoskeletal: BRICENO x4. No abnormal movements  Neuro: AAO x3 CN II-XII grossly intact  Psych: Mood and affect normal, pleasant and cooperative            Cardiographics  Lab Review       Results from last 7 days   Lab Units 10/27/23  0630   MAGNESIUM mg/dL 2.2     Results from last 7 days   Lab Units 10/27/23  0630   SODIUM mmol/L 139   POTASSIUM mmol/L 3.6   BUN mg/dL 9   CREATININE mg/dL 0.91   CALCIUM mg/dL 9.5     @[id number]@  Results from last 7 days   Lab Units 10/27/23  0630 10/26/23  0436 10/25/23  0522   WBC 10*3/mm3 9.25 8.47 9.20   HEMOGLOBIN g/dL 10.5* 11.3* 11.7*   HEMATOCRIT % 34.6 37.0 38.4   PLATELETS 10*3/mm3 309 322 351             Assessment:  Abdominal pain/N/V  Anemia  Hypertension  Coronary artery disease  CKD  DM type II      Plan:  BP trends improved, continue bisoprolol and hydralazine.   No chest pain  Cardiac stable for discharge, no changes   She will follow up in office on Nov 28 at 11      )10/27/2023  BRETT Tucker/Transcription:   \"Dictated utilizing Dragon dictation\". "

## 2023-10-28 LAB
LAB AP CASE REPORT: NORMAL
PATH REPORT.FINAL DX SPEC: NORMAL
PATH REPORT.GROSS SPEC: NORMAL

## 2023-10-30 ENCOUNTER — TELEPHONE (OUTPATIENT)
Dept: GASTROENTEROLOGY | Facility: CLINIC | Age: 63
End: 2023-10-30
Payer: MEDICARE

## 2023-10-30 ENCOUNTER — PREP FOR SURGERY (OUTPATIENT)
Dept: OTHER | Facility: HOSPITAL | Age: 63
End: 2023-10-30
Payer: MEDICARE

## 2023-10-30 DIAGNOSIS — D50.9 MICROCYTIC ANEMIA: Primary | ICD-10-CM

## 2023-10-30 NOTE — TELEPHONE ENCOUNTER
Left voicemail x 3 to schedule hospital follow up w/tino 4-6wk after hospital d/c date     Ok for hub to read and schedule

## 2023-10-31 ENCOUNTER — READMISSION MANAGEMENT (OUTPATIENT)
Dept: CALL CENTER | Facility: HOSPITAL | Age: 63
End: 2023-10-31
Payer: MEDICARE

## 2023-10-31 NOTE — OUTREACH NOTE
Medical Week 1 Survey      Flowsheet Row Responses   [facility] patient discharged from? [place]   Does the patient have one of the following disease processes/diagnoses(primary or secondary)? Other   Week 1 attempt successful? No   Unsuccessful attempts Attempt 1            Sandra LECHUGA - Licensed Nurse

## 2023-11-01 ENCOUNTER — TELEPHONE (OUTPATIENT)
Dept: GASTROENTEROLOGY | Facility: CLINIC | Age: 63
End: 2023-11-01
Payer: MEDICARE

## 2023-11-03 ENCOUNTER — TELEPHONE (OUTPATIENT)
Dept: GASTROENTEROLOGY | Facility: CLINIC | Age: 63
End: 2023-11-03
Payer: MEDICARE

## 2023-11-03 ENCOUNTER — READMISSION MANAGEMENT (OUTPATIENT)
Dept: CALL CENTER | Facility: HOSPITAL | Age: 63
End: 2023-11-03
Payer: MEDICARE

## 2023-11-03 NOTE — PROGRESS NOTES
Small bowel biopsies were normal    Mild nonspecific inflammation seen on stomach biopsy.  Continue pepcid x 1 month

## 2023-11-03 NOTE — OUTREACH NOTE
Medical Week 1 Survey      Flowsheet Row Responses   [facility] patient discharged from? [place]   Does the patient have one of the following disease processes/diagnoses(primary or secondary)? Other   Week 1 attempt successful? No   Unsuccessful attempts Attempt 2            Yary Portillo Registered Nurse

## 2023-11-03 NOTE — TELEPHONE ENCOUNTER
----- Message from Reshma Duque MD sent at 11/3/2023  1:55 PM EDT -----  Small bowel biopsies were normal    Mild nonspecific inflammation seen on stomach biopsy.  Continue pepcid x 1 month

## 2023-11-07 ENCOUNTER — READMISSION MANAGEMENT (OUTPATIENT)
Dept: CALL CENTER | Facility: HOSPITAL | Age: 63
End: 2023-11-07
Payer: MEDICARE

## 2023-11-07 ENCOUNTER — TELEPHONE (OUTPATIENT)
Dept: GASTROENTEROLOGY | Facility: CLINIC | Age: 63
End: 2023-11-07
Payer: MEDICARE

## 2023-11-07 NOTE — OUTREACH NOTE
Medical Week 1 Survey      Flowsheet Row Responses   [facility] patient discharged from? [place]   Does the patient have one of the following disease processes/diagnoses(primary or secondary)? Other   Week 1 attempt successful? No   Unsuccessful attempts Attempt 3   Revoke Decline to participate            Jacqueline GREER - Registered Nurse

## 2023-11-08 ENCOUNTER — TELEPHONE (OUTPATIENT)
Dept: GASTROENTEROLOGY | Facility: CLINIC | Age: 63
End: 2023-11-08
Payer: MEDICARE

## 2023-11-28 ENCOUNTER — OFFICE VISIT (OUTPATIENT)
Dept: CARDIOLOGY | Facility: CLINIC | Age: 63
End: 2023-11-28
Payer: MEDICARE

## 2023-11-28 VITALS
HEIGHT: 61 IN | SYSTOLIC BLOOD PRESSURE: 138 MMHG | DIASTOLIC BLOOD PRESSURE: 72 MMHG | WEIGHT: 174 LBS | BODY MASS INDEX: 32.85 KG/M2 | HEART RATE: 55 BPM

## 2023-11-28 DIAGNOSIS — E78.5 HYPERLIPIDEMIA, UNSPECIFIED HYPERLIPIDEMIA TYPE: ICD-10-CM

## 2023-11-28 DIAGNOSIS — I10 ESSENTIAL HYPERTENSION: Primary | ICD-10-CM

## 2023-11-28 DIAGNOSIS — I25.119 CORONARY ARTERY DISEASE INVOLVING NATIVE CORONARY ARTERY OF NATIVE HEART WITH ANGINA PECTORIS: ICD-10-CM

## 2023-11-28 RX ORDER — BISOPROLOL FUMARATE 5 MG/1
2.5 TABLET, FILM COATED ORAL DAILY
Qty: 45 TABLET | Refills: 3 | Status: SHIPPED | OUTPATIENT
Start: 2023-11-28 | End: 2023-11-28 | Stop reason: SDUPTHER

## 2023-11-28 RX ORDER — BISOPROLOL FUMARATE 5 MG/1
2.5 TABLET, FILM COATED ORAL DAILY
Qty: 45 TABLET | Refills: 3 | Status: SHIPPED | OUTPATIENT
Start: 2023-11-28

## 2023-11-28 NOTE — PROGRESS NOTES
Subjective:        Lily Unger is a 63 y.o. female who here for follow up    Chief Complaint   Patient presents with    Follow-up     HOSPITAL FOLLOW UP       HPI    This is a 63-year-old female with coronary artery disease (Stent ostial RCA 2019, cath 2020 previous LAD proximal stent widely patent, diagonal branch jailed with angioplasty ostial diagonal branch 99% reduced to 10 to 20% in 2020, stent to the mid LAD 2021.) hypertension, hyperlipidemia, diabetes mellitus, CKD.  She follows up in office today after discharge from the hospital.  She was admitted 10/22/2023 through 10/27/2023 for abdominal pain, nausea and vomiting.  Cardiology consulted during admission for hypertension.  Hydralazine 3 times daily added with improved blood pressure. She stopped taking hydralazine once home because it was causing nausea.     Echo 2/28/2023 EF 60 to 65%, normal LV function.  Cardiac catheterization 4/21/2023 normal left main with ostial 20% stenosis, LAD midportion stent widely patent minimum diffuse distal irregularity including diagonal  branches.  Circumflex nondominant with early atherosclerotic plaque.  RCA dominant with ostial 20% stenosis, distal 20 to 30% stenosis.  Normal LV gram.    The following portions of the patient's history were reviewed and updated as appropriate: allergies, current medications, past family history, past medical history, past social history, past surgical history and problem list.    Past Medical History:   Diagnosis Date    Asthma     CAD (coronary artery disease)     Chronic kidney disease     COVID     Crescendo angina     Diabetes mellitus     H/O angioplasty     Hiatal hernia     Hyperlipidemia     Hypertension     Nausea and vomiting 10/24/2023    Obesity     Recurrent urinary tract infection     Rotator cuff tear, right     Sleep apnea     NO MACHINE    Torn rotator cuff          reports that she quit smoking about 31 years ago. Her smoking use included  cigarettes. She has never used smokeless tobacco. She reports that she does not currently use alcohol. She reports that she does not use drugs.     Family History   Problem Relation Age of Onset    Heart attack Father     Heart disease Father     Heart attack Brother     Heart disease Brother     Heart attack Maternal Grandfather     Heart disease Maternal Grandfather     Heart disease Mother     Malig Hyperthermia Neg Hx        ROS     Review of Systems  Constitutional: No wt loss, fever, fatigue  Gastrointestinal: No nausea, abdominal pain  Behavioral/Psych: No insomnia or anxiety  Cardiovascular no chest pain or shortness of breath      Objective:           Vitals and nursing note reviewed.   Constitutional:       Appearance: Well-developed.   HENT:      Head: Normocephalic.      Right Ear: External ear normal.      Left Ear: External ear normal.   Neck:      Vascular: No JVD.   Pulmonary:      Effort: Pulmonary effort is normal. No respiratory distress.      Breath sounds: Normal breath sounds. No stridor. No rales.   Cardiovascular:      Normal rate. Regular rhythm.      No gallop.    Pulses:     Intact distal pulses.   Edema:     Peripheral edema absent.   Abdominal:      General: Bowel sounds are normal. There is no distension.      Palpations: Abdomen is soft.      Tenderness: There is no abdominal tenderness. There is no guarding.   Musculoskeletal: Normal range of motion.         General: No tenderness.      Cervical back: Normal range of motion. Skin:     General: Skin is warm.   Neurological:      Mental Status: Alert and oriented to person, place, and time.      Deep Tendon Reflexes: Reflexes are normal and symmetric.   Psychiatric:         Judgment: Judgment normal.         Procedures    Conclusion         Successful right and left coronary angiogram and LV gram    Normal left main with ostial 20% stenosis    Left anterior descending midportion stent widely patent minimum diffuse distal irregularity  including the diagonal and  branches    Circumflex artery nondominant with early atherosclerotic plaque    Right coronary artery dominant with ostial 20% stenosis distal 20 to 30% stenosis    Normal LV gram    Interpretation Summary         Left ventricular ejection fraction appears to be 61 - 65%.    Left ventricular diastolic function was normal.    Estimated right ventricular systolic pressure from tricuspid regurgitation is normal (<35 mmHg).      Current Outpatient Medications:     aspirin 81 MG EC tablet, Take 1 tablet by mouth Daily., Disp: , Rfl:     bisoprolol (ZEBeta) 5 MG tablet, Take 1 tablet by mouth Daily. (Patient taking differently: Take 1 tablet by mouth Daily. TAKING 1/2 TAB), Disp: 45 tablet, Rfl: 1    metFORMIN (GLUCOPHAGE) 1000 MG tablet, Take 1 tablet by mouth 2 (Two) Times a Day., Disp: , Rfl: 0    omeprazole (priLOSEC) 40 MG capsule, Take 1 capsule by mouth Daily., Disp: , Rfl:     ondansetron ODT (ZOFRAN-ODT) 4 MG disintegrating tablet, Place 1 tablet on the tongue 4 (Four) Times a Day As Needed for Nausea or Vomiting., Disp: 15 tablet, Rfl: 0    pregabalin (LYRICA) 150 MG capsule, Take 1 capsule by mouth 2 (Two) Times a Day., Disp: , Rfl:     rosuvastatin (CRESTOR) 20 MG tablet, Take 1 tablet by mouth Daily., Disp: 90 tablet, Rfl: 3    Tresiba FlexTouch 100 UNIT/ML solution pen-injector injection, 25 Units., Disp: , Rfl:     nitroglycerin (NITROSTAT) 0.4 MG SL tablet, Place 1 tablet under the tongue See Admin Instructions., Disp: 25 tablet, Rfl: 3    OneTouch Delica Lancets 30G misc, TEST 1 (ONE) EACH DAILY, Disp: , Rfl:     ONETOUCH VERIO test strip, , Disp: , Rfl: 0     Assessment:        Patient Active Problem List   Diagnosis    Chest pain, atypical    Shortness of breath    Syncope and collapse    Weakness    Coronary artery disease involving native heart with angina pectoris    Soft tissue lesion of shoulder region    Disorder of rotator cuff    Shoulder pain    Rotator cuff  tear arthropathy    Unstable angina pectoris    Injury of kidney    Type 2 diabetes mellitus    Hypertension    Hypotension    Hypovolemia    Bilateral carotid artery disease    Medical non-compliance    Dizziness    Viral illness    SOB (shortness of breath)    Asthma exacerbation    CKD (chronic kidney disease) stage 3, GFR 30-59 ml/min    Asthma    CAD (coronary artery disease)    Essential hypertension    Abnormal nuclear stress test    Precordial pain    Hyperlipidemia    Abdominal pain    Microcytic anemia    Nausea and vomiting               Plan:   1.  Hospital discharge follow-up hypertension: BP today 138/72, controlled. Continue bisoprolol, refilled.     Importance of controlling hypertension and blood pressure  checkup on the regular basis has been explained. Hypertension as a silent killer has been discussed. Risk reduction of the weight and regular exercises to control the hypertension has been explained.    2.  Coronary artery disease: no chest pain, continue aspirin, bisoprolol, crestor.     Risk reduction for the coronary artery disease, controlling the blood pressure, blood sugar management, cholesterol management, exercise, stress management, and proper compliance with medications and follow-up has been discussed    3.  Hyperlipidemia: Lipid panel 7/2023 TC 99, HDL 32, LDL 32, Trig 177. AST/LT wnl. Continue crestor.     Risk of the hyperlipidemia, importance of the treatment has been explained. Pros and cons of the statins has been explained. Regular blood workup as well as side effects including the liver failure, myelopathy death has been explained.               No diagnosis found.    There are no diagnoses linked to this encounter.    COUNSELING: Jessica Horner was given to patient for the following topics: diagnostic results, risk factor reductions, impressions, risks and benefits of treatment options and importance of treatment compliance .       SMOKING COUNSELING:  denies    Follow up in 1 year or sooner if needed    Sincerely,   BRETT Tucker  [facility]  11/28/23  11:01 EST    EMR Dragon/Transcription disclaimer:   Much of this encounter note is an electronic transcription/translation of spoken language to printed text. The electronic translation of spoken language may permit erroneous, or at times, nonsensical words or phrases to be inadvertently transcribed; Although I have reviewed the note for such errors, some may still exist.

## 2024-03-08 DIAGNOSIS — I65.23 BILATERAL CAROTID ARTERY STENOSIS: Primary | ICD-10-CM

## 2024-05-02 RX ORDER — NITROGLYCERIN 0.4 MG/1
TABLET SUBLINGUAL
Qty: 25 TABLET | Refills: 3 | Status: SHIPPED | OUTPATIENT
Start: 2024-05-02

## 2024-11-14 ENCOUNTER — OFFICE VISIT (OUTPATIENT)
Dept: CARDIOLOGY | Facility: CLINIC | Age: 64
End: 2024-11-14
Payer: MEDICARE

## 2024-11-14 VITALS
HEIGHT: 61 IN | BODY MASS INDEX: 33.04 KG/M2 | WEIGHT: 175 LBS | HEART RATE: 50 BPM | DIASTOLIC BLOOD PRESSURE: 74 MMHG | SYSTOLIC BLOOD PRESSURE: 153 MMHG

## 2024-11-14 DIAGNOSIS — I25.119 CORONARY ARTERY DISEASE INVOLVING NATIVE CORONARY ARTERY OF NATIVE HEART WITH ANGINA PECTORIS: Primary | ICD-10-CM

## 2024-11-14 DIAGNOSIS — R07.2 PRECORDIAL PAIN: ICD-10-CM

## 2024-11-14 PROCEDURE — 3078F DIAST BP <80 MM HG: CPT | Performed by: INTERNAL MEDICINE

## 2024-11-14 PROCEDURE — 93000 ELECTROCARDIOGRAM COMPLETE: CPT | Performed by: INTERNAL MEDICINE

## 2024-11-14 PROCEDURE — 3077F SYST BP >= 140 MM HG: CPT | Performed by: INTERNAL MEDICINE

## 2024-11-14 PROCEDURE — 99213 OFFICE O/P EST LOW 20 MIN: CPT | Performed by: INTERNAL MEDICINE

## 2024-11-14 RX ORDER — EMPAGLIFLOZIN 10 MG/1
10 TABLET, FILM COATED ORAL DAILY
COMMUNITY
Start: 2024-10-01

## 2024-11-14 NOTE — PROGRESS NOTES
Subjective:        Lily Unger is a 64 y.o. female who here for follow up    CC  Few cp relieved with s/l ntg  HPI  64 years old complains of chest pains and tightness in the chest     Problems Addressed this Visit          Cardiac and Vasculature    Coronary artery disease involving native heart with angina pectoris - Primary    Relevant Orders    Stress Test With Myocardial Perfusion One Day    Adult Transthoracic Echo Complete W/ Cont if Necessary Per Protocol    Precordial pain    Relevant Orders    Stress Test With Myocardial Perfusion One Day    Adult Transthoracic Echo Complete W/ Cont if Necessary Per Protocol     Diagnoses         Codes Comments    Coronary artery disease involving native coronary artery of native heart with angina pectoris    -  Primary ICD-10-CM: I25.119  ICD-9-CM: 414.01, 413.9     Precordial pain     ICD-10-CM: R07.2  ICD-9-CM: 786.51           .    The following portions of the patient's history were reviewed and updated as appropriate: allergies, current medications, past family history, past medical history, past social history, past surgical history and problem list.    Past Medical History:   Diagnosis Date    Abnormal carotid duplex scan     Asthma     CAD (coronary artery disease)     unspecified    Chronic kidney disease     COVID     Crescendo angina     Diabetes mellitus     hyperglycemia insulin    H/O angioplasty     Hiatal hernia     Hyperlipidemia     Hypertension     Nausea and vomiting 10/24/2023    Obesity     Occlusion and stenosis of bilateral carotid arteries 04/18/2023    Recurrent urinary tract infection     Rotator cuff tear, right     Sleep apnea     NO MACHINE    Torn rotator cuff      reports that she quit smoking about 32 years ago. Her smoking use included cigarettes. She has never used smokeless tobacco. She reports that she does not currently use alcohol. She reports that she does not use drugs.   Family History   Problem Relation Age of Onset     "Heart disease Mother     Lung disease Mother     Heart attack Father     Heart disease Father     Heart attack Brother     Heart disease Brother     Other Brother         blood clots    Heart attack Maternal Grandfather     Heart disease Maternal Grandfather     Malig Hyperthermia Neg Hx        Review of Systems  Constitutional: No wt loss, fever, fatigue  Gastrointestinal: No nausea, abdominal pain  Behavioral/Psych: No insomnia or anxiety   Cardiovascular no chest pains or tightness in the chest  Objective:       Physical Exam           Physical Exam  /74   Pulse 50   Ht 154.9 cm (61\")   Wt 79.4 kg (175 lb)   BMI 33.07 kg/m²     General appearance: No acute changes   Eyes: Sclerae conjunctivae normal, pupils reactive   HENT: Atraumatic; oropharynx clear with moist mucous membranes and no mucosal ulcerations;  Neck: Trachea midline; NECK, supple, no thyromegaly or lymphadenopathy   Lungs: Normal size and shape, normal breath sounds, equal distribution of air, no rales and rhonchi   CV: S1-S2 regular, no murmurs, no rub, no gallop   Abdomen: Soft, nontender; no masses , no abnormal abdominal sounds   Extremities: No deformity , normal color , no peripheral edema   Skin: Normal temperature, turgor and texture; no rash, ulcers  Psych: Appropriate affect, alert and oriented to person, place and time             ECG 12 Lead    Date/Time: 11/14/2024 1:15 PM  Performed by: Roe Harper MD    Authorized by: Roe Harper MD  Comparison: compared with previous ECG   Similar to previous ECG  Rhythm: sinus rhythm    Clinical impression: non-specific ECG            Echocardiogram:    Results for orders placed in visit on 02/17/23    Adult Transthoracic Echo Complete W/ Cont if Necessary Per Protocol    Interpretation Summary    Left ventricular ejection fraction appears to be 61 - 65%.    Left ventricular diastolic function was normal.    Estimated right ventricular systolic pressure from tricuspid " regurgitation is normal (<35 mmHg).          Current Outpatient Medications:     aspirin 81 MG EC tablet, Take 1 tablet by mouth Daily., Disp: , Rfl:     bisoprolol (ZEBeta) 5 MG tablet, Take 0.5 tablets by mouth Daily., Disp: 45 tablet, Rfl: 3    Jardiance 10 MG tablet tablet, Take 1 tablet by mouth Daily., Disp: , Rfl:     metFORMIN (GLUCOPHAGE) 1000 MG tablet, Take 1 tablet by mouth 2 (Two) Times a Day., Disp: , Rfl: 0    nitroglycerin (NITROSTAT) 0.4 MG SL tablet, PLACE ONE TABLET UNDER TONGUE AS NEEDED FOR CHEST PAIN, Disp: 25 tablet, Rfl: 3    omeprazole (priLOSEC) 40 MG capsule, Take 1 capsule by mouth Daily., Disp: , Rfl:     OneTouch Delica Lancets 30G misc, TEST 1 (ONE) EACH DAILY, Disp: , Rfl:     ONETOUCH VERIO test strip, , Disp: , Rfl: 0    pregabalin (LYRICA) 150 MG capsule, Take 1 capsule by mouth 2 (Two) Times a Day., Disp: , Rfl:     rosuvastatin (CRESTOR) 20 MG tablet, Take 1 tablet by mouth Daily., Disp: 90 tablet, Rfl: 3    Tresiba FlexTouch 100 UNIT/ML solution pen-injector injection, 25 Units., Disp: , Rfl:    Assessment:                Plan:          ICD-10-CM ICD-9-CM   1. Coronary artery disease involving native coronary artery of native heart with angina pectoris  I25.119 414.01     413.9   2. Precordial pain  R07.2 786.51     1. Coronary artery disease involving native coronary artery of native heart with angina pectoris  Considering the patient's symptoms as well as clinical situation and  EKG findings, along with cardiac risk factors, ischemic workup is necessary to rule out ischemic cardiomyopathy, stress induced arrhythmias, and functional capacity for diagnosis as well as prognostic consideration    - Stress Test With Myocardial Perfusion One Day  - Adult Transthoracic Echo Complete W/ Cont if Necessary Per Protocol    2. Precordial pain  Considering patient's medical condition as well as the risk factors, patient will require echocardiogram for further evaluation for the LV  function, four-chamber evaluation, including the pressures, valvular function and  pericardial disease and pericardial effusion    - Stress Test With Myocardial Perfusion One Day  - Adult Transthoracic Echo Complete W/ Cont if Necessary Per Protocol      Stress cardiolyte, echo    Follow up  COUNSELING:    Lily Horner was given to patient for the following topics: diagnostic results, risk factor reductions, impressions, risks and benefits of treatment options and importance of treatment compliance .       SMOKING COUNSELING:        Dictated using Dragon dictation

## 2024-11-20 RX ORDER — NITROGLYCERIN 0.4 MG/1
0.4 TABLET SUBLINGUAL
Qty: 25 TABLET | Refills: 3 | Status: SHIPPED | OUTPATIENT
Start: 2024-11-20 | End: 2024-11-22 | Stop reason: SDUPTHER

## 2024-11-20 RX ORDER — BISOPROLOL FUMARATE 5 MG/1
2.5 TABLET, FILM COATED ORAL DAILY
Qty: 45 TABLET | Refills: 4 | Status: SHIPPED | OUTPATIENT
Start: 2024-11-20

## 2024-11-20 RX ORDER — ROSUVASTATIN CALCIUM 20 MG/1
20 TABLET, COATED ORAL DAILY
Qty: 90 TABLET | Refills: 4 | Status: SHIPPED | OUTPATIENT
Start: 2024-11-20

## 2024-11-21 ENCOUNTER — HOSPITAL ENCOUNTER (OUTPATIENT)
Dept: CARDIOLOGY | Facility: HOSPITAL | Age: 64
Discharge: HOME | End: 2024-11-21
Payer: MEDICARE

## 2024-11-21 ENCOUNTER — HOSPITAL ENCOUNTER (OUTPATIENT)
Dept: CARDIOLOGY | Facility: HOSPITAL | Age: 64
Discharge: HOME | End: 2024-11-21
Admitting: INTERNAL MEDICINE
Payer: MEDICARE

## 2024-11-21 VITALS
DIASTOLIC BLOOD PRESSURE: 67 MMHG | SYSTOLIC BLOOD PRESSURE: 163 MMHG | OXYGEN SATURATION: 96 % | HEART RATE: 53 BPM | WEIGHT: 175 LBS | BODY MASS INDEX: 33.04 KG/M2 | HEIGHT: 61 IN

## 2024-11-21 VITALS
DIASTOLIC BLOOD PRESSURE: 67 MMHG | SYSTOLIC BLOOD PRESSURE: 181 MMHG | HEART RATE: 53 BPM | BODY MASS INDEX: 33.04 KG/M2 | WEIGHT: 175 LBS | HEIGHT: 61 IN

## 2024-11-21 LAB
AORTIC DIMENSIONLESS INDEX: 0.9 (DI)
ASCENDING AORTA: 3.5 CM
BH CV ECHO MEAS - ACS: 1.94 CM
BH CV ECHO MEAS - AO MAX PG: 6.2 MMHG
BH CV ECHO MEAS - AO MEAN PG: 3.1 MMHG
BH CV ECHO MEAS - AO ROOT DIAM: 3.3 CM
BH CV ECHO MEAS - AO V2 MAX: 124.9 CM/SEC
BH CV ECHO MEAS - AO V2 VTI: 29.2 CM
BH CV ECHO MEAS - AVA(I,D): 2.8 CM2
BH CV ECHO MEAS - EDV(CUBED): 71.1 ML
BH CV ECHO MEAS - EDV(MOD-SP2): 93 ML
BH CV ECHO MEAS - EDV(MOD-SP4): 103 ML
BH CV ECHO MEAS - EF(MOD-BP): 61.1 %
BH CV ECHO MEAS - EF(MOD-SP2): 61.3 %
BH CV ECHO MEAS - EF(MOD-SP4): 59.2 %
BH CV ECHO MEAS - ESV(CUBED): 23.4 ML
BH CV ECHO MEAS - ESV(MOD-SP2): 36 ML
BH CV ECHO MEAS - ESV(MOD-SP4): 42 ML
BH CV ECHO MEAS - FS: 31 %
BH CV ECHO MEAS - IVS/LVPW: 1.07 CM
BH CV ECHO MEAS - IVSD: 1.24 CM
BH CV ECHO MEAS - LAT PEAK E' VEL: 6.7 CM/SEC
BH CV ECHO MEAS - LV DIASTOLIC VOL/BSA (35-75): 57.7 CM2
BH CV ECHO MEAS - LV MASS(C)D: 174.9 GRAMS
BH CV ECHO MEAS - LV MAX PG: 4.3 MMHG
BH CV ECHO MEAS - LV MEAN PG: 2.02 MMHG
BH CV ECHO MEAS - LV SYSTOLIC VOL/BSA (12-30): 23.5 CM2
BH CV ECHO MEAS - LV V1 MAX: 103.4 CM/SEC
BH CV ECHO MEAS - LV V1 VTI: 25.8 CM
BH CV ECHO MEAS - LVIDD: 4.1 CM
BH CV ECHO MEAS - LVIDS: 2.9 CM
BH CV ECHO MEAS - LVOT AREA: 3.2 CM2
BH CV ECHO MEAS - LVOT DIAM: 2.02 CM
BH CV ECHO MEAS - LVPWD: 1.16 CM
BH CV ECHO MEAS - MED PEAK E' VEL: 5.2 CM/SEC
BH CV ECHO MEAS - MR MAX PG: 65.9 MMHG
BH CV ECHO MEAS - MR MAX VEL: 406 CM/SEC
BH CV ECHO MEAS - MV A DUR: 0.11 SEC
BH CV ECHO MEAS - MV A MAX VEL: 52.6 CM/SEC
BH CV ECHO MEAS - MV DEC SLOPE: 325.8 CM/SEC2
BH CV ECHO MEAS - MV DEC TIME: 0.34 SEC
BH CV ECHO MEAS - MV E MAX VEL: 75.3 CM/SEC
BH CV ECHO MEAS - MV E/A: 1.43
BH CV ECHO MEAS - MV MAX PG: 3.3 MMHG
BH CV ECHO MEAS - MV MEAN PG: 1.09 MMHG
BH CV ECHO MEAS - MV P1/2T: 82 MSEC
BH CV ECHO MEAS - MV V2 VTI: 35 CM
BH CV ECHO MEAS - MVA(P1/2T): 2.7 CM2
BH CV ECHO MEAS - MVA(VTI): 2.35 CM2
BH CV ECHO MEAS - PA ACC TIME: 0.14 SEC
BH CV ECHO MEAS - PA V2 MAX: 83.1 CM/SEC
BH CV ECHO MEAS - PULM A REVS DUR: 0.12 SEC
BH CV ECHO MEAS - PULM A REVS VEL: 27.9 CM/SEC
BH CV ECHO MEAS - PULM DIAS VEL: 59.1 CM/SEC
BH CV ECHO MEAS - PULM S/D: 0.79
BH CV ECHO MEAS - PULM SYS VEL: 46.5 CM/SEC
BH CV ECHO MEAS - RAP SYSTOLE: 3 MMHG
BH CV ECHO MEAS - RV MAX PG: 2.9 MMHG
BH CV ECHO MEAS - RV V1 MAX: 84.5 CM/SEC
BH CV ECHO MEAS - RV V1 VTI: 21.4 CM
BH CV ECHO MEAS - RVSP: 16 MMHG
BH CV ECHO MEAS - SUP REN AO DIAM: 1.8 CM
BH CV ECHO MEAS - SV(LVOT): 82.4 ML
BH CV ECHO MEAS - SV(MOD-SP2): 57 ML
BH CV ECHO MEAS - SV(MOD-SP4): 61 ML
BH CV ECHO MEAS - SVI(LVOT): 46.2 ML/M2
BH CV ECHO MEAS - SVI(MOD-SP2): 31.9 ML/M2
BH CV ECHO MEAS - SVI(MOD-SP4): 34.2 ML/M2
BH CV ECHO MEAS - TAPSE (>1.6): 1.63 CM
BH CV ECHO MEAS - TR MAX PG: 13.1 MMHG
BH CV ECHO MEAS - TR MAX VEL: 180.7 CM/SEC
BH CV ECHO MEASUREMENTS AVERAGE E/E' RATIO: 12.66
BH CV ECHO SHUNT ASSESSMENT PERFORMED (HIDDEN SCRIPTING): 1
BH CV REST NUCLEAR ISOTOPE DOSE: 10.9 MCI
BH CV STRESS BP STAGE 1: NORMAL
BH CV STRESS COMMENTS STAGE 1: NORMAL
BH CV STRESS DOSE REGADENOSON STAGE 1: 0.4
BH CV STRESS DURATION MIN STAGE 1: 1
BH CV STRESS DURATION SEC STAGE 1: 47
BH CV STRESS GRADE STAGE 1: 0
BH CV STRESS HR STAGE 1: 78
BH CV STRESS METS STAGE 1: 1.6
BH CV STRESS NUCLEAR ISOTOPE DOSE: 32.9 MCI
BH CV STRESS PROTOCOL 1: NORMAL
BH CV STRESS RECOVERY BP: NORMAL MMHG
BH CV STRESS RECOVERY HR: 67 BPM
BH CV STRESS RECOVERY O2: 96 %
BH CV STRESS SPEED STAGE 1: 1
BH CV STRESS STAGE 1: 1
BH CV XLRA - TDI S': 11.1 CM/SEC
LEFT ATRIUM VOLUME INDEX: 25.9 ML/M2
LV EF NUC BP: 60 %
MAXIMAL PREDICTED HEART RATE: 154 BPM
PERCENT MAX PREDICTED HR: 50.65 %
SINUS: 2.8 CM
STJ: 2.6 CM
STRESS BASELINE BP: NORMAL MMHG
STRESS BASELINE HR: 53 BPM
STRESS O2 SAT REST: 96 %
STRESS PERCENT HR: 60 %
STRESS POST ESTIMATED WORKLOAD: 1.6 METS
STRESS POST EXERCISE DUR MIN: 1 MIN
STRESS POST EXERCISE DUR SEC: 47 SEC
STRESS POST PEAK BP: NORMAL MMHG
STRESS POST PEAK HR: 78 BPM
STRESS TARGET HR: 131 BPM

## 2024-11-21 PROCEDURE — 25010000002 REGADENOSON 0.4 MG/5ML SOLUTION: Performed by: INTERNAL MEDICINE

## 2024-11-21 PROCEDURE — 93306 TTE W/DOPPLER COMPLETE: CPT | Performed by: INTERNAL MEDICINE

## 2024-11-21 PROCEDURE — 93017 CV STRESS TEST TRACING ONLY: CPT

## 2024-11-21 PROCEDURE — 78452 HT MUSCLE IMAGE SPECT MULT: CPT

## 2024-11-21 PROCEDURE — A9500 TC99M SESTAMIBI: HCPCS | Performed by: INTERNAL MEDICINE

## 2024-11-21 PROCEDURE — 93306 TTE W/DOPPLER COMPLETE: CPT

## 2024-11-21 PROCEDURE — 34310000005 TECHNETIUM SESTAMIBI: Performed by: INTERNAL MEDICINE

## 2024-11-21 RX ORDER — REGADENOSON 0.08 MG/ML
0.4 INJECTION, SOLUTION INTRAVENOUS
Status: COMPLETED | OUTPATIENT
Start: 2024-11-21 | End: 2024-11-21

## 2024-11-21 RX ADMIN — TECHNETIUM TC 99M SESTAMIBI 1 DOSE: 1 INJECTION INTRAVENOUS at 09:06

## 2024-11-21 RX ADMIN — TECHNETIUM TC 99M SESTAMIBI 1 DOSE: 1 INJECTION INTRAVENOUS at 07:08

## 2024-11-21 RX ADMIN — REGADENOSON 0.4 MG: 0.08 INJECTION, SOLUTION INTRAVENOUS at 09:06

## 2024-11-22 RX ORDER — NITROGLYCERIN 0.4 MG/1
0.4 TABLET SUBLINGUAL
Qty: 25 TABLET | Refills: 3 | Status: SHIPPED | OUTPATIENT
Start: 2024-11-22 | End: 2024-11-25 | Stop reason: SDUPTHER

## 2024-11-25 ENCOUNTER — OFFICE VISIT (OUTPATIENT)
Dept: CARDIOLOGY | Facility: CLINIC | Age: 64
End: 2024-11-25
Payer: MEDICARE

## 2024-11-25 VITALS
OXYGEN SATURATION: 100 % | HEIGHT: 61 IN | WEIGHT: 172.8 LBS | DIASTOLIC BLOOD PRESSURE: 70 MMHG | SYSTOLIC BLOOD PRESSURE: 152 MMHG | HEART RATE: 64 BPM | BODY MASS INDEX: 32.62 KG/M2

## 2024-11-25 DIAGNOSIS — I10 ESSENTIAL HYPERTENSION: ICD-10-CM

## 2024-11-25 DIAGNOSIS — I25.119 CORONARY ARTERY DISEASE INVOLVING NATIVE CORONARY ARTERY OF NATIVE HEART WITH ANGINA PECTORIS: Primary | ICD-10-CM

## 2024-11-25 DIAGNOSIS — R94.39 THALLIUM STRESS TEST ABNORMAL: ICD-10-CM

## 2024-11-25 DIAGNOSIS — R79.1 ABNORMAL COAGULATION PROFILE: ICD-10-CM

## 2024-11-25 DIAGNOSIS — E78.5 HYPERLIPIDEMIA, UNSPECIFIED HYPERLIPIDEMIA TYPE: ICD-10-CM

## 2024-11-25 DIAGNOSIS — R07.2 PRECORDIAL PAIN: ICD-10-CM

## 2024-11-25 DIAGNOSIS — R94.39 ABNORMAL STRESS TEST: ICD-10-CM

## 2024-11-25 PROCEDURE — 99214 OFFICE O/P EST MOD 30 MIN: CPT | Performed by: INTERNAL MEDICINE

## 2024-11-25 PROCEDURE — 3078F DIAST BP <80 MM HG: CPT | Performed by: INTERNAL MEDICINE

## 2024-11-25 PROCEDURE — 3077F SYST BP >= 140 MM HG: CPT | Performed by: INTERNAL MEDICINE

## 2024-11-25 RX ORDER — NITROGLYCERIN 0.4 MG/1
0.4 TABLET SUBLINGUAL
Qty: 25 TABLET | Refills: 3 | Status: SHIPPED | OUTPATIENT
Start: 2024-11-25

## 2024-11-25 NOTE — H&P (VIEW-ONLY)
Subjective:        Lily Unger is a 64 y.o. female who here for follow up    CC  Chest pain  HPI  64 years old female continues to complains of chest pain     Problems Addressed this Visit          Cardiac and Vasculature    CAD (coronary artery disease) - Primary    Relevant Medications    nitroglycerin (NITROSTAT) 0.4 MG SL tablet    Other Relevant Orders    Case Request Cath Lab: Left Heart Cath (Completed)    CBC & Differential    Basic Metabolic Panel    aPTT    Protime-INR    Essential hypertension    Relevant Orders    Case Request Cath Lab: Left Heart Cath (Completed)    CBC & Differential    Basic Metabolic Panel    aPTT    Protime-INR    Precordial pain    Relevant Orders    Case Request Cath Lab: Left Heart Cath (Completed)    CBC & Differential    Basic Metabolic Panel    aPTT    Protime-INR    Hyperlipidemia    Relevant Orders    Case Request Cath Lab: Left Heart Cath (Completed)    CBC & Differential    Basic Metabolic Panel    aPTT    Protime-INR    Abnormal stress test    Relevant Orders    Case Request Cath Lab: Left Heart Cath (Completed)    CBC & Differential    Basic Metabolic Panel    aPTT    Protime-INR     Other Visit Diagnoses       Thallium stress test abnormal        Abnormal coagulation profile        Relevant Orders    aPTT          Diagnoses         Codes Comments    Coronary artery disease involving native coronary artery of native heart with angina pectoris    -  Primary ICD-10-CM: I25.119  ICD-9-CM: 414.01, 413.9     Essential hypertension     ICD-10-CM: I10  ICD-9-CM: 401.9     Hyperlipidemia, unspecified hyperlipidemia type     ICD-10-CM: E78.5  ICD-9-CM: 272.4     Precordial pain     ICD-10-CM: R07.2  ICD-9-CM: 786.51     Thallium stress test abnormal     ICD-10-CM: R94.39  ICD-9-CM: 794.39     Abnormal stress test     ICD-10-CM: R94.39  ICD-9-CM: 794.39     Abnormal coagulation profile     ICD-10-CM: R79.1  ICD-9-CM: 790.92           .    The following portions of the  "patient's history were reviewed and updated as appropriate: allergies, current medications, past family history, past medical history, past social history, past surgical history and problem list.    Past Medical History:   Diagnosis Date    Abnormal carotid duplex scan     Asthma     CAD (coronary artery disease)     unspecified    Chronic kidney disease     COVID     Crescendo angina     Diabetes mellitus     hyperglycemia insulin    H/O angioplasty     Hiatal hernia     Hyperlipidemia     Hypertension     Nausea and vomiting 10/24/2023    Obesity     Occlusion and stenosis of bilateral carotid arteries 04/18/2023    Recurrent urinary tract infection     Rotator cuff tear, right     Sleep apnea     NO MACHINE    Torn rotator cuff      reports that she quit smoking about 32 years ago. Her smoking use included cigarettes. She has never used smokeless tobacco. She reports that she does not currently use alcohol. She reports that she does not use drugs.   Family History   Problem Relation Age of Onset    Heart disease Mother     Lung disease Mother     Heart attack Father     Heart disease Father     Heart attack Brother     Heart disease Brother     Other Brother         blood clots    Heart attack Maternal Grandfather     Heart disease Maternal Grandfather     Malig Hyperthermia Neg Hx        Review of Systems  Constitutional: No wt loss, fever, fatigue  Gastrointestinal: No nausea, abdominal pain  Behavioral/Psych: No insomnia or anxiety   Cardiovascular chest pain  Objective:       Physical Exam  /70   Pulse 64   Ht 154.9 cm (60.98\")   Wt 78.4 kg (172 lb 12.8 oz)   SpO2 100%   BMI 32.67 kg/m²   General appearance: No acute changes   Neck: Trachea midline; NECK, supple, no thyromegaly or lymphadenopathy   Lungs: Normal size and shape, normal breath sounds, equal distribution of air, no rales and rhonchi   CV: S1-S2 regular, sys murmurs, no rub, no gallop   Abdomen: Soft, nontender; no masses , no abnormal " abdominal sounds   Extremities: No deformity , normal color , no peripheral edema   Skin: Normal temperature, turgor and texture; no rash, ulcers          Procedures      Echocardiogram:    Results for orders placed in visit on 11/14/24    Adult Transthoracic Echo Complete W/ Cont if Necessary Per Protocol    Interpretation Summary    Left ventricular systolic function is normal. Calculated left ventricular EF = 61.1% Left ventricular ejection fraction appears to be 61 - 65%.    Left ventricular diastolic function was normal.    Estimated right ventricular systolic pressure from tricuspid regurgitation is normal (<35 mmHg).    nterpretation Summary         Findings consistent with an equivocal ECG stress test.    Impressions are consistent with an intermediate risk study.    Left ventricular ejection fraction is normal (Calculated EF = 60%).    Myocardial perfusion imaging indicates a small-sized, mildly severe area of ischemia located in the anterior wall.      Current Outpatient Medications:     aspirin 81 MG EC tablet, Take 1 tablet by mouth Daily., Disp: , Rfl:     bisoprolol (ZEBeta) 5 MG tablet, Take 0.5 tablets by mouth Daily., Disp: 45 tablet, Rfl: 4    Jardiance 10 MG tablet tablet, Take 1 tablet by mouth Daily., Disp: , Rfl:     metFORMIN (GLUCOPHAGE) 1000 MG tablet, Take 1 tablet by mouth 2 (Two) Times a Day., Disp: , Rfl: 0    nitroglycerin (NITROSTAT) 0.4 MG SL tablet, Take 1 tablet by mouth Every 5 (Five) Minutes As Needed for Chest Pain. Take no more than 3 doses in 15 minutes., Disp: 25 tablet, Rfl: 3    omeprazole (priLOSEC) 40 MG capsule, Take 1 capsule by mouth Daily., Disp: , Rfl:     OneTouch Delica Lancets 30G misc, TEST 1 (ONE) EACH DAILY, Disp: , Rfl:     ONETOUCH VERIO test strip, , Disp: , Rfl: 0    pregabalin (LYRICA) 150 MG capsule, Take 1 capsule by mouth 2 (Two) Times a Day., Disp: , Rfl:     rosuvastatin (CRESTOR) 20 MG tablet, Take 1 tablet by mouth Daily., Disp: 90 tablet, Rfl: 4     Tresiba FlexTouch 100 UNIT/ML solution pen-injector injection, 25 Units., Disp: , Rfl:    Assessment:                Plan:          ICD-10-CM ICD-9-CM   1. Coronary artery disease involving native coronary artery of native heart with angina pectoris  I25.119 414.01     413.9   2. Essential hypertension  I10 401.9   3. Hyperlipidemia, unspecified hyperlipidemia type  E78.5 272.4   4. Precordial pain  R07.2 786.51   5. Thallium stress test abnormal  R94.39 794.39   6. Abnormal stress test  R94.39 794.39   7. Abnormal coagulation profile  R79.1 790.92     1. Coronary artery disease involving native coronary artery of native heart with angina pectoris  Needs further evaluation  - Case Request Cath Lab: Left Heart Cath  - CBC & Differential  - Basic Metabolic Panel  - aPTT  - Protime-INR    2. Essential hypertension  Continue current treatment  - Case Request Cath Lab: Left Heart Cath  - CBC & Differential  - Basic Metabolic Panel  - aPTT  - Protime-INR    3. Hyperlipidemia, unspecified hyperlipidemia type  Continue current treatment  - Case Request Cath Lab: Left Heart Cath  - CBC & Differential  - Basic Metabolic Panel  - aPTT  - Protime-INR    4. Precordial pain    - Case Request Cath Lab: Left Heart Cath  - CBC & Differential  - Basic Metabolic Panel  - aPTT  - Protime-INR    5. Thallium stress test abnormal  Needs further evaluate    6. Abnormal stress test    - Case Request Cath Lab: Left Heart Cath  - CBC & Differential  - Basic Metabolic Panel  - aPTT  - Protime-INR    7. Abnormal coagulation profile    - aPTT      STILL HAVE CP WITH EXCERTION    Procedure, risks and options of cardiac cath explained to pt INCLUDING BUT NOT LIMITED TO MI, STROKE, DEATH, INFECTION HAEMORRHAGE, . Pt understands well and agrees with no further questions.    COUNSELING:    Lily Horner was given to patient for the following topics: diagnostic results, risk factor reductions, impressions, risks and benefits of  treatment options and importance of treatment compliance .       SMOKING COUNSELING:        Dictated using Dragon dictation

## 2024-11-29 ENCOUNTER — LAB (OUTPATIENT)
Dept: LAB | Facility: HOSPITAL | Age: 64
End: 2024-11-29
Payer: MEDICARE

## 2024-11-29 LAB
ANION GAP SERPL CALCULATED.3IONS-SCNC: 12.4 MMOL/L (ref 5–15)
ANISOCYTOSIS BLD QL: NORMAL
APTT PPP: 28.4 SECONDS (ref 22.7–35.4)
BASOPHILS # BLD MANUAL: 0 10*3/MM3 (ref 0–0.2)
BASOPHILS NFR BLD MANUAL: 0 % (ref 0–1.5)
BUN SERPL-MCNC: 9 MG/DL (ref 8–23)
BUN/CREAT SERPL: 11.3 (ref 7–25)
CALCIUM SPEC-SCNC: 9.2 MG/DL (ref 8.6–10.5)
CHLORIDE SERPL-SCNC: 106 MMOL/L (ref 98–107)
CO2 SERPL-SCNC: 24.6 MMOL/L (ref 22–29)
CREAT SERPL-MCNC: 0.8 MG/DL (ref 0.57–1)
DEPRECATED RDW RBC AUTO: 46.3 FL (ref 37–54)
EGFRCR SERPLBLD CKD-EPI 2021: 81.4 ML/MIN/1.73
EOSINOPHIL # BLD MANUAL: 0.38 10*3/MM3 (ref 0–0.4)
EOSINOPHIL NFR BLD MANUAL: 5.2 % (ref 0.3–6.2)
ERYTHROCYTE [DISTWIDTH] IN BLOOD BY AUTOMATED COUNT: 17.6 % (ref 12.3–15.4)
GLUCOSE SERPL-MCNC: 182 MG/DL (ref 65–99)
HCT VFR BLD AUTO: 38.3 % (ref 34–46.6)
HGB BLD-MCNC: 11.4 G/DL (ref 12–15.9)
HYPOCHROMIA BLD QL: NORMAL
INR PPP: 1.16 (ref 0.9–1.1)
LYMPHOCYTES # BLD MANUAL: 2.31 10*3/MM3 (ref 0.7–3.1)
LYMPHOCYTES NFR BLD MANUAL: 10.3 % (ref 5–12)
MCH RBC QN AUTO: 22 PG (ref 26.6–33)
MCHC RBC AUTO-ENTMCNC: 29.8 G/DL (ref 31.5–35.7)
MCV RBC AUTO: 73.8 FL (ref 79–97)
MONOCYTES # BLD: 0.74 10*3/MM3 (ref 0.1–0.9)
NEUTROPHILS # BLD AUTO: 3.8 10*3/MM3 (ref 1.7–7)
NEUTROPHILS NFR BLD MANUAL: 52.6 % (ref 42.7–76)
PLAT MORPH BLD: NORMAL
PLATELET # BLD AUTO: 410 10*3/MM3 (ref 140–450)
PMV BLD AUTO: 9.6 FL (ref 6–12)
POTASSIUM SERPL-SCNC: 4.3 MMOL/L (ref 3.5–5.2)
PROTHROMBIN TIME: 15 SECONDS (ref 11.7–14.2)
RBC # BLD AUTO: 5.19 10*6/MM3 (ref 3.77–5.28)
SODIUM SERPL-SCNC: 143 MMOL/L (ref 136–145)
VARIANT LYMPHS NFR BLD MANUAL: 32 % (ref 19.6–45.3)
WBC MORPH BLD: NORMAL
WBC NRBC COR # BLD AUTO: 7.22 10*3/MM3 (ref 3.4–10.8)

## 2024-11-29 PROCEDURE — 36415 COLL VENOUS BLD VENIPUNCTURE: CPT | Performed by: INTERNAL MEDICINE

## 2024-11-29 PROCEDURE — 85610 PROTHROMBIN TIME: CPT | Performed by: INTERNAL MEDICINE

## 2024-11-29 PROCEDURE — 85025 COMPLETE CBC W/AUTO DIFF WBC: CPT | Performed by: INTERNAL MEDICINE

## 2024-11-29 PROCEDURE — 80048 BASIC METABOLIC PNL TOTAL CA: CPT | Performed by: INTERNAL MEDICINE

## 2024-11-29 PROCEDURE — 85007 BL SMEAR W/DIFF WBC COUNT: CPT | Performed by: INTERNAL MEDICINE

## 2024-11-29 PROCEDURE — 85730 THROMBOPLASTIN TIME PARTIAL: CPT | Performed by: INTERNAL MEDICINE

## 2024-12-02 ENCOUNTER — TELEPHONE (OUTPATIENT)
Dept: CARDIOLOGY | Facility: CLINIC | Age: 64
End: 2024-12-02
Payer: MEDICARE

## 2024-12-02 NOTE — TELEPHONE ENCOUNTER
Caller: Lily Unger    Relationship: Self    Best call back number: [phone] YOU CAN LEAVE VM    What is the best time to reach you: ANYTIME    Who are you requesting to speak with (clinical staff, provider,  specific staff member): CLINICAL        What was the call regarding: PT HAS HEART CATH SCHEDULED FOR TOMORROW.  SHE HAS SINUS ISSUES.  IS IT OK SHE STILL HAS HEART CATH?  ONLY TAKING NASAL SPRAY.  PLEASE CALL TO ADVISE.

## 2024-12-02 NOTE — TELEPHONE ENCOUNTER
PATENT IS NOT HAVING ANY OTHER SYMPTOMS OR FEVER JUST SINUS ISSUE  -TOLD HER SHE SHOULD BE GOOD TO GO

## 2024-12-02 NOTE — TELEPHONE ENCOUNTER
Caller: Lily Unger    Relationship: Self    Best call back number: [phone] YOU CAN LEAVE VM    What is the best time to reach you: ANYTIME    Who are you requesting to speak with (clinical staff, provider,  specific staff member): CLINICAL        What was the call regarding: PT HAS HEART CATH SCHEDULED FOR TOMORROW.  SHE HAS SINUS ISSUES.  IS IT OK SHE STILL HAS HEART CATH?  ONLY TAKING NASAL SPRAY.  PLEASE CALL TO ADVISE.

## 2024-12-03 ENCOUNTER — HOSPITAL ENCOUNTER (OUTPATIENT)
Facility: HOSPITAL | Age: 64
Discharge: HOME | End: 2024-12-04
Attending: INTERNAL MEDICINE | Admitting: INTERNAL MEDICINE
Payer: MEDICARE

## 2024-12-03 DIAGNOSIS — R07.2 PRECORDIAL PAIN: ICD-10-CM

## 2024-12-03 DIAGNOSIS — I10 ESSENTIAL HYPERTENSION: ICD-10-CM

## 2024-12-03 DIAGNOSIS — R94.39 ABNORMAL STRESS TEST: ICD-10-CM

## 2024-12-03 DIAGNOSIS — E78.5 HYPERLIPIDEMIA, UNSPECIFIED HYPERLIPIDEMIA TYPE: ICD-10-CM

## 2024-12-03 DIAGNOSIS — I25.119 CORONARY ARTERY DISEASE INVOLVING NATIVE CORONARY ARTERY OF NATIVE HEART WITH ANGINA PECTORIS: ICD-10-CM

## 2024-12-03 DIAGNOSIS — I48.0 PAROXYSMAL ATRIAL FIBRILLATION: ICD-10-CM

## 2024-12-03 PROBLEM — R07.9 CHEST PAIN: Status: ACTIVE | Noted: 2024-12-03

## 2024-12-03 LAB
ACT BLD: 233 SECONDS (ref 82–152)
ALBUMIN SERPL-MCNC: 3.5 G/DL (ref 3.5–5.2)
ALBUMIN/GLOB SERPL: 1 G/DL
ALP SERPL-CCNC: 80 U/L (ref 39–117)
ALT SERPL W P-5'-P-CCNC: 16 U/L (ref 1–33)
ANION GAP SERPL CALCULATED.3IONS-SCNC: 10.1 MMOL/L (ref 5–15)
AST SERPL-CCNC: 14 U/L (ref 1–32)
BILIRUB SERPL-MCNC: <0.2 MG/DL (ref 0–1.2)
BUN SERPL-MCNC: 10 MG/DL (ref 8–23)
BUN/CREAT SERPL: 13.5 (ref 7–25)
CALCIUM SPEC-SCNC: 8.8 MG/DL (ref 8.6–10.5)
CHLORIDE SERPL-SCNC: 105 MMOL/L (ref 98–107)
CO2 SERPL-SCNC: 23.9 MMOL/L (ref 22–29)
CREAT SERPL-MCNC: 0.74 MG/DL (ref 0.57–1)
EGFRCR SERPLBLD CKD-EPI 2021: 89.4 ML/MIN/1.73
GLOBULIN UR ELPH-MCNC: 3.5 GM/DL
GLUCOSE BLDC GLUCOMTR-MCNC: 149 MG/DL (ref 70–130)
GLUCOSE BLDC GLUCOMTR-MCNC: 164 MG/DL (ref 70–130)
GLUCOSE BLDC GLUCOMTR-MCNC: 199 MG/DL (ref 70–130)
GLUCOSE SERPL-MCNC: 160 MG/DL (ref 65–99)
MAGNESIUM SERPL-MCNC: 2.1 MG/DL (ref 1.6–2.4)
POTASSIUM SERPL-SCNC: 3.9 MMOL/L (ref 3.5–5.2)
PROT SERPL-MCNC: 7 G/DL (ref 6–8.5)
SODIUM SERPL-SCNC: 139 MMOL/L (ref 136–145)

## 2024-12-03 PROCEDURE — 25010000002 MIDAZOLAM PER 1 MG: Performed by: INTERNAL MEDICINE

## 2024-12-03 PROCEDURE — 92978 ENDOLUMINL IVUS OCT C 1ST: CPT | Performed by: INTERNAL MEDICINE

## 2024-12-03 PROCEDURE — C1769 GUIDE WIRE: HCPCS | Performed by: INTERNAL MEDICINE

## 2024-12-03 PROCEDURE — 82948 REAGENT STRIP/BLOOD GLUCOSE: CPT

## 2024-12-03 PROCEDURE — C1887 CATHETER, GUIDING: HCPCS | Performed by: INTERNAL MEDICINE

## 2024-12-03 PROCEDURE — 25010000002 FENTANYL CITRATE (PF) 50 MCG/ML SOLUTION: Performed by: INTERNAL MEDICINE

## 2024-12-03 PROCEDURE — C1753 CATH, INTRAVAS ULTRASOUND: HCPCS | Performed by: INTERNAL MEDICINE

## 2024-12-03 PROCEDURE — C1725 CATH, TRANSLUMIN NON-LASER: HCPCS | Performed by: INTERNAL MEDICINE

## 2024-12-03 PROCEDURE — C1760 CLOSURE DEV, VASC: HCPCS | Performed by: INTERNAL MEDICINE

## 2024-12-03 PROCEDURE — 80053 COMPREHEN METABOLIC PANEL: CPT | Performed by: NURSE PRACTITIONER

## 2024-12-03 PROCEDURE — C1894 INTRO/SHEATH, NON-LASER: HCPCS | Performed by: INTERNAL MEDICINE

## 2024-12-03 PROCEDURE — 25010000002 LIDOCAINE 2% SOLUTION: Performed by: INTERNAL MEDICINE

## 2024-12-03 PROCEDURE — 25510000001 IOPAMIDOL PER 1 ML: Performed by: INTERNAL MEDICINE

## 2024-12-03 PROCEDURE — 92920 PRQ TRLUML C ANGIOP 1ART&/BR: CPT | Performed by: INTERNAL MEDICINE

## 2024-12-03 PROCEDURE — G0378 HOSPITAL OBSERVATION PER HR: HCPCS

## 2024-12-03 PROCEDURE — 83735 ASSAY OF MAGNESIUM: CPT | Performed by: NURSE PRACTITIONER

## 2024-12-03 PROCEDURE — 25010000002 HEPARIN (PORCINE) PER 1000 UNITS: Performed by: INTERNAL MEDICINE

## 2024-12-03 PROCEDURE — 93458 L HRT ARTERY/VENTRICLE ANGIO: CPT | Performed by: INTERNAL MEDICINE

## 2024-12-03 PROCEDURE — 85347 COAGULATION TIME ACTIVATED: CPT

## 2024-12-03 PROCEDURE — 25010000002 ONDANSETRON PER 1 MG: Performed by: INTERNAL MEDICINE

## 2024-12-03 RX ORDER — LIDOCAINE HYDROCHLORIDE 20 MG/ML
INJECTION, SOLUTION INFILTRATION; PERINEURAL
Status: DISCONTINUED | OUTPATIENT
Start: 2024-12-03 | End: 2024-12-03 | Stop reason: HOSPADM

## 2024-12-03 RX ORDER — PREGABALIN 75 MG/1
150 CAPSULE ORAL 2 TIMES DAILY
Status: DISCONTINUED | OUTPATIENT
Start: 2024-12-03 | End: 2024-12-04 | Stop reason: HOSPADM

## 2024-12-03 RX ORDER — POLYETHYLENE GLYCOL 3350 17 G/17G
17 POWDER, FOR SOLUTION ORAL DAILY PRN
Status: DISCONTINUED | OUTPATIENT
Start: 2024-12-03 | End: 2024-12-04 | Stop reason: HOSPADM

## 2024-12-03 RX ORDER — HEPARIN SODIUM 1000 [USP'U]/ML
INJECTION, SOLUTION INTRAVENOUS; SUBCUTANEOUS
Status: DISCONTINUED | OUTPATIENT
Start: 2024-12-03 | End: 2024-12-03 | Stop reason: HOSPADM

## 2024-12-03 RX ORDER — AMOXICILLIN 250 MG
2 CAPSULE ORAL 2 TIMES DAILY PRN
Status: DISCONTINUED | OUTPATIENT
Start: 2024-12-03 | End: 2024-12-04 | Stop reason: HOSPADM

## 2024-12-03 RX ORDER — ACETAMINOPHEN 500 MG
1000 TABLET ORAL EVERY 6 HOURS PRN
Status: DISCONTINUED | OUTPATIENT
Start: 2024-12-03 | End: 2024-12-04 | Stop reason: HOSPADM

## 2024-12-03 RX ORDER — ASPIRIN 81 MG/1
TABLET, CHEWABLE ORAL
Status: DISCONTINUED | OUTPATIENT
Start: 2024-12-03 | End: 2024-12-03 | Stop reason: HOSPADM

## 2024-12-03 RX ORDER — SODIUM CHLORIDE 9 MG/ML
40 INJECTION, SOLUTION INTRAVENOUS AS NEEDED
Status: DISCONTINUED | OUTPATIENT
Start: 2024-12-03 | End: 2024-12-04 | Stop reason: HOSPADM

## 2024-12-03 RX ORDER — ROSUVASTATIN CALCIUM 20 MG/1
20 TABLET, COATED ORAL DAILY
Status: DISCONTINUED | OUTPATIENT
Start: 2024-12-03 | End: 2024-12-04 | Stop reason: HOSPADM

## 2024-12-03 RX ORDER — POTASSIUM CHLORIDE 750 MG/1
20 TABLET, FILM COATED, EXTENDED RELEASE ORAL ONCE
Status: COMPLETED | OUTPATIENT
Start: 2024-12-03 | End: 2024-12-03

## 2024-12-03 RX ORDER — SODIUM CHLORIDE 0.9 % (FLUSH) 0.9 %
10 SYRINGE (ML) INJECTION AS NEEDED
Status: DISCONTINUED | OUTPATIENT
Start: 2024-12-03 | End: 2024-12-03 | Stop reason: HOSPADM

## 2024-12-03 RX ORDER — BISOPROLOL FUMARATE 5 MG/1
2.5 TABLET, FILM COATED ORAL DAILY
Status: DISCONTINUED | OUTPATIENT
Start: 2024-12-04 | End: 2024-12-04 | Stop reason: HOSPADM

## 2024-12-03 RX ORDER — FENTANYL CITRATE 50 UG/ML
INJECTION, SOLUTION INTRAMUSCULAR; INTRAVENOUS
Status: DISCONTINUED | OUTPATIENT
Start: 2024-12-03 | End: 2024-12-03 | Stop reason: HOSPADM

## 2024-12-03 RX ORDER — IOPAMIDOL 755 MG/ML
INJECTION, SOLUTION INTRAVASCULAR
Status: DISCONTINUED | OUTPATIENT
Start: 2024-12-03 | End: 2024-12-03 | Stop reason: HOSPADM

## 2024-12-03 RX ORDER — MIDAZOLAM HYDROCHLORIDE 1 MG/ML
INJECTION, SOLUTION INTRAMUSCULAR; INTRAVENOUS
Status: DISCONTINUED | OUTPATIENT
Start: 2024-12-03 | End: 2024-12-03 | Stop reason: HOSPADM

## 2024-12-03 RX ORDER — SODIUM CHLORIDE 0.9 % (FLUSH) 0.9 %
10 SYRINGE (ML) INJECTION AS NEEDED
Status: DISCONTINUED | OUTPATIENT
Start: 2024-12-03 | End: 2024-12-04 | Stop reason: HOSPADM

## 2024-12-03 RX ORDER — ASPIRIN 81 MG/1
81 TABLET ORAL DAILY
Status: DISCONTINUED | OUTPATIENT
Start: 2024-12-04 | End: 2024-12-04 | Stop reason: HOSPADM

## 2024-12-03 RX ORDER — NITROGLYCERIN 0.4 MG/1
0.4 TABLET SUBLINGUAL
Status: DISCONTINUED | OUTPATIENT
Start: 2024-12-03 | End: 2024-12-03

## 2024-12-03 RX ORDER — SODIUM CHLORIDE 0.9 % (FLUSH) 0.9 %
10 SYRINGE (ML) INJECTION EVERY 12 HOURS SCHEDULED
Status: DISCONTINUED | OUTPATIENT
Start: 2024-12-03 | End: 2024-12-03 | Stop reason: HOSPADM

## 2024-12-03 RX ORDER — SODIUM CHLORIDE 0.9 % (FLUSH) 0.9 %
10 SYRINGE (ML) INJECTION EVERY 12 HOURS SCHEDULED
Status: DISCONTINUED | OUTPATIENT
Start: 2024-12-03 | End: 2024-12-04 | Stop reason: HOSPADM

## 2024-12-03 RX ORDER — ACETAMINOPHEN 500 MG
1000 TABLET ORAL EVERY 6 HOURS PRN
COMMUNITY

## 2024-12-03 RX ORDER — ONDANSETRON 2 MG/ML
INJECTION INTRAMUSCULAR; INTRAVENOUS
Status: DISCONTINUED | OUTPATIENT
Start: 2024-12-03 | End: 2024-12-03 | Stop reason: HOSPADM

## 2024-12-03 RX ORDER — ACETAMINOPHEN 325 MG/1
650 TABLET ORAL EVERY 4 HOURS PRN
Status: DISCONTINUED | OUTPATIENT
Start: 2024-12-03 | End: 2024-12-03

## 2024-12-03 RX ORDER — SODIUM CHLORIDE 9 MG/ML
75 INJECTION, SOLUTION INTRAVENOUS CONTINUOUS
Status: ACTIVE | OUTPATIENT
Start: 2024-12-03 | End: 2024-12-03

## 2024-12-03 RX ORDER — BISACODYL 5 MG/1
5 TABLET, DELAYED RELEASE ORAL DAILY PRN
Status: DISCONTINUED | OUTPATIENT
Start: 2024-12-03 | End: 2024-12-04 | Stop reason: HOSPADM

## 2024-12-03 RX ORDER — HYDRALAZINE HYDROCHLORIDE 20 MG/ML
10 INJECTION INTRAMUSCULAR; INTRAVENOUS EVERY 6 HOURS PRN
Status: DISCONTINUED | OUTPATIENT
Start: 2024-12-03 | End: 2024-12-04 | Stop reason: HOSPADM

## 2024-12-03 RX ORDER — BISACODYL 10 MG
10 SUPPOSITORY, RECTAL RECTAL DAILY PRN
Status: DISCONTINUED | OUTPATIENT
Start: 2024-12-03 | End: 2024-12-04 | Stop reason: HOSPADM

## 2024-12-03 RX ORDER — NITROGLYCERIN 0.4 MG/1
0.4 TABLET SUBLINGUAL
Status: DISCONTINUED | OUTPATIENT
Start: 2024-12-03 | End: 2024-12-04 | Stop reason: HOSPADM

## 2024-12-03 RX ADMIN — ROSUVASTATIN CALCIUM 20 MG: 20 TABLET, FILM COATED ORAL at 21:10

## 2024-12-03 RX ADMIN — Medication 10 ML: at 21:06

## 2024-12-03 RX ADMIN — POTASSIUM CHLORIDE 20 MEQ: 750 TABLET, EXTENDED RELEASE ORAL at 20:49

## 2024-12-03 RX ADMIN — PREGABALIN 150 MG: 75 CAPSULE ORAL at 20:49

## 2024-12-03 RX ADMIN — TICAGRELOR 60 MG: 60 TABLET ORAL at 20:52

## 2024-12-03 NOTE — DISCHARGE INSTRUCTIONS
[facility]  [address] [place] [address]    Coronary Angiogram with or without Stent (Femoral Approach) After Care    Refer to this sheet in the next few weeks. These instructions provide you with information on caring for yourself after your procedure. Your health care provider may also give you more specific instructions. Your treatment has been planned according to current medical practices, but problems sometimes occur. Call your health care provider if you have any problems or questions after your procedure.    WHAT TO EXPECT AFTER THE PROCEDURE    The insertion site may be tender for a few days after your procedure.  Minor discomfort or tenderness and a small bump at the catheter insertion site.  The bump should decrease in size and tenderness within 1 to 2 weeks.     HOME CARE INSTRUCTIONS      Take medicines only as directed by your health care provider. Blood thinners may be prescribed after your procedure to improve blood flow through the stent.  Metformin or any medications containing Metformin should not be taken for 48 hours after your procedure.    Cardiac Rehab may or may not be ordered.  Please consult with your physician.   Do not apply powder or lotion to the site.    Check your insertion site every day for redness, swelling, or fluid leaking from the insertion site.    Increase your fluid intake for the next 2 days.    Hold direct pressure over the site when you cough, sneeze, laugh or change positions.  Do this for the next 2 days.    Avoid strenuous activity as directed by your physician.  Follow instructions about when you can drive and return to work as directed by your physician.     Do not take baths, swim, or use a hot tub until your health care provider approves.   You may shower 24 hours after the procedure. Remove the bandage (dressing) and gently wash the site with plain soap and water.  Gently pat the site dry.  Do not rub the insertion area with a washcloth or  towel.  You may apply a band aid daily for 2 days if desired.   Limit your activity for the first 48 hours.  Do not bend, squat, or lift anything over 20lb. (9 kg) or as directed by your health care provider.  However, we recommend lifting nothing heavier than a gallon of milk.   Do not operate machinery or power tools for 24 hours.  A responsible adult should be with you for the first 24 hours after you arrive home. Do not make any important legal decisions or sign legal papers for 24 hours.  Do not drink alcohol for 24 hours.    Eat a heart-healthy diet. This should include plenty of fresh fruits and vegetables. Meat should be lean cuts. Avoid the following types of food:    Food that is high in salt.    Canned or highly processed food.    Food that is high in saturated fat or sugar.    Fried food.    Make any other lifestyle changes recommended by your health care provider. This may include:    Not using any tobacco products including cigarettes, chewing tobacco, or electronic cigarettes.   Managing your weight.    Getting regular exercise.    Managing your blood pressure.    Limiting your alcohol intake.    Managing other health problems, such as diabetes.    If you need an MRI after your heart stent was placed, be sure to tell the health care provider who orders the MRI that you have a heart stent.    Keep all follow-up visits as directed by your health care provider.      Call your doctor if:    You have heavy bleeding from the site, lie down flat, hold manual pressure and call 911 immediately.     You develop chest pain, shortness of breath, feel faint, or pass out.  You have bleeding, swelling larger than a walnut, or drainage from the catheter insertion site.  You develop pain, discoloration, coldness, numbness, tingling, or severe bruising in the leg that held the catheter.  You develop bleeding from any other place such as from the bowels.   You have a fever > 101 or chills.    Call Your Doctor If:      You have any symptoms of a stroke.  Remember BE FAST  B-balance. Sudden trouble walking or loss of balance.  E-eyes.  Sudden changes in how you see or a sudden onset of a very bad headache.   F-face. Sudden weakness or loss of feeling of the face or facial droop on one side.   A-arms Sudden weakness or numbness in one arm.  One arm drifts down if they are both held out in front of you. This happens suddenly and usually on one side of the body.  S-speech.  Sudden trouble speaking, slurred speech or trouble understanding what people are saying.   T-time  Time to call emergency services.  Write down the symptoms and the time they started.    MAKE SURE YOU:  Understand these instructions.  Will watch your condition.  Will get help right away if you are not doing well or get worse.

## 2024-12-03 NOTE — PLAN OF CARE
Goal Outcome Evaluation:  Plan of Care Reviewed With: patient        Progress: improving  Outcome Evaluation: right groin with dressing and homer patch, S/D/I, bleeding stopped, no hematoma noted. does complain at times of SOA , SATs 95-99% on RA, abdominal muscles used. Probable home tomorrow.

## 2024-12-03 NOTE — NURSING NOTE
Upon entering patients room, patient c/o shortness of breath. Upon checking patient cath site, gauze and Tegaderm were saturated with new bloody drainage. Upon removing dressing, blood noted to be oozing from site. Manual pressure held for 5 minute, oozing stopped and new gauze and Tegaderm applied. Dr. Harper aware of patient shortness of breath.

## 2024-12-03 NOTE — NURSING NOTE
Dr. Harper at bedside to assess patient. Instructed to given patient caffeine to help with shortness of breath that is possibly a side effect of Brilinta given to patient in cath lab post angioplasty. Patient also noted to be in a-fib rhythm at this time. Dr. Harper states he would like to keep patient overnight for observation. Patient and patient daughter agreeable.

## 2024-12-03 NOTE — Clinical Note
First balloon inflation max pressure = 12 ismael. First balloon inflation duration = 8 seconds. Second inflation of balloon - Max pressure = 12 ismael. 2nd Inflation of balloon - Duration = 8 seconds. 2nd inflation was done at 10:20 EST. Third inflation of balloon - Max pressure = 12 ismael. 3rd Inflation of balloon - Duration = 8 seconds. 3rd inflation was done at 10:20 EST.

## 2024-12-03 NOTE — Clinical Note
The right DP pulse is +2. The right PT pulse is +1. The right radial pulse is +1. The right ulnar pulse is +1.

## 2024-12-03 NOTE — Clinical Note
Hemostasis started on the right femoral artery. Perclose was used in achieving hemostasis. Closure device deployed in the vessel. Hemostasis achieved successfully. Closure device additional comment: Manual pressure held following to ensure hemostasis. 4x4 with tegaderm dressing.

## 2024-12-03 NOTE — Clinical Note
Allergies reviewed.  H&P note has been confirmed for the patient. Procedural consent has been signed.  Staff has reviewed the patient's labs. Skilled Nursing Facility Notes

## 2024-12-04 ENCOUNTER — APPOINTMENT (OUTPATIENT)
Dept: CARDIOLOGY | Facility: HOSPITAL | Age: 64
End: 2024-12-04
Payer: MEDICARE

## 2024-12-04 VITALS
OXYGEN SATURATION: 91 % | DIASTOLIC BLOOD PRESSURE: 59 MMHG | SYSTOLIC BLOOD PRESSURE: 139 MMHG | BODY MASS INDEX: 32.47 KG/M2 | TEMPERATURE: 98.1 F | HEART RATE: 59 BPM | HEIGHT: 61 IN | WEIGHT: 172 LBS | RESPIRATION RATE: 16 BRPM

## 2024-12-04 LAB
ALBUMIN SERPL-MCNC: 3.5 G/DL (ref 3.5–5.2)
ALBUMIN/GLOB SERPL: 1.2 G/DL
ALP SERPL-CCNC: 78 U/L (ref 39–117)
ALT SERPL W P-5'-P-CCNC: 14 U/L (ref 1–33)
ANION GAP SERPL CALCULATED.3IONS-SCNC: 8.4 MMOL/L (ref 5–15)
AST SERPL-CCNC: 16 U/L (ref 1–32)
BILIRUB SERPL-MCNC: 0.2 MG/DL (ref 0–1.2)
BUN SERPL-MCNC: 12 MG/DL (ref 8–23)
BUN/CREAT SERPL: 12.1 (ref 7–25)
CALCIUM SPEC-SCNC: 9 MG/DL (ref 8.6–10.5)
CHLORIDE SERPL-SCNC: 107 MMOL/L (ref 98–107)
CHOLEST SERPL-MCNC: 69 MG/DL (ref 0–200)
CO2 SERPL-SCNC: 24.6 MMOL/L (ref 22–29)
CREAT SERPL-MCNC: 0.99 MG/DL (ref 0.57–1)
DEPRECATED RDW RBC AUTO: 46.7 FL (ref 37–54)
EGFRCR SERPLBLD CKD-EPI 2021: 63 ML/MIN/1.73
ERYTHROCYTE [DISTWIDTH] IN BLOOD BY AUTOMATED COUNT: 17.6 % (ref 12.3–15.4)
GLOBULIN UR ELPH-MCNC: 3 GM/DL
GLUCOSE BLDC GLUCOMTR-MCNC: 168 MG/DL (ref 70–130)
GLUCOSE BLDC GLUCOMTR-MCNC: 198 MG/DL (ref 70–130)
GLUCOSE SERPL-MCNC: 169 MG/DL (ref 65–99)
HCT VFR BLD AUTO: 31.6 % (ref 34–46.6)
HDLC SERPL-MCNC: 20 MG/DL (ref 40–60)
HGB BLD-MCNC: 9.7 G/DL (ref 12–15.9)
LDLC SERPL CALC-MCNC: 24 MG/DL (ref 0–100)
LDLC/HDLC SERPL: 1.02 {RATIO}
MAGNESIUM SERPL-MCNC: 2.1 MG/DL (ref 1.6–2.4)
MCH RBC QN AUTO: 22.8 PG (ref 26.6–33)
MCHC RBC AUTO-ENTMCNC: 30.7 G/DL (ref 31.5–35.7)
MCV RBC AUTO: 74.4 FL (ref 79–97)
PLATELET # BLD AUTO: 360 10*3/MM3 (ref 140–450)
PMV BLD AUTO: 9.3 FL (ref 6–12)
POTASSIUM SERPL-SCNC: 4.1 MMOL/L (ref 3.5–5.2)
POTASSIUM SERPL-SCNC: 4.1 MMOL/L (ref 3.5–5.2)
PROT SERPL-MCNC: 6.5 G/DL (ref 6–8.5)
RBC # BLD AUTO: 4.25 10*6/MM3 (ref 3.77–5.28)
SODIUM SERPL-SCNC: 140 MMOL/L (ref 136–145)
TRIGL SERPL-MCNC: 143 MG/DL (ref 0–150)
TSH SERPL DL<=0.05 MIU/L-ACNC: 1.64 UIU/ML (ref 0.27–4.2)
VLDLC SERPL-MCNC: 25 MG/DL (ref 5–40)
WBC NRBC COR # BLD AUTO: 8.54 10*3/MM3 (ref 3.4–10.8)

## 2024-12-04 PROCEDURE — G0378 HOSPITAL OBSERVATION PER HR: HCPCS

## 2024-12-04 PROCEDURE — 84132 ASSAY OF SERUM POTASSIUM: CPT | Performed by: INTERNAL MEDICINE

## 2024-12-04 PROCEDURE — 93246 EXT ECG>7D<15D RECORDING: CPT

## 2024-12-04 PROCEDURE — 84443 ASSAY THYROID STIM HORMONE: CPT | Performed by: NURSE PRACTITIONER

## 2024-12-04 PROCEDURE — 80061 LIPID PANEL: CPT | Performed by: NURSE PRACTITIONER

## 2024-12-04 PROCEDURE — 85027 COMPLETE CBC AUTOMATED: CPT | Performed by: NURSE PRACTITIONER

## 2024-12-04 PROCEDURE — 83735 ASSAY OF MAGNESIUM: CPT | Performed by: NURSE PRACTITIONER

## 2024-12-04 PROCEDURE — 82948 REAGENT STRIP/BLOOD GLUCOSE: CPT

## 2024-12-04 PROCEDURE — 80053 COMPREHEN METABOLIC PANEL: CPT | Performed by: NURSE PRACTITIONER

## 2024-12-04 PROCEDURE — 93005 ELECTROCARDIOGRAM TRACING: CPT

## 2024-12-04 RX ORDER — PRASUGREL 10 MG/1
10 TABLET, FILM COATED ORAL DAILY
Status: DISCONTINUED | OUTPATIENT
Start: 2024-12-05 | End: 2024-12-04 | Stop reason: HOSPADM

## 2024-12-04 RX ORDER — PRASUGREL 10 MG/1
60 TABLET, FILM COATED ORAL ONCE
Status: COMPLETED | OUTPATIENT
Start: 2024-12-04 | End: 2024-12-04

## 2024-12-04 RX ORDER — PRASUGREL 10 MG/1
10 TABLET, FILM COATED ORAL DAILY
Qty: 30 TABLET | Refills: 11 | Status: SHIPPED | OUTPATIENT
Start: 2024-12-05

## 2024-12-04 RX ADMIN — ASPIRIN 81 MG: 81 TABLET, COATED ORAL at 09:15

## 2024-12-04 RX ADMIN — PRASUGREL 60 MG: 10 TABLET, FILM COATED ORAL at 09:20

## 2024-12-04 RX ADMIN — ROSUVASTATIN CALCIUM 20 MG: 20 TABLET, FILM COATED ORAL at 09:15

## 2024-12-04 RX ADMIN — Medication 10 ML: at 09:15

## 2024-12-04 RX ADMIN — ACETAMINOPHEN 1000 MG: 500 TABLET ORAL at 12:13

## 2024-12-04 RX ADMIN — LANSOPRAZOLE 30 MG: 15 TABLET, ORALLY DISINTEGRATING ORAL at 06:51

## 2024-12-04 RX ADMIN — EMPAGLIFLOZIN 10 MG: 10 TABLET, FILM COATED ORAL at 09:15

## 2024-12-04 RX ADMIN — PREGABALIN 150 MG: 75 CAPSULE ORAL at 09:15

## 2024-12-04 NOTE — DISCHARGE SUMMARY
[facility]  Physician Discharge Summary    Patient Identification:  Name: Lily Unger  Age: 64 y.o.  Sex: female  :  1960  MRN: [id number]    Admit date: 12/3/2024    Discharge date and time:  24 12:25      Admitting Physician: Roe Harper MD     Discharge Physician: Daysi WEST    Discharge Diagnoses:   Active Hospital Problems    Diagnosis     **Abnormal stress test     Chest pain     Hyperlipidemia     Precordial pain     CAD (coronary artery disease)     Essential hypertension        Discharged Condition: stable    Hospital Course:     This is a 64 year old female know to this provider with coronary artery disease admitted following cardiac cath. She presented to [facility] for outpatient cardiac cath. Cath Normal left main, LAD midportion 50 to 60% narrowing with small caliber LAD with mid regularity.  Circumflex nondominant normal.  RCA dominant with proximal ostial stent showed 80% reduced 0% with balloon.  Normal LV gram.  Successful angioplasty of the ostial RCA 80% reduced to 0% with balloon.  Successful intravascular ultrasound.  She did have some oozing from the right femoral cath site following cardiac catheterization.  This resolved, dressing clean dry and intact, no drainage.  Right femoral cath site without swelling or hematoma.  She did have short burst of atrial fibrillation following cardiac catheterization however converted to sinus rhythm.  On day of discharge she is sinus rhythm.  She will go home with Holter monitor.  We will not anticoagulate at this time due to dual antiplatelet therapy.  She had some breathlessness with Brilinta, this was discontinued and she was loaded with Effient and will go home on Effient at discharge.  Vital signs remained stable and she is cardiovascular stable for discharge with outpatient follow-up.    Consults:   None    Discharge Exam:  General: No acute distress   Skin: Warm and dry, no diaphoresis noted   EYES:  PERTL   HEENT: external ear and nose normal; oral mucosa moist   Neck: Supple; no carotid bruits; no JVD   Heart: S1S2 regular rate and rhythm; no murmurs; no gallop or rub appreciated   Pulmonary: Respirations regular, unlabored at rest, bilateral breath sounds have good air entry throughout all lung fields; no crackles, rubs or wheezes auscultated.     GI: Soft, non-tender, non-distended, positive bowel sounds  No hepatosplenomegaly   Extremities: Bilateral lower extremities have no pre-tibial pitting edema; DP/PT pulses are palpable   Neurological: Alert and oriented x 3; no neuro deficits          LABS:      Results from last 7 days   Lab Units 12/04/24  0326   MAGNESIUM mg/dL 2.1     Results from last 7 days   Lab Units 12/04/24  0326   SODIUM mmol/L 140   POTASSIUM mmol/L 4.1  4.1   BUN mg/dL 12   CREATININE mg/dL 0.99   CALCIUM mg/dL 9.0     @LABRCNTbnp@  Results from last 7 days   Lab Units 12/04/24  0326 11/29/24  1131   WBC 10*3/mm3 8.54 7.22   HEMOGLOBIN g/dL 9.7* 11.4*   HEMATOCRIT % 31.6* 38.3   PLATELETS 10*3/mm3 360 410     Results from last 7 days   Lab Units 11/29/24  1131   INR  1.16*   APTT seconds 28.4     Results from last 7 days   Lab Units 12/04/24  0326   CHOLESTEROL mg/dL 69   TRIGLYCERIDES mg/dL 143   HDL CHOL mg/dL 20*   LDL CHOL mg/dL 24     Disposition:  Home    Discharge Medications:      Discharge Medications        New Medications        Instructions Start Date   prasugrel 10 MG tablet  Commonly known as: EFFIENT   10 mg, Oral, Daily   Start Date: December 5, 2024            Changes to Medications        Instructions Start Date   metFORMIN 1000 MG tablet  Commonly known as: GLUCOPHAGE  What changed: These instructions start on December 6, 2024. If you are unsure what to do until then, ask your doctor or other care provider.   1,000 mg, Oral, 2 Times Daily   Start Date: December 6, 2024     rosuvastatin 20 MG tablet  Commonly known as: CRESTOR  What changed: when to take this   20 mg,  Oral, Daily             Continue These Medications        Instructions Start Date   acetaminophen 500 MG tablet  Commonly known as: TYLENOL   1,000 mg, Every 6 Hours PRN      aspirin 81 MG EC tablet   81 mg, Daily      bisoprolol 5 MG tablet  Commonly known as: ZEBeta   2.5 mg, Oral, Daily      Jardiance 10 MG tablet tablet  Generic drug: empagliflozin   10 mg, Daily      nitroglycerin 0.4 MG SL tablet  Commonly known as: NITROSTAT   0.4 mg, Oral, Every 5 Minutes PRN, Take no more than 3 doses in 15 minutes.      omeprazole 40 MG capsule  Commonly known as: priLOSEC   40 mg, Daily      OneTouch Delica Lancets 30G misc   TEST 1 (ONE) EACH DAILY      OneTouch Verio test strip  Generic drug: glucose blood   No dose, route, or frequency recorded.      pregabalin 150 MG capsule  Commonly known as: LYRICA   150 mg, 2 Times Daily      Tresiba FlexTouch 100 UNIT/ML solution pen-injector injection  Generic drug: insulin degludec   Inject 25 Units under the skin into the appropriate area as directed Every Night.                 Discharge Home Instructions:  1. Follow up with dec 20 at 1    Routine post cardiac catheterization/PCI discharge home care instructions:    1. No submerging procedure site below water for 7-10 days.  2. No lifting objects greater than 1 lbs for 3 days.  3. If groin site used, avoid climbing several flights of stairs or sitting for longer than 2 hours at a time for the next 24 hours.   4. Monitor puncture site for bleeding and/or knots;. If bleeding should occur at the groin site: lie flat, apply pressure and return to the ER. If bleeding should occur at the wrist site, apply pressure and return to the ER.  5.  You may apply a DRY Band-Aid over the puncture site if needed. Do not apply any lotions, salves or ointments to site.  6. No driving for 3 days.  7. Return to ER for recurrent symptoms.  8. No smoking.  9. Take all medications as prescribed.      Signed:  BRETT Tucker  12/4/2024  11:30  "EST      EMR Dragon/Transcription:   \"Dictated utilizing Dragon dictation\".     "

## 2024-12-04 NOTE — PLAN OF CARE
Problem: Adult Inpatient Plan of Care  Goal: Plan of Care Review  Outcome: Progressing  Goal: Readiness for Transition of Care  Outcome: Progressing   Goal Outcome Evaluation:      Pt A+Ox4 throughout shift, no SOB noted. No acute events overnight reported. Pt's R femoral site no complications overnight- site soft, tender, no hematoma, no bleeding. Pt OOB to  stby frequently with no issues. Possible discharge today?

## 2024-12-04 NOTE — NURSING NOTE
"@0651- Went into patients room to administer AM med and pt states she's been \"SOB all night\", advsd she didn't report any SOB throughout shift or assessments pt stated that it was actually \"only around 2/3 am some tightness and she felt SOB but it is not happening now\", educated patient on importance of expressing new symptoms and issues to nursing staff so we can notate and intervene.   "

## 2024-12-04 NOTE — NURSING NOTE
"Pt refusing PM brilinta. States she had \"extreme SOB\" in cath lab post surgery and was specifically instructed by MD not to take this that that is what caused it. Pt advsd she does not feel comfortable taking unless further directed differently from the MD.   Educated on benefits of brilinta and that we would still watch for s/s bleeding from site, and we have oxygen if needed to provide to assist in SOB.     Paging MD.     S/with Jeannie MAGAÑA MD who advsd to have her take with caffeine and if Meredith wants her on something else tomorrow we can address it at that time.    Patient agreeable now to take brilinta.   "

## 2024-12-04 NOTE — CASE MANAGEMENT/SOCIAL WORK
Discharge Planning Assessment  [facility]     Patient Name: Lily Unger  MRN: [id number]  Today's Date: 12/4/2024    Admit Date: 12/3/2024    Plan: Home; Denies needs.  Will d/c w/ holter monitor.   Discharge Needs Assessment       Row Name 12/04/24 1201       Living Environment    People in Home sibling(s)    Name(s) of People in Home Brother/Samir Mcgarry and sister-in-law/Carlene Malgorzata; Nieces Georgi & Marium    Current Living Arrangements home    Potentially Unsafe Housing Conditions none    In the past 12 months has the electric, gas, oil, or water company threatened to shut off services in your home? No    Primary Care Provided by self    Provides Primary Care For no one    Family Caregiver if Needed sibling(s)    Family Caregiver Names Samir and sister in law/Carlene    Quality of Family Relationships helpful;supportive;involved    Able to Return to Prior Arrangements yes       Resource/Environmental Concerns    Resource/Environmental Concerns none    Transportation Concerns none       Transportation Needs    In the past 12 months, has lack of transportation kept you from medical appointments or from getting medications? no    In the past 12 months, has lack of transportation kept you from meetings, work, or from getting things needed for daily living? No       Food Insecurity    Within the past 12 months, you worried that your food would run out before you got the money to buy more. Never true    Within the past 12 months, the food you bought just didn't last and you didn't have money to get more. Never true       Transition Planning    Patient/Family Anticipates Transition to home with family    Patient/Family Anticipated Services at Transition none    Transportation Anticipated family or friend will provide       Discharge Needs Assessment    Readmission Within the Last 30 Days no previous admission in last 30 days    Equipment Currently Used at Home scales    Concerns to be Addressed no discharge  needs identified;denies needs/concerns at this time    Do you want help finding or keeping work or a job? I do not need or want help    Do you want help with school or training? For example, starting or completing job training or getting a high school diploma, GED or equivalent No    Anticipated Changes Related to Illness none    Equipment Needed After Discharge none    Provided Post Acute Provider List? Refused    Refused Provider List Comment Offered home health list and she refused need.                   Discharge Plan       Row Name 12/04/24 1203       Plan    Plan Home; Denies needs.  Will d/c w/ holter monitor.    Plan Comments CCP spoke with patient at bedside and introduced self and role.  Discharge planning discussed.  Information on face sheet verified.  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  Patient lives with her brother/Samir Dennis and his family (wife Carlene and adult kids Georgi and Marium), in a single-story home with no entrance stair steps.  Patients PCP confirmed as, Anil Savage and [facility]  Patient has the following DME- scale.  Pt up ad oh in room.  Patient denies use of past home health or going to a sub-acute rehab.  Patient plans to return home at discharge and denies need for rehab or home health list.  Patient denies current discharge needs.  CCP will continue to follow….…Lesly CHARLTON /SUSHMA.                  Continued Care and Services - Admitted Since 12/3/2024    No active coordination exists for this encounter.       Expected Discharge Date and Time       Expected Discharge Date Expected Discharge Time    Dec 4, 2024            Demographic Summary       Row Name 12/04/24 1156       General Information    Admission Type observation    Arrived From home    Required Notices Provided Observation Status Notice    Referral Source admission list    Reason for Consult discharge planning    Preferred Language English       Contact Information     Permission Granted to Share Info With family/designee                   Functional Status       Row Name 12/04/24 3779       Functional Status    Usual Activity Tolerance moderate    Current Activity Tolerance moderate       Assessment of Health Literacy    How often do you have someone help you read hospital materials? Never    Health Literacy Good       Functional Status, IADL    Medications independent    Meal Preparation independent    Housekeeping independent    Laundry independent    Shopping independent    If for any reason you need help with day-to-day activities such as bathing, preparing meals, shopping, managing finances, etc., do you get the help you need? I don't need any help       Mental Status    General Appearance WDL WDL       Mental Status Summary    Recent Changes in Mental Status/Cognitive Functioning no changes       Employment/    Employment Status retired                   Psychosocial    No documentation.                  Abuse/Neglect    No documentation.                  Legal    No documentation.                  Substance Abuse    No documentation.                  Patient Forms    No documentation.                     Lesly Bhagat RN

## 2024-12-06 LAB
QT INTERVAL: 468 MS
QTC INTERVAL: 444 MS

## 2024-12-20 ENCOUNTER — OFFICE VISIT (OUTPATIENT)
Dept: CARDIOLOGY | Facility: CLINIC | Age: 64
End: 2024-12-20
Payer: MEDICARE

## 2024-12-20 VITALS
WEIGHT: 172 LBS | DIASTOLIC BLOOD PRESSURE: 69 MMHG | BODY MASS INDEX: 32.47 KG/M2 | HEART RATE: 56 BPM | SYSTOLIC BLOOD PRESSURE: 147 MMHG | HEIGHT: 61 IN

## 2024-12-20 DIAGNOSIS — E78.5 HYPERLIPIDEMIA, UNSPECIFIED HYPERLIPIDEMIA TYPE: ICD-10-CM

## 2024-12-20 DIAGNOSIS — I10 ESSENTIAL HYPERTENSION: ICD-10-CM

## 2024-12-20 DIAGNOSIS — Z09 HOSPITAL DISCHARGE FOLLOW-UP: Primary | ICD-10-CM

## 2024-12-20 DIAGNOSIS — I48.0 AF (PAROXYSMAL ATRIAL FIBRILLATION): ICD-10-CM

## 2024-12-20 DIAGNOSIS — I25.119 CORONARY ARTERY DISEASE INVOLVING NATIVE CORONARY ARTERY OF NATIVE HEART WITH ANGINA PECTORIS: ICD-10-CM

## 2024-12-20 RX ORDER — BENZONATATE 100 MG/1
200 CAPSULE ORAL
COMMUNITY
Start: 2024-12-16 | End: 2024-12-26

## 2024-12-20 RX ORDER — AMLODIPINE BESYLATE 5 MG/1
5 TABLET ORAL DAILY
Qty: 30 TABLET | Refills: 11 | Status: SHIPPED | OUTPATIENT
Start: 2024-12-20

## 2024-12-20 RX ORDER — DOXYCYCLINE 100 MG/1
100 CAPSULE ORAL
COMMUNITY
Start: 2024-12-16 | End: 2024-12-23

## 2024-12-20 RX ORDER — FLUTICASONE PROPIONATE 50 MCG
1 SPRAY, SUSPENSION (ML) NASAL DAILY
COMMUNITY
Start: 2024-11-25

## 2024-12-20 NOTE — PROGRESS NOTES
Subjective:        Lily Unger is a 64 y.o. female who here for follow up    No chief complaint on file.      HPI        Lily Unger is a 64-year-old female who has a history of CAD, CKD, diabetes mellitus, hyperlipidemia, hypertension, obesity, and sleep apnea without use of CPAP machine.    She is here today for hospital follow-up cardiac cath.  She recently was hospitalized on 5/3/2024 to 12/4/2024 due to chest pain and history of CAD.  She underwent a cardiac cath which showed normal left main, LAD portion 50 to 60% narrowing with small caliber LAD with mild regularity.  Circumflex nondominant normal.  RCA dominant with proximal ostial stent showed 80% reduced to 0% with balloon.  Normal LV gram.  Successful angioplasty of the ostial RCA 80% reduced to 0% with balloon.  Successful interval vascular ultrasound.  She did have some oozing at her cardiac cath site with resolved.  No swelling or hematoma noted at discharge.  She had a short burst of atrial fibrillation and converted back to sinus rhythm prior to discharge.  Discharged with a Holter monitor.  She was not sent home on anticoagulation due to antiplatelet therapy.  Brilinta cause some breathlessness and she was loaded with Effient at discharge.    9/21/2024 echo EF 61 to 65%.  Normal LV function.    The following portions of the patient's history were reviewed and updated as appropriate: allergies, current medications, past family history, past medical history, past social history, past surgical history and problem list.    Past Medical History:   Diagnosis Date    Abnormal carotid duplex scan     Asthma     CAD (coronary artery disease)     unspecified    Chronic kidney disease     COVID     Crescendo angina     Diabetes mellitus     hyperglycemia insulin    H/O angioplasty     Hiatal hernia     Hyperlipidemia     Hypertension     Nausea and vomiting 10/24/2023    Obesity     Occlusion and stenosis of bilateral carotid arteries 04/18/2023     Recurrent urinary tract infection     Rotator cuff tear, right     Sleep apnea     NO MACHINE         reports that she quit smoking about 32 years ago. Her smoking use included cigarettes. She has never used smokeless tobacco. She reports that she does not currently use alcohol. She reports that she does not use drugs.     Family History   Problem Relation Age of Onset    Heart disease Mother     Lung disease Mother     Heart attack Father     Heart disease Father     Heart attack Brother     Heart disease Brother     Other Brother         blood clots    Heart attack Maternal Grandfather     Heart disease Maternal Grandfather     Malig Hyperthermia Neg Hx        ROS     Review of Systems  Constitutional: No wt loss, fever, fatigue  Gastrointestinal: No nausea, abdominal pain  Behavioral/Psych: No insomnia or anxiety  Cardiovascular  denies chest pain, and shortness of breath.      Objective:           Vitals and nursing note reviewed.   Constitutional:       Appearance: Well-developed.   HENT:      Head: Normocephalic.      Right Ear: External ear normal.      Left Ear: External ear normal.   Neck:      Vascular: No JVD.   Pulmonary:      Effort: Pulmonary effort is normal. No respiratory distress.      Breath sounds: Normal breath sounds. No stridor. No rales.   Cardiovascular:      Normal rate. Regular rhythm.      No gallop.    Pulses:     Intact distal pulses.   Edema:     Peripheral edema absent.   Abdominal:      General: Bowel sounds are normal. There is no distension.      Palpations: Abdomen is soft.      Tenderness: There is no abdominal tenderness. There is no guarding.   Musculoskeletal: Normal range of motion.         General: No tenderness.      Cervical back: Normal range of motion. Skin:     General: Skin is warm.   Neurological:      Mental Status: Alert and oriented to person, place, and time.      Deep Tendon Reflexes: Reflexes are normal and symmetric.   Psychiatric:         Judgment: Judgment  normal.         Procedures    11/2024  Interpretation Summary         Left ventricular systolic function is normal. Calculated left ventricular EF = 61.1% Left ventricular ejection fraction appears to be 61 - 65%.    Left ventricular diastolic function was normal.    Estimated right ventricular systolic pressure from tricuspid regurgitation is normal (<35 mmHg).    11/2024   Interpretation Summary         Findings consistent with an equivocal ECG stress test.    Impressions are consistent with an intermediate risk study.    Left ventricular ejection fraction is normal (Calculated EF = 60%).    Myocardial perfusion imaging indicates a small-sized, mildly severe area of ischemia located in the anterior wall.     Asymptomatic for chest pain. Specificity of study reduced secondary to baseline Non-specific ST-T wave abnormalities, however ECG is  suggestive of ischemia.   1 mm Horizontal ST depression Inferior, V4, V5 in recovery   Ectopy: none.  Blood pressure response:  Baseline Hypertensive  163/67, Peak (post Lexiscan) 144/46, Recovery: 136//65  Pharmacologic study due to Beta-blocker therapy and able to participate in low level exercise and tolerance is fair.     Supervised by: Dr. Harper    12/2024   HEMODYNAMIC / ANGIOGRAPHIC DATA:   Left ventricular end diastolic pressure was 10 mmHg.  Left ventriculography revealed an EF around 50%.   The left main is normal left main.  The left anterior descending artery is , Left anterior descending midportion 50 to 60% narrowing with small caliber LAD with minimum irregularity  The left circumflex is nondominant and normal.  The right coronary artery is , Right coronary artery dominant, has proximal/ostial stent showed 80% reduced to 0% with 3.0/12 NC balloon  Successful angioplasty of ostial 80% reduced to 0% with 3.0/12 NC TEKURO balloon    Intravascular ultrasound, was performed before and after shows no complications appropriate size of the stent    DIMITRIOS FLOW  PRE..3..... POST....3..    TYPE OF LESION......C.......    RECOMMENDATIONS: Post-procedure care will focus on prevention of any ischemic events and congestive complications. Aggressive risk factor modification will be carried out.  Importance of taking dual antiplatelets for one year has been explained, risk of stent thrombosis leading to the acute MI, which carries high morbidity and mortality has been explained    Discontinuation or interruptions of these medications should be under the strict guidance of appropriate health professional    Current Outpatient Medications:     acetaminophen (TYLENOL) 500 MG tablet, Take 2 tablets by mouth Every 6 (Six) Hours As Needed for Mild Pain., Disp: , Rfl:     aspirin 81 MG EC tablet, Take 1 tablet by mouth Daily., Disp: , Rfl:     bisoprolol (ZEBeta) 5 MG tablet, Take 0.5 tablets by mouth Daily., Disp: 45 tablet, Rfl: 4    Jardiance 10 MG tablet tablet, Take 1 tablet by mouth Daily., Disp: , Rfl:     metFORMIN (GLUCOPHAGE) 1000 MG tablet, Take 1 tablet by mouth 2 (Two) Times a Day., Disp: , Rfl:     nitroglycerin (NITROSTAT) 0.4 MG SL tablet, Take 1 tablet by mouth Every 5 (Five) Minutes As Needed for Chest Pain. Take no more than 3 doses in 15 minutes., Disp: 25 tablet, Rfl: 3    omeprazole (priLOSEC) 40 MG capsule, Take 1 capsule by mouth Daily., Disp: , Rfl:     OneTouch Delica Lancets 30G misc, TEST 1 (ONE) EACH DAILY, Disp: , Rfl:     ONETOUCH VERIO test strip, , Disp: , Rfl: 0    prasugrel (EFFIENT) 10 MG tablet, Take 1 tablet by mouth Daily., Disp: 30 tablet, Rfl: 11    pregabalin (LYRICA) 150 MG capsule, Take 1 capsule by mouth 2 (Two) Times a Day., Disp: , Rfl:     rosuvastatin (CRESTOR) 20 MG tablet, Take 1 tablet by mouth Daily. (Patient taking differently: Take 1 tablet by mouth Every Night.), Disp: 90 tablet, Rfl: 4    Tresiba FlexTouch 100 UNIT/ML solution pen-injector injection, Inject 25 Units under the skin into the appropriate area as directed Every Night.,  Disp: , Rfl:      Assessment:        Patient Active Problem List   Diagnosis    Chest pain, atypical    Shortness of breath    Syncope and collapse    Weakness    Coronary artery disease involving native heart with angina pectoris    Soft tissue lesion of shoulder region    Disorder of rotator cuff    Shoulder pain    Rotator cuff tear arthropathy    Unstable angina pectoris    Injury of kidney    Type 2 diabetes mellitus    Hypertension    Hypotension    Hypovolemia    Bilateral carotid artery disease    Medical non-compliance    Dizziness    Viral illness    SOB (shortness of breath)    Asthma exacerbation    CKD (chronic kidney disease) stage 3, GFR 30-59 ml/min    Asthma    CAD (coronary artery disease)    Essential hypertension    Abnormal nuclear stress test    Precordial pain    Hyperlipidemia    Abdominal pain    Microcytic anemia    Nausea and vomiting    Abnormal stress test    Chest pain               Plan:   1. Hospital for CAD: Ischemic workup as above.  No chest pain.  Continue Effient, aspirin, and beta-blockade. She declines cardiac rehab at this time.     Risk reduction for the coronary artery disease, controlling the blood pressure, blood sugar management, cholesterol management, exercise, stress management, and proper compliance with medications and follow-up has been discussed    2.  Hypertension: blood pressure elevated. I will add amlodipine.    Educated patient on exercising for at least 30 minutes a day for 2 to 3 days a week. Importance of controlling hypertension and blood pressure checkup on the regular basis has been explained. Hypertension as a silent killer has been discussed. Risk reduction of the weight and regular exercises to control the hypertension has been explained.    3.  Hyperlipidemia: Her primary care manages her labs and cholesterol.    Risk of the hyperlipidemia, importance of the treatment has been explained. Pros and cons of the statins has been explained. Regular blood  workup as well as side effects including the liver failure, myelopathy death has been explained.    4.  PAF: She went into atrial fibs while in the hospital and converted to sinus rhythm.  No anticoagulation due to antiplatelet therapy.  Holter results are pending.             No diagnosis found.    There are no diagnoses linked to this encounter.    COUNSELING: tess Horner was given to patient for the following topics: diagnostic results, risk factor reductions, impressions, risks and benefits of treatment options and importance of treatment compliance .       SMOKING COUNSELING:    We will call with her results of her Holter.  Follow up in one month for blood pressure check.    Sincerely,   BRETT Beth  [facility]  12/20/24  12:52 EST    EMR Dragon/Transcription disclaimer: Dictated utilizing Dragon Dictation

## 2025-01-03 ENCOUNTER — TELEPHONE (OUTPATIENT)
Dept: CARDIOLOGY | Facility: CLINIC | Age: 65
End: 2025-01-03
Payer: MEDICARE

## 2025-01-03 NOTE — TELEPHONE ENCOUNTER
----- Message from Laura ANDREW sent at 1/2/2025 11:12 AM EST -----  Regarding: Medication issue  Just started Amlodipine and it is making her Short of Breathe    STOP AMLODIPINE MONITOR BP AND HR ALSO WATCH SALT INTAKE

## 2025-01-10 LAB
CV ZIO BASELINE AVG BPM: 60 BPM
CV ZIO BASELINE BPM HIGH: 167 BPM
CV ZIO BASELINE BPM LOW: 41 BPM
CV ZIO DEVICE ANALYSIS TIME: NORMAL
CV ZIO ECT SVE COUNT: NORMAL EPISODES
CV ZIO ECT SVE CPLT COUNT: 146 EPISODES
CV ZIO ECT SVE CPLT FREQ: NORMAL
CV ZIO ECT SVE FREQ: NORMAL
CV ZIO ECT SVE TPLT COUNT: 28 EPISODES
CV ZIO ECT SVE TPLT FREQ: NORMAL
CV ZIO ECT VE COUNT: 606 EPISODES
CV ZIO ECT VE CPLT COUNT: 0 EPISODES
CV ZIO ECT VE CPLT FREQ: 0
CV ZIO ECT VE FREQ: NORMAL
CV ZIO ECT VE TPLT COUNT: 0 EPISODES
CV ZIO ECT VE TPLT FREQ: 0
CV ZIO ECTOPIC SVE COUPLET RAW PERCENT: 0.02 %
CV ZIO ECTOPIC SVE ISOLATED PERCENT: 0.96 %
CV ZIO ECTOPIC SVE TRIPLET RAW PERCENT: 0.01 %
CV ZIO ECTOPIC VE COUPLET RAW PERCENT: 0 %
CV ZIO ECTOPIC VE ISOLATED PERCENT: 0.05 %
CV ZIO ECTOPIC VE TRIPLET RAW PERCENT: 0 %
CV ZIO ENROLLMENT END: NORMAL
CV ZIO ENROLLMENT START: NORMAL
CV ZIO PATIENT EVENTS DIARIES: 0
CV ZIO PATIENT EVENTS TRIGGERS: 0
CV ZIO PAUSE COUNT: 0
CV ZIO PRESCRIPTION STATUS: NORMAL
CV ZIO SVT AVG BPM: 116 BPM
CV ZIO SVT BPM HIGH: 167 BPM
CV ZIO SVT BPM LOW: 78 BPM
CV ZIO SVT COUNT: 20
CV ZIO SVT F EPI AVG BPM: 150 BPM
CV ZIO SVT F EPI BEATS: 4 BEATS
CV ZIO SVT F EPI BPM HIGH: 167 BPM
CV ZIO SVT F EPI BPM LOW: 138 BPM
CV ZIO SVT F EPI DUR: 1.4 SEC
CV ZIO SVT F EPI END: NORMAL
CV ZIO SVT F EPI START: NORMAL
CV ZIO SVT L EPI AVG BPM: 92 BPM
CV ZIO SVT L EPI BEATS: 19 BEATS
CV ZIO SVT L EPI BPM HIGH: 103 BPM
CV ZIO SVT L EPI BPM LOW: 78 BPM
CV ZIO SVT L EPI DUR: 12.1 SEC
CV ZIO SVT L EPI END: NORMAL
CV ZIO SVT L EPI START: NORMAL
CV ZIO TOTAL  ENROLLMENT PERIOD: NORMAL
CV ZIO VT COUNT: 0

## 2025-01-13 RX ORDER — BISOPROLOL FUMARATE 5 MG/1
2.5 TABLET, FILM COATED ORAL DAILY
Qty: 45 TABLET | Refills: 1 | Status: SHIPPED | OUTPATIENT
Start: 2025-01-13

## 2025-01-17 ENCOUNTER — TELEPHONE (OUTPATIENT)
Dept: CARDIOLOGY | Facility: CLINIC | Age: 65
End: 2025-01-17
Payer: MEDICARE

## 2025-01-17 NOTE — TELEPHONE ENCOUNTER
----- Message from Rose Castañeda sent at 1/16/2025 12:01 PM EST -----  I have tried to call a couple of times but not able to get through. Will you call her and let her know her holter showed   NSR WITH FREQUENT NSSVTS. Please let her know if she is not having any symptoms then we will continue to monitor her. She will continue her BB.     Thanks, Jaime      Unable to reach patient -she has moved and her # is not in service   Private Auto Walk in

## 2025-02-10 RX ORDER — ROSUVASTATIN CALCIUM 20 MG/1
20 TABLET, COATED ORAL DAILY
Qty: 90 TABLET | Refills: 4 | OUTPATIENT
Start: 2025-02-10

## 2025-03-24 ENCOUNTER — TELEPHONE (OUTPATIENT)
Dept: CARDIOLOGY | Facility: CLINIC | Age: 65
End: 2025-03-24

## 2025-03-24 NOTE — TELEPHONE ENCOUNTER
Caller: Carlene Mcgarry    Relationship: Emergency Contact    Best call back number: [phone]    What is the best time to reach you: ANYTIME    What was the call regarding: PT'S DAUGHTER IS CALLING TO SEE IF WE ACCEPT [employer]. PLEASE CALL PT'S DAUGHTER

## 2025-04-30 ENCOUNTER — OFFICE VISIT (OUTPATIENT)
Dept: CARDIOLOGY | Facility: CLINIC | Age: 65
End: 2025-04-30
Payer: MEDICARE

## 2025-04-30 VITALS
HEIGHT: 61 IN | BODY MASS INDEX: 31.15 KG/M2 | SYSTOLIC BLOOD PRESSURE: 138 MMHG | DIASTOLIC BLOOD PRESSURE: 71 MMHG | HEART RATE: 62 BPM | WEIGHT: 165 LBS

## 2025-04-30 DIAGNOSIS — E78.5 HYPERLIPIDEMIA, UNSPECIFIED HYPERLIPIDEMIA TYPE: ICD-10-CM

## 2025-04-30 DIAGNOSIS — I25.119 CORONARY ARTERY DISEASE INVOLVING NATIVE CORONARY ARTERY OF NATIVE HEART WITH ANGINA PECTORIS: ICD-10-CM

## 2025-04-30 DIAGNOSIS — I10 ESSENTIAL HYPERTENSION: ICD-10-CM

## 2025-04-30 DIAGNOSIS — R06.02 SOB (SHORTNESS OF BREATH): Primary | ICD-10-CM

## 2025-04-30 DIAGNOSIS — I48.0 AF (PAROXYSMAL ATRIAL FIBRILLATION): ICD-10-CM

## 2025-04-30 PROCEDURE — 1160F RVW MEDS BY RX/DR IN RCRD: CPT | Performed by: NURSE PRACTITIONER

## 2025-04-30 PROCEDURE — 1159F MED LIST DOCD IN RCRD: CPT | Performed by: NURSE PRACTITIONER

## 2025-04-30 PROCEDURE — 3078F DIAST BP <80 MM HG: CPT | Performed by: NURSE PRACTITIONER

## 2025-04-30 PROCEDURE — 3075F SYST BP GE 130 - 139MM HG: CPT | Performed by: NURSE PRACTITIONER

## 2025-04-30 NOTE — PROGRESS NOTES
Subjective:        Lily Unger is a 64 y.o. female who here for follow up    No chief complaint on file.      HPI    Lily Unger is a 64-year-old female she is here today for CAD, history of PAF, and hypertension who has a sleep apnea with use of CPAP, hyperlipidemia, obesity, CKD, and diabetes mellitus.    11/2124 echo: EF 61 to 65%.  LV function is normal.    12/3/24 left main is normal.  LAD midportion 50 to 60% narrowing with small caliber LAD with minimum irregularity.  Left circumflex is nondominant and normal.  RCA has proximal/ostial stent showed 80% reduced to 0% with 3.0/12 NC balloon.      12/4/24 Holter showed normal sinus rhythm with frequent in S SVT S.  20 supraventricular tachycardia runs occurred, run with the fastest interval lasting 4 beats with a max rate of 167 bpm, the longest lasting 19 beats with an average rate of 92 bpm.  Isolated SVE's were rare.  SVE couplets were rare.  SVE triplets were rare.  Isolated VE's were rare.  Endo VE couplets or VE triplets were present.      The following portions of the patient's history were reviewed and updated as appropriate: allergies, current medications, past family history, past medical history, past social history, past surgical history and problem list.    Past Medical History:   Diagnosis Date    Abnormal carotid duplex scan     Asthma     CAD (coronary artery disease)     unspecified    Chronic kidney disease     COVID     Crescendo angina     Diabetes mellitus     hyperglycemia insulin    H/O angioplasty     Hiatal hernia     Hyperlipidemia     Hypertension     Nausea and vomiting 10/24/2023    Obesity     Occlusion and stenosis of bilateral carotid arteries 04/18/2023    Recurrent urinary tract infection     Rotator cuff tear, right     Sleep apnea     NO MACHINE         reports that she quit smoking about 33 years ago. Her smoking use included cigarettes. She has never used smokeless tobacco. She reports that she does not currently  use alcohol. She reports that she does not use drugs.     Family History   Problem Relation Age of Onset    Heart disease Mother     Lung disease Mother     Heart attack Father     Heart disease Father     Heart attack Brother     Heart disease Brother     Other Brother         blood clots    Heart attack Maternal Grandfather     Heart disease Maternal Grandfather     Malig Hyperthermia Neg Hx             Objective:           Vitals and nursing note reviewed.   Constitutional:       Appearance: Well-developed.   HENT:      Head: Normocephalic.      Right Ear: External ear normal.      Left Ear: External ear normal.   Neck:      Vascular: No JVD.   Pulmonary:      Effort: Pulmonary effort is normal. No respiratory distress.      Breath sounds: Normal breath sounds. No stridor. No rales.   Cardiovascular:      Normal rate. Regular rhythm.      No gallop.    Pulses:     Intact distal pulses.   Edema:     Peripheral edema absent.   Abdominal:      General: Bowel sounds are normal. There is no distension.      Palpations: Abdomen is soft.      Tenderness: There is no abdominal tenderness. There is no guarding.   Musculoskeletal: Normal range of motion.         General: No tenderness.      Cervical back: Normal range of motion. Skin:     General: Skin is warm.   Neurological:      Mental Status: Alert and oriented to person, place, and time.      Deep Tendon Reflexes: Reflexes are normal and symmetric.   Psychiatric:         Judgment: Judgment normal.         Procedures       11/2124      Interpretation Summary         Left ventricular systolic function is normal. Calculated left ventricular EF = 61.1% Left ventricular ejection fraction appears to be 61 - 65%.    Left ventricular diastolic function was normal.    Estimated right ventricular systolic pressure from tricuspid regurgitation is normal (<35 mmHg).      12/3/24    HEMODYNAMIC / ANGIOGRAPHIC DATA:   Left ventricular end diastolic pressure was 10 mmHg.  Left  ventriculography revealed an EF around 50%.   The left main is normal left main.  The left anterior descending artery is , Left anterior descending midportion 50 to 60% narrowing with small caliber LAD with minimum irregularity  The left circumflex is nondominant and normal.  The right coronary artery is , Right coronary artery dominant, has proximal/ostial stent showed 80% reduced to 0% with 3.0/12 NC balloon  Successful angioplasty of ostial 80% reduced to 0% with 3.0/12 NC TEKURO balloon    Intravascular ultrasound, was performed before and after shows no complications appropriate size of the stent    DIMITRIOS FLOW PRE..3..... POST....3..    TYPE OF LESION......C.......    RECOMMENDATIONS: Post-procedure care will focus on prevention of any ischemic events and congestive complications. Aggressive risk factor modification will be carried out.  Importance of taking dual antiplatelets for one year has been explained, risk of stent thrombosis leading to the acute MI, which carries high morbidity and mortality has been explained    Discontinuation or interruptions of these medications should be under the strict guidance of appropriate health professional     12/4/24    Interpretation Summary         An abnormal monitor study.    Patient had a min HR of 41 bpm, max HR of 167 bpm, and avg HR of 60 bpm. Predominant underlying rhythm was Sinus Rhythm. 20 Supraventricular Tachycardia runs occurred, the run with the fastest interval lasting 4 beats with a max rate of 167 bpm, the longest lasting 19 beats with an avg rate of 92 bpm. Isolated SVEs were rare (<1.0%), SVE Couplets were rare (<1.0%), and SVE Triplets were rare (<1.0%). Isolated VEs were rare (<1.0%), and no VE Couplets or VE Triplets were present.    NSR WITH FREQUENT NSSVTS     Patient had a min HR of 41 bpm, max HR of 167 bpm, and avg HR of 60 bpm. Predominant underlying rhythm was Sinus Rhythm. 20 Supraventricular Tachycardia runs occurred, the run with the  fastest interval lasting 4 beats with a max rate of 167 bpm, the longest lasting 19 beats with an avg rate of 92 bpm. Isolated SVEs were rare (<1.0%), SVE Couplets were rare (<1.0%), and SVE Triplets were rare (<1.0%). Isolated VEs were rare (<1.0%), and no VE Couplets or VE Triplets were present.        Current Outpatient Medications:     acetaminophen (TYLENOL) 500 MG tablet, Take 2 tablets by mouth Every 6 (Six) Hours As Needed for Mild Pain., Disp: , Rfl:     amLODIPine (NORVASC) 5 MG tablet, Take 1 tablet by mouth Daily., Disp: 30 tablet, Rfl: 11    aspirin 81 MG EC tablet, Take 1 tablet by mouth Daily., Disp: , Rfl:     bisoprolol (ZEBeta) 5 MG tablet, TAKE 1/2 TABLET BY MOUTH DAILY, Disp: 45 tablet, Rfl: 1    fluticasone (FLONASE) 50 MCG/ACT nasal spray, Administer 1 spray into the nostril(s) as directed by provider Daily., Disp: , Rfl:     Jardiance 10 MG tablet tablet, Take 1 tablet by mouth Daily., Disp: , Rfl:     metFORMIN (GLUCOPHAGE) 1000 MG tablet, Take 1 tablet by mouth 2 (Two) Times a Day., Disp: , Rfl:     nitroglycerin (NITROSTAT) 0.4 MG SL tablet, Take 1 tablet by mouth Every 5 (Five) Minutes As Needed for Chest Pain. Take no more than 3 doses in 15 minutes., Disp: 25 tablet, Rfl: 3    omeprazole (priLOSEC) 40 MG capsule, Take 1 capsule by mouth Daily., Disp: , Rfl:     OneTouch Delica Lancets 30G misc, TEST 1 (ONE) EACH DAILY, Disp: , Rfl:     ONETOUCH VERIO test strip, , Disp: , Rfl: 0    prasugrel (EFFIENT) 10 MG tablet, Take 1 tablet by mouth Daily., Disp: 30 tablet, Rfl: 11    pregabalin (LYRICA) 150 MG capsule, Take 1 capsule by mouth 2 (Two) Times a Day., Disp: , Rfl:     rosuvastatin (CRESTOR) 20 MG tablet, Take 1 tablet by mouth Daily. (Patient taking differently: Take 1 tablet by mouth Every Night.), Disp: 90 tablet, Rfl: 4    Tresiba FlexTouch 100 UNIT/ML solution pen-injector injection, Inject 25 Units under the skin into the appropriate area as directed Every Night., Disp: , Rfl:       Assessment:        Patient Active Problem List   Diagnosis    Chest pain, atypical    Shortness of breath    Syncope and collapse    Weakness    Coronary artery disease involving native heart with angina pectoris    Soft tissue lesion of shoulder region    Disorder of rotator cuff    Shoulder pain    Rotator cuff tear arthropathy    Unstable angina pectoris    Injury of kidney    Type 2 diabetes mellitus    Hypertension    Hypotension    Hypovolemia    Bilateral carotid artery disease    Medical non-compliance    Dizziness    Viral illness    SOB (shortness of breath)    Asthma exacerbation    CKD (chronic kidney disease) stage 3, GFR 30-59 ml/min    Asthma    CAD (coronary artery disease)    Essential hypertension    Abnormal nuclear stress test    Precordial pain    Hyperlipidemia    Abdominal pain    Microcytic anemia    Nausea and vomiting    Abnormal stress test    Chest pain               Plan:   1.  CAD: Previous ischemic workup as above.  She denies any chest pain.  Continue Effient, aspirin, and beta-blockade.    Risk reduction for the coronary artery disease, controlling the blood pressure, blood sugar management, cholesterol management, exercise, stress management, and proper compliance with medications and follow-up has been discussed    2.  Hypertension: stable.    Educated patient on exercising for at least 30 minutes a day for 2 to 3 days a week. Importance of controlling hypertension and blood pressure checkup on the regular basis has been explained. Hypertension as a silent killer has been discussed. Risk reduction of the weight and regular exercises to control the hypertension has been explained.    3.  Hyperlipidemia: Her PCP manages labs and cholesterol.      Risk of the hyperlipidemia, importance of the treatment has been explained. Pros and cons of the statins has been explained. Regular blood workup as well as side effects including the liver failure, myelopathy death has been  explained.    4.  PAF: Not currently on a/c as her afib happened one marian and is currently on antiplatelet therapy for cad.  Previous Holter revealed normal sinus rhythm with frequent NS SVT S.     5. Shortness of breath: She will have an echo             No diagnosis found.    There are no diagnoses linked to this encounter.    COUNSELING: tess Horner was given to patient for the following topics: diagnostic results, risk factor reductions, impressions, risks and benefits of treatment options and importance of treatment compliance .       SMOKING COUNSELING: denies    - cath 12/3/2024: Stop Effient in 6 months.     -Follow up for history of afib with 14 day monitor. May need to go on a/c     - shortness of breath: an echo    Sincerely,   BRETT Beth  [facility]  04/30/25  12:13 EDT    EMR Dragon/Transcription disclaimer: Dictated utilizing Dragon Dictation

## 2025-05-05 RX ORDER — PRASUGREL 10 MG/1
10 TABLET, FILM COATED ORAL DAILY
Qty: 90 TABLET | Refills: 3 | Status: SHIPPED | OUTPATIENT
Start: 2025-05-05

## 2025-05-07 ENCOUNTER — HOSPITAL ENCOUNTER (OUTPATIENT)
Dept: CARDIOLOGY | Facility: HOSPITAL | Age: 65
Discharge: HOME | End: 2025-05-07
Admitting: NURSE PRACTITIONER
Payer: MEDICARE

## 2025-05-07 ENCOUNTER — APPOINTMENT (OUTPATIENT)
Dept: GENERAL RADIOLOGY | Facility: HOSPITAL | Age: 65
End: 2025-05-07
Payer: MEDICARE

## 2025-05-07 ENCOUNTER — OFFICE VISIT (OUTPATIENT)
Dept: CARDIOLOGY | Facility: CLINIC | Age: 65
End: 2025-05-07
Payer: MEDICARE

## 2025-05-07 ENCOUNTER — HOSPITAL ENCOUNTER (EMERGENCY)
Facility: HOSPITAL | Age: 65
Discharge: HOME | End: 2025-05-07
Attending: EMERGENCY MEDICINE
Payer: MEDICARE

## 2025-05-07 VITALS
SYSTOLIC BLOOD PRESSURE: 149 MMHG | HEIGHT: 61 IN | DIASTOLIC BLOOD PRESSURE: 64 MMHG | OXYGEN SATURATION: 96 % | BODY MASS INDEX: 31.13 KG/M2 | WEIGHT: 164.9 LBS | HEART RATE: 54 BPM

## 2025-05-07 VITALS
RESPIRATION RATE: 16 BRPM | HEART RATE: 53 BPM | OXYGEN SATURATION: 95 % | SYSTOLIC BLOOD PRESSURE: 166 MMHG | TEMPERATURE: 98 F | DIASTOLIC BLOOD PRESSURE: 63 MMHG

## 2025-05-07 DIAGNOSIS — I25.119 CORONARY ARTERY DISEASE INVOLVING NATIVE CORONARY ARTERY OF NATIVE HEART WITH ANGINA PECTORIS: Primary | ICD-10-CM

## 2025-05-07 DIAGNOSIS — R07.89 LEFT-SIDED CHEST WALL PAIN: Primary | ICD-10-CM

## 2025-05-07 DIAGNOSIS — I25.119 CORONARY ARTERY DISEASE INVOLVING NATIVE CORONARY ARTERY OF NATIVE HEART WITH ANGINA PECTORIS: ICD-10-CM

## 2025-05-07 DIAGNOSIS — I10 ESSENTIAL HYPERTENSION: ICD-10-CM

## 2025-05-07 DIAGNOSIS — R06.02 SOB (SHORTNESS OF BREATH): ICD-10-CM

## 2025-05-07 DIAGNOSIS — E78.5 HYPERLIPIDEMIA, UNSPECIFIED HYPERLIPIDEMIA TYPE: ICD-10-CM

## 2025-05-07 DIAGNOSIS — R07.2 PRECORDIAL PAIN: ICD-10-CM

## 2025-05-07 DIAGNOSIS — I48.0 AF (PAROXYSMAL ATRIAL FIBRILLATION): ICD-10-CM

## 2025-05-07 LAB
ALBUMIN SERPL-MCNC: 3.6 G/DL (ref 3.5–5.2)
ALBUMIN/GLOB SERPL: 0.9 G/DL
ALP SERPL-CCNC: 74 U/L (ref 39–117)
ALT SERPL W P-5'-P-CCNC: 9 U/L (ref 1–33)
ANION GAP SERPL CALCULATED.3IONS-SCNC: 10.9 MMOL/L (ref 5–15)
AORTIC DIMENSIONLESS INDEX: 0.77 (DI)
ASCENDING AORTA: 3.2 CM
AST SERPL-CCNC: 16 U/L (ref 1–32)
AV MEAN PRESS GRAD SYS DOP V1V2: 2.7 MMHG
AV VMAX SYS DOP: 106.7 CM/SEC
BASOPHILS # BLD AUTO: 0.06 10*3/MM3 (ref 0–0.2)
BASOPHILS NFR BLD AUTO: 0.8 % (ref 0–1.5)
BH CV ECHO MEAS - ACS: 2.03 CM
BH CV ECHO MEAS - AO MAX PG: 4.6 MMHG
BH CV ECHO MEAS - AO V2 VTI: 26.8 CM
BH CV ECHO MEAS - AVA(I,D): 1.91 CM2
BH CV ECHO MEAS - EDV(CUBED): 75.4 ML
BH CV ECHO MEAS - EDV(MOD-SP2): 65 ML
BH CV ECHO MEAS - EDV(MOD-SP4): 67 ML
BH CV ECHO MEAS - EF(MOD-SP2): 64.6 %
BH CV ECHO MEAS - EF(MOD-SP4): 68.7 %
BH CV ECHO MEAS - ESV(CUBED): 19.4 ML
BH CV ECHO MEAS - ESV(MOD-SP2): 23 ML
BH CV ECHO MEAS - ESV(MOD-SP4): 21 ML
BH CV ECHO MEAS - FS: 36.4 %
BH CV ECHO MEAS - IVS/LVPW: 0.96 CM
BH CV ECHO MEAS - IVSD: 1.29 CM
BH CV ECHO MEAS - LAT PEAK E' VEL: 8.1 CM/SEC
BH CV ECHO MEAS - LV DIASTOLIC VOL/BSA (35-75): 38.5 CM2
BH CV ECHO MEAS - LV MASS(C)D: 205.9 GRAMS
BH CV ECHO MEAS - LV MAX PG: 4.5 MMHG
BH CV ECHO MEAS - LV MEAN PG: 2.27 MMHG
BH CV ECHO MEAS - LV SYSTOLIC VOL/BSA (12-30): 12.1 CM2
BH CV ECHO MEAS - LV V1 MAX: 106.3 CM/SEC
BH CV ECHO MEAS - LV V1 VTI: 20.6 CM
BH CV ECHO MEAS - LVIDD: 4.2 CM
BH CV ECHO MEAS - LVIDS: 2.7 CM
BH CV ECHO MEAS - LVOT AREA: 2.48 CM2
BH CV ECHO MEAS - LVOT DIAM: 1.78 CM
BH CV ECHO MEAS - LVPWD: 1.35 CM
BH CV ECHO MEAS - MED PEAK E' VEL: 6.2 CM/SEC
BH CV ECHO MEAS - MR MAX PG: 63.4 MMHG
BH CV ECHO MEAS - MR MAX VEL: 398.2 CM/SEC
BH CV ECHO MEAS - MV A DUR: 0.14 SEC
BH CV ECHO MEAS - MV A MAX VEL: 69.4 CM/SEC
BH CV ECHO MEAS - MV DEC SLOPE: 405.8 CM/SEC2
BH CV ECHO MEAS - MV DEC TIME: 0.21 SEC
BH CV ECHO MEAS - MV E MAX VEL: 69.4 CM/SEC
BH CV ECHO MEAS - MV E/A: 1
BH CV ECHO MEAS - MV MAX PG: 2.8 MMHG
BH CV ECHO MEAS - MV MEAN PG: 1.18 MMHG
BH CV ECHO MEAS - MV P1/2T: 63.3 MSEC
BH CV ECHO MEAS - MV V2 VTI: 35.4 CM
BH CV ECHO MEAS - MVA(P1/2T): 3.5 CM2
BH CV ECHO MEAS - MVA(VTI): 1.45 CM2
BH CV ECHO MEAS - PA ACC TIME: 0.12 SEC
BH CV ECHO MEAS - PA V2 MAX: 71.8 CM/SEC
BH CV ECHO MEAS - PULM A REVS DUR: 0.12 SEC
BH CV ECHO MEAS - PULM A REVS VEL: 22.7 CM/SEC
BH CV ECHO MEAS - PULM DIAS VEL: 49.7 CM/SEC
BH CV ECHO MEAS - PULM S/D: 0.92
BH CV ECHO MEAS - PULM SYS VEL: 45.8 CM/SEC
BH CV ECHO MEAS - RAP SYSTOLE: 3 MMHG
BH CV ECHO MEAS - RV MAX PG: 1.34 MMHG
BH CV ECHO MEAS - RV V1 MAX: 57.8 CM/SEC
BH CV ECHO MEAS - RV V1 VTI: 9 CM
BH CV ECHO MEAS - RVSP: 23 MMHG
BH CV ECHO MEAS - SV(LVOT): 51.2 ML
BH CV ECHO MEAS - SV(MOD-SP2): 42 ML
BH CV ECHO MEAS - SV(MOD-SP4): 46 ML
BH CV ECHO MEAS - SVI(LVOT): 29.4 ML/M2
BH CV ECHO MEAS - SVI(MOD-SP2): 24.1 ML/M2
BH CV ECHO MEAS - SVI(MOD-SP4): 26.4 ML/M2
BH CV ECHO MEAS - TAPSE (>1.6): 2.17 CM
BH CV ECHO MEAS - TR MAX PG: 20.2 MMHG
BH CV ECHO MEAS - TR MAX VEL: 224.8 CM/SEC
BH CV ECHO MEASUREMENTS AVERAGE E/E' RATIO: 9.71
BH CV XLRA - RV BASE: 3.1 CM
BH CV XLRA - RV LENGTH: 5.7 CM
BH CV XLRA - RV MID: 2.7 CM
BH CV XLRA - TDI S': 9.7 CM/SEC
BILIRUB SERPL-MCNC: 0.2 MG/DL (ref 0–1.2)
BUN SERPL-MCNC: 16 MG/DL (ref 8–23)
BUN/CREAT SERPL: 17.8 (ref 7–25)
CALCIUM SPEC-SCNC: 9.8 MG/DL (ref 8.6–10.5)
CHLORIDE SERPL-SCNC: 106 MMOL/L (ref 98–107)
CO2 SERPL-SCNC: 23.1 MMOL/L (ref 22–29)
CREAT SERPL-MCNC: 0.9 MG/DL (ref 0.57–1)
DEPRECATED RDW RBC AUTO: 50.2 FL (ref 37–54)
EGFRCR SERPLBLD CKD-EPI 2021: 70.7 ML/MIN/1.73
EOSINOPHIL # BLD AUTO: 0.26 10*3/MM3 (ref 0–0.4)
EOSINOPHIL NFR BLD AUTO: 3.3 % (ref 0.3–6.2)
ERYTHROCYTE [DISTWIDTH] IN BLOOD BY AUTOMATED COUNT: 19.4 % (ref 12.3–15.4)
GEN 5 1HR TROPONIN T REFLEX: 7 NG/L
GLOBULIN UR ELPH-MCNC: 3.9 GM/DL
GLUCOSE SERPL-MCNC: 103 MG/DL (ref 65–99)
HCT VFR BLD AUTO: 36.4 % (ref 34–46.6)
HGB BLD-MCNC: 10.6 G/DL (ref 12–15.9)
HOLD SPECIMEN: NORMAL
HOLD SPECIMEN: NORMAL
IMM GRANULOCYTES # BLD AUTO: 0.01 10*3/MM3 (ref 0–0.05)
IMM GRANULOCYTES NFR BLD AUTO: 0.1 % (ref 0–0.5)
LEFT ATRIUM VOLUME INDEX: 25.4 ML/M2
LV EF BIPLANE MOD: 68.4 %
LYMPHOCYTES # BLD AUTO: 2.34 10*3/MM3 (ref 0.7–3.1)
LYMPHOCYTES NFR BLD AUTO: 30.1 % (ref 19.6–45.3)
MCH RBC QN AUTO: 21.2 PG (ref 26.6–33)
MCHC RBC AUTO-ENTMCNC: 29.1 G/DL (ref 31.5–35.7)
MCV RBC AUTO: 72.9 FL (ref 79–97)
MONOCYTES # BLD AUTO: 0.72 10*3/MM3 (ref 0.1–0.9)
MONOCYTES NFR BLD AUTO: 9.3 % (ref 5–12)
NEUTROPHILS NFR BLD AUTO: 4.39 10*3/MM3 (ref 1.7–7)
NEUTROPHILS NFR BLD AUTO: 56.4 % (ref 42.7–76)
PLATELET # BLD AUTO: 404 10*3/MM3 (ref 140–450)
PMV BLD AUTO: 9.2 FL (ref 6–12)
POTASSIUM SERPL-SCNC: 3.9 MMOL/L (ref 3.5–5.2)
PROT SERPL-MCNC: 7.5 G/DL (ref 6–8.5)
QT INTERVAL: 469 MS
QTC INTERVAL: 438 MS
RBC # BLD AUTO: 4.99 10*6/MM3 (ref 3.77–5.28)
SINUS: 2.8 CM
SODIUM SERPL-SCNC: 140 MMOL/L (ref 136–145)
STJ: 2.28 CM
TROPONIN T NUMERIC DELTA: -2 NG/L
TROPONIN T SERPL HS-MCNC: 9 NG/L
WBC NRBC COR # BLD AUTO: 7.78 10*3/MM3 (ref 3.4–10.8)
WHOLE BLOOD HOLD COAG: NORMAL
WHOLE BLOOD HOLD SPECIMEN: NORMAL

## 2025-05-07 PROCEDURE — 99284 EMERGENCY DEPT VISIT MOD MDM: CPT

## 2025-05-07 PROCEDURE — 99214 OFFICE O/P EST MOD 30 MIN: CPT | Performed by: NURSE PRACTITIONER

## 2025-05-07 PROCEDURE — 93010 ELECTROCARDIOGRAM REPORT: CPT | Performed by: INTERNAL MEDICINE

## 2025-05-07 PROCEDURE — 93000 ELECTROCARDIOGRAM COMPLETE: CPT | Performed by: NURSE PRACTITIONER

## 2025-05-07 PROCEDURE — 93005 ELECTROCARDIOGRAM TRACING: CPT | Performed by: EMERGENCY MEDICINE

## 2025-05-07 PROCEDURE — 84484 ASSAY OF TROPONIN QUANT: CPT | Performed by: EMERGENCY MEDICINE

## 2025-05-07 PROCEDURE — 71045 X-RAY EXAM CHEST 1 VIEW: CPT

## 2025-05-07 PROCEDURE — 93306 TTE W/DOPPLER COMPLETE: CPT

## 2025-05-07 PROCEDURE — 85025 COMPLETE CBC W/AUTO DIFF WBC: CPT | Performed by: EMERGENCY MEDICINE

## 2025-05-07 PROCEDURE — 80053 COMPREHEN METABOLIC PANEL: CPT | Performed by: EMERGENCY MEDICINE

## 2025-05-07 PROCEDURE — 36415 COLL VENOUS BLD VENIPUNCTURE: CPT

## 2025-05-07 PROCEDURE — 93306 TTE W/DOPPLER COMPLETE: CPT | Performed by: INTERNAL MEDICINE

## 2025-05-07 NOTE — ED PROVIDER NOTES
EMERGENCY DEPARTMENT ENCOUNTER  Room Number:  34/34  PCP: Jaycob Savage MD  Independent Historians: Patient      HPI:  Chief Complaint: had concerns including Chest Pain.     A complete HPI/ROS/PMH/PSH/SH/FH are unobtainable due to: None    Chronic or social conditions impacting patient care (Social Determinants of Health): None      Context: Lily Unger is a 64 y.o. female with a medical history of CAD, hypertension, hyperlipidemia and rotator cuff tear arthropathy who presents to the ED c/o acute left-sided chest wall pain that sharp in nature and aching that developed while patient was receiving her echocardiogram and the tech was having to push on her chest wall there to get images.  Pain is mild to moderate at this time.  Aspirin taken prior to arrival.  No associated dyspnea, diaphoresis.  Pain is worse when she moves her left upper extremity or palpates her left chest wall.  No pain prior to echocardiogram today.  Patient reports chronic intermittent dizziness and chronic dyspnea are unchanged from baseline for many years.      Review of prior external notes (non-ED) -and- Review of prior external test results outside of this encounter:  Cardiology office note 5/7/2025 signed at 1137 today reports the patient had no complaint of chest discomfort and recommends continued Effient, aspirin and beta-blockade as well as risk reduction for CAD with good glycemic control, blood pressure control and cholesterol management as well as exercise and stress management  ======================  Cardiology office note 4/30/2025 reviewed: Patient followed for CAD, hypertension and hyperlipidemia as well as paroxysmal atrial fibrillation and chronic shortness of breath.      PAST MEDICAL HISTORY  Active Ambulatory Problems     Diagnosis Date Noted    Chest pain, atypical 01/28/2016    Shortness of breath 01/28/2016    Syncope and collapse 01/28/2016    Weakness 01/28/2016    Coronary artery disease involving  native heart with angina pectoris 01/28/2016    Soft tissue lesion of shoulder region 04/22/2015    Disorder of rotator cuff 06/10/2015    Shoulder pain 04/01/2015    Rotator cuff tear arthropathy 06/10/2015    Unstable angina pectoris 10/01/2015    Injury of kidney 07/01/2016    Type 2 diabetes mellitus 07/01/2016    Hypertension 07/01/2016    Hypotension 07/01/2016    Hypovolemia 07/04/2016    Bilateral carotid artery disease 06/19/2018    Medical non-compliance 11/17/2018    Dizziness 11/17/2018    Viral illness 11/17/2018    SOB (shortness of breath) 03/12/2019    Asthma exacerbation 12/27/2019    CKD (chronic kidney disease) stage 3, GFR 30-59 ml/min 12/27/2019    Asthma 12/27/2019    CAD (coronary artery disease) 08/10/2020    Essential hypertension 08/10/2020    Abnormal nuclear stress test 08/10/2020    Precordial pain 04/05/2023    Hyperlipidemia 08/09/2023    Abdominal pain 10/22/2023    Microcytic anemia 10/23/2023    Nausea and vomiting 10/24/2023    Abnormal stress test 11/25/2024    Chest pain 12/03/2024     Resolved Ambulatory Problems     Diagnosis Date Noted    No Resolved Ambulatory Problems     Past Medical History:   Diagnosis Date    Abnormal carotid duplex scan     Chronic kidney disease     COVID     Crescendo angina     Diabetes mellitus     H/O angioplasty     Hiatal hernia     Obesity     Occlusion and stenosis of bilateral carotid arteries 04/18/2023    Recurrent urinary tract infection     Rotator cuff tear, right     Sleep apnea          PAST SURGICAL HISTORY  Past Surgical History:   Procedure Laterality Date    CARDIAC CATHETERIZATION      OUTCOME: SUCCESSFUL    CARDIAC CATHETERIZATION N/A 03/20/2019    Procedure: RIGHT AND LEFT HEART CATH;  Surgeon: Roe Harper MD;  Location: [facility] CATH INVASIVE LOCATION;  Service: Cardiology    CARDIAC CATHETERIZATION N/A 03/20/2019    Procedure: Coronary angiography;  Surgeon: Roe Harper MD;  [facility];  Service: Cardiology    CARDIAC CATHETERIZATION N/A 03/20/2019    Procedure: Left ventriculography;  Surgeon: Roe Harper MD;  [facility];  Service: Cardiology    CARDIAC CATHETERIZATION N/A 03/20/2019    Procedure: Stent BANDAR coronary;  Surgeon: Roe Harper MD;  [facility];  Service: Cardiology    CARDIAC CATHETERIZATION N/A 08/14/2020    Procedure: Left Heart Cath;  Surgeon: Roe Harper MD;  [facility];  Service: Cardiovascular;  Laterality: N/A;    CARDIAC CATHETERIZATION N/A 08/14/2020    Procedure: Coronary angiography;  Surgeon: Roe Harper MD;  [facility];  Service: Cardiovascular;  Laterality: N/A;    CARDIAC CATHETERIZATION N/A 08/14/2020    Procedure: Left ventriculography;  Surgeon: Roe Harper MD;  [facility];  Service: Cardiovascular;  Laterality: N/A;    CARDIAC CATHETERIZATION N/A 08/14/2020    Procedure: Percutaneous Coronary Intervention;  Surgeon: Roe Harper MD;  [facility];  Service: Cardiovascular;  Laterality: N/A;    CARDIAC CATHETERIZATION N/A 01/12/2022    Procedure: Left Heart Cath;  Surgeon: Roe Harper MD;  [facility];  Service: Cardiovascular;  Laterality: N/A;    CARDIAC CATHETERIZATION N/A 01/12/2022    Procedure: Coronary angiography;  Surgeon: Roe Harper MD;  [facility];  Service: Cardiovascular;  Laterality: N/A;    CARDIAC CATHETERIZATION N/A 01/12/2022    Procedure: Left ventriculography;  Surgeon: Roe Harper MD;  [facility];  Service: Cardiovascular;  Laterality: N/A;    CARDIAC CATHETERIZATION N/A 01/12/2022    Procedure: Resting Full Cycle Ratio;  Surgeon: Roe Harper MD;  [facility];  Service: Cardiovascular;  Laterality: N/A;    CARDIAC CATHETERIZATION N/A 2022    Procedure: Stent BANDAR coronary;  Surgeon: Roe Harper MD;  [facility];  Service: Cardiovascular;  Laterality: N/A;    CARDIAC CATHETERIZATION N/A 2023    Procedure: Left Heart Cath;  Surgeon: Roe Harper MD;  [facility];  Service: Cardiology;  Laterality: N/A;    CARDIAC CATHETERIZATION N/A 2023    Procedure: Coronary angiography;  Surgeon: Roe Harper MD;  [facility];  Service: Cardiology;  Laterality: N/A;    CARDIAC CATHETERIZATION N/A 2023    Procedure: Left ventriculography;  Surgeon: Roe Harper MD;  [facility];  Service: Cardiology;  Laterality: N/A;    CARDIAC CATHETERIZATION N/A 12/3/2024    Procedure: Left Heart Cath;  Surgeon: Roe Harper MD;  [facility];  Service: Cardiovascular;  Laterality: N/A;    CARDIAC CATHETERIZATION N/A 12/3/2024    Procedure: Percutaneous Coronary Intervention;  Surgeon: Roe Harper MD;  [facility];  Service: Cardiovascular;  Laterality: N/A;    CARDIAC ELECTROPHYSIOLOGY PROCEDURE N/A 2020    Procedure: Temporary Pacemaker;  Surgeon: Roe Harper MD;  [facility];  Service: Cardiovascular;  Laterality: N/A;     SECTION  1999    CORONARY STENT PLACEMENT  2022    LAD    ENDOSCOPY N/A 10/25/2023    Procedure: ESOPHAGOGASTRODUODENOSCOPY with cold biopsies;  Surgeon: Reshma Duque MD;  Location: [facility] ENDOSCOPY;  Service: Gastroenterology;  Laterality: N/A;  pre: nausea and vomiting  post: normal    HYSTERECTOMY      INTERVENTIONAL RADIOLOGY PROCEDURE N/A 12/3/2024    Procedure: Intravascular Ultrasound;  Surgeon: Roe Harper MD;  Location: [facility] CATH  INVASIVE LOCATION;  Service: Cardiovascular;  Laterality: N/A;    TONSILLECTOMY AND ADENOIDECTOMY  1968    UMBILICAL HERNIA REPAIR           FAMILY HISTORY  Family History   Problem Relation Age of Onset    Heart disease Mother     Lung disease Mother     Heart attack Father     Heart disease Father     Heart attack Brother     Heart disease Brother     Other Brother         blood clots    Heart attack Maternal Grandfather     Heart disease Maternal Grandfather     Malig Hyperthermia Neg Hx          SOCIAL HISTORY  Social History     Socioeconomic History    Marital status:    Tobacco Use    Smoking status: Former     Current packs/day: 0.00     Types: Cigarettes     Quit date:      Years since quittin.3    Smokeless tobacco: Never   Vaping Use    Vaping status: Never Used   Substance and Sexual Activity    Alcohol use: Not Currently    Drug use: No    Sexual activity: Defer     Birth control/protection: Post-menopausal, Sponge         ALLERGIES  Hydrocodone, Penicillins, Norvasc [amlodipine], Oxybutynin, Ozempic (0.25 or 0.5 mg-dose) [semaglutide(0.25 or 0.5mg-dos)], and Plavix [clopidogrel bisulfate]      REVIEW OF SYSTEMS  Review of Systems  Included in HPI  All systems reviewed and negative except for those discussed in HPI.      PHYSICAL EXAM    I have reviewed the triage vital signs and nursing notes.    ED Triage Vitals   Temp Heart Rate Resp BP SpO2   25 1151 25 1150 25 1150 25 1150 25 1150   98 °F (36.7 °C) 55 16 147/74 97 %      Temp src Heart Rate Source Patient Position BP Location FiO2 (%)   25 1151 25 1150 25 1150 -- --   Tympanic Monitor Lying         Physical Exam    Physical Exam   Constitutional: No distress.  Nontoxic  HENT:  Head: Normocephalic and atraumatic.   Oropharynx: Mucous membranes are moist.   Eyes: . No scleral icterus. No conjunctival pallor.  Neck: Normal range of motion. Neck supple.   Cardiovascular: Pink warm and  well perfused throughout.  Regular  Pulmonary/Chest: No respiratory distress.  No tachypnea or increased work of breathing appreciated.  Clear to auscultation.  Mild soft tissue tenderness to left anterior upper chest wall with no ecchymosis or flail segment.  Abdominal: Soft. There is no tenderness. There is no rebound and no guarding.   Musculoskeletal: Moves all extremities equally.    Neurological: Alert and oriented.  No acute focal deficit appreciated.  Skin: Skin is pink, warm, and dry.   Psychiatric: Mood and affect normal.   Nursing note and vitals reviewed.             LAB RESULTS  Recent Results (from the past 24 hours)   Adult Transthoracic Echo Complete W/ Cont if Necessary Per Protocol    Collection Time: 05/07/25 10:30 AM   Result Value Ref Range    EF(MOD-bp) 68.4 %    LVIDd 4.2 cm    LVIDs 2.7 cm    IVSd 1.29 cm    LVPWd 1.35 cm    FS 36.4 %    IVS/LVPW 0.96 cm    ESV(cubed) 19.4 ml    LV Sys Vol (BSA corrected) 12.1 cm2    EDV(cubed) 75.4 ml    LV Mcqueen Vol (BSA corrected) 38.5 cm2    LV mass(C)d 205.9 grams    LVOT area 2.48 cm2    LVOT diam 1.78 cm    EDV(MOD-sp2) 65.0 ml    EDV(MOD-sp4) 67.0 ml    ESV(MOD-sp2) 23.0 ml    ESV(MOD-sp4) 21.0 ml    SV(MOD-sp2) 42.0 ml    SV(MOD-sp4) 46.0 ml    SVi(MOD-SP2) 24.1 ml/m2    SVi(MOD-SP4) 26.4 ml/m2    SVi (LVOT) 29.4 ml/m2    EF(MOD-sp2) 64.6 %    EF(MOD-sp4) 68.7 %    MV E max yanick 69.4 cm/sec    MV A max yanick 69.4 cm/sec    MV dec time 0.21 sec    MV E/A 1.00     Pulm A Revs Dur 0.12 sec    MV A dur 0.14 sec    LA ESV Index (BP) 25.4 ml/m2    Med Peak E' Yanick 6.2 cm/sec    Lat Peak E' Yanick 8.1 cm/sec    TR max yanick 224.8 cm/sec    Avg E/e' ratio 9.71     SV(LVOT) 51.2 ml    RV Base 3.1 cm    RV Mid 2.7 cm    RV Length 5.7 cm    TAPSE (>1.6) 2.17 cm    RV S' 9.7 cm/sec    Pulm Sys Yanick 45.8 cm/sec    Pulm Mcqueen Yanick 49.7 cm/sec    Pulm S/D 0.92     Pulm A Revs Yanick 22.7 cm/sec    LV V1 max 106.3 cm/sec    LV V1 max PG 4.5 mmHg    LV V1 mean PG 2.27 mmHg    LV V1  VTI 20.6 cm    Ao pk martine 106.7 cm/sec    Ao max PG 4.6 mmHg    Ao mean PG 2.7 mmHg    Ao V2 VTI 26.8 cm    GLORIA(I,D) 1.91 cm2    Dimensionless Index 0.77 (DI)    MV max PG 2.8 mmHg    MV mean PG 1.18 mmHg    MV V2 VTI 35.4 cm    MV P1/2t 63.3 msec    MVA(P1/2t) 3.5 cm2    MVA(VTI) 1.45 cm2    MV dec slope 405.8 cm/sec2    MR max martine 398.2 cm/sec    MR max PG 63.4 mmHg    TR max PG 20.2 mmHg    RV V1 max PG 1.34 mmHg    RV V1 max 57.8 cm/sec    RV V1 VTI 9.0 cm    PA V2 max 71.8 cm/sec    PA acc time 0.12 sec    ACS 2.03 cm    Sinus 2.8 cm    STJ 2.28 cm    RVSP(TR) 23 mmHg    RAP systole 3 mmHg    Ascending aorta 3.2 cm   High Sensitivity Troponin T    Collection Time: 05/07/25 12:02 PM    Specimen: Blood   Result Value Ref Range    HS Troponin T 9 <14 ng/L   Comprehensive Metabolic Panel    Collection Time: 05/07/25 12:02 PM    Specimen: Blood   Result Value Ref Range    Glucose 103 (H) 65 - 99 mg/dL    BUN 16 8 - 23 mg/dL    Creatinine 0.90 0.57 - 1.00 mg/dL    Sodium 140 136 - 145 mmol/L    Potassium 3.9 3.5 - 5.2 mmol/L    Chloride 106 98 - 107 mmol/L    CO2 23.1 22.0 - 29.0 mmol/L    Calcium 9.8 8.6 - 10.5 mg/dL    Total Protein 7.5 6.0 - 8.5 g/dL    Albumin 3.6 3.5 - 5.2 g/dL    ALT (SGPT) 9 1 - 33 U/L    AST (SGOT) 16 1 - 32 U/L    Alkaline Phosphatase 74 39 - 117 U/L    Total Bilirubin 0.2 0.0 - 1.2 mg/dL    Globulin 3.9 gm/dL    A/G Ratio 0.9 g/dL    BUN/Creatinine Ratio 17.8 7.0 - 25.0    Anion Gap 10.9 5.0 - 15.0 mmol/L    eGFR 70.7 >60.0 mL/min/1.73   Green Top (Gel)    Collection Time: 05/07/25 12:02 PM   Result Value Ref Range    Extra Tube Hold for add-ons.    Lavender Top    Collection Time: 05/07/25 12:02 PM   Result Value Ref Range    Extra Tube hold for add-on    Gold Top - SST    Collection Time: 05/07/25 12:02 PM   Result Value Ref Range    Extra Tube Hold for add-ons.    Light Blue Top    Collection Time: 05/07/25 12:02 PM   Result Value Ref Range    Extra Tube Hold for add-ons.    CBC Auto  Differential    Collection Time: 05/07/25 12:02 PM    Specimen: Blood   Result Value Ref Range    WBC 7.78 3.40 - 10.80 10*3/mm3    RBC 4.99 3.77 - 5.28 10*6/mm3    Hemoglobin 10.6 (L) 12.0 - 15.9 g/dL    Hematocrit 36.4 34.0 - 46.6 %    MCV 72.9 (L) 79.0 - 97.0 fL    MCH 21.2 (L) 26.6 - 33.0 pg    MCHC 29.1 (L) 31.5 - 35.7 g/dL    RDW 19.4 (H) 12.3 - 15.4 %    RDW-SD 50.2 37.0 - 54.0 fl    MPV 9.2 6.0 - 12.0 fL    Platelets 404 140 - 450 10*3/mm3    Neutrophil % 56.4 42.7 - 76.0 %    Lymphocyte % 30.1 19.6 - 45.3 %    Monocyte % 9.3 5.0 - 12.0 %    Eosinophil % 3.3 0.3 - 6.2 %    Basophil % 0.8 0.0 - 1.5 %    Immature Grans % 0.1 0.0 - 0.5 %    Neutrophils, Absolute 4.39 1.70 - 7.00 10*3/mm3    Lymphocytes, Absolute 2.34 0.70 - 3.10 10*3/mm3    Monocytes, Absolute 0.72 0.10 - 0.90 10*3/mm3    Eosinophils, Absolute 0.26 0.00 - 0.40 10*3/mm3    Basophils, Absolute 0.06 0.00 - 0.20 10*3/mm3    Immature Grans, Absolute 0.01 0.00 - 0.05 10*3/mm3   ECG 12 Lead Chest Pain    Collection Time: 05/07/25 12:08 PM   Result Value Ref Range    QT Interval 469 ms    QTC Interval 438 ms   High Sensitivity Troponin T 1Hr    Collection Time: 05/07/25  1:24 PM    Specimen: Blood   Result Value Ref Range    HS Troponin T 7 <14 ng/L    Troponin T Numeric Delta -2 Abnormal if >/=3 ng/L         RADIOLOGY  XR Chest 1 View  Result Date: 5/7/2025  ONE-VIEW PORTABLE CHEST  HISTORY: Chest pain.  FINDINGS: The lungs are well expanded and clear and the heart size is normal. There is no acute disease or change from 4/5/2019.  This report was finalized on 5/7/2025 12:36 PM by Dr. Wilberto George M.D on Workstation: [id number]          MEDICATIONS GIVEN IN ER  Medications - No data to display      ORDERS PLACED DURING THIS VISIT:  Orders Placed This Encounter   Procedures    XR Chest 1 View    High Sensitivity Troponin T    [place] Draw    Comprehensive Metabolic Panel    CBC Auto Differential    High Sensitivity Troponin T 1Hr    ECG 12 Lead Chest  Pain    CBC & Differential    Green Top (Gel)    Lavender Top    Gold Top - SST    Light Blue Top         OUTPATIENT MEDICATION MANAGEMENT:  No current Epic-ordered facility-administered medications on file.     Current Outpatient Medications Ordered in Epic   Medication Sig Dispense Refill    acetaminophen (TYLENOL) 500 MG tablet Take 2 tablets by mouth Every 6 (Six) Hours As Needed for Mild Pain.      aspirin 81 MG EC tablet Take 1 tablet by mouth Daily.      bisoprolol (ZEBeta) 5 MG tablet TAKE 1/2 TABLET BY MOUTH DAILY 45 tablet 1    fluticasone (FLONASE) 50 MCG/ACT nasal spray Administer 1 spray into the nostril(s) as directed by provider Daily.      Jardiance 10 MG tablet tablet Take 1 tablet by mouth Daily.      metFORMIN (GLUCOPHAGE) 1000 MG tablet Take 1 tablet by mouth 2 (Two) Times a Day.      nitroglycerin (NITROSTAT) 0.4 MG SL tablet Take 1 tablet by mouth Every 5 (Five) Minutes As Needed for Chest Pain. Take no more than 3 doses in 15 minutes. 25 tablet 3    omeprazole (priLOSEC) 40 MG capsule Take 1 capsule by mouth Daily.      OneTouch Delica Lancets 30G misc TEST 1 (ONE) EACH DAILY      ONETOUCH VERIO test strip   0    prasugrel (EFFIENT) 10 MG tablet TAKE 1 TABLET BY MOUTH DAILY 90 tablet 3    pregabalin (LYRICA) 150 MG capsule Take 1 capsule by mouth 2 (Two) Times a Day.      rosuvastatin (CRESTOR) 20 MG tablet Take 1 tablet by mouth Daily. (Patient taking differently: Take 1 tablet by mouth Every Night.) 90 tablet 4    Tirzepatide (MOUNJARO SC) Inject  under the skin into the appropriate area as directed.      Tresiba FlexTouch 100 UNIT/ML solution pen-injector injection Inject 25 Units under the skin into the appropriate area as directed Every Night.           PROCEDURES  Procedures            PROGRESS, DATA ANALYSIS, CONSULTS, AND MEDICAL DECISION MAKING  All labs have been independently interpreted by me.  All radiology studies have been reviewed by me. All EKGs have been independently viewed  and interpreted by me.  Discussion below represents my analysis of pertinent findings related to patient's condition, differential diagnosis, treatment plan and final disposition.    Differential diagnosis:   My differential diagnosis for chest pain includes but is not limited to:  Muscle strain, costochondritis, myositis, pleurisy, rib fracture, intercostal neuritis, herpes zoster, tumor, myocardial infarction, coronary syndrome, unstable angina, angina, aortic dissection, mitral valve prolapse, pericarditis, palpitations, pulmonary embolus, pneumonia, pneumothorax, lung cancer, GERD, esophagitis, esophageal spasm      Clinical Scores:                  ED Course as of 05/07/25 1420   Wed May 07, 2025   1211 EKG           EKG time: 1208  Rhythm/Rate: Sinus bradycardia, 50  P waves and NJ: MARY within normal limits  QRS, axis: Narrow complex  ST and T waves: No STEMI; QTc within normal limits    Interpreted Contemporaneously by me, independently viewed  Comparison: 12/4/2024-no acute change   [RS]   1237 HS Troponin T: 9 [RS]   1237 Glucose(!): 103 [RS]   1237 BUN: 16 [RS]   1237 Creatinine: 0.90 [RS]   1237 Sodium: 140 [RS]   1237 Potassium: 3.9 [RS]   1237 ALT (SGPT): 9 [RS]   1237 AST (SGOT): 16 [RS]   1237 Total Bilirubin: 0.2 [RS]   1237 WBC: 7.78 [RS]   1237 Hemoglobin(!): 10.6 [RS]   1237 Platelets: 404 [RS]   1237 Immature Grans, Absolute: 0.01 [RS]   1237 RADIOLOGY      Study: Single view chest  Findings: No pneumothorax or large focal infiltrate appreciated  I independently viewed and interpreted these images contemporaneously with treatment.    [RS]   1345 Reassuring workup thus far.  When repeat troponin is negative, plan discharge and outpatient follow-up. [RS]   1412 Troponin T Numeric Delta: -2 [RS]   1419 No acute life threats identified for this patient's left chest wall soreness.  Reviewed all findings with patient.  Patient has establish care and close outpatient follow-up available.  All agreeable  discharge planning. [RS]      ED Course User Index  [RS] Qasim George MD         Prescription drug monitoring program review:     AS OF 14:20 EDT VITALS:    BP - 166/63  HR - 53  TEMP - 98 °F (36.7 °C) (Tympanic)  O2 SATS - 95%    COMPLEXITY OF CARE  Admission was considered but after careful review of the patient's presentation, physical examination, diagnostic results, and response to treatment the patient may be safely discharged with outpatient follow-up.      DIAGNOSIS  Final diagnoses:   Left-sided chest wall pain         DISPOSITION  ED Disposition       ED Disposition   Discharge    Condition   Stable    Comment   --                  DISCHARGE    Patient discharged in stable condition.    Reviewed implications of results, diagnosis, meds, responsibility to follow up, warning signs and symptoms of possible worsening, potential complications and reasons to return to ER.    Patient/Family voiced understanding of above instructions.    Discussed plan for discharge, as there is no emergent indication for admission. Patient referred to primary care provider for regular health maintenance. Pt/family is agreeable and understands need for follow up and possible repeat testing.  Pt is aware that discharge does not mean that nothing is wrong but it indicates no emergency is present that requires admission and they must continue care with follow-up as given below or physician of their choice.     FOLLOW-UP  Jaycob Savage MD  [address] [id number] [phone]    Schedule an appointment as soon as possible for a visit in 3 days           Medication List        Changed      rosuvastatin 20 MG tablet  Commonly known as: CRESTOR  Take 1 tablet by mouth Daily.  What changed: when to take this                Please note that portions of this document were completed with a voice recognition program.    Note Disclaimer: At [facility], we believe that sharing information builds trust and better  relationships. You are receiving this note because you recently visited [facility]. It is possible you will see health information before a provider has talked with you about it. This kind of information can be easy to misunderstand. To help you fully understand what it means for your health, we urge you to discuss this note with your provider.         Qasim George MD  05/07/25 [id number]

## 2025-05-07 NOTE — PROGRESS NOTES
Subjective:        Lily Unger is a 64 y.o. female who here for follow up    No chief complaint on file.      HPI    Lily Unger is a 64-year-old female she is here today for CAD, history of PAF, and hypertension who has a sleep apnea with use of CPAP, hyperlipidemia, obesity, CKD, and diabetes mellitus. She presented to the office today for her echocardiogram and Holter.  She let the echo tech know that she had been having chest pain associated with shortness of breath she stated it had been going on for last couple of days.  The pain radiated to her shoulder.  She declined going to the emergency room but stated that she would do a stress test.  Her daughter came and she told her daughter she had been having it for 2 weeks her daughter and her decided to go to room.       testing remain relevant.    11/2124 echo: EF 61 to 65%.  LV function is normal.    12/3/24 left main is normal.  LAD midportion 50 to 60% narrowing with small caliber LAD with minimum irregularity.  Left circumflex is nondominant and normal.  RCA has proximal/ostial stent showed 80% reduced to 0% with 3.0/12 NC balloon.      12/4/24 Holter showed normal sinus rhythm with frequent in S SVT S.  20 supraventricular tachycardia runs occurred, run with the fastest interval lasting 4 beats with a max rate of 167 bpm, the longest lasting 19 beats with an average rate of 92 bpm.  Isolated SVE's were rare.  SVE couplets were rare.  SVE triplets were rare.  Isolated VE's were rare.  Endo VE couplets or VE triplets were present.      The following portions of the patient's history were reviewed and updated as appropriate: allergies, current medications, past family history, past medical history, past social history, past surgical history and problem list.    Past Medical History:   Diagnosis Date    Abnormal carotid duplex scan     Asthma     CAD (coronary artery disease)     unspecified    Chronic kidney disease     COVID     Crescendo angina      Diabetes mellitus     hyperglycemia insulin    H/O angioplasty     Hiatal hernia     Hyperlipidemia     Hypertension     Nausea and vomiting 10/24/2023    Obesity     Occlusion and stenosis of bilateral carotid arteries 04/18/2023    Recurrent urinary tract infection     Rotator cuff tear, right     Sleep apnea     NO MACHINE         reports that she quit smoking about 33 years ago. Her smoking use included cigarettes. She has never used smokeless tobacco. She reports that she does not currently use alcohol. She reports that she does not use drugs.     Family History   Problem Relation Age of Onset    Heart disease Mother     Lung disease Mother     Heart attack Father     Heart disease Father     Heart attack Brother     Heart disease Brother     Other Brother         blood clots    Heart attack Maternal Grandfather     Heart disease Maternal Grandfather     Malig Hyperthermia Neg Hx             Objective:           Vitals and nursing note reviewed.   Constitutional:       Appearance: Well-developed.   HENT:      Head: Normocephalic.      Right Ear: External ear normal.      Left Ear: External ear normal.   Neck:      Vascular: No JVD.   Pulmonary:      Effort: Pulmonary effort is normal. No respiratory distress.      Breath sounds: Normal breath sounds. No stridor. No rales.   Cardiovascular:      Normal rate. Regular rhythm.      No gallop.    Pulses:     Intact distal pulses.   Edema:     Peripheral edema absent.   Abdominal:      General: Bowel sounds are normal. There is no distension.      Palpations: Abdomen is soft.      Tenderness: There is no abdominal tenderness. There is no guarding.   Musculoskeletal: Normal range of motion.         General: No tenderness.      Cervical back: Normal range of motion. Skin:     General: Skin is warm.   Neurological:      Mental Status: Alert and oriented to person, place, and time.      Deep Tendon Reflexes: Reflexes are normal and symmetric.   Psychiatric:          Judgment: Judgment normal.           ECG 12 Lead    Date/Time: 5/7/2025 12:30 PM  Performed by: Rose Castañeda APRN    Authorized by: Rose Castañeda APRN  Comparison: compared with previous ECG from 12/4/2024  Comparison to previous ECG: Nonspecific twaves  Rhythm: sinus rhythm  Rate: normal    Clinical impression: non-specific ECG             11/2124      Interpretation Summary         Left ventricular systolic function is normal. Calculated left ventricular EF = 61.1% Left ventricular ejection fraction appears to be 61 - 65%.    Left ventricular diastolic function was normal.    Estimated right ventricular systolic pressure from tricuspid regurgitation is normal (<35 mmHg).      12/3/24    HEMODYNAMIC / ANGIOGRAPHIC DATA:   Left ventricular end diastolic pressure was 10 mmHg.  Left ventriculography revealed an EF around 50%.   The left main is normal left main.  The left anterior descending artery is , Left anterior descending midportion 50 to 60% narrowing with small caliber LAD with minimum irregularity  The left circumflex is nondominant and normal.  The right coronary artery is , Right coronary artery dominant, has proximal/ostial stent showed 80% reduced to 0% with 3.0/12 NC balloon  Successful angioplasty of ostial 80% reduced to 0% with 3.0/12 NC TEKURO balloon    Intravascular ultrasound, was performed before and after shows no complications appropriate size of the stent    DIMITRIOS FLOW PRE..3..... POST....3..    TYPE OF LESION......C.......    RECOMMENDATIONS: Post-procedure care will focus on prevention of any ischemic events and congestive complications. Aggressive risk factor modification will be carried out.  Importance of taking dual antiplatelets for one year has been explained, risk of stent thrombosis leading to the acute MI, which carries high morbidity and mortality has been explained    Discontinuation or interruptions of these medications should be under the strict guidance of  appropriate health professional     12/4/24    Interpretation Summary         An abnormal monitor study.    Patient had a min HR of 41 bpm, max HR of 167 bpm, and avg HR of 60 bpm. Predominant underlying rhythm was Sinus Rhythm. 20 Supraventricular Tachycardia runs occurred, the run with the fastest interval lasting 4 beats with a max rate of 167 bpm, the longest lasting 19 beats with an avg rate of 92 bpm. Isolated SVEs were rare (<1.0%), SVE Couplets were rare (<1.0%), and SVE Triplets were rare (<1.0%). Isolated VEs were rare (<1.0%), and no VE Couplets or VE Triplets were present.    NSR WITH FREQUENT NSSVTS     Patient had a min HR of 41 bpm, max HR of 167 bpm, and avg HR of 60 bpm. Predominant underlying rhythm was Sinus Rhythm. 20 Supraventricular Tachycardia runs occurred, the run with the fastest interval lasting 4 beats with a max rate of 167 bpm, the longest lasting 19 beats with an avg rate of 92 bpm. Isolated SVEs were rare (<1.0%), SVE Couplets were rare (<1.0%), and SVE Triplets were rare (<1.0%). Isolated VEs were rare (<1.0%), and no VE Couplets or VE Triplets were present.        Current Outpatient Medications:     acetaminophen (TYLENOL) 500 MG tablet, Take 2 tablets by mouth Every 6 (Six) Hours As Needed for Mild Pain., Disp: , Rfl:     aspirin 81 MG EC tablet, Take 1 tablet by mouth Daily., Disp: , Rfl:     bisoprolol (ZEBeta) 5 MG tablet, TAKE 1/2 TABLET BY MOUTH DAILY, Disp: 45 tablet, Rfl: 1    fluticasone (FLONASE) 50 MCG/ACT nasal spray, Administer 1 spray into the nostril(s) as directed by provider Daily., Disp: , Rfl:     Jardiance 10 MG tablet tablet, Take 1 tablet by mouth Daily., Disp: , Rfl:     metFORMIN (GLUCOPHAGE) 1000 MG tablet, Take 1 tablet by mouth 2 (Two) Times a Day., Disp: , Rfl:     nitroglycerin (NITROSTAT) 0.4 MG SL tablet, Take 1 tablet by mouth Every 5 (Five) Minutes As Needed for Chest Pain. Take no more than 3 doses in 15 minutes., Disp: 25 tablet, Rfl: 3     omeprazole (priLOSEC) 40 MG capsule, Take 1 capsule by mouth Daily., Disp: , Rfl:     OneTouch Delica Lancets 30G misc, TEST 1 (ONE) EACH DAILY, Disp: , Rfl:     ONETOUCH VERIO test strip, , Disp: , Rfl: 0    prasugrel (EFFIENT) 10 MG tablet, TAKE 1 TABLET BY MOUTH DAILY, Disp: 90 tablet, Rfl: 3    pregabalin (LYRICA) 150 MG capsule, Take 1 capsule by mouth 2 (Two) Times a Day., Disp: , Rfl:     rosuvastatin (CRESTOR) 20 MG tablet, Take 1 tablet by mouth Daily. (Patient taking differently: Take 1 tablet by mouth Every Night.), Disp: 90 tablet, Rfl: 4    Tirzepatide (MOUNJARO SC), Inject  under the skin into the appropriate area as directed., Disp: , Rfl:     Tresiba FlexTouch 100 UNIT/ML solution pen-injector injection, Inject 25 Units under the skin into the appropriate area as directed Every Night., Disp: , Rfl:      Assessment:        Patient Active Problem List   Diagnosis    Chest pain, atypical    Shortness of breath    Syncope and collapse    Weakness    Coronary artery disease involving native heart with angina pectoris    Soft tissue lesion of shoulder region    Disorder of rotator cuff    Shoulder pain    Rotator cuff tear arthropathy    Unstable angina pectoris    Injury of kidney    Type 2 diabetes mellitus    Hypertension    Hypotension    Hypovolemia    Bilateral carotid artery disease    Medical non-compliance    Dizziness    Viral illness    SOB (shortness of breath)    Asthma exacerbation    CKD (chronic kidney disease) stage 3, GFR 30-59 ml/min    Asthma    CAD (coronary artery disease)    Essential hypertension    Abnormal nuclear stress test    Precordial pain    Hyperlipidemia    Abdominal pain    Microcytic anemia    Nausea and vomiting    Abnormal stress test    Chest pain               Plan:   1.  CAD: Previous testing as above.  She presented to the office today for her echocardiogram and Holter due to shortness of breath and palpitations.  She was letting the echo tech know that she was  having chest pain for the last 2 weeks associated with shortness of breath.  Originally she had decline going to the emergency room but agreed on a stress test.  Her daughter spoke with her and EMS is being called and she is going to the ER.  She states she has not missed any of her Effient.    Risk reduction for the coronary artery disease, controlling the blood pressure, blood sugar management, cholesterol management, exercise, stress management, and proper compliance with medications and follow-up has been discussed    2.  Hypertension: Blood pressure elevated today.  She is going to the ER and adjustments can be made there.    Educated patient on exercising for at least 30 minutes a day for 2 to 3 days a week. Importance of controlling hypertension and blood pressure checkup on the regular basis has been explained. Hypertension as a silent killer has been discussed. Risk reduction of the weight and regular exercises to control the hypertension has been explained.    3.  Hyperlipidemia: Her PCP manages labs and cholesterol.      Risk of the hyperlipidemia, importance of the treatment has been explained. Pros and cons of the statins has been explained. Regular blood workup as well as side effects including the liver failure, myelopathy death has been explained.    4.  PAF: Not currently on a/c as her afib happened one time and is currently on antiplatelet therapy for cad.  Previous Holter revealed normal sinus rhythm with frequent NS SVT S.     5. Shortness of breath/chest pressure x 2 weeks she went by ambulance to the emergency room.         No diagnosis found.    There are no diagnoses linked to this encounter.    COUNSELING: tess Horner was given to patient for the following topics: diagnostic results, risk factor reductions, impressions, risks and benefits of treatment options and importance of treatment compliance .       SMOKING COUNSELING: denies    She is going to the hospital for  shortness of breath, chest discomfort x 2 weeks which radiates to her left shoulder for further evaluation.      Sincerely,   BRETT Beth  [facility]  05/07/25  11:37 EDT    EMR Dragon/Transcription disclaimer: Dictated utilizing Dragon Dictation

## 2025-05-07 NOTE — ED NOTES
Pt arrives via EMS from her PCP office. Complains of intermittent CP for the last 2 weeks. Complains of SOA and dizziness with it. Pt received 324 mg ASA PTA via EMS.

## 2025-05-08 ENCOUNTER — OFFICE VISIT (OUTPATIENT)
Dept: CARDIOLOGY | Facility: CLINIC | Age: 65
End: 2025-05-08
Payer: MEDICARE

## 2025-05-08 ENCOUNTER — HOSPITAL ENCOUNTER (OUTPATIENT)
Dept: CARDIOLOGY | Facility: HOSPITAL | Age: 65
Discharge: HOME | End: 2025-05-08
Payer: MEDICARE

## 2025-05-08 VITALS
SYSTOLIC BLOOD PRESSURE: 152 MMHG | OXYGEN SATURATION: 97 % | WEIGHT: 164 LBS | DIASTOLIC BLOOD PRESSURE: 74 MMHG | HEART RATE: 54 BPM | HEIGHT: 61 IN | BODY MASS INDEX: 30.96 KG/M2

## 2025-05-08 VITALS
WEIGHT: 165 LBS | HEART RATE: 54 BPM | DIASTOLIC BLOOD PRESSURE: 74 MMHG | SYSTOLIC BLOOD PRESSURE: 152 MMHG | BODY MASS INDEX: 31.15 KG/M2 | HEIGHT: 61 IN

## 2025-05-08 DIAGNOSIS — I25.119 CORONARY ARTERY DISEASE INVOLVING NATIVE CORONARY ARTERY OF NATIVE HEART WITH ANGINA PECTORIS: ICD-10-CM

## 2025-05-08 DIAGNOSIS — E78.5 HYPERLIPIDEMIA, UNSPECIFIED HYPERLIPIDEMIA TYPE: ICD-10-CM

## 2025-05-08 DIAGNOSIS — R06.02 SOB (SHORTNESS OF BREATH): ICD-10-CM

## 2025-05-08 DIAGNOSIS — I10 ESSENTIAL HYPERTENSION: ICD-10-CM

## 2025-05-08 DIAGNOSIS — I48.0 AF (PAROXYSMAL ATRIAL FIBRILLATION): ICD-10-CM

## 2025-05-08 DIAGNOSIS — I48.0 AF (PAROXYSMAL ATRIAL FIBRILLATION): Primary | ICD-10-CM

## 2025-05-08 DIAGNOSIS — R94.39 ABNORMAL NUCLEAR STRESS TEST: Primary | ICD-10-CM

## 2025-05-08 LAB
BH CV IMMEDIATE POST RECOVERY TECH DATA SYMPTOMS: NORMAL
BH CV IMMEDIATE POST TECH DATA BLOOD PRESSURE: NORMAL MMHG
BH CV IMMEDIATE POST TECH DATA HEART RATE: 84 BPM
BH CV IMMEDIATE POST TECH DATA OXYGEN SATS: 97 %
BH CV REST NUCLEAR ISOTOPE DOSE: 10.9 MCI
BH CV STRESS BP STAGE 1: NORMAL
BH CV STRESS DURATION MIN STAGE 1: 2
BH CV STRESS DURATION SEC STAGE 1: 9
BH CV STRESS GRADE STAGE 1: 0
BH CV STRESS HR STAGE 1: 88
BH CV STRESS METS STAGE 1: 1.6
BH CV STRESS NUCLEAR ISOTOPE DOSE: 31.1 MCI
BH CV STRESS PROTOCOL 1: NORMAL
BH CV STRESS RECOVERY BP: NORMAL MMHG
BH CV STRESS RECOVERY HR: 60 BPM
BH CV STRESS RECOVERY O2: 97 %
BH CV STRESS SPEED STAGE 1: 1
BH CV STRESS STAGE 1: 1
BH CV THREE MINUTE POST TECH DATA BLOOD PRESSURE: NORMAL MMHG
BH CV THREE MINUTE POST TECH DATA HEART RATE: 64 BPM
BH CV THREE MINUTE POST TECH DATA OXYGEN SATURATION: 97 %
BH CV THREE MINUTE RECOVERY TECH DATA SYMPTOM: NORMAL
MAXIMAL PREDICTED HEART RATE: 154 BPM
PERCENT MAX PREDICTED HR: 57.14 %
SPECT HRT GATED+EF W RNC IV: 60 %
STRESS BASELINE BP: NORMAL MMHG
STRESS BASELINE HR: 51 BPM
STRESS O2 SAT REST: 97 %
STRESS PERCENT HR: 67 %
STRESS POST ESTIMATED WORKLOAD: 1.6 METS
STRESS POST EXERCISE DUR MIN: 2 MIN
STRESS POST EXERCISE DUR SEC: 9 SEC
STRESS POST O2 SAT PEAK: 97 %
STRESS POST PEAK BP: NORMAL MMHG
STRESS POST PEAK HR: 88 BPM
STRESS TARGET HR: 131 BPM

## 2025-05-08 PROCEDURE — 34310000005 TECHNETIUM SESTAMIBI: Performed by: INTERNAL MEDICINE

## 2025-05-08 PROCEDURE — A9500 TC99M SESTAMIBI: HCPCS | Performed by: INTERNAL MEDICINE

## 2025-05-08 PROCEDURE — 3077F SYST BP >= 140 MM HG: CPT | Performed by: INTERNAL MEDICINE

## 2025-05-08 PROCEDURE — 3078F DIAST BP <80 MM HG: CPT | Performed by: INTERNAL MEDICINE

## 2025-05-08 PROCEDURE — 78452 HT MUSCLE IMAGE SPECT MULT: CPT

## 2025-05-08 PROCEDURE — 99213 OFFICE O/P EST LOW 20 MIN: CPT | Performed by: INTERNAL MEDICINE

## 2025-05-08 PROCEDURE — 25010000002 REGADENOSON 0.4 MG/5ML SOLUTION: Performed by: INTERNAL MEDICINE

## 2025-05-08 PROCEDURE — 93017 CV STRESS TEST TRACING ONLY: CPT

## 2025-05-08 RX ORDER — REGADENOSON 0.08 MG/ML
0.4 INJECTION, SOLUTION INTRAVENOUS
Status: COMPLETED | OUTPATIENT
Start: 2025-05-08 | End: 2025-05-08

## 2025-05-08 RX ORDER — ISOSORBIDE MONONITRATE 30 MG/1
30 TABLET, EXTENDED RELEASE ORAL DAILY
Qty: 30 TABLET | Refills: 11 | Status: SHIPPED | OUTPATIENT
Start: 2025-05-08

## 2025-05-08 RX ORDER — AMINOPHYLLINE 25 MG/ML
125 INJECTION, SOLUTION INTRAVENOUS ONCE
OUTPATIENT
Start: 2025-05-08 | End: 2025-05-08

## 2025-05-08 RX ORDER — AMINOPHYLLINE 25 MG/ML
125 INJECTION, SOLUTION INTRAVENOUS ONCE
OUTPATIENT
Start: 2025-05-08

## 2025-05-08 RX ADMIN — TECHNETIUM TC 99M SESTAMIBI 1 DOSE: 1 INJECTION INTRAVENOUS at 11:08

## 2025-05-08 RX ADMIN — TECHNETIUM TC 99M SESTAMIBI 1 DOSE: 1 INJECTION INTRAVENOUS at 08:06

## 2025-05-08 RX ADMIN — REGADENOSON 0.4 MG: 0.08 INJECTION, SOLUTION INTRAVENOUS at 11:08

## 2025-05-08 NOTE — PROGRESS NOTES
Test results   Subjective:        Lily Unger is a 64 y.o. female who here for follow up    CC  Follow-up hypertension atrial fibrillation shortness of breath  HPI  64 years old female with hypertension atrial fibrillation shortness of breath here for the follow-up denies any chest pains or tightness in the chest     Problems Addressed this Visit          Cardiac and Vasculature    Essential hypertension    AF (paroxysmal atrial fibrillation) - Primary    Relevant Medications    isosorbide mononitrate (IMDUR) 30 MG 24 hr tablet       Pulmonary and Pneumonias    SOB (shortness of breath)     Diagnoses         Codes Comments      AF (paroxysmal atrial fibrillation)    -  Primary ICD-10-CM: I48.0  ICD-9-CM: 427.31       Essential hypertension     ICD-10-CM: I10  ICD-9-CM: 401.9       SOB (shortness of breath)     ICD-10-CM: R06.02  ICD-9-CM: 786.05           .    The following portions of the patient's history were reviewed and updated as appropriate: allergies, current medications, past family history, past medical history, past social history, past surgical history and problem list.    Past Medical History:   Diagnosis Date    Abnormal carotid duplex scan     Asthma     CAD (coronary artery disease)     unspecified    Chronic kidney disease     COVID     Crescendo angina     Diabetes mellitus     hyperglycemia insulin    H/O angioplasty     Hiatal hernia     Hyperlipidemia     Hypertension     Nausea and vomiting 10/24/2023    Obesity     Occlusion and stenosis of bilateral carotid arteries 04/18/2023    Recurrent urinary tract infection     Rotator cuff tear, right     Sleep apnea     NO MACHINE     reports that she quit smoking about 33 years ago. Her smoking use included cigarettes. She has never used smokeless tobacco. She reports that she does not currently use alcohol. She reports that she does not use drugs.   Family History   Problem Relation Age of Onset    Heart disease Mother     Lung disease Mother  "    Heart attack Father     Heart disease Father     Heart attack Brother     Heart disease Brother     Other Brother         blood clots    Heart attack Maternal Grandfather     Heart disease Maternal Grandfather     Malig Hyperthermia Neg Hx        Review of Systems  Constitutional: No wt loss, fever, fatigue  Gastrointestinal: No nausea, abdominal pain  Behavioral/Psych: No insomnia or anxiety   Cardiovascular no chest pains or tightness in the chest  Objective:       Physical Exam  /74   Pulse 54   Ht 154.9 cm (60.98\")   Wt 74.8 kg (165 lb)   BMI 31.19 kg/m²   General appearance: No acute changes   Neck: Trachea midline; NECK, supple, no thyromegaly or lymphadenopathy   Lungs: Normal size and shape, normal breath sounds, equal distribution of air, no rales and rhonchi   CV: S1-S2 regular, no murmurs, no rub, no gallop   Abdomen: Soft, nontender; no masses , no abnormal abdominal sounds   Extremities: No deformity , normal color , no peripheral edema   Skin: Normal temperature, turgor and texture; no rash, ulcers          Procedures      Echocardiogram:    Results for orders placed during the hospital encounter of 05/07/25    Adult Transthoracic Echo Complete W/ Cont if Necessary Per Protocol    Interpretation Summary    Left ventricular ejection fraction appears to be 66 - 70%.    Left ventricular diastolic function was normal.    Estimated right ventricular systolic pressure from tricuspid regurgitation is normal (<35 mmHg).          Current Outpatient Medications:     acetaminophen (TYLENOL) 500 MG tablet, Take 2 tablets by mouth Every 6 (Six) Hours As Needed for Mild Pain., Disp: , Rfl:     aspirin 81 MG EC tablet, Take 1 tablet by mouth Daily., Disp: , Rfl:     bisoprolol (ZEBeta) 5 MG tablet, TAKE 1/2 TABLET BY MOUTH DAILY, Disp: 45 tablet, Rfl: 1    fluticasone (FLONASE) 50 MCG/ACT nasal spray, Administer 1 spray into the nostril(s) as directed by provider Daily., Disp: , Rfl:     Jardiance 10 MG " tablet tablet, Take 1 tablet by mouth Daily., Disp: , Rfl:     metFORMIN (GLUCOPHAGE) 1000 MG tablet, Take 1 tablet by mouth 2 (Two) Times a Day., Disp: , Rfl:     nitroglycerin (NITROSTAT) 0.4 MG SL tablet, Take 1 tablet by mouth Every 5 (Five) Minutes As Needed for Chest Pain. Take no more than 3 doses in 15 minutes., Disp: 25 tablet, Rfl: 3    omeprazole (priLOSEC) 40 MG capsule, Take 1 capsule by mouth Daily., Disp: , Rfl:     OneTouch Delica Lancets 30G misc, TEST 1 (ONE) EACH DAILY, Disp: , Rfl:     ONETOUCH VERIO test strip, , Disp: , Rfl: 0    prasugrel (EFFIENT) 10 MG tablet, TAKE 1 TABLET BY MOUTH DAILY, Disp: 90 tablet, Rfl: 3    pregabalin (LYRICA) 150 MG capsule, Take 1 capsule by mouth 2 (Two) Times a Day., Disp: , Rfl:     rosuvastatin (CRESTOR) 20 MG tablet, Take 1 tablet by mouth Daily. (Patient taking differently: Take 1 tablet by mouth Every Night.), Disp: 90 tablet, Rfl: 4    Tirzepatide (MOUNJARO SC), Inject  under the skin into the appropriate area as directed., Disp: , Rfl:     Tresiba FlexTouch 100 UNIT/ML solution pen-injector injection, Inject 25 Units under the skin into the appropriate area as directed Every Night., Disp: , Rfl:     isosorbide mononitrate (IMDUR) 30 MG 24 hr tablet, Take 1 tablet by mouth Daily., Disp: 30 tablet, Rfl: 11   Assessment:                Plan:          ICD-10-CM ICD-9-CM   1. AF (paroxysmal atrial fibrillation)  I48.0 427.31   2. Essential hypertension  I10 401.9   3. SOB (shortness of breath)  R06.02 786.05     1. AF (paroxysmal atrial fibrillation)  Under control    2. Essential hypertension  Continue current treatment    3. SOB (shortness of breath)  Under control  stress test mildly positive  Start imdur 30 mg   May stop effient 3 rd of June  Cath if symptoms persist  6 months  COUNSELING:    Lily Horner was given to patient for the following topics: diagnostic results, risk factor reductions, impressions, risks and benefits of treatment  options and importance of treatment compliance .       SMOKING COUNSELING:        Dictated using Dragon dictation

## 2025-05-27 PROBLEM — I48.0 AF (PAROXYSMAL ATRIAL FIBRILLATION): Status: ACTIVE | Noted: 2025-05-27

## 2025-06-18 ENCOUNTER — TELEPHONE (OUTPATIENT)
Dept: CARDIOLOGY | Facility: CLINIC | Age: 65
End: 2025-06-18
Payer: MEDICARE

## 2025-06-18 NOTE — TELEPHONE ENCOUNTER
----- Message from Rose Castañeda sent at 6/18/2025  2:55 PM EDT -----  Please let her know her holter results showed.  NSR WITH OCCASIONAL NSSVTS and we will monitor this.     Thanks, Jaime    TRY TO CALL HER BUT GOT RECORDING STATINGPATIENT IS NOT EXCEPTING CALLS VM NOT OPTION        I HAVE TRIED SEVERAL TIMES TO REACH PATIENT -RECORDING STILL SAYS SHE IS NOT EXCEPTING CALLS   VM NOT AN OPTION

## (undated) DEVICE — 6F .070 JR 4 100CM: Brand: CORDIS

## (undated) DEVICE — ADAPT CLN BIOGUARD AIR/H2O DISP

## (undated) DEVICE — GLIDESHEATH SLENDER STAINLESS STEEL KIT: Brand: GLIDESHEATH SLENDER

## (undated) DEVICE — DGW .035 FC J3MM 260CM TEF: Brand: EMERALD

## (undated) DEVICE — LN SMPL CO2 SHTRM SD STREAM W/M LUER

## (undated) DEVICE — CATH DIAG IMPULSE FL3.5 5F 100CM

## (undated) DEVICE — GLIDESHEATH BASIC HYDROPHILIC COATED INTRODUCER SHEATH: Brand: GLIDESHEATH

## (undated) DEVICE — NC TREK CORONARY DILATATION CATHETER 3.5 MM X 8 MM / RAPID-EXCHANGE: Brand: NC TREK

## (undated) DEVICE — GW EMR FIX EXCHG J STD .035 3MM 260CM

## (undated) DEVICE — CATH VENT MIV RADL PIG ST TIP 4F 110CM

## (undated) DEVICE — TUBING, SUCTION, 1/4" X 10', STRAIGHT: Brand: MEDLINE

## (undated) DEVICE — BALN PRESS WEDGE 5F 110CM

## (undated) DEVICE — DISPOSABLE ADAPTER

## (undated) DEVICE — KT MANIFLD CARDIAC

## (undated) DEVICE — SENSR O2 OXIMAX FNGR A/ 18IN NONSTR

## (undated) DEVICE — KT ORCA ORCAPOD DISP STRL

## (undated) DEVICE — FEMORAL ENTRY ANGIOGRAPHY SHIELD-YELLOW: Brand: RADPAD

## (undated) DEVICE — RUNTHROUGH NS EXTRA FLOPPY PTCA GUIDEWIRE: Brand: RUNTHROUGH

## (undated) DEVICE — GUIDE CATHETER: Brand: MACH1™

## (undated) DEVICE — TREK CORONARY DILATATION CATHETER 2.50 MM X 12 MM / RAPID-EXCHANGE: Brand: TREK

## (undated) DEVICE — TR BAND RADIAL ARTERY COMPRESSION DEVICE: Brand: TR BAND

## (undated) DEVICE — PK CATH CARD 40

## (undated) DEVICE — DEV INDEFLATOR P/N 580289

## (undated) DEVICE — GC 5F 056 JR 4 LBT: Brand: BRITE TIP

## (undated) DEVICE — INTRO SHEATH ART/FEM ENGAGE .035 5F12CM

## (undated) DEVICE — CATH DIAG IMPULSE FR4 5F 100CM

## (undated) DEVICE — BLCK/BITE BLOX W/DENTL/RIM W/STRAP 54F

## (undated) DEVICE — TRY INTRO PERC 6F

## (undated) DEVICE — CORONARY IMAGING CATHETER: Brand: OPTICROSS™ 6 HD

## (undated) DEVICE — PINNACLE INTRODUCER SHEATH: Brand: PINNACLE

## (undated) DEVICE — PERCLOSE PROGLIDE™ SUTURE-MEDIATED CLOSURE SYSTEM: Brand: PERCLOSE PROGLIDE™

## (undated) DEVICE — GC 5F 056 JL 3.5 LBT: Brand: BRITE TIP

## (undated) DEVICE — MINI TREK CORONARY DILATATION CATHETER 2.0 MM X 12 MM / RAPID-EXCHANGE: Brand: MINI TREK

## (undated) DEVICE — CATH VENT MIV RADL PIG ST TIP 5F 110CM

## (undated) DEVICE — HI-TORQUE WHISPER ES GUIDE WIRE .014 STRAIGHTTIP 3.0 CM X 190 CM: Brand: HI-TORQUE WHISPER

## (undated) DEVICE — CANN O2 ETCO2 FITS ALL CONN CO2 SMPL A/ 7IN DISP LF

## (undated) DEVICE — BALN PTCA TAKERU RX NC 2.5X12MM

## (undated) DEVICE — CATH ELECTRD PACE TEMP BI NONHEP 5F110CM

## (undated) DEVICE — FRCP BX RADJAW4 NDL 2.8 240CM LG OG BX40

## (undated) DEVICE — INTRO SHEATH ART/FEM ENGAGE .035 4F12CM

## (undated) DEVICE — LOU PACE DEFIB: Brand: MEDLINE INDUSTRIES, INC.

## (undated) DEVICE — NC TREK™ CORONARY DILATATION CATHETER 2.25 MM X 12 MM / RAPID-EXCHANGE: Brand: NC TREK™

## (undated) DEVICE — DEV INDEFLATOR

## (undated) DEVICE — BALN PTCA TAKERU RX NC 3X12MM

## (undated) DEVICE — GW PRESSUREWIRE AERIS W/ AGILE TP 175CM

## (undated) DEVICE — Device